# Patient Record
Sex: FEMALE | Race: AMERICAN INDIAN OR ALASKA NATIVE | Employment: OTHER | URBAN - METROPOLITAN AREA
[De-identification: names, ages, dates, MRNs, and addresses within clinical notes are randomized per-mention and may not be internally consistent; named-entity substitution may affect disease eponyms.]

---

## 2021-03-03 ENCOUNTER — HOSPITAL ENCOUNTER (OUTPATIENT)
Dept: LAB | Age: 56
Discharge: HOME | End: 2021-03-03
Payer: COMMERCIAL

## 2021-03-03 DIAGNOSIS — Z13.220 SCREENING FOR CHOLESTEROL LEVEL: ICD-10-CM

## 2021-03-03 DIAGNOSIS — K21.9 GASTROESOPHAGEAL REFLUX DISEASE, UNSPECIFIED WHETHER ESOPHAGITIS PRESENT: ICD-10-CM

## 2021-03-03 DIAGNOSIS — E66.09 OBESITY DUE TO EXCESS CALORIES WITH SERIOUS COMORBIDITY, UNSPECIFIED CLASSIFICATION: ICD-10-CM

## 2021-03-03 DIAGNOSIS — Z11.59 ENCOUNTER FOR HEPATITIS C SCREENING TEST FOR LOW RISK PATIENT: ICD-10-CM

## 2021-03-03 DIAGNOSIS — K92.1 BLOOD IN STOOL: ICD-10-CM

## 2021-03-03 DIAGNOSIS — E03.9 ACQUIRED HYPOTHYROIDISM: ICD-10-CM

## 2021-03-03 DIAGNOSIS — R14.0 ABDOMINAL BLOATING: ICD-10-CM

## 2021-03-03 LAB
ALBUMIN SERPL-MCNC: 3.5 G/DL (ref 3.4–5)
ALBUMIN/GLOB SERPL: 1.1 {RATIO} (ref 0.8–1.7)
ALP SERPL-CCNC: 104 U/L (ref 45–117)
ALT SERPL-CCNC: 26 U/L (ref 13–56)
ANION GAP SERPL CALC-SCNC: 2 MMOL/L (ref 3–18)
AST SERPL-CCNC: 21 U/L (ref 10–38)
BASOPHILS # BLD: 0.1 K/UL (ref 0–0.1)
BASOPHILS NFR BLD: 1 % (ref 0–2)
BILIRUB SERPL-MCNC: 0.4 MG/DL (ref 0.2–1)
BUN SERPL-MCNC: 12 MG/DL (ref 7–18)
BUN/CREAT SERPL: 13 (ref 12–20)
CALCIUM SERPL-MCNC: 9.2 MG/DL (ref 8.5–10.1)
CHLORIDE SERPL-SCNC: 103 MMOL/L (ref 100–111)
CHOLEST SERPL-MCNC: 206 MG/DL
CO2 SERPL-SCNC: 33 MMOL/L (ref 21–32)
CREAT SERPL-MCNC: 0.91 MG/DL (ref 0.6–1.3)
DIFFERENTIAL METHOD BLD: ABNORMAL
EOSINOPHIL # BLD: 0.5 K/UL (ref 0–0.4)
EOSINOPHIL NFR BLD: 5 % (ref 0–5)
ERYTHROCYTE [DISTWIDTH] IN BLOOD BY AUTOMATED COUNT: 15 % (ref 11.6–14.5)
EST. AVERAGE GLUCOSE BLD GHB EST-MCNC: 114 MG/DL
GLOBULIN SER CALC-MCNC: 3.3 G/DL (ref 2–4)
GLUCOSE SERPL-MCNC: 89 MG/DL (ref 74–99)
HBA1C MFR BLD: 5.6 % (ref 4.2–5.6)
HCT VFR BLD AUTO: 47.2 % (ref 35–45)
HDLC SERPL-MCNC: 45 MG/DL (ref 40–60)
HDLC SERPL: 4.6 {RATIO} (ref 0–5)
HGB BLD-MCNC: 14.8 G/DL (ref 12–16)
LDLC SERPL CALC-MCNC: 128.8 MG/DL (ref 0–100)
LIPID PROFILE,FLP: ABNORMAL
LYMPHOCYTES # BLD: 2.4 K/UL (ref 0.9–3.6)
LYMPHOCYTES NFR BLD: 24 % (ref 21–52)
MCH RBC QN AUTO: 30.5 PG (ref 24–34)
MCHC RBC AUTO-ENTMCNC: 31.4 G/DL (ref 31–37)
MCV RBC AUTO: 97.1 FL (ref 74–97)
MONOCYTES # BLD: 0.6 K/UL (ref 0.05–1.2)
MONOCYTES NFR BLD: 6 % (ref 3–10)
NEUTS SEG # BLD: 6.6 K/UL (ref 1.8–8)
NEUTS SEG NFR BLD: 64 % (ref 40–73)
PLATELET # BLD AUTO: 215 K/UL (ref 135–420)
PMV BLD AUTO: 10.6 FL (ref 9.2–11.8)
POTASSIUM SERPL-SCNC: 5 MMOL/L (ref 3.5–5.5)
PROT SERPL-MCNC: 6.8 G/DL (ref 6.4–8.2)
RBC # BLD AUTO: 4.86 M/UL (ref 4.2–5.3)
SODIUM SERPL-SCNC: 138 MMOL/L (ref 136–145)
T4 FREE SERPL-MCNC: 1.2 NG/DL (ref 0.7–1.5)
TRIGL SERPL-MCNC: 161 MG/DL (ref ?–150)
TSH SERPL DL<=0.05 MIU/L-ACNC: 3.58 UIU/ML (ref 0.36–3.74)
VLDLC SERPL CALC-MCNC: 32.2 MG/DL
WBC # BLD AUTO: 10.1 K/UL (ref 4.6–13.2)

## 2021-03-03 PROCEDURE — 84443 ASSAY THYROID STIM HORMONE: CPT

## 2021-03-03 PROCEDURE — 80061 LIPID PANEL: CPT

## 2021-03-03 PROCEDURE — 84439 ASSAY OF FREE THYROXINE: CPT

## 2021-03-03 PROCEDURE — 83036 HEMOGLOBIN GLYCOSYLATED A1C: CPT

## 2021-03-03 PROCEDURE — 86803 HEPATITIS C AB TEST: CPT

## 2021-03-03 PROCEDURE — 85025 COMPLETE CBC W/AUTO DIFF WBC: CPT

## 2021-03-03 PROCEDURE — 80053 COMPREHEN METABOLIC PANEL: CPT

## 2021-03-03 PROCEDURE — 36415 COLL VENOUS BLD VENIPUNCTURE: CPT

## 2021-03-04 LAB
HCV AB SER IA-ACNC: 0.08 INDEX
HCV AB SERPL QL IA: NEGATIVE
HCV COMMENT,HCGAC: NORMAL

## 2021-03-05 ENCOUNTER — TELEPHONE (OUTPATIENT)
Dept: CARDIOLOGY CLINIC | Age: 56
End: 2021-03-05

## 2021-03-05 NOTE — TELEPHONE ENCOUNTER
Per Cinthya Lee NP to be seen for Tachycardia I have called and spoken to patient and she Is aware of her appointment with Dr Nancy Plaza in the [place] office on 3/19/21 @ 12:00.

## 2021-03-15 ENCOUNTER — VIRTUAL VISIT (OUTPATIENT)
Dept: FAMILY MEDICINE CLINIC | Age: 56
End: 2021-03-15
Payer: COMMERCIAL

## 2021-03-15 DIAGNOSIS — Z87.898 HISTORY OF ASCITES: ICD-10-CM

## 2021-03-15 DIAGNOSIS — R51.9 NONINTRACTABLE HEADACHE, UNSPECIFIED CHRONICITY PATTERN, UNSPECIFIED HEADACHE TYPE: ICD-10-CM

## 2021-03-15 DIAGNOSIS — Z71.2 ENCOUNTER TO DISCUSS TEST RESULTS: Primary | ICD-10-CM

## 2021-03-15 DIAGNOSIS — F32.A DEPRESSION, UNSPECIFIED DEPRESSION TYPE: ICD-10-CM

## 2021-03-15 DIAGNOSIS — M25.473 ANKLE SWELLING, UNSPECIFIED LATERALITY: ICD-10-CM

## 2021-03-15 DIAGNOSIS — E03.9 ACQUIRED HYPOTHYROIDISM: ICD-10-CM

## 2021-03-15 DIAGNOSIS — G62.9 NEUROPATHY: ICD-10-CM

## 2021-03-15 DIAGNOSIS — R00.0 TACHYCARDIA: ICD-10-CM

## 2021-03-15 DIAGNOSIS — K92.1 BLOOD IN STOOL: ICD-10-CM

## 2021-03-15 DIAGNOSIS — F43.10 PTSD (POST-TRAUMATIC STRESS DISORDER): ICD-10-CM

## 2021-03-15 DIAGNOSIS — K57.92 DIVERTICULITIS: ICD-10-CM

## 2021-03-15 DIAGNOSIS — R14.0 ABDOMINAL BLOATING: ICD-10-CM

## 2021-03-15 DIAGNOSIS — K21.9 GASTROESOPHAGEAL REFLUX DISEASE, UNSPECIFIED WHETHER ESOPHAGITIS PRESENT: ICD-10-CM

## 2021-03-15 DIAGNOSIS — J45.909 ASTHMA, UNSPECIFIED ASTHMA SEVERITY, UNSPECIFIED WHETHER COMPLICATED, UNSPECIFIED WHETHER PERSISTENT: ICD-10-CM

## 2021-03-15 PROCEDURE — 99214 OFFICE O/P EST MOD 30 MIN: CPT | Performed by: NURSE PRACTITIONER

## 2021-03-15 RX ORDER — ALBUTEROL SULFATE 90 UG/1
2 AEROSOL, METERED RESPIRATORY (INHALATION)
Qty: 1 INHALER | Refills: 0 | Status: SHIPPED | OUTPATIENT
Start: 2021-03-15 | End: 2021-04-12 | Stop reason: SDUPTHER

## 2021-03-15 NOTE — PROGRESS NOTES
Antonio Hays presents today for   Chief Complaint   Patient presents with    Follow-up     3 week    Results     discuss lab results    Other     Lolis Tavaresas George preferred language for health care discussion is english/other. Is someone accompanying this pt? no    Is the patient using any DME equipment during [address]? no    Depression Screening:  3 most recent PHQ Screens 3/15/2021   Little interest or pleasure in doing things More than half the days   Feeling down, depressed, irritable, or hopeless More than half the days   Total Score PHQ 2 4   Trouble falling or staying asleep, or sleeping too much Not at all   Feeling tired or having little energy Not at all   Poor appetite, weight loss, or overeating Not at all   Feeling bad about yourself - or that you are a failure or have let yourself or your family down Nearly every day   Trouble concentrating on things such as school, work, reading, or watching TV More than half the days   Moving or speaking so slowly that other people could have noticed; or the opposite being so fidgety that others notice Not at all   Thoughts of being better off dead, or hurting yourself in some way More than half the days   PHQ 9 Score 11   How difficult have these problems made it for you to do your work, take care of your home and get along with others Not difficult at all       Learning Assessment:  Learning Assessment 2/22/2021   PRIMARY LEARNER Patient   HIGHEST LEVEL OF EDUCATION - PRIMARY LEARNER  2 YEARS [address] CAREGIVER No   PRIMARY LANGUAGE ENGLISH    NEED No   LEARNER PREFERENCE PRIMARY DEMONSTRATION   ANSWERED BY patient   RELATIONSHIP SELF       Abuse Screening:  Abuse Screening Questionnaire 2/22/2021   Do you ever feel afraid of your partner? N   Are you in a relationship with someone who physically or mentally threatens you? N   Is it safe for you to go home?  Y       Generalized Anxiety  No flowsheet data found. Health Maintenance Due   Topic Date Due    Pneumococcal 0-64 years (1 of 1 - PPSV23) Never done    COVID-19 Vaccine (1) Never done    DTaP/Tdap/Td series (1 - Tdap) Never done    PAP AKA CERVICAL CYTOLOGY  Never done    Shingrix Vaccine Age 50> (1 of 2) Never done    Colorectal Cancer Screening Combo  Never done    Breast Cancer Screen Mammogram  Never done    Flu Vaccine (1) 09/01/2020   . Health Maintenance reviewed and discussed and ordered per Provider. Coordination of Care:  1. Have you been to the ER, urgent care clinic since your last visit? Hospitalized since your last visit? no    2. Have you seen or consulted any other health care providers outside of the [address] [place] [address] since your last visit? Include any pap smears or colon screening.  no      Advance Directive:  Discussed 2/22/21
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
## 2021-03-19 ENCOUNTER — OFFICE VISIT (OUTPATIENT)
Dept: CARDIOLOGY CLINIC | Age: 56
End: 2021-03-19
Payer: COMMERCIAL

## 2021-03-19 VITALS
DIASTOLIC BLOOD PRESSURE: 90 MMHG | BODY MASS INDEX: 47.09 KG/M2 | SYSTOLIC BLOOD PRESSURE: 175 MMHG | HEIGHT: 66 IN | TEMPERATURE: 97.4 F | HEART RATE: 72 BPM | WEIGHT: 293 LBS

## 2021-03-19 DIAGNOSIS — I50.33 ACUTE ON CHRONIC DIASTOLIC CONGESTIVE HEART FAILURE (HCC): Primary | ICD-10-CM

## 2021-03-19 DIAGNOSIS — I10 ESSENTIAL HYPERTENSION: ICD-10-CM

## 2021-03-19 DIAGNOSIS — E66.01 SEVERE OBESITY (BMI >= 40) (HCC): ICD-10-CM

## 2021-03-19 PROCEDURE — 93000 ELECTROCARDIOGRAM COMPLETE: CPT | Performed by: INTERNAL MEDICINE

## 2021-03-19 PROCEDURE — 99205 OFFICE O/P NEW HI 60 MIN: CPT | Performed by: INTERNAL MEDICINE

## 2021-03-19 RX ORDER — LANOLIN ALCOHOL/MO/W.PET/CERES
400 CREAM (GRAM) TOPICAL DAILY
Qty: 30 TAB | Refills: 1 | Status: SHIPPED | OUTPATIENT
Start: 2021-03-19 | End: 2021-05-24 | Stop reason: SDUPTHER

## 2021-03-19 RX ORDER — FUROSEMIDE 40 MG/1
40 TABLET ORAL 2 TIMES DAILY
Qty: 60 TAB | Refills: 1 | Status: SHIPPED | OUTPATIENT
Start: 2021-03-19 | End: 2021-04-26

## 2021-03-19 RX ORDER — SPIRONOLACTONE 25 MG/1
25 TABLET ORAL DAILY
Qty: 30 TAB | Refills: 1 | Status: SHIPPED | OUTPATIENT
Start: 2021-03-19 | End: 2021-04-26 | Stop reason: SDUPTHER

## 2021-03-19 RX ORDER — EPINEPHRINE 1 MG/ML
INJECTION, SOLUTION, CONCENTRATE INTRAVENOUS ONCE
COMMUNITY

## 2021-03-19 NOTE — PROGRESS NOTES
HISTORY OF PRESENT ILLNESS  Malachi Carrasco is a 48 y.o. female. 3/21 seen as new patient. H/o TR, MR, idiopathic tachycardia, edema, neuropathy    New Patient  The history is provided by the medical records and patient. Associated symptoms include shortness of breath. Pertinent negatives include no chest pain and no headaches. Palpitations   The history is provided by the patient. The current episode started more than 1 week ago. The problem has been resolved (since thyroid medicine given, last episode >2yrs ago). Episode frequency: racing. Associated symptoms include lower extremity edema and shortness of breath. Pertinent negatives include no fever, no malaise/fatigue, no chest pain, no claudication, no orthopnea, no PND, no nausea, no vomiting, no headaches, no dizziness and no cough. Shortness of Breath  The history is provided by the patient. This is a new problem. The current episode started more than 1 week ago (yrs). The problem has been gradually worsening (few months). Associated symptoms include leg swelling. Pertinent negatives include no fever, no headaches, no cough, no wheezing, no PND, no orthopnea, no chest pain, no vomiting, no rash and no claudication. Sore throat: walking out of the house. The problem's precipitants include exercise. Leg Swelling  The history is provided by the patient. This is a new problem. The current episode started more than 1 week ago (yrs). The problem occurs daily. The problem has been gradually worsening (few months). Associated symptoms include shortness of breath. Pertinent negatives include no chest pain and no headaches. The symptoms are aggravated by standing. The symptoms are relieved by sleep and medications.      Allergies   Allergen Reactions    Ansaid [Flurbiprofen] Anaphylaxis    Keflex [Cephalexin] Hives and Itching       Past Medical History:   Diagnosis Date    Anemia     Angio-edema     Asthma     Depression     Diverticulosis     Enlargement, spleen     Esophagospasm     Essential hypertension     Fatty liver     GERD (gastroesophageal reflux disease)     Histoplasmosis     HPV (human papilloma virus) anogenital infection     Lyme disease     Neuropathy     PCOS (polycystic ovarian syndrome)     PTSD (post-traumatic stress disorder)     Tachycardia     Thyroid disease     Tricuspid insufficiency        Family History   Problem Relation Age of Onset    Cancer Mother         stomach    Seizures Mother     Cancer Sister         double mastectomy    Cancer Sister         lung    Heart Surgery Maternal Grandmother         CABG    Stroke Neg Hx        Social History     Tobacco Use    Smoking status: Current Every Day Smoker     Packs/day: 0.50     Years: 15.00     Pack years: 7.50     Start date: 1985    Smokeless tobacco: Never Used   Substance Use Topics    Alcohol use: Not Currently    Drug use: Not Currently        Current Outpatient Medications   Medication Sig    EPINEPHrine HCl, PF, (ADRENALIN) 1 mg/mL (1 mL) injection once.  albuterol (PROVENTIL HFA, VENTOLIN HFA, PROAIR HFA) 90 mcg/actuation inhaler Take 2 Puffs by inhalation every four (4) hours as needed for Wheezing.  omeprazole (PRILOSEC) 40 mg capsule     magnesium oxide (MAG-OX) 400 mg tablet     ergocalciferol (ERGOCALCIFEROL) 1,250 mcg (50,000 unit) capsule     cyanocobalamin 1,000 mcg tablet     fluticasone propionate (Flovent HFA) 220 mcg/actuation inhaler Take 1 Puff by inhalation two (2) times a day.  traMADoL (ULTRAM) 50 mg tablet Take 50 mg by mouth as needed for Pain.  cyclobenzaprine (FLEXERIL) 10 mg tablet Take  by mouth as needed for Muscle Spasm(s).  levothyroxine (SYNTHROID) 100 mcg tablet Take 100 mcg by mouth Daily (before breakfast).  furosemide (Lasix) 40 mg tablet Take 40 mg by mouth daily. 1/2 tab    cetirizine (ZyrTEC) 10 mg tablet Take 10 mg by mouth daily.     potassium chloride (K-DUR, KLOR-CON) 20 mEq tablet Take 1 Tab by mouth daily. No current facility-administered medications for this visit. Past Surgical History:   Procedure Laterality Date    HX CHOLECYSTECTOMY      HX HEART CATHETERIZATION  2010 approx    normal per patient    HX HEMORRHOIDECTOMY      HX OTHER SURGICAL      Vocal cord polyps       Visit Vitals  BP (!) 175/90   Pulse 72   Temp 97.4 °F (36.3 °C) (Temporal)   Ht 5' 6\" (1.676 m)   Wt 151.8 kg (334 lb 9.6 oz)   BMI 54.01 kg/m²       Diagnostic Studies:  I have reviewed the relevant tests done on the patient and show as follows  EKG tracings reviewed by me today. EKG Results     Procedure 720 Value Units Date/Time    AMB POC EKG ROUTINE W/ 12 LEADS, INTER & REP [[id number]] Resulted: 03/19/21 1242    Order Status: Completed Updated: 03/19/21 1234        XR Results (most recent):  No results found for this or any previous visit. Ms. Luis Lazo has a reminder for a \"due or due soon\" health maintenance. I have asked that she contact her primary care provider for follow-up on this health maintenance. Review of Systems   Constitutional: Negative for chills, fever, malaise/fatigue and weight loss. HENT: Negative for nosebleeds. Sore throat: walking out of the house. Eyes: Negative for discharge. Respiratory: Positive for shortness of breath. Negative for cough and wheezing. Cardiovascular: Positive for palpitations and leg swelling. Negative for chest pain, orthopnea, claudication and PND. Gastrointestinal: Negative for diarrhea, nausea and vomiting. Genitourinary: Negative for dysuria and hematuria. Musculoskeletal: Negative for joint pain. Skin: Negative for rash. Neurological: Negative for dizziness, seizures, loss of consciousness and headaches. Endo/Heme/Allergies: Negative for polydipsia. Does not bruise/bleed easily. Psychiatric/Behavioral: Negative for depression and substance abuse. The patient does not have insomnia.     11/20 echo  ECHOCARDIOGRAM COMPLETE  (DOPPLER ECHO)11/20/2020  [facility]  Result Impression   :   POOR QUALITY 2-DIMENSIONAL IMAGES   DEFINITY CONTRAST UTILIZED TO ENHANCE ENDOCARDIAL BORDER DEFINITION   LEFT VENTRICULAR HYPERTROPHY   GROSSLY NORMAL LEFT VENTRICULAR CAVITY SIZE AND SYSTOLIC FUNCTION   COULD NOT ACCURATELY QUANTIFY EJECTION FRACTION   GRADE 1 DIASTOLIC DYSFUNCTION   RIGHT VENTRICLE NOT VISUALIZED   GROSSLY NORMAL LEFT ATRIAL SIZE   RIGHT ATRIUM NOT VISUALIZED   NO OBVIOUS HEMODYNAMICALLY SIGNIFICANT VALVE PATHOLOGY   RVSP - NA     Physical Exam   Constitutional: She is oriented to person, place, and time. She appears well-developed and well-nourished. No distress. Morbid obesity   HENT:   Head: Normocephalic and atraumatic. Mouth/Throat: Normal dentition. Eyes: Right eye exhibits no discharge. Left eye exhibits no discharge. No scleral icterus. Neck: Neck supple. No JVD present. Carotid bruit is not present. No thyromegaly present. Cardiovascular: Normal rate, regular rhythm, S1 normal, S2 normal, normal heart sounds and intact distal pulses. Exam reveals no gallop and no friction rub. No murmur heard. Pulmonary/Chest: Effort normal and breath sounds normal. She has no wheezes. She has no rales. Abdominal: Soft. She exhibits no mass. There is no abdominal tenderness. Musculoskeletal:         General: No edema. Lymphadenopathy:        Right cervical: No superficial cervical adenopathy present. Left cervical: No superficial cervical adenopathy present. Neurological: She is alert and oriented to person, place, and time. Skin: Skin is warm and dry. No rash noted. Psychiatric: She has a normal mood and affect. Her behavior is normal.       ASSESSMENT and PLAN    LIPIDS : Results for Becca Blake (MRN [id number]) as of 3/19/2021 13:01   Ref.  Range 3/3/2021 12:20   Triglyceride Latest Ref Range: <150 MG/ (H)   Cholesterol, total Latest Ref Range: <200 MG/ (H)   HDL Cholesterol Latest Ref Range: 40 - 60 MG/DL 45   CHOL/HDL Ratio Latest Ref Range: 0 - 5.0   4.6   VLDL, calculated Latest Units: MG/DL 32.2   LDL, calculated Latest Ref Range: 0 - 100 MG/.8 (H)         Diagnoses and all orders for this visit:    1. Acute on chronic diastolic congestive heart failure (HCC)  -     AMB POC EKG ROUTINE W/ 12 LEADS, INTER & REP  -     furosemide (Lasix) 40 mg tablet; Take 1 Tab by mouth two (2) times a day. -     spironolactone (ALDACTONE) 25 mg tablet; Take 1 Tab by mouth daily.  -     magnesium oxide (MAG-OX) 400 mg tablet; Take 1 Tab by mouth daily.  -     METABOLIC PANEL, BASIC; Future  -     METABOLIC PANEL, BASIC; Future    2. Severe obesity (BMI >= 40) ([facility])    3. Essential hypertension        Pertinent laboratory and test data reviewed and discussed with patient. See patient instructions also for other medical advice given    Medications Discontinued During This Encounter   Medication Reason    magnesium oxide (MAG-OX) 400 mg tablet REORDER    furosemide (Lasix) 40 mg tablet REORDER       Follow-up and Dispositions    · Return in about 6 weeks (around 4/30/2021), or if symptoms worsen or fail to improve, for post test.       3/19/2021 seen as new patient for changing the cardiologist.  She cannot locate her old cardiologist and so is changing. She has acute on chronic CHF which is diastolic and right heart failure mostly. Blood pressure is elevated today but is normal most of the times at home. CHF is secondary to morbid obesity most likely. She will need to be worked up for sleep apnea. History of Lyme disease for which she was treated with doxycycline a year. Apparently she is still positive for it and I recommended ID opinion for that. Increase Lasix to 40 twice daily add spironolactone. Told her not to use any over-the-counter potassium but does take the prescription pills so we can follow the labs closely and replace as needed. Continue magnesium.   History of significant electrolyte problems in the past.  Told her to come to ER if she does not feel well. Extensive review of previous records and face-to-face time was more than 60 minutes.

## 2021-03-19 NOTE — PATIENT INSTRUCTIONS
Learning About the [address] [facility] Diet  What is the Mediterranean diet? The Mediterranean diet is a style of eating rather than a diet plan. It features foods eaten in [place], [place], [place] and [place], and other countries along the [facility]. It emphasizes eating foods like fish, fruits, vegetables, beans, high-fiber breads and whole grains, nuts, and olive oil. This style of eating includes limited red meat, cheese, and sweets. Why choose the Mediterranean diet? A Mediterranean-style diet may improve heart health. It contains more fat than other heart-healthy diets. But the fats are mainly from nuts, unsaturated oils (such as fish oils and olive oil), and certain nut or seed oils (such as canola, soybean, or flaxseed oil). These fats may help protect the heart and blood vessels. How can you get started on the Mediterranean diet? Here are some things you can do to switch to a more Mediterranean way of eating. What to eat  · Eat a variety of fruits and vegetables each day, such as grapes, blueberries, tomatoes, broccoli, peppers, figs, olives, spinach, eggplant, beans, lentils, and chickpeas. · Eat a variety of whole-grain foods each day, such as oats, brown rice, and whole wheat bread, pasta, and couscous. · Eat fish at least 2 times a week. Try tuna, salmon, mackerel, lake trout, herring, or sardines. · Eat moderate amounts of low-fat dairy products, such as milk, cheese, or yogurt. · Eat moderate amounts of poultry and eggs. · Choose healthy (unsaturated) fats, such as nuts, olive oil, and certain nut or seed oils like canola, soybean, and flaxseed. · Limit unhealthy (saturated) fats, such as butter, palm oil, and coconut oil. And limit fats found in animal products, such as meat and dairy products made with whole milk. Try to eat red meat only a few times a month in very small amounts. · Limit sweets and desserts to only a few times a week.  This includes sugar-sweetened drinks like soda. The Mediterranean diet may also include red wine with your meal--1 glass each day for women and up to 2 glasses a day for men. Tips for eating at home  · Use herbs, spices, garlic, lemon zest, and citrus juice instead of salt to add flavor to foods. · Add avocado slices to your sandwich instead of salazar. · Have fish for lunch or dinner instead of red meat. Brush the fish with olive oil, and broil or grill it. · Sprinkle your salad with seeds or nuts instead of cheese. · Cook with olive or canola oil instead of butter or oils that are high in saturated fat. · Switch from 2% milk or whole milk to 1% or fat-free milk. · Dip raw vegetables in a vinaigrette dressing or hummus instead of dips made from mayonnaise or sour cream.  · Have a piece of fruit for dessert instead of a piece of cake. Try baked apples, or have some dried fruit. Tips for eating out  · Try broiled, grilled, baked, or poached fish instead of having it fried or breaded. · Ask your  to have your meals prepared with olive oil instead of butter. · Order dishes made with marinara sauce or sauces made from olive oil. Avoid sauces made from cream or mayonnaise. · Choose whole-grain breads, whole wheat pasta and pizza crust, brown rice, beans, and lentils. · Cut back on butter or margarine on bread. Instead, you can dip your bread in a small amount of olive oil. · Ask for a side salad or grilled vegetables instead of french fries or chips. Where can you learn more? Go to http://www.apple.com/  Enter O407 in the search box to learn more about \"Learning About the Mediterranean Diet. \"  Current as of: August 22, 2019               Content Version: 12.6  © 4736-9554 [employer]. Care instructions adapted under license by [employer] (which disclaims liability or warranty for this information).  If you have questions about a medical condition or this instruction, always ask your healthcare professional. [address] any warranty or liability for your use of this information.

## 2021-03-25 ENCOUNTER — HOSPITAL ENCOUNTER (OUTPATIENT)
Dept: LAB | Age: 56
Discharge: HOME | End: 2021-03-25
Payer: COMMERCIAL

## 2021-03-25 DIAGNOSIS — I50.33 ACUTE ON CHRONIC DIASTOLIC CONGESTIVE HEART FAILURE (HCC): ICD-10-CM

## 2021-03-25 LAB
ANION GAP SERPL CALC-SCNC: 4 MMOL/L (ref 3–18)
BUN SERPL-MCNC: 13 MG/DL (ref 7–18)
BUN/CREAT SERPL: 14 (ref 12–20)
CALCIUM SERPL-MCNC: 9.6 MG/DL (ref 8.5–10.1)
CHLORIDE SERPL-SCNC: 101 MMOL/L (ref 100–111)
CO2 SERPL-SCNC: 33 MMOL/L (ref 21–32)
CREAT SERPL-MCNC: 0.95 MG/DL (ref 0.6–1.3)
GLUCOSE SERPL-MCNC: 98 MG/DL (ref 74–99)
POTASSIUM SERPL-SCNC: 4.5 MMOL/L (ref 3.5–5.5)
SODIUM SERPL-SCNC: 138 MMOL/L (ref 136–145)

## 2021-03-25 PROCEDURE — 36415 COLL VENOUS BLD VENIPUNCTURE: CPT

## 2021-03-25 PROCEDURE — 80048 BASIC METABOLIC PNL TOTAL CA: CPT

## 2021-03-30 ENCOUNTER — OFFICE VISIT (OUTPATIENT)
Dept: CARDIOLOGY CLINIC | Age: 56
End: 2021-03-30
Payer: COMMERCIAL

## 2021-03-30 VITALS
HEART RATE: 79 BPM | WEIGHT: 293 LBS | HEIGHT: 66 IN | BODY MASS INDEX: 47.09 KG/M2 | SYSTOLIC BLOOD PRESSURE: 158 MMHG | TEMPERATURE: 97.3 F | DIASTOLIC BLOOD PRESSURE: 100 MMHG

## 2021-03-30 DIAGNOSIS — E66.01 SEVERE OBESITY (BMI >= 40) (HCC): ICD-10-CM

## 2021-03-30 DIAGNOSIS — I10 ESSENTIAL HYPERTENSION: ICD-10-CM

## 2021-03-30 DIAGNOSIS — I50.33 ACUTE ON CHRONIC DIASTOLIC CONGESTIVE HEART FAILURE (HCC): Primary | ICD-10-CM

## 2021-03-30 PROCEDURE — 99214 OFFICE O/P EST MOD 30 MIN: CPT | Performed by: NURSE PRACTITIONER

## 2021-03-30 NOTE — PATIENT INSTRUCTIONS
Heart-Healthy Diet: Care Instructions  Your Care Instructions     A heart-healthy diet has lots of vegetables, fruits, nuts, beans, and whole grains, and is low in salt. It limits foods that are high in saturated fat, such as meats, cheeses, and fried foods. It may be hard to change your diet, but even small changes can lower your risk of heart attack and heart disease. Follow-up care is a key part of your treatment and safety. Be sure to make and go to all appointments, and call your doctor if you are having problems. It's also a good idea to know your test results and keep a list of the medicines you take. How can you care for yourself at home? Watch your portions  · Learn what a serving is. A \"serving\" and a \"portion\" are not always the same thing. Make sure that you are not eating larger portions than are recommended. For example, a serving of pasta is ½ cup. A serving size of meat is 2 to 3 ounces. A 3-ounce serving is about the size of a deck of cards. Measure serving sizes until you are good at [place]" them. Keep in mind that restaurants often serve portions that are 2 or 3 times the size of one serving. · To keep your energy level up and keep you from feeling hungry, eat often but in smaller portions. · Eat only the number of calories you need to stay at a healthy weight. If you need to lose weight, eat fewer calories than your body burns (through exercise and other physical activity). Eat more fruits and vegetables  · Eat a variety of fruit and vegetables every day. Dark green, deep orange, red, or yellow fruits and vegetables are especially good for you. Examples include spinach, carrots, peaches, and berries. · Keep carrots, celery, and other veggies handy for snacks. Buy fruit that is in season and store it where you can see it so that you will be tempted to eat it. · Cook dishes that have a lot of veggies in them, such as stir-fries and soups.   Limit saturated and trans fat  · Read food labels, and try to avoid saturated and trans fats. They increase your risk of heart disease. · Use olive or canola oil when you cook. · Bake, broil, grill, or steam foods instead of frying them. · Choose lean meats instead of high-fat meats such as hot dogs and sausages. Cut off all visible fat when you prepare meat. · Eat fish, skinless poultry, and meat alternatives such as soy products instead of high-fat meats. Soy products, such as tofu, may be especially good for your heart. · Choose low-fat or fat-free milk and dairy products. Eat foods high in fiber  · Eat a variety of grain products every day. Include whole-grain foods that have lots of fiber and nutrients. Examples of whole-grain foods include oats, whole wheat bread, and brown rice. · Buy whole-grain breads and cereals, instead of white bread or pastries. Limit salt and sodium  · Limit how much salt and sodium you eat to help lower your blood pressure. · Taste food before you salt it. Add only a little salt when you think you need it. With time, your taste buds will adjust to less salt. · Eat fewer snack items, fast foods, and other high-salt, processed foods. Check food labels for the amount of sodium in packaged foods. · Choose low-sodium versions of canned goods (such as soups, vegetables, and beans). Limit sugar  · Limit drinks and foods with added sugar. These include candy, desserts, and soda pop. Limit alcohol  · Limit alcohol to no more than 2 drinks a day for men and 1 drink a day for women. Too much alcohol can cause health problems. When should you call for help? Watch closely for changes in your health, and be sure to contact your doctor if:    · You would like help planning heart-healthy meals. Where can you learn more? Go to http://www.[employer].com/  Enter V137 in the search box to learn more about \"Heart-Healthy Diet: Care Instructions. \"  Current as of: August 22, 2019               Content Version: 12.6  © 0298-7711 [employer]. Care instructions adapted under license by [employer] (which disclaims liability or warranty for this information). If you have questions about a medical condition or this instruction, always ask your healthcare professional. [address] any warranty or liability for your use of this information.

## 2021-03-30 NOTE — PROGRESS NOTES
HISTORY OF PRESENT ILLNESS  Travon Sterling is a 48 y.o. female. 3/21 seen as new patient. H/o TR, MR, idiopathic tachycardia, edema, neuropathy  3/30/2021  Seen for follow-up for diastolic heart failure. Patient is very tearful and anxious with 3 pages of questions. She denies chest pain shortness of breath palpitations. She reports bilateral lower extremity edema is improving. She reports that she is following with vascular for chronic venous insufficiency. She complains of chronic neuropathic pain and has requested pain medication or sleep aid. New Patient  The history is provided by the medical records and patient. Pertinent negatives include no chest pain, no headaches and no shortness of breath. Palpitations   The history is provided by the patient. The current episode started more than 1 week ago. The problem has been resolved (since thyroid medicine given, last episode >2yrs ago). Episode frequency: racing. Associated symptoms include lower extremity edema. Pertinent negatives include no fever, no malaise/fatigue, no chest pain, no claudication, no orthopnea, no PND, no nausea, no vomiting, no headaches, no dizziness, no cough and no shortness of breath. Her past medical history is significant for hypertension. Shortness of Breath  The history is provided by the patient. This is a new problem. The current episode started more than 1 week ago (yrs). The problem has been gradually worsening (few months). Associated symptoms include leg swelling. Pertinent negatives include no fever, no headaches, no cough, no wheezing, no PND, no orthopnea, no chest pain, no vomiting, no rash and no claudication. Sore throat: walking out of the house. The problem's precipitants include exercise. Leg Swelling  The history is provided by the patient. This is a new problem. The current episode started more than 1 week ago (yrs). The problem occurs daily. The problem has been gradually worsening (few months). Pertinent negatives include no chest pain, no headaches and no shortness of breath. The symptoms are aggravated by standing. The symptoms are relieved by sleep and medications. CHF  Pertinent negatives include no chest pain, no headaches and no shortness of breath. Hypertension  Pertinent negatives include no chest pain, no headaches and no shortness of breath. Allergies   Allergen Reactions    Ansaid [Flurbiprofen] Anaphylaxis    Keflex [Cephalexin] Hives and Itching       Past Medical History:   Diagnosis Date    Anemia     Angio-edema     Asthma     Depression     Diverticulosis     Enlargement, spleen     Esophagospasm     Essential hypertension     Fatty liver     GERD (gastroesophageal reflux disease)     Histoplasmosis     HPV (human papilloma virus) anogenital infection     Lyme disease     Neuropathy     PCOS (polycystic ovarian syndrome)     PTSD (post-traumatic stress disorder)     Tachycardia     Thyroid disease     Tricuspid insufficiency        Family History   Problem Relation Age of Onset    Cancer Mother         stomach    Seizures Mother     Cancer Sister         double mastectomy    Cancer Sister         lung    Heart Surgery Maternal Grandmother         CABG    Stroke Neg Hx        Social History     Tobacco Use    Smoking status: Current Every Day Smoker     Packs/day: 0.50     Years: 15.00     Pack years: 7.50     Start date: 1985    Smokeless tobacco: Never Used    Tobacco comment: less than 10 depending on the day   Substance Use Topics    Alcohol use: Not Currently    Drug use: Not Currently        Current Outpatient Medications   Medication Sig    EPINEPHrine HCl, PF, (ADRENALIN) 1 mg/mL (1 mL) injection once.  furosemide (Lasix) 40 mg tablet Take 1 Tab by mouth two (2) times a day.  spironolactone (ALDACTONE) 25 mg tablet Take 1 Tab by mouth daily.  magnesium oxide (MAG-OX) 400 mg tablet Take 1 Tab by mouth daily.     albuterol (PROVENTIL HFA, VENTOLIN HFA, PROAIR HFA) 90 mcg/actuation inhaler Take 2 Puffs by inhalation every four (4) hours as needed for Wheezing.  omeprazole (PRILOSEC) 40 mg capsule     ergocalciferol (ERGOCALCIFEROL) 1,250 mcg (50,000 unit) capsule     cyanocobalamin 1,000 mcg tablet     fluticasone propionate (Flovent HFA) 220 mcg/actuation inhaler Take 1 Puff by inhalation two (2) times a day.  traMADoL (ULTRAM) 50 mg tablet Take 50 mg by mouth as needed for Pain.  cyclobenzaprine (FLEXERIL) 10 mg tablet Take  by mouth as needed for Muscle Spasm(s).  levothyroxine (SYNTHROID) 100 mcg tablet Take 100 mcg by mouth Daily (before breakfast).  cetirizine (ZyrTEC) 10 mg tablet Take 10 mg by mouth daily.  potassium chloride (K-DUR, KLOR-CON) 20 mEq tablet Take 1 Tab by mouth daily. No current facility-administered medications for this visit. Past Surgical History:   Procedure Laterality Date    HX CHOLECYSTECTOMY      HX HEART CATHETERIZATION  2010 approx    normal per patient    HX HEMORRHOIDECTOMY      HX OTHER SURGICAL      Vocal cord polyps       Visit Vitals  BP (!) 158/100   Pulse 79   Temp 97.3 °F (36.3 °C)   Ht 5' 6\" (1.676 m)   Wt 145.2 kg (320 lb)   LMP 02/15/2021   BMI 51.65 kg/m²       Diagnostic Studies:  I have reviewed the relevant tests done on the patient and show as follows  EKG tracings reviewed by me today. EKG Results     None        XR Results (most recent):  No results found for this or any previous visit. Ms. Cherry Aguilar has a reminder for a \"due or due soon\" health maintenance. I have asked that she contact her primary care provider for follow-up on this health maintenance. Review of Systems   Constitutional: Negative for chills, fever, malaise/fatigue and weight loss. HENT: Negative for nosebleeds. Sore throat: walking out of the house. Eyes: Negative for discharge. Respiratory: Negative for cough, shortness of breath and wheezing.     Cardiovascular: Positive for leg swelling. Negative for chest pain, palpitations, orthopnea, claudication and PND. Gastrointestinal: Negative for diarrhea, nausea and vomiting. Genitourinary: Negative for dysuria and hematuria. Musculoskeletal: Positive for joint pain and myalgias. Skin: Negative for rash. Neurological: Negative for dizziness, seizures, loss of consciousness and headaches. Endo/Heme/Allergies: Negative for polydipsia. Does not bruise/bleed easily. Psychiatric/Behavioral: Negative for depression and substance abuse. The patient is nervous/anxious and has insomnia. 11/20 echo  ECHOCARDIOGRAM COMPLETE  (DOPPLER ECHO)11/20/2020  [facility]  Result Impression   :   POOR QUALITY 2-DIMENSIONAL IMAGES   DEFINITY CONTRAST UTILIZED TO ENHANCE ENDOCARDIAL BORDER DEFINITION   LEFT VENTRICULAR HYPERTROPHY   GROSSLY NORMAL LEFT VENTRICULAR CAVITY SIZE AND SYSTOLIC FUNCTION   COULD NOT ACCURATELY QUANTIFY EJECTION FRACTION   GRADE 1 DIASTOLIC DYSFUNCTION   RIGHT VENTRICLE NOT VISUALIZED   GROSSLY NORMAL LEFT ATRIAL SIZE   RIGHT ATRIUM NOT VISUALIZED   NO OBVIOUS HEMODYNAMICALLY SIGNIFICANT VALVE PATHOLOGY   RVSP - NA     Physical Exam   Constitutional: She is oriented to person, place, and time. She appears well-developed and well-nourished. No distress. Morbid obesity   HENT:   Head: Normocephalic and atraumatic. Mouth/Throat: Normal dentition. Eyes: Right eye exhibits no discharge. Left eye exhibits no discharge. No scleral icterus. Neck: Neck supple. No JVD (unable to assess JVD due to obesity) present. Carotid bruit is not present. No thyromegaly present. Cardiovascular: Normal rate, regular rhythm, S1 normal, S2 normal, normal heart sounds and intact distal pulses. Exam reveals no gallop and no friction rub. No murmur heard. Pulmonary/Chest: Effort normal and breath sounds normal. No respiratory distress. She has no wheezes. She has no rales. Abdominal: Soft. She exhibits no mass.  There is no abdominal tenderness. Musculoskeletal:         General: Edema (1 BLE edema, with chronic stasis changes to skin) present. Lymphadenopathy:        Right cervical: No superficial cervical adenopathy present. Left cervical: No superficial cervical adenopathy present. Neurological: She is alert and oriented to person, place, and time. Skin: Skin is warm and dry. No rash noted. Psychiatric: She has a normal mood and affect. Her behavior is normal.   Nursing note and vitals reviewed. ASSESSMENT and PLAN    LIPIDS : Results for Maria Luz Tao (MRN [id number]) as of 3/19/2021 13:01   Ref. Range 3/3/2021 12:20   Triglyceride Latest Ref Range: <150 MG/ (H)   Cholesterol, total Latest Ref Range: <200 MG/ (H)   HDL Cholesterol Latest Ref Range: 40 - 60 MG/DL 45   CHOL/HDL Ratio Latest Ref Range: 0 - 5.0   4.6   VLDL, calculated Latest Units: MG/DL 32.2   LDL, calculated Latest Ref Range: 0 - 100 MG/.8 (H)         Diagnoses and all orders for this visit:    1. Acute on chronic diastolic congestive heart failure ([address]  -     ECHO ADULT COMPLETE; Future  -     REFERRAL TO PULMONARY DISEASE    2. Essential hypertension    3. Severe obesity (BMI >= 40) (HCC)        Pertinent laboratory and test data reviewed and discussed with patient. See patient instructions also for other medical advice given    There are no discontinued medications. Follow-up and Dispositions    · Return in 4 years (on 3/30/2025), or if symptoms worsen or fail to improve, for Please obtain records from cardiologist in [place], Follow up with Dr. Corie Lenz. 3/19/2021 seen as new patient for changing the cardiologist.  She cannot locate her old cardiologist and so is changing. She has acute on chronic CHF which is diastolic and right heart failure mostly. Blood pressure is elevated today but is normal most of the times at home. CHF is secondary to morbid obesity most likely.   She will need to be worked up for sleep apnea. History of Lyme disease for which she was treated with doxycycline a year. Apparently she is still positive for it and I recommended ID opinion for that. Increase Lasix to 40 twice daily add spironolactone. Told her not to use any over-the-counter potassium but does take the prescription pills so we can follow the labs closely and replace as needed. Continue magnesium. History of significant electrolyte problems in the past.  Told her to come to ER if she does not feel well. Extensive review of previous records and face-to-face time was more than 60 minutes. 3/30/2021  Seen in follow-up for chronic CHF. Prior echo reviewed poor image quality. Blood pressure is elevated in office today however significantly lower upon recheck. She complains of chronic pain and insomnia and has requested pain medication as well as sleep aid advised to follow-up with PCP. She denies chest pain, shortness of breath, palpitations. She reports improvement in bilateral lower extremity edema. She is very anxious tearful demanding answers and prognosis and life expectancy reassurance provided recommended to continue current plan of care with sleep study, weight loss, fluid status control and weight loss. She has requested follow-up echocardiogram which we have ordered. Recent BMP reviewed and discussed with patient potassium and renal function is stable. Have referred to pulmonary for sleep study. Encouraged diet and exercise to promote weight loss.   Follow-up with Dr. Gasper Seip as previously scheduled

## 2021-03-30 NOTE — PROGRESS NOTES
1. Have you been to the ER, urgent care clinic since your last visit? Hospitalized since your last visit? no    2. Have you seen or consulted any other health care providers outside of the [address] [place] [address] since your last visit? Include any pap smears or colon screening.  no

## 2021-04-12 DIAGNOSIS — J45.909 ASTHMA, UNSPECIFIED ASTHMA SEVERITY, UNSPECIFIED WHETHER COMPLICATED, UNSPECIFIED WHETHER PERSISTENT: ICD-10-CM

## 2021-04-12 RX ORDER — ALBUTEROL SULFATE 90 UG/1
2 AEROSOL, METERED RESPIRATORY (INHALATION)
Qty: 1 INHALER | Refills: 0 | Status: SHIPPED | OUTPATIENT
Start: 2021-04-12 | End: 2021-05-24 | Stop reason: SDUPTHER

## 2021-04-12 NOTE — TELEPHONE ENCOUNTER
Received faxed refill request from [facility]. Last appt 3/15/21, next appt 6/17/21, saw cardiology on 3/30/21. Labs done 3/3/21]    Requested Prescriptions     Pending Prescriptions Disp Refills    albuterol (PROVENTIL HFA, VENTOLIN HFA, PROAIR HFA) 90 mcg/actuation inhaler 1 Inhaler 0     Sig: Take 2 Puffs by inhalation every four (4) hours as needed for Wheezing.

## 2021-04-15 ENCOUNTER — HOSPITAL ENCOUNTER (OUTPATIENT)
Dept: LAB | Age: 56
Discharge: HOME | End: 2021-04-15
Payer: COMMERCIAL

## 2021-04-15 LAB
ANION GAP SERPL CALC-SCNC: 2 MMOL/L (ref 3–18)
BUN SERPL-MCNC: 11 MG/DL (ref 7–18)
BUN/CREAT SERPL: 16 (ref 12–20)
CALCIUM SERPL-MCNC: 9.2 MG/DL (ref 8.5–10.1)
CHLORIDE SERPL-SCNC: 102 MMOL/L (ref 100–111)
CO2 SERPL-SCNC: 34 MMOL/L (ref 21–32)
CREAT SERPL-MCNC: 0.67 MG/DL (ref 0.6–1.3)
GLUCOSE SERPL-MCNC: 71 MG/DL (ref 74–99)
POTASSIUM SERPL-SCNC: 4.6 MMOL/L (ref 3.5–5.5)
SODIUM SERPL-SCNC: 138 MMOL/L (ref 136–145)

## 2021-04-15 PROCEDURE — 36415 COLL VENOUS BLD VENIPUNCTURE: CPT

## 2021-04-15 PROCEDURE — 80048 BASIC METABOLIC PNL TOTAL CA: CPT

## 2021-04-16 ENCOUNTER — TELEPHONE (OUTPATIENT)
Dept: CARDIOLOGY CLINIC | Age: 56
End: 2021-04-16

## 2021-04-16 NOTE — TELEPHONE ENCOUNTER
Called and spoke to patient per Ampi NP reqarding labs, labs reviewed no significant abnormality. She voices understanding and acceptance of this advice and will call back if any further questions or concerns.

## 2021-04-16 NOTE — TELEPHONE ENCOUNTER
----- Message from Indira Gamez NP sent at 4/16/2021  3:34 PM EDT -----  reviewed labs no significant abnormality

## 2021-04-26 ENCOUNTER — OFFICE VISIT (OUTPATIENT)
Dept: CARDIOLOGY CLINIC | Age: 56
End: 2021-04-26

## 2021-04-26 VITALS
BODY MASS INDEX: 47.09 KG/M2 | HEIGHT: 66 IN | SYSTOLIC BLOOD PRESSURE: 158 MMHG | DIASTOLIC BLOOD PRESSURE: 99 MMHG | HEART RATE: 69 BPM | WEIGHT: 293 LBS

## 2021-04-26 DIAGNOSIS — I50.33 ACUTE ON CHRONIC DIASTOLIC CONGESTIVE HEART FAILURE (HCC): Primary | ICD-10-CM

## 2021-04-26 DIAGNOSIS — I10 ESSENTIAL HYPERTENSION: ICD-10-CM

## 2021-04-26 DIAGNOSIS — E66.01 SEVERE OBESITY (BMI >= 40) (HCC): ICD-10-CM

## 2021-04-26 DIAGNOSIS — Z86.39 HISTORY OF HYPOKALEMIA: ICD-10-CM

## 2021-04-26 PROCEDURE — 99214 OFFICE O/P EST MOD 30 MIN: CPT | Performed by: INTERNAL MEDICINE

## 2021-04-26 RX ORDER — SPIRONOLACTONE 25 MG/1
25 TABLET ORAL DAILY
Qty: 30 TAB | Refills: 3 | Status: SHIPPED | OUTPATIENT
Start: 2021-04-26 | End: 2021-05-27

## 2021-04-26 RX ORDER — FUROSEMIDE 40 MG/1
40 TABLET ORAL DAILY
Qty: 90 TAB | Refills: 1 | Status: SHIPPED | OUTPATIENT
Start: 2021-04-26

## 2021-04-26 RX ORDER — LISINOPRIL 5 MG/1
5 TABLET ORAL DAILY
Qty: 30 TAB | Refills: 1 | Status: SHIPPED | OUTPATIENT
Start: 2021-04-26 | End: 2021-06-28

## 2021-04-26 RX ORDER — POTASSIUM CHLORIDE 20 MEQ/1
20 TABLET, EXTENDED RELEASE ORAL DAILY
Qty: 30 TAB | Refills: 5 | Status: SHIPPED | OUTPATIENT
Start: 2021-04-26 | End: 2021-12-03

## 2021-04-26 NOTE — PROGRESS NOTES
HISTORY OF PRESENT ILLNESS  Ed Billing is a 48 y.o. female. 3/21 seen as new patient. H/o TR, MR, idiopathic tachycardia, edema, neuropathy    Shortness of Breath  The history is provided by the patient. This is a new problem. The current episode started more than 1 week ago (yrs). The problem has not changed since onset. Associated symptoms include chest pain and leg swelling. Pertinent negatives include no fever, no headaches, no cough, no wheezing, no PND, no orthopnea, no vomiting, no rash and no claudication. Sore throat: walking out of the house. The problem's precipitants include exercise. Leg Swelling  The history is provided by the patient. This is a new problem. The current episode started more than 1 week ago (yrs). The problem occurs daily. The problem has not changed since onset. Associated symptoms include chest pain and shortness of breath. Pertinent negatives include no headaches. The symptoms are aggravated by standing. The symptoms are relieved by sleep and medications. CHF  The history is provided by the medical records. This is a chronic problem. Associated symptoms include chest pain and shortness of breath. Pertinent negatives include no headaches. Chest Pain (Angina)   The history is provided by the patient. This is a new problem. The current episode started more than 1 week ago (1/21). The problem occurs every several days. Associated with: pressure. Associated symptoms include lower extremity edema and shortness of breath. Pertinent negatives include no claudication, no cough, no dizziness, no fever, no headaches, no malaise/fatigue, no nausea, no orthopnea, no palpitations, no PND and no vomiting.      Allergies   Allergen Reactions    Ansaid [Flurbiprofen] Anaphylaxis    Keflex [Cephalexin] Hives and Itching       Past Medical History:   Diagnosis Date    Anemia     Angio-edema     Asthma     Depression     Diverticulosis     Enlargement, spleen     Esophagospasm  Essential hypertension     Fatty liver     GERD (gastroesophageal reflux disease)     Histoplasmosis     HPV (human papilloma virus) anogenital infection     Lyme disease     Neuropathy     PCOS (polycystic ovarian syndrome)     PTSD (post-traumatic stress disorder)     Tachycardia     Thyroid disease     Tricuspid insufficiency        Family History   Problem Relation Age of Onset    Cancer Mother         stomach    Seizures Mother     Cancer Sister         double mastectomy    Cancer Sister         lung    Heart Surgery Maternal Grandmother         CABG    Stroke Neg Hx        Social History     Tobacco Use    Smoking status: Current Every Day Smoker     Packs/day: 0.50     Years: 15.00     Pack years: 7.50     Start date: 1985    Smokeless tobacco: Never Used    Tobacco comment: less than 10 depending on the day   Substance Use Topics    Alcohol use: Not Currently    Drug use: Not Currently        Current Outpatient Medications   Medication Sig    albuterol (PROVENTIL HFA, VENTOLIN HFA, PROAIR HFA) 90 mcg/actuation inhaler Take 2 Puffs by inhalation every four (4) hours as needed for Wheezing.  EPINEPHrine HCl, PF, (ADRENALIN) 1 mg/mL (1 mL) injection once.  furosemide (Lasix) 40 mg tablet Take 1 Tab by mouth two (2) times a day.  spironolactone (ALDACTONE) 25 mg tablet Take 1 Tab by mouth daily.  magnesium oxide (MAG-OX) 400 mg tablet Take 1 Tab by mouth daily.  omeprazole (PRILOSEC) 40 mg capsule     ergocalciferol (ERGOCALCIFEROL) 1,250 mcg (50,000 unit) capsule     cyanocobalamin 1,000 mcg tablet     fluticasone propionate (Flovent HFA) 220 mcg/actuation inhaler Take 1 Puff by inhalation two (2) times a day.  traMADoL (ULTRAM) 50 mg tablet Take 50 mg by mouth as needed for Pain.  cyclobenzaprine (FLEXERIL) 10 mg tablet Take  by mouth as needed for Muscle Spasm(s).  levothyroxine (SYNTHROID) 100 mcg tablet Take 100 mcg by mouth Daily (before breakfast).     cetirizine (ZyrTEC) 10 mg tablet Take 10 mg by mouth daily.  potassium chloride (K-DUR, KLOR-CON) 20 mEq tablet Take 1 Tab by mouth daily. No current facility-administered medications for this visit. Past Surgical History:   Procedure Laterality Date    HX CHOLECYSTECTOMY      HX HEART CATHETERIZATION  2010 approx    normal per patient    HX HEMORRHOIDECTOMY      HX OTHER SURGICAL      Vocal cord polyps       Visit Vitals  BP (!) 158/99   Pulse 69   Ht 5' 6\" (1.676 m)   Wt 147.4 kg (325 lb)   BMI 52.46 kg/m²       Diagnostic Studies:  I have reviewed the relevant tests done on the patient and show as follows  EKG tracings reviewed by me today. EKG Results     None        XR Results (most recent):  No results found for this or any previous visit. Ms. James Lopez has a reminder for a \"due or due soon\" health maintenance. I have asked that she contact her primary care provider for follow-up on this health maintenance. Review of Systems   Constitutional: Negative for chills, fever, malaise/fatigue and weight loss. HENT: Negative for nosebleeds. Sore throat: walking out of the house. Eyes: Negative for discharge. Respiratory: Positive for shortness of breath. Negative for cough and wheezing. Cardiovascular: Positive for chest pain and leg swelling. Negative for palpitations, orthopnea, claudication and PND. Gastrointestinal: Negative for diarrhea, nausea and vomiting. Genitourinary: Negative for dysuria and hematuria. Musculoskeletal: Negative for joint pain. Skin: Negative for rash. Neurological: Negative for dizziness, seizures, loss of consciousness and headaches. Endo/Heme/Allergies: Negative for polydipsia. Does not bruise/bleed easily. Psychiatric/Behavioral: Negative for depression and substance abuse. The patient does not have insomnia.     11/20 echo  ECHOCARDIOGRAM COMPLETE  (DOPPLER ECHO)11/20/2020  [facility]  Result Impression   :   POOR QUALITY 2-DIMENSIONAL IMAGES   DEFINITY CONTRAST UTILIZED TO ENHANCE ENDOCARDIAL BORDER DEFINITION   LEFT VENTRICULAR HYPERTROPHY   GROSSLY NORMAL LEFT VENTRICULAR CAVITY SIZE AND SYSTOLIC FUNCTION   COULD NOT ACCURATELY QUANTIFY EJECTION FRACTION   GRADE 1 DIASTOLIC DYSFUNCTION   RIGHT VENTRICLE NOT VISUALIZED   GROSSLY NORMAL LEFT ATRIAL SIZE   RIGHT ATRIUM NOT VISUALIZED   NO OBVIOUS HEMODYNAMICALLY SIGNIFICANT VALVE PATHOLOGY   RVSP - NA     04/26/21   ECHO ADULT COMPLETE 04/26/2021 4/26/2021    Narrative · Contrast used: DEFINITY. · Image quality for this study was technically difficult. · LV: Estimated LVEF is 60 - 65%. Normal cavity size and systolic function   (ejection fraction normal). Moderate concentric hypertrophy. Wall motion:   normal. Moderate (grade 2) left ventricular diastolic dysfunction. · RV: Mildly dilated right ventricle. · RA: Mildly dilated right atrium. · TV: Right Ventricular Arterial Pressure (RVSP) is 23 mmHg. Pulmonary   hypertension not suggested by Doppler findings. Signed by: Adrianan Rossi MD     Physical Exam   Constitutional: She is oriented to person, place, and time. She appears well-developed and well-nourished. No distress. Morbid obesity   HENT:   Head: Normocephalic and atraumatic. Mouth/Throat: Normal dentition. Eyes: Right eye exhibits no discharge. Left eye exhibits no discharge. No scleral icterus. Neck: Neck supple. No JVD present. Carotid bruit is not present. No thyromegaly present. Cardiovascular: Normal rate, regular rhythm, S1 normal, S2 normal, normal heart sounds and intact distal pulses. Exam reveals no gallop and no friction rub. No murmur heard. Pulmonary/Chest: Effort normal and breath sounds normal. She has no wheezes. She has no rales. She exhibits tenderness (reproducible). Abdominal: Soft. She exhibits no mass. There is no abdominal tenderness. Musculoskeletal:         General: Edema (trace) present.    Lymphadenopathy: Right cervical: No superficial cervical adenopathy present. Left cervical: No superficial cervical adenopathy present. Neurological: She is alert and oriented to person, place, and time. Skin: Skin is warm and dry. No rash noted. Psychiatric: She has a normal mood and affect. Her behavior is normal.       ASSESSMENT and PLAN    LIPIDS : Results for Rosalba Woods (MRN [id number]) as of 3/19/2021 13:01   Ref. Range 3/3/2021 12:20   Triglyceride Latest Ref Range: <150 MG/ (H)   Cholesterol, total Latest Ref Range: <200 MG/ (H)   HDL Cholesterol Latest Ref Range: 40 - 60 MG/DL 45   CHOL/HDL Ratio Latest Ref Range: 0 - 5.0   4.6   VLDL, calculated Latest Units: MG/DL 32.2   LDL, calculated Latest Ref Range: 0 - 100 MG/.8 (H)       3/19/2021 seen as new patient for changing the cardiologist.  She cannot locate her old cardiologist and so is changing. She has acute on chronic CHF which is diastolic and right heart failure mostly. Blood pressure is elevated today but is normal most of the times at home. CHF is secondary to morbid obesity most likely. She will need to be worked up for sleep apnea. History of Lyme disease for which she was treated with doxycycline a year. Apparently she is still positive for it and I recommended ID opinion for that. Increase Lasix to 40 twice daily add spironolactone. Told her not to use any over-the-counter potassium but does take the prescription pills so we can follow the labs closely and replace as needed. Continue magnesium. History of significant electrolyte problems in the past.  Told her to come to ER if she does not feel well. Extensive review of previous records and face-to-face time was more than 60 minutes. Diagnoses and all orders for this visit:    1. Acute on chronic diastolic congestive heart failure (HCC)  -     furosemide (Lasix) 40 mg tablet; Take 1 Tab by mouth daily. -     spironolactone (ALDACTONE) 25 mg tablet;  Take 1 Tab by mouth daily.  -     METABOLIC PANEL, BASIC; Future    2. History of hypokalemia  -     potassium chloride (K-DUR, KLOR-CON) 20 mEq tablet; Take 1 Tab by mouth daily. 3. Essential hypertension  -     lisinopriL (PRINIVIL, ZESTRIL) 5 mg tablet; Take 1 Tab by mouth daily. 4. Severe obesity (BMI >= 40) ([facility])  -     REFERRAL TO BARIATRICS        Pertinent laboratory and test data reviewed and discussed with patient. See patient instructions also for other medical advice given    Medications Discontinued During This Encounter   Medication Reason    potassium chloride (K-DUR, KLOR-CON) 20 mEq tablet REORDER    furosemide (Lasix) 40 mg tablet     spironolactone (ALDACTONE) 25 mg tablet REORDER       Follow-up and Dispositions    · Return in about 3 months (around 7/26/2021), or if symptoms worsen or fail to improve, for post test.       4/26/2021 CHF persisting. Patient not taking lasix as advised. She will now take lasix 40 mg/day. Follow labs closely. Add acei and f/u BP chart at home. Diet and exercise discussed. Refer to bariatrics.

## 2021-04-26 NOTE — PROGRESS NOTES
1. Have you been to the ER, urgent care clinic since your last visit? Hospitalized since your last visit?     no  2. Have you seen or consulted any other health care providers outside of the [address] [place] [address] since your last visit? Include any pap smears or colon screening. No     3. Since your last visit, have you had any of the following symptoms? chest pains and shortness of breath. 4.  Have you had any blood work, X-rays or cardiac testing? No         5. Where do you normally have your labs drawn? 6. Do you need any refills today?    yes

## 2021-04-26 NOTE — PATIENT INSTRUCTIONS
Medications Discontinued During This Encounter   Medication Reason    potassium chloride (K-DUR, KLOR-CON) 20 mEq tablet REORDER    furosemide (Lasix) 40 mg tablet     spironolactone (ALDACTONE) 25 mg tablet REORDER     After the recommended changes have been made in blood pressure medicines, patient advised to keep BP/HR(pulse rate) chart twice daily and bring us results in next 4 to 5 days. Patient may send the results via \"My Chart\" if desired. Please rest for 5-10 minutes before checking blood pressure. Sit on a comfortable chair without crossing the legs and put your arm on a table. We recommend that you use an upper arm cuff. Check the blood pressure 3 times each time you check the blood pressure and record the lowest reading. If you check the blood pressure in both arms, use the higher reading. Learning About the [address] [facility] Diet  What is the Mediterranean diet? The Mediterranean diet is a style of eating rather than a diet plan. It features foods eaten in [place], [place], [place] and [place], and other countries along the [facility]. It emphasizes eating foods like fish, fruits, vegetables, beans, high-fiber breads and whole grains, nuts, and olive oil. This style of eating includes limited red meat, cheese, and sweets. Why choose the Mediterranean diet? A Mediterranean-style diet may improve heart health. It contains more fat than other heart-healthy diets. But the fats are mainly from nuts, unsaturated oils (such as fish oils and olive oil), and certain nut or seed oils (such as canola, soybean, or flaxseed oil). These fats may help protect the heart and blood vessels. How can you get started on the Mediterranean diet? Here are some things you can do to switch to a more Mediterranean way of eating.   What to eat  · Eat a variety of fruits and vegetables each day, such as grapes, blueberries, tomatoes, broccoli, peppers, figs, olives, spinach, eggplant, beans, lentils, and chickpeas. · Eat a variety of whole-grain foods each day, such as oats, brown rice, and whole wheat bread, pasta, and couscous. · Eat fish at least 2 times a week. Try tuna, salmon, mackerel, lake trout, herring, or sardines. · Eat moderate amounts of low-fat dairy products, such as milk, cheese, or yogurt. · Eat moderate amounts of poultry and eggs. · Choose healthy (unsaturated) fats, such as nuts, olive oil, and certain nut or seed oils like canola, soybean, and flaxseed. · Limit unhealthy (saturated) fats, such as butter, palm oil, and coconut oil. And limit fats found in animal products, such as meat and dairy products made with whole milk. Try to eat red meat only a few times a month in very small amounts. · Limit sweets and desserts to only a few times a week. This includes sugar-sweetened drinks like soda. The Mediterranean diet may also include red wine with your meal--1 glass each day for women and up to 2 glasses a day for men. Tips for eating at home  · Use herbs, spices, garlic, lemon zest, and citrus juice instead of salt to add flavor to foods. · Add avocado slices to your sandwich instead of salazar. · Have fish for lunch or dinner instead of red meat. Brush the fish with olive oil, and broil or grill it. · Sprinkle your salad with seeds or nuts instead of cheese. · Cook with olive or canola oil instead of butter or oils that are high in saturated fat. · Switch from 2% milk or whole milk to 1% or fat-free milk. · Dip raw vegetables in a vinaigrette dressing or hummus instead of dips made from mayonnaise or sour cream.  · Have a piece of fruit for dessert instead of a piece of cake. Try baked apples, or have some dried fruit. Tips for eating out  · Try broiled, grilled, baked, or poached fish instead of having it fried or breaded. · Ask your  to have your meals prepared with olive oil instead of butter. · Order dishes made with marinara sauce or sauces made from olive oil.  Avoid sauces made from cream or mayonnaise. · Choose whole-grain breads, whole wheat pasta and pizza crust, brown rice, beans, and lentils. · Cut back on butter or margarine on bread. Instead, you can dip your bread in a small amount of olive oil. · Ask for a side salad or grilled vegetables instead of french fries or chips. Where can you learn more? Go to http://www.apple.com/  Enter O407 in the search box to learn more about \"Learning About the Mediterranean Diet. \"  Current as of: December 17, 2020               Content Version: 12.8  © 5403-7660 Healthwise, [employer]. Care instructions adapted under license by [employer] (which disclaims liability or warranty for this information). If you have questions about a medical condition or this instruction, always ask your healthcare professional. [address] any warranty or liability for your use of this information.

## 2021-05-18 ENCOUNTER — HOSPITAL ENCOUNTER (OUTPATIENT)
Dept: LAB | Age: 56
Discharge: HOME | End: 2021-05-18
Payer: COMMERCIAL

## 2021-05-18 DIAGNOSIS — I50.33 ACUTE ON CHRONIC DIASTOLIC CONGESTIVE HEART FAILURE (HCC): ICD-10-CM

## 2021-05-18 LAB
ANION GAP SERPL CALC-SCNC: 4 MMOL/L (ref 3–18)
BUN SERPL-MCNC: 10 MG/DL (ref 7–18)
BUN/CREAT SERPL: 13 (ref 12–20)
CALCIUM SERPL-MCNC: 9.4 MG/DL (ref 8.5–10.1)
CHLORIDE SERPL-SCNC: 99 MMOL/L (ref 100–111)
CO2 SERPL-SCNC: 34 MMOL/L (ref 21–32)
CREAT SERPL-MCNC: 0.76 MG/DL (ref 0.6–1.3)
GLUCOSE SERPL-MCNC: 116 MG/DL (ref 74–99)
POTASSIUM SERPL-SCNC: 4.1 MMOL/L (ref 3.5–5.5)
SODIUM SERPL-SCNC: 137 MMOL/L (ref 136–145)

## 2021-05-18 PROCEDURE — 36415 COLL VENOUS BLD VENIPUNCTURE: CPT

## 2021-05-18 PROCEDURE — 80048 BASIC METABOLIC PNL TOTAL CA: CPT

## 2021-05-24 ENCOUNTER — OFFICE VISIT (OUTPATIENT)
Dept: FAMILY MEDICINE CLINIC | Age: 56
End: 2021-05-24
Payer: COMMERCIAL

## 2021-05-24 VITALS
DIASTOLIC BLOOD PRESSURE: 78 MMHG | WEIGHT: 293 LBS | HEART RATE: 75 BPM | SYSTOLIC BLOOD PRESSURE: 130 MMHG | BODY MASS INDEX: 47.09 KG/M2 | RESPIRATION RATE: 18 BRPM | HEIGHT: 66 IN | OXYGEN SATURATION: 97 % | TEMPERATURE: 98.4 F

## 2021-05-24 DIAGNOSIS — E03.9 ACQUIRED HYPOTHYROIDISM: ICD-10-CM

## 2021-05-24 DIAGNOSIS — Z87.42 HISTORY OF PCOS: ICD-10-CM

## 2021-05-24 DIAGNOSIS — Z98.890 HISTORY OF THROAT SURGERY: ICD-10-CM

## 2021-05-24 DIAGNOSIS — Z87.898 HISTORY OF ASCITES: ICD-10-CM

## 2021-05-24 DIAGNOSIS — J45.909 ASTHMA, UNSPECIFIED ASTHMA SEVERITY, UNSPECIFIED WHETHER COMPLICATED, UNSPECIFIED WHETHER PERSISTENT: Primary | ICD-10-CM

## 2021-05-24 DIAGNOSIS — K21.9 GASTROESOPHAGEAL REFLUX DISEASE, UNSPECIFIED WHETHER ESOPHAGITIS PRESENT: ICD-10-CM

## 2021-05-24 DIAGNOSIS — K92.1 BLOOD IN STOOL: ICD-10-CM

## 2021-05-24 DIAGNOSIS — E66.01 MORBID OBESITY WITH BMI OF 50.0-59.9, ADULT (HCC): ICD-10-CM

## 2021-05-24 DIAGNOSIS — I50.33 ACUTE ON CHRONIC DIASTOLIC CONGESTIVE HEART FAILURE (HCC): ICD-10-CM

## 2021-05-24 DIAGNOSIS — F17.200 SMOKING: ICD-10-CM

## 2021-05-24 DIAGNOSIS — R06.00 DYSPNEA, UNSPECIFIED TYPE: ICD-10-CM

## 2021-05-24 DIAGNOSIS — G62.9 NEUROPATHY: ICD-10-CM

## 2021-05-24 PROCEDURE — 99204 OFFICE O/P NEW MOD 45 MIN: CPT | Performed by: STUDENT IN AN ORGANIZED HEALTH CARE EDUCATION/TRAINING PROGRAM

## 2021-05-24 RX ORDER — ALBUTEROL SULFATE 90 UG/1
2 AEROSOL, METERED RESPIRATORY (INHALATION)
Qty: 1 INHALER | Refills: 0 | Status: SHIPPED | OUTPATIENT
Start: 2021-05-24

## 2021-05-24 RX ORDER — LEVOTHYROXINE SODIUM 100 UG/1
100 TABLET ORAL
Qty: 30 TABLET | Refills: 1 | Status: SHIPPED | OUTPATIENT
Start: 2021-05-24 | End: 2021-07-30

## 2021-05-24 RX ORDER — LANOLIN ALCOHOL/MO/W.PET/CERES
400 CREAM (GRAM) TOPICAL DAILY
Qty: 30 TABLET | Refills: 1 | Status: SHIPPED | OUTPATIENT
Start: 2021-05-24 | End: 2022-01-27 | Stop reason: SDUPTHER
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
## 2021-05-24 NOTE — PROGRESS NOTES
Chief Complaint   Patient presents with    Establish Care    PCOS    Asthma    GERD    Hypertension    Other     request for referral to ENT, OB-GYN, and GI     1. Have you been to the ER, urgent care clinic since your last visit? Hospitalized since your last visit? No    2. Have you seen or consulted any other health care providers outside of the [address] [place] [address] since your last visit? Include any pap smears or colon screening.  Neeta Baker is scheduled with neurology Jessica Almanzar MD on July 22,2021 at 10:20 am and general surgery Kushal Macdonald DO on June 25,2021 at 2:00 pm

## 2021-05-24 NOTE — PATIENT INSTRUCTIONS
Asthma Action Plan: After Your Visit  Your Care Instructions  An asthma action plan is based on peak flow and asthma symptoms. Sorting symptoms and peak flow into red, yellow, and green \"zones\" can help you know how bad your asthma is and what actions you should take. Work with your doctor to make your plan. An action plan may include:  · The peak flow readings and symptoms for each zone. · What medicines to take in each zone. · When to call a doctor. · A list of emergency contact numbers. · A list of your asthma triggers. Follow-up care is a key part of your treatment and safety. Be sure to make and go to all appointments, and call your doctor if you are having problems. It's also a good idea to know your test results and keep a list of the medicines you take. How can you care for yourself at home? · Take your daily medicines to help minimize long-term damage and avoid asthma attacks. · Check your peak flow every morning and evening. This is the best way to know how well your lungs are working. · Check your action plan to see what zone you are in.  ¨ If you are in the green zone, keep taking your daily asthma medicines as prescribed. ¨ If you are in the yellow zone, you may be having or will soon have an asthma attack. You may not have any symptoms, but your lungs are not working as well as they should. Take the medicines listed in your action plan. If you stay in the yellow zone, your doctor may need to increase the dose or add a medicine. ¨ If you are in the red zone, follow your action plan. If your symptoms or peak flow don't improve soon, you may need to go to the emergency room or be admitted to the hospital.  · Use an asthma diary. Write down your peak flow readings in the asthma diary. If you have an attack, write down what caused it (if you know), the symptoms, and what medicine you took.   · Make sure you know how and when to call your doctor or go to the hospital.  · Take both the asthma action plan and the asthma diary--along with your peak flow meter and medicines--when you see your doctor. Tell your doctor if you are having trouble following your action plan. When should you call for help? Call 911 anytime you think you may need emergency care. For example, call if:  · You have severe trouble breathing. Call your doctor now or seek immediate medical care if:  · Your symptoms do not get better after you have followed your asthma action plan. · You cough up yellow, dark brown, or bloody mucus (sputum). Watch closely for changes in your health, and be sure to contact your doctor if:  · Your coughing and wheezing get worse. · You need to use quick-relief medicine on more than 2 days a week (unless it is just for exercise). · You need help figuring out what is triggering your asthma attacks. Where can you learn more? Go to APROOFED.be  Enter B511 in the search box to learn more about \"Asthma Action Plan: After Your Visit. \"   © 9523-3691 Healthwise, [employer]. Care instructions adapted under license by [employer] (which disclaims liability or warranty for this information). This care instruction is for use with your licensed healthcare professional. If you have questions about a medical condition or this instruction, always ask your healthcare professional. [address] any warranty or liability for your use of this information. Content Version: 41.8.948782; Last Revised: March 9, 2012                 Asthma in Adults: Care Instructions  Overview     Asthma makes it hard for you to breathe. During an asthma attack, the airways swell and narrow. Severe asthma attacks can be dangerous, but you can usually prevent them. Controlling asthma and treating symptoms before they get bad can help you avoid bad attacks. You may also avoid future trips to the doctor. Follow-up care is a key part of your treatment and safety.  Be sure to make and go to all appointments, and call your doctor if you are having problems. It's also a good idea to know your test results and keep a list of the medicines you take. How can you care for yourself at home? · Follow your asthma action plan so you can manage your symptoms at home. An asthma action plan will help you prevent and control airway reactions and will tell you what to do during an asthma attack. If you do not have an asthma action plan, work with your doctor to build one. · Take your asthma medicine exactly as prescribed. Medicine plays an important role in controlling asthma. Talk to your doctor right away if you have any questions about what to take and how to take it. ? Use your quick-relief medicine when you have symptoms of an attack. Quick-relief medicine often is an albuterol inhaler. Some people need to use quick-relief medicine before they exercise. ? Take your controller medicine every day, not just when you have symptoms. Controller medicine is usually an inhaled corticosteroid. The goal is to prevent problems before they occur. ? If your doctor prescribed corticosteroid pills to use during an attack, take them as directed. They may take hours to work, but they may shorten the attack and help you breathe better. ? Keep your quick-relief medicine with you at all times. · Talk to your doctor before using other medicines. Some medicines, such as aspirin, can cause asthma attacks in some people. · Check yourself for asthma symptoms to know which step to follow in your action plan. Watch for things like being short of breath, having chest tightness, coughing, and wheezing. Also notice if symptoms wake you up at night or if you get tired quickly when you exercise. · If you have a peak flow meter, use it to check how well you are breathing. This can help you predict when an asthma attack is going to occur. Then you can take medicine to prevent the asthma attack or make it less severe.   · See your doctor regularly. These visits will help you learn more about asthma and what you can do to control it. Your doctor will monitor your treatment to make sure the medicine is helping you. · Keep track of your asthma attacks and your treatment. After you have had an attack, write down what triggered it, what helped end it, and any concerns you have about your asthma action plan. Take your diary when you see your doctor. You can then review your asthma action plan and decide if it is working. · Do not smoke or allow others to smoke around you. Avoid smoky places. Smoking makes asthma worse. If you need help quitting, talk to your doctor about stop-smoking programs and medicines. These can increase your chances of quitting for good. · Learn what triggers an asthma attack for you, and avoid the triggers when you can. Common triggers include colds, smoke, air pollution, dust, pollen, mold, pets, cockroaches, stress, and cold air. · Avoid colds and the flu. Talk to your doctor about getting a pneumococcal vaccine shot. If you have had one before, ask your doctor whether you need a second dose. Get a flu vaccine every fall. If you must be around people with colds or the flu, wash your hands often. When should you call for help? Call 911 anytime you think you may need emergency care. For example, call if:    · You have severe trouble breathing. Call your doctor now or seek immediate medical care if:    · Your symptoms do not get better after you have followed your asthma action plan.     · You cough up yellow, dark brown, or bloody mucus (sputum). Watch closely for changes in your health, and be sure to contact your doctor if:    · Your coughing and wheezing get worse.     · You need to use quick-relief medicine on more than 2 days a week within a month (unless it is just for exercise).     · You need help figuring out what is triggering your asthma attacks. Where can you learn more?   Go to http://www.gray.com/  Enter P597 in the search box to learn more about \"Asthma in Adults: Care Instructions. \"  Current as of: October 26, 2020               Content Version: 12.8  © 2006-2021 Healthwise, [facility]. Care instructions adapted under license by [employer] (which disclaims liability or warranty for this information). If you have questions about a medical condition or this instruction, always ask your healthcare professional. [place] [address] any warranty or liability for your use of this information.

## 2021-05-25 PROBLEM — R06.00 DYSPNEA: Status: ACTIVE | Noted: 2021-05-25

## 2021-05-25 PROBLEM — I50.33 ACUTE ON CHRONIC DIASTOLIC CONGESTIVE HEART FAILURE (HCC): Status: ACTIVE | Noted: 2021-05-25

## 2021-05-25 PROBLEM — E66.01 MORBID OBESITY WITH BMI OF 50.0-59.9, ADULT (HCC): Status: ACTIVE | Noted: 2021-05-25

## 2021-05-25 PROBLEM — G62.9 NEUROPATHY: Status: ACTIVE | Noted: 2021-05-25

## 2021-05-27 NOTE — TELEPHONE ENCOUNTER
This pharmacy faxed over request for the following prescriptions to be filled:    Medication requested :   Requested Prescriptions     Pending Prescriptions Disp Refills    cyanocobalamin 1,000 mcg tablet 30 Tablet 0     Sig: Take 1 Tablet by mouth daily.  ergocalciferol (ERGOCALCIFEROL) 1,250 mcg (50,000 unit) capsule 5 Capsule 0     Sig: Take 1 Capsule by mouth every seven (7) days.      PCP: Milo  Pharmacy or Print: [facility]  Mail order or [address] [phone]    Scheduled appointment if not seen by current providers in office: lov 5/24/2021 f/u 8/24/2021

## 2021-06-03 RX ORDER — ERGOCALCIFEROL 1.25 MG/1
50000 CAPSULE ORAL
Qty: 5 CAPSULE | Refills: 0 | Status: SHIPPED | OUTPATIENT
Start: 2021-06-03 | End: 2021-07-21

## 2021-06-03 RX ORDER — LANOLIN ALCOHOL/MO/W.PET/CERES
1000 CREAM (GRAM) TOPICAL DAILY
Qty: 30 TABLET | Refills: 0 | Status: SHIPPED | OUTPATIENT
Start: 2021-06-03 | End: 2021-07-21

## 2021-06-08 ENCOUNTER — DOCUMENTATION ONLY (OUTPATIENT)
Dept: PULMONOLOGY | Age: 56
End: 2021-06-08

## 2021-06-08 NOTE — PROGRESS NOTES
Lf vm for pt to speak w/Carine in regs to new pt sleep apt ref by Np Patricio Souza-when/where if any evals/studies/cpap??;  Symptoms? ?

## 2021-06-28 ENCOUNTER — TELEPHONE (OUTPATIENT)
Dept: CARDIOLOGY CLINIC | Age: 56
End: 2021-06-28

## 2021-06-28 ENCOUNTER — TELEPHONE (OUTPATIENT)
Dept: FAMILY MEDICINE CLINIC | Age: 56
End: 2021-06-28

## 2021-06-28 DIAGNOSIS — I10 ESSENTIAL HYPERTENSION: ICD-10-CM

## 2021-06-28 RX ORDER — LISINOPRIL 5 MG/1
TABLET ORAL
Qty: 30 TABLET | Refills: 2 | Status: SHIPPED | OUTPATIENT
Start: 2021-06-28 | End: 2021-06-29 | Stop reason: SDUPTHER

## 2021-06-28 NOTE — TELEPHONE ENCOUNTER
Placed call to Kacie Inks Meter to inquire / obtain more information on her pain. \"Per Kacie Inks Meter I'm headed to the ER, I've spoken with my insurance company and no need for advise\".

## 2021-06-28 NOTE — TELEPHONE ENCOUNTER
Pt called stating a couple days ago a piece of her bicuspid on the upper right side broke off and she accidentally swallowed it. Pt states she didn't realize it until later and now she is in pain. Pt states the roof of her mouth is swollen along the right side. Pt believes she has an infection and would like to know if she could get an antibiotic. Pt states she is afraid the infection will travel to her brain or her heart. Pt has a dentist appt scheduled for 7/1/2021.

## 2021-06-29 RX ORDER — LISINOPRIL 5 MG/1
TABLET ORAL
Qty: 30 TABLET | Refills: 2 | Status: SHIPPED | OUTPATIENT
Start: 2021-06-29 | End: 2021-08-02

## 2021-07-19 ENCOUNTER — VIRTUAL VISIT (OUTPATIENT)
Dept: NEUROLOGY | Age: 56
End: 2021-07-19
Payer: COMMERCIAL

## 2021-07-19 DIAGNOSIS — G62.9 NEUROPATHY: Primary | ICD-10-CM

## 2021-07-19 PROCEDURE — 99204 OFFICE O/P NEW MOD 45 MIN: CPT | Performed by: NURSE PRACTITIONER

## 2021-07-19 NOTE — PROGRESS NOTES
Belinda Rios presents today for   Chief Complaint   Patient presents with    Headache    New Patient       Is someone accompanying this pt? Virtual visit [phone]    Is the patient using any DME equipment during [address]? no    Depression Screening:  3 most recent PHQ Screens 5/24/2021   Little interest or pleasure in doing things Several days   Feeling down, depressed, irritable, or hopeless Several days   Total Score PHQ 2 2   Trouble falling or staying asleep, or sleeping too much More than half the days   Feeling tired or having little energy Several days   Poor appetite, weight loss, or overeating Several days   Feeling bad about yourself - or that you are a failure or have let yourself or your family down Several days   Trouble concentrating on things such as school, work, reading, or watching TV Not at all   Moving or speaking so slowly that other people could have noticed; or the opposite being so fidgety that others notice Not at all   Thoughts of being better off dead, or hurting yourself in some way Not at all   PHQ 9 Score 7   How difficult have these problems made it for you to do your work, take care of your home and get along with others Not difficult at all       Learning Assessment:  Learning Assessment 5/24/2021   PRIMARY LEARNER Patient   HIGHEST LEVEL OF EDUCATION - PRIMARY LEARNER  2 YEARS [address] CAREGIVER No   PRIMARY LANGUAGE ENGLISH    NEED -   LEARNER PREFERENCE PRIMARY DEMONSTRATION     LISTENING     READING     OTHER (COMMENT)   ANSWERED BY ANA   RELATIONSHIP SELF       Abuse Screening:  Abuse Screening Questionnaire 5/24/2021   Do you ever feel afraid of your partner? N   Are you in a relationship with someone who physically or mentally threatens you? N   Is it safe for you to go home? Y       Fall Risk  No flowsheet data found. Coordination of Care:  1.  Have you been to the ER, urgent care clinic since your last visit? Hospitalized since your last visit? no    2. Have you seen or consulted any other health care providers outside of the [address] [place] [address] since your last visit? Include any pap smears or colon screening.  no

## 2021-07-19 NOTE — PROGRESS NOTES
Charley Velasquez is a 48 y.o. female who was seen by synchronous (real-time) audio-video technology on 7/19/2021 for Headache and New Patient        Assessment & Plan:   Diagnoses and all orders for this visit:    1. Neuropathy       Patient presents for new patient evaluation to discuss her main concern of neuropathic pain. She formally was seen by neurologist while in [place]. Most recently she has been seen by neurologist in the area Dr. Fred Hdez. She will have records sent to our office so I can review previous testing and medications. Unfortunately I cannot make any informed adjustment to her treatment plan without observing these records and patient verbalized understanding. She will follow up with me in office and hopefully at that time I have been able to obtain records. Reviewed documentation including an A1c of 5.6% dated in February. Positive tobacco use and certainly this can be impacting neuropathic symptoms as well as stated vascular concerns. Encourage smoking cessation. She is going out of town and will follow up with me in office in September. All questions addressed and patient is agreeable plan of care. I spent at least 45 minutes on this visit with this new patient. Subjective: This is a 50-year-old female who presents for new patient evaluation with chief complaint of neuropathy. She was previously referred by her former primary care provider for evaluation of headaches and neuropathy in March. Today patient denies headaches. She tells me of a history of neuropathy starting years ago. She was originally seen by a neurologist in [place]. This is going on over 6 years ago. She said originally she had intermittent numbness starting to the thigh. Would have infrequent sharp shooting pains in the feet. Now symptoms have progressed and become more persistent. Having numbness, burning, and tingling in the feet going up to about the shins.   Also having some intermittent pains in the hands. She tells me the neuropathic pain is affecting her ability to sleep. She tells me she only gets approximately 2 to 4 hours of sleep a day. In the past she has been tried on gabapentin. She said that she was on as high as 300 mg 3 times a day and did not tolerate this. Her former neurologist Dr. Martha Galeano dropper her down to 100 mg and she did not tolerate the lower dose of GPN. She said she did not like how it made her feel drowsy. She also reports being on amitriptyline in the past.  She is unsure the dose of this. She said it did help her sleep but did not help much for neuropathic pain during the daytime. She tells me of being prescribed B12 and vitamin D. Tell me it can feel like she can't control her feet at times. She reports seeing orthopedist for back and knee pain. She sees Dr. Vanda Leary and Dr. Luis Carlos Lutz respectively. She also sees Dr. Margy Lindsey for her ankle pain. She mentions orthopedist remarking stim router may aid in neuropathy and she will follow up with them regarding this. She tells me she had lower extremity EMGs in the past completed by Dr. Martha Galeano. I do not have records of this. She tells me of previous imaging to the spine ordered by her orthopedist.  Also endorses PVD and is being seen by vascular specialist for these concerns. Denies diabetes mellitus. Denies history of chemotherapy or radiation. Denies family history of neuropathy. Positive tobacco use. Prior to Admission medications    Medication Sig Start Date End Date Taking? Authorizing Provider   lisinopriL (PRINIVIL, ZESTRIL) 5 mg tablet Take 1 tablet by mouth once daily 6/29/21  Yes Patricio Souza NP   cyanocobalamin 1,000 mcg tablet Take 1 Tablet by mouth daily. 6/3/21  Yes James Pedraza PA-C   ergocalciferol (ERGOCALCIFEROL) 1,250 mcg (50,000 unit) capsule Take 1 Capsule by mouth every seven (7) days.  6/3/21  Yes James Pedraza PA-C   spironolactone (ALDACTONE) 25 mg tablet Take 1 tablet by mouth once daily 5/27/21  Yes Merlyn Lewis MD   albuterol (PROVENTIL HFA, VENTOLIN HFA, PROAIR HFA) 90 mcg/actuation inhaler Take 2 Puffs by inhalation every four (4) hours as needed for Wheezing. 5/24/21  Yes Hailey Pedraza PA-C   magnesium oxide (MAG-OX) 400 mg tablet Take 1 Tablet by mouth daily. 5/24/21  Yes Hailey Pedraza PA-C   potassium chloride (K-DUR, KLOR-CON) 20 mEq tablet Take 1 Tab by mouth daily. 4/26/21  Yes Merlyn Lewis MD   furosemide (Lasix) 40 mg tablet Take 1 Tab by mouth daily. 4/26/21  Yes Merlyn Lewis MD   EPINEPHrine HCl, PF, (ADRENALIN) 1 mg/mL (1 mL) injection once. Yes Provider, Historical   omeprazole (PRILOSEC) 40 mg capsule  1/21/21  Yes Provider, Historical   fluticasone propionate (Flovent HFA) 220 mcg/actuation inhaler Take 1 Puff by inhalation two (2) times a day. Yes Provider, Historical   traMADoL (ULTRAM) 50 mg tablet Take 50 mg by mouth as needed for Pain. Yes Provider, Historical   cyclobenzaprine (FLEXERIL) 10 mg tablet Take  by mouth as needed for Muscle Spasm(s). Yes Provider, Historical   cetirizine (ZyrTEC) 10 mg tablet Take 10 mg by mouth daily. Yes Provider, Historical     Patient Active Problem List   Diagnosis Code    Acute on chronic diastolic congestive heart failure (HCC) I50.33    Neuropathy G62.9    Dyspnea R06.00    Morbid obesity with BMI of 50.0-59.9, adult ([facility] E66.01, Z68.43     Patient Active Problem List    Diagnosis Date Noted    Acute on chronic diastolic congestive heart failure ([facility] 05/25/2021    Neuropathy 05/25/2021    Dyspnea 05/25/2021    Morbid obesity with BMI of 50.0-59.9, adult ([facility] 05/25/2021     Current Outpatient Medications   Medication Sig Dispense Refill    lisinopriL (PRINIVIL, ZESTRIL) 5 mg tablet Take 1 tablet by mouth once daily 30 Tablet 2    cyanocobalamin 1,000 mcg tablet Take 1 Tablet by mouth daily.  30 Tablet 0    ergocalciferol (ERGOCALCIFEROL) 1,250 mcg (50,000 unit) capsule Take 1 Capsule by mouth every seven (7) days. 5 Capsule 0    spironolactone (ALDACTONE) 25 mg tablet Take 1 tablet by mouth once daily 30 Tablet 0    albuterol (PROVENTIL HFA, VENTOLIN HFA, PROAIR HFA) 90 mcg/actuation inhaler Take 2 Puffs by inhalation every four (4) hours as needed for Wheezing. 1 Inhaler 0    magnesium oxide (MAG-OX) 400 mg tablet Take 1 Tablet by mouth daily. 30 Tablet 1    potassium chloride (K-DUR, KLOR-CON) 20 mEq tablet Take 1 Tab by mouth daily. 30 Tab 5    furosemide (Lasix) 40 mg tablet Take 1 Tab by mouth daily. 90 Tab 1    EPINEPHrine HCl, PF, (ADRENALIN) 1 mg/mL (1 mL) injection once.  omeprazole (PRILOSEC) 40 mg capsule       fluticasone propionate (Flovent HFA) 220 mcg/actuation inhaler Take 1 Puff by inhalation two (2) times a day.  traMADoL (ULTRAM) 50 mg tablet Take 50 mg by mouth as needed for Pain.  cyclobenzaprine (FLEXERIL) 10 mg tablet Take  by mouth as needed for Muscle Spasm(s).  cetirizine (ZyrTEC) 10 mg tablet Take 10 mg by mouth daily.        Allergies   Allergen Reactions    Latex, Natural Rubber Itching    Ansaid [Flurbiprofen] Anaphylaxis    Iron Dextran Anaphylaxis    Codeine Itching    Keflex [Cephalexin] Hives and Itching    Levofloxacin Myalgia     Tendon rupture     Past Medical History:   Diagnosis Date    Anemia     Angio-edema     Asthma     Depression     Diverticulosis     Enlargement, spleen     Esophagospasm     Essential hypertension     Fatty liver     GERD (gastroesophageal reflux disease)     Histoplasmosis     HPV (human papilloma virus) anogenital infection     Lyme disease     Neuropathy     PCOS (polycystic ovarian syndrome)     PTSD (post-traumatic stress disorder)     Tachycardia     Thyroid disease     Tricuspid insufficiency      Past Surgical History:   Procedure Laterality Date    HX CHOLECYSTECTOMY      HX HEART CATHETERIZATION  2010 approx    normal per patient    HX HEMORRHOIDECTOMY      HX OTHER SURGICAL Vocal cord polyps     Family History   Problem Relation Age of Onset    Cancer Mother         stomach    Seizures Mother    Larance Donita Cancer Sister         double mastectomy    Cancer Sister         lung    Heart Surgery Maternal Grandmother         CABG    Stroke Neg Hx      Social History     Tobacco Use    Smoking status: Current Every Day Smoker     Packs/day: 0.50     Years: 15.00     Pack years: 7.50     Start date: 1985    Smokeless tobacco: Never Used    Tobacco comment: less than 10 depending on the day   Substance Use Topics    Alcohol use: Not Currently       Review of Systems:   Constitutional: no fever or chills  Skin denies rash or itching  HEENT:  Denies tinnitus, hearing loss, or visual changes  Respiratory: denies shortness of breath  Cardiovascular: denies chest pain, dyspnea on exertion  Gastrointestinal: does not report nausea or vomiting  Genitourinary: does not report dysuria or incontinence  Musculoskeletal: does not report joint pain or swelling  Endocrine: denies weight change  Hematology: denies easy bruising or bleeding   Neurological: as above in HPI      Objective:     Patient-Reported Vitals 7/19/2021   Patient-Reported Weight 318lb        [INSTRUCTIONS:  \"[x]\" Indicates a positive item  \"[]\" Indicates a negative item  -- DELETE ALL ITEMS NOT EXAMINED]    Constitutional: [x] Appears well-developed and well-nourished [x] No apparent distress      [] Abnormal -     Mental status: [x] Alert and awake  [x] Oriented to person/place/time [x] Able to follow commands    [] Abnormal -     Eyes:   EOM    [x]  Normal    [] Abnormal -   Sclera  [x]  Normal    [] Abnormal -          Discharge [x]  None visible   [] Abnormal -     HENT: [x] Normocephalic, atraumatic  [] Abnormal -   [x] Mouth/Throat: Mucous membranes are moist    External Ears [x] Normal  [] Abnormal -    Neck: [x] No visualized mass [] Abnormal -     Pulmonary/Chest: [x] Respiratory effort normal   [x] No visualized signs of difficulty breathing or respiratory distress        [] Abnormal -      Musculoskeletal:   [x] Normal gait with no signs of ataxia         [x] Normal range of motion of neck        [] Abnormal -     Neurological:        [x] No Facial Asymmetry (Cranial nerve 7 motor function) (limited exam due to video visit)          [x] No gaze palsy        [] Abnormal -          Skin:        [x] No significant exanthematous lesions or discoloration noted on facial skin         [] Abnormal -            Psychiatric:       [x] Normal Affect [] Abnormal -        [x] No Hallucinations    Other pertinent observable physical exam findings:-    This is a very pleasant 52-year-old female. She is alert in no apparent distress. Full fund of knowledge. Speech is clear. No facial asymmetry. Extraocular movements intact. Tongue midline. Equal shoulder shrug. Finger-nose-finger intact. Able to take several steps with no obvious signs of ataxia or incoordination. We discussed the expected course, resolution and complications of the diagnosis(es) in detail. Medication risks, benefits, costs, interactions, and alternatives were discussed as indicated. I advised her to contact the office if her condition worsens, changes or fails to improve as anticipated. She expressed understanding with the diagnosis(es) and plan. Sima Rodriguez, was evaluated through a synchronous (real-time) audio-video encounter. The patient (or guardian if applicable) is aware that this is a billable service. Verbal consent to proceed has been obtained within the past 12 months. The visit was conducted pursuant to the emergency declaration under the [address] [place] [address] authority and the [employer] and [employer] General Act. Patient identification was verified, and a caregiver was present when appropriate.  The patient was located in a state where the provider was credentialed to provide care.       Jose Eduardo Rm, NP

## 2021-07-21 RX ORDER — LANOLIN ALCOHOL/MO/W.PET/CERES
CREAM (GRAM) TOPICAL
Qty: 30 TABLET | Refills: 0 | Status: SHIPPED | OUTPATIENT
Start: 2021-07-21 | End: 2021-09-01

## 2021-07-21 RX ORDER — ERGOCALCIFEROL 1.25 MG/1
CAPSULE ORAL
Qty: 5 CAPSULE | Refills: 0 | Status: SHIPPED | OUTPATIENT
Start: 2021-07-21 | End: 2021-09-01

## 2021-07-22 DIAGNOSIS — I50.33 ACUTE ON CHRONIC DIASTOLIC CONGESTIVE HEART FAILURE (HCC): ICD-10-CM

## 2021-07-22 DIAGNOSIS — R06.00 DYSPNEA, UNSPECIFIED TYPE: ICD-10-CM

## 2021-07-22 DIAGNOSIS — E66.01 MORBID OBESITY WITH BMI OF 50.0-59.9, ADULT (HCC): ICD-10-CM

## 2021-07-22 DIAGNOSIS — E03.9 ACQUIRED HYPOTHYROIDISM: ICD-10-CM

## 2021-07-22 DIAGNOSIS — J45.909 ASTHMA, UNSPECIFIED ASTHMA SEVERITY, UNSPECIFIED WHETHER COMPLICATED, UNSPECIFIED WHETHER PERSISTENT: ICD-10-CM

## 2021-07-26 ENCOUNTER — TRANSCRIBE ORDER (OUTPATIENT)
Dept: SCHEDULING | Age: 56
End: 2021-07-26

## 2021-07-26 DIAGNOSIS — E03.9 ACQUIRED HYPOTHYROIDISM: Primary | ICD-10-CM

## 2021-07-29 DIAGNOSIS — E03.9 ACQUIRED HYPOTHYROIDISM: ICD-10-CM

## 2021-07-30 RX ORDER — LEVOTHYROXINE SODIUM 100 UG/1
TABLET ORAL
Qty: 30 TABLET | Refills: 0 | Status: SHIPPED | OUTPATIENT
Start: 2021-07-30 | End: 2021-09-01

## 2021-08-02 ENCOUNTER — OFFICE VISIT (OUTPATIENT)
Dept: CARDIOLOGY CLINIC | Age: 56
End: 2021-08-02
Payer: COMMERCIAL

## 2021-08-02 VITALS
HEART RATE: 76 BPM | HEIGHT: 66 IN | BODY MASS INDEX: 46.93 KG/M2 | WEIGHT: 292 LBS | DIASTOLIC BLOOD PRESSURE: 70 MMHG | SYSTOLIC BLOOD PRESSURE: 109 MMHG

## 2021-08-02 DIAGNOSIS — I10 ESSENTIAL HYPERTENSION: ICD-10-CM

## 2021-08-02 DIAGNOSIS — E66.01 SEVERE OBESITY (BMI >= 40) (HCC): ICD-10-CM

## 2021-08-02 DIAGNOSIS — I50.32 CHRONIC DIASTOLIC CONGESTIVE HEART FAILURE (HCC): Primary | ICD-10-CM

## 2021-08-02 PROCEDURE — 99214 OFFICE O/P EST MOD 30 MIN: CPT | Performed by: INTERNAL MEDICINE

## 2021-08-02 RX ORDER — LISINOPRIL 2.5 MG/1
TABLET ORAL
Qty: 90 TABLET | Refills: 1 | Status: SHIPPED | OUTPATIENT
Start: 2021-08-02 | End: 2022-01-13

## 2021-08-02 NOTE — PROGRESS NOTES
1. Have you been to the ER, urgent care clinic since your last visit? Hospitalized since your last visit? Yes When: 6/2021 Where: [facility] Reason for visit: dental pain    2. Have you seen or consulted any other health care providers outside of the [address] [place] [address] since your last visit? Include any pap smears or colon screening. No     3. Since your last visit, have you had any of the following symptoms? chest pains and shortness of breath. 4.  Have you had any blood work, X-rays or cardiac testing? Yes Where: [facility]         5. Where do you normally have your labs drawn? [facility]  6. Do you need any refills today?    no

## 2021-08-02 NOTE — PROGRESS NOTES
HISTORY OF PRESENT ILLNESS  Zach Peralta is a 48 y.o. female. 3/21 seen as new patient. H/o TR, MR, idiopathic tachycardia, edema, neuropathy    CHF  The history is provided by the medical records. This is a chronic problem. Associated symptoms include chest pain and shortness of breath. Pertinent negatives include no headaches. Shortness of Breath  The history is provided by the patient. This is a new problem. The current episode started more than 1 week ago (yrs). The problem has been gradually improving. Associated symptoms include chest pain. Pertinent negatives include no fever, no headaches, no cough, no wheezing, no PND, no orthopnea, no vomiting, no rash, no leg swelling and no claudication. Sore throat: walking out of the house. The problem's precipitants include exercise (Only intermittently some days with walking in the house;). Leg Swelling  The history is provided by the patient. This is a new problem. The current episode started more than 1 week ago (yrs). The problem occurs daily. The problem has been resolved. Associated symptoms include chest pain and shortness of breath. Pertinent negatives include no headaches. The symptoms are aggravated by standing. The symptoms are relieved by sleep and medications. Chest Pain (Angina)   The history is provided by the patient. This is a new problem. The current episode started more than 1 week ago (1/21). The problem occurs rarely (3 episodes in 3 days in last 3 months; ). The pain is associated with walking (walking dog; ). The pain is present in the lateral region, right side and left side. The quality of the pain is described as pressure-like and tightness. The pain radiates to the left jaw and left neck. Associated symptoms include lower extremity edema and shortness of breath.  Pertinent negatives include no claudication, no cough, no dizziness, no fever, no headaches, no malaise/fatigue, no nausea, no orthopnea, no palpitations, no PND and no vomiting. Treatments tried: drinking coke. The treatment provided mild relief.      Allergies   Allergen Reactions    Latex, Natural Rubber Itching    Ansaid [Flurbiprofen] Anaphylaxis    Iron Dextran Anaphylaxis    Codeine Itching    Keflex [Cephalexin] Hives and Itching    Levofloxacin Myalgia     Tendon rupture       Past Medical History:   Diagnosis Date    Anemia     Angio-edema     Asthma     Depression     Diverticulosis     Enlargement, spleen     Esophagospasm     Essential hypertension     Fatty liver     GERD (gastroesophageal reflux disease)     Histoplasmosis     HPV (human papilloma virus) anogenital infection     Lyme disease     Neuropathy     PCOS (polycystic ovarian syndrome)     PTSD (post-traumatic stress disorder)     Tachycardia     Thyroid disease     Tricuspid insufficiency        Family History   Problem Relation Age of Onset    Cancer Mother         stomach    Seizures Mother     Cancer Sister         double mastectomy    Cancer Sister         lung    Heart Surgery Maternal Grandmother         CABG    Stroke Neg Hx        Social History     Tobacco Use    Smoking status: Current Every Day Smoker     Packs/day: 0.50     Years: 15.00     Pack years: 7.50     Start date: 1985    Smokeless tobacco: Never Used    Tobacco comment: less than 10 depending on the day   Vaping Use    Vaping Use: Former   Substance Use Topics    Alcohol use: Not Currently    Drug use: Not Currently        Current Outpatient Medications   Medication Sig    Euthyrox 100 mcg tablet TAKE 1 TABLET BY MOUTH ONCE DAILY BEFORE BREAKFAST FOR 30 DAYS    ergocalciferol (ERGOCALCIFEROL) 1,250 mcg (50,000 unit) capsule Take 1 capsule by mouth once a week    cyanocobalamin 1,000 mcg tablet Take 1 tablet by mouth once daily    lisinopriL (PRINIVIL, ZESTRIL) 5 mg tablet Take 1 tablet by mouth once daily    spironolactone (ALDACTONE) 25 mg tablet Take 1 tablet by mouth once daily    albuterol (PROVENTIL HFA, VENTOLIN HFA, PROAIR HFA) 90 mcg/actuation inhaler Take 2 Puffs by inhalation every four (4) hours as needed for Wheezing.  magnesium oxide (MAG-OX) 400 mg tablet Take 1 Tablet by mouth daily.  potassium chloride (K-DUR, KLOR-CON) 20 mEq tablet Take 1 Tab by mouth daily.  furosemide (Lasix) 40 mg tablet Take 1 Tab by mouth daily.  EPINEPHrine HCl, PF, (ADRENALIN) 1 mg/mL (1 mL) injection once.  omeprazole (PRILOSEC) 40 mg capsule     traMADoL (ULTRAM) 50 mg tablet Take 50 mg by mouth as needed for Pain.  cyclobenzaprine (FLEXERIL) 10 mg tablet Take  by mouth as needed for Muscle Spasm(s).  cetirizine (ZyrTEC) 10 mg tablet Take 10 mg by mouth daily. No current facility-administered medications for this visit. Past Surgical History:   Procedure Laterality Date    HX CHOLECYSTECTOMY      HX HEART CATHETERIZATION  2010 approx    normal per patient    HX HEMORRHOIDECTOMY      HX OTHER SURGICAL      Vocal cord polyps       Visit Vitals  /70   Pulse 76   Ht 5' 6\" (1.676 m)   Wt 132.5 kg (292 lb)   BMI 47.13 kg/m²       Diagnostic Studies:  I have reviewed the relevant tests done on the patient and show as follows  EKG tracings reviewed by me today. EKG Results     None        XR Results (most recent):  No results found for this or any previous visit. Ms. Sherri Wallis has a reminder for a \"due or due soon\" health maintenance. I have asked that she contact her primary care provider for follow-up on this health maintenance. Review of Systems   Constitutional: Negative for chills, fever, malaise/fatigue and weight loss. HENT: Negative for nosebleeds. Sore throat: walking out of the house. Eyes: Negative for discharge. Respiratory: Positive for shortness of breath. Negative for cough and wheezing. Cardiovascular: Positive for chest pain. Negative for palpitations, orthopnea, claudication, leg swelling and PND.    Gastrointestinal: Negative for diarrhea, nausea and vomiting. Genitourinary: Negative for dysuria and hematuria. Musculoskeletal: Negative for joint pain. Skin: Negative for rash. Neurological: Negative for dizziness, seizures, loss of consciousness and headaches. Endo/Heme/Allergies: Negative for polydipsia. Does not bruise/bleed easily. Psychiatric/Behavioral: Negative for depression and substance abuse. The patient does not have insomnia. 11/20 echo  ECHOCARDIOGRAM COMPLETE  (DOPPLER ECHO)11/20/2020  [facility]  Result Impression   :   POOR QUALITY 2-DIMENSIONAL IMAGES   DEFINITY CONTRAST UTILIZED TO ENHANCE ENDOCARDIAL BORDER DEFINITION   LEFT VENTRICULAR HYPERTROPHY   GROSSLY NORMAL LEFT VENTRICULAR CAVITY SIZE AND SYSTOLIC FUNCTION   COULD NOT ACCURATELY QUANTIFY EJECTION FRACTION   GRADE 1 DIASTOLIC DYSFUNCTION   RIGHT VENTRICLE NOT VISUALIZED   GROSSLY NORMAL LEFT ATRIAL SIZE   RIGHT ATRIUM NOT VISUALIZED   NO OBVIOUS HEMODYNAMICALLY SIGNIFICANT VALVE PATHOLOGY   RVSP - NA     04/26/21   ECHO ADULT COMPLETE 04/26/2021 4/26/2021    Narrative · Contrast used: DEFINITY. · Image quality for this study was technically difficult. · LV: Estimated LVEF is 60 - 65%. Normal cavity size and systolic function   (ejection fraction normal). Moderate concentric hypertrophy. Wall motion:   normal. Moderate (grade 2) left ventricular diastolic dysfunction. · RV: Mildly dilated right ventricle. · RA: Mildly dilated right atrium. · TV: Right Ventricular Arterial Pressure (RVSP) is 23 mmHg. Pulmonary   hypertension not suggested by Doppler findings. Signed by: Mami Pastrana MD     Physical Exam  Constitutional:       General: She is not in acute distress. Appearance: She is well-developed. She is obese. Comments: Morbid obesity   HENT:      Head: Normocephalic and atraumatic. Mouth/Throat:      Dentition: Normal dentition. Eyes:      General: No scleral icterus. Right eye: No discharge. Left eye: No discharge. Neck:      Thyroid: No thyromegaly. Vascular: No carotid bruit or JVD. Cardiovascular:      Rate and Rhythm: Normal rate and regular rhythm. Pulses: Intact distal pulses. Heart sounds: Normal heart sounds, S1 normal and S2 normal. No murmur heard. No friction rub. No gallop. Pulmonary:      Effort: Pulmonary effort is normal.      Breath sounds: Normal breath sounds. No wheezing or rales. Chest:      Chest wall: No tenderness. Abdominal:      Palpations: Abdomen is soft. There is no mass. Tenderness: There is no abdominal tenderness. Musculoskeletal:      Cervical back: Neck supple. Right lower leg: No edema. Left lower leg: No edema. Lymphadenopathy:      Cervical:      Right cervical: No superficial cervical adenopathy. Left cervical: No superficial cervical adenopathy. Skin:     General: Skin is warm and dry. Findings: No rash. Neurological:      Mental Status: She is alert and oriented to person, place, and time. Psychiatric:         Behavior: Behavior normal.         ASSESSMENT and PLAN    LIPIDS : Results for Swathi Baronred (MRN [id number]) as of 3/19/2021 13:01   Ref. Range 3/3/2021 12:20   Triglyceride Latest Ref Range: <150 MG/ (H)   Cholesterol, total Latest Ref Range: <200 MG/ (H)   HDL Cholesterol Latest Ref Range: 40 - 60 MG/DL 45   CHOL/HDL Ratio Latest Ref Range: 0 - 5.0   4.6   VLDL, calculated Latest Units: MG/DL 32.2   LDL, calculated Latest Ref Range: 0 - 100 MG/.8 (H)     ACC CVD risk score as of 3/21 is 5.9%. Continue diet and exercise for now. 3/19/2021 seen as new patient for changing the cardiologist.  She cannot locate her old cardiologist and so is changing. She has acute on chronic CHF which is diastolic and right heart failure mostly. Blood pressure is elevated today but is normal most of the times at home. CHF is secondary to morbid obesity most likely.   She will need to be worked up for sleep apnea. History of Lyme disease for which she was treated with doxycycline a year. Apparently she is still positive for it and I recommended ID opinion for that. Increase Lasix to 40 twice daily add spironolactone. Told her not to use any over-the-counter potassium but does take the prescription pills so we can follow the labs closely and replace as needed. Continue magnesium. History of significant electrolyte problems in the past.  Told her to come to ER if she does not feel well. Extensive review of previous records and face-to-face time was more than 60 minutes. 4/26/2021 CHF persisting. Patient not taking lasix as advised. She will now take lasix 40 mg/day. Follow labs closely. Add acei and f/u BP chart at home. Diet and exercise discussed. Refer to bariatrics. Diagnoses and all orders for this visit:    1. Chronic diastolic congestive heart failure (HCC)  -     METABOLIC PANEL, BASIC; Future  -     CBC W/O DIFF; Future    2. Essential hypertension  -     lisinopriL (PRINIVIL, ZESTRIL) 2.5 mg tablet; Take 1 tablet by mouth once daily    3. Severe obesity (BMI >= 40) (HCC)        Pertinent laboratory and test data reviewed and discussed with patient. See patient instructions also for other medical advice given    Medications Discontinued During This Encounter   Medication Reason    fluticasone propionate (Flovent HFA) 220 mcg/actuation inhaler Alternate Therapy    lisinopriL (PRINIVIL, ZESTRIL) 5 mg tablet        Follow-up and Dispositions    · Return in about 3 months (around 11/2/2021), or if symptoms worsen or fail to improve, for post test.       8/2/2021 CHF is compensated. Blood pressure is controlled and is low normal.  Reduce lisinopril. Check labs to follow-up on potassium due to Aldactone. She is lost significant weight for which she was congratulated. Recommended to continue to lose more weight with which she agrees and is trying.

## 2021-08-02 NOTE — PATIENT INSTRUCTIONS
Medications Discontinued During This Encounter   Medication Reason    fluticasone propionate (Flovent HFA) 220 mcg/actuation inhaler Alternate Therapy    lisinopriL (PRINIVIL, ZESTRIL) 5 mg tablet           A Healthy Heart: Care Instructions  Your Care Instructions     Coronary artery disease, also called heart disease, occurs when a substance called plaque builds up in the vessels that supply oxygen-rich blood to your heart muscle. This can narrow the blood vessels and reduce blood flow. A heart attack happens when blood flow is completely blocked. A high-fat diet, smoking, and other factors increase the risk of heart disease. Your doctor has found that you have a chance of having heart disease. You can do lots of things to keep your heart healthy. It may not be easy, but you can change your diet, exercise more, and quit smoking. These steps really work to lower your chance of heart disease. Follow-up care is a key part of your treatment and safety. Be sure to make and go to all appointments, and call your doctor if you are having problems. It's also a good idea to know your test results and keep a list of the medicines you take. How can you care for yourself at home? Diet    · Use less salt when you cook and eat. This helps lower your blood pressure. Taste food before salting. Add only a little salt when you think you need it. With time, your taste buds will adjust to less salt.     · Eat fewer snack items, fast foods, canned soups, and other high-salt, high-fat, processed foods.     · Read food labels and try to avoid saturated and trans fats. They increase your risk of heart disease by raising cholesterol levels.     · Limit the amount of solid fat-butter, margarine, and shortening-you eat. Use olive, peanut, or canola oil when you cook. Bake, broil, and steam foods instead of frying them.     · Eat a variety of fruit and vegetables every day.  Dark green, deep orange, red, or yellow fruits and vegetables are especially good for you. Examples include spinach, carrots, peaches, and berries.     · Foods high in fiber can reduce your cholesterol and provide important vitamins and minerals. High-fiber foods include whole-grain cereals and breads, oatmeal, beans, brown rice, citrus fruits, and apples.     · Eat lean proteins. Heart-healthy proteins include seafood, lean meats and poultry, eggs, beans, peas, nuts, seeds, and soy products.     · Limit drinks and foods with added sugar. These include candy, desserts, and soda pop. Lifestyle changes    · If your doctor recommends it, get more exercise. Walking is a good choice. Bit by bit, increase the amount you walk every day. Try for at least 30 minutes on most days of the week. You also may want to swim, bike, or do other activities.     · Do not smoke. If you need help quitting, talk to your doctor about stop-smoking programs and medicines. These can increase your chances of quitting for good. Quitting smoking may be the most important step you can take to protect your heart. It is never too late to quit.     · Limit alcohol to 2 drinks a day for men and 1 drink a day for women. Too much alcohol can cause health problems.     · Manage other health problems such as diabetes, high blood pressure, and high cholesterol. If you think you may have a problem with alcohol or drug use, talk to your doctor. Medicines    · Take your medicines exactly as prescribed. Call your doctor if you think you are having a problem with your medicine.     · If your doctor recommends aspirin, take the amount directed each day. Make sure you take aspirin and not another kind of pain reliever, such as acetaminophen (Tylenol). When should you call for help? Call 911 if you have symptoms of a heart attack.  These may include:    · Chest pain or pressure, or a strange feeling in the chest.     · Sweating.     · Shortness of breath.     · Pain, pressure, or a strange feeling in the back, neck, jaw, or upper belly or in one or both shoulders or arms.     · Lightheadedness or sudden weakness.     · A fast or irregular heartbeat. After you call 911, the  may tell you to chew 1 adult-strength or 2 to 4 low-dose aspirin. Wait for an ambulance. Do not try to drive yourself. Watch closely for changes in your health, and be sure to contact your doctor if you have any problems. Where can you learn more? Go to http://www.apple.com/  Enter F075 in the search box to learn more about \"A Healthy Heart: Care Instructions. \"  Current as of: August 31, 2020               Content Version: 12.8  © 2006-2021 [employer], [employer]. Care instructions adapted under license by [employer] (which disclaims liability or warranty for this information). If you have questions about a medical condition or this instruction, always ask your healthcare professional. [address] any warranty or liability for your use of this information.

## 2021-08-03 ENCOUNTER — OFFICE VISIT (OUTPATIENT)
Dept: FAMILY MEDICINE CLINIC | Age: 56
End: 2021-08-03
Payer: COMMERCIAL

## 2021-08-03 VITALS
OXYGEN SATURATION: 98 % | SYSTOLIC BLOOD PRESSURE: 126 MMHG | HEART RATE: 78 BPM | RESPIRATION RATE: 18 BRPM | DIASTOLIC BLOOD PRESSURE: 70 MMHG | TEMPERATURE: 98.9 F | WEIGHT: 292 LBS | HEIGHT: 66 IN | BODY MASS INDEX: 46.93 KG/M2

## 2021-08-03 DIAGNOSIS — G47.00 INSOMNIA, UNSPECIFIED TYPE: Primary | ICD-10-CM

## 2021-08-03 DIAGNOSIS — G62.9 NEUROPATHY: ICD-10-CM

## 2021-08-03 DIAGNOSIS — E66.01 MORBID OBESITY WITH BMI OF 45.0-49.9, ADULT (HCC): ICD-10-CM

## 2021-08-03 DIAGNOSIS — I50.32 CHRONIC DIASTOLIC CONGESTIVE HEART FAILURE (HCC): ICD-10-CM

## 2021-08-03 PROCEDURE — 99214 OFFICE O/P EST MOD 30 MIN: CPT | Performed by: STUDENT IN AN ORGANIZED HEALTH CARE EDUCATION/TRAINING PROGRAM

## 2021-08-03 RX ORDER — RAMELTEON 8 MG/1
8 TABLET ORAL
Qty: 30 TABLET | Refills: 0 | Status: SHIPPED | OUTPATIENT
Start: 2021-08-03 | End: 2021-09-27

## 2021-08-03 NOTE — PATIENT INSTRUCTIONS
Learning About Sleeping Well  What does sleeping well mean? Sleeping well means getting enough sleep. How much sleep is enough varies among people. The number of hours you sleep is not as important as how you feel when you wake up. If you do not feel refreshed, you probably need more sleep. Another sign of not getting enough sleep is feeling tired during the day. The average total nightly sleep time is 7½ to 8 hours. Healthy adults may need a little more or a little less than this. Why is getting enough sleep important? Getting enough quality sleep is a basic part of good health. When your sleep suffers, your mood and your thoughts can suffer too. You may find yourself feeling more grumpy or stressed. Not getting enough sleep also can lead to serious problems, including injury, accidents, anxiety, and depression. What might cause poor sleeping? Many things can cause sleep problems, including:  · Stress. Stress can be caused by fear about a single event, such as giving a speech. Or you may have ongoing stress, such as worry about work or school. · Depression, anxiety, and other mental or emotional conditions. · Changes in your sleep habits or surroundings. This includes changes that happen where you sleep, such as noise, light, or sleeping in a different bed. It also includes changes in your sleep pattern, such as having jet lag or working a late shift. · Health problems, such as pain, breathing problems, and restless legs syndrome. · Lack of regular exercise. How can you help yourself? Here are some tips that may help you sleep more soundly and wake up feeling more refreshed. Your sleeping area   · Use your bedroom only for sleeping and sex. A bit of light reading may help you fall asleep. But if it doesn't, do your reading elsewhere in the house. Don't watch TV in bed. · Be sure your bed is big enough to stretch out comfortably, especially if you have a sleep partner.   · Keep your bedroom quiet, dark, and cool. Use curtains, blinds, or a sleep mask to block out light. To block out noise, use earplugs, soothing music, or a \"white noise\" machine. Your evening and bedtime routine   · Create a relaxing bedtime routine. You might want to take a warm shower or bath, listen to soothing music, or drink a cup of noncaffeinated tea. · Go to bed at the same time every night. And get up at the same time every morning, even if you feel tired. What to avoid   · Limit caffeine (coffee, tea, caffeinated sodas) during the day, and don't have any for at least 4 to 6 hours before bedtime. · Don't drink alcohol before bedtime. Alcohol can cause you to wake up more often during the night. · Don't smoke or use tobacco, especially in the evening. Nicotine can keep you awake. · Don't take naps during the day, especially close to bedtime. · Don't lie in bed awake for too long. If you can't fall asleep, or if you wake up in the middle of the night and can't get back to sleep within 15 minutes or so, get out of bed and go to another room until you feel sleepy. · Don't take medicine right before bed that may keep you awake or make you feel hyper or energized. Your doctor can tell you if your medicine may do this and if you can take it earlier in the day. If you can't sleep   · Imagine yourself in a peaceful, pleasant scene. Focus on the details and feelings of being in a place that is relaxing. · Get up and do a quiet or boring activity until you feel sleepy. · Don't drink any liquids after 6 p.m. if you wake up often because you have to go to the bathroom. Where can you learn more? Go to http://www.gray.com/  Enter G147 in the search box to learn more about \"Learning About Sleeping Well. \"  Current as of: September 23, 2020               Content Version: 12.8  © 5757-7758 [employer].    Care instructions adapted under license by [employer] (which disclaims liability or warranty for this information). If you have questions about a medical condition or this instruction, always ask your healthcare professional. [address] any warranty or liability for your use of this information. Insomnia: Care Instructions  Your Care Instructions     Insomnia is the inability to sleep well. It is a common problem for most people at some time. Insomnia may make it hard for you to get to sleep, stay asleep, or sleep as long as you need to. This can make you tired and grouchy during the day. It can also make you forgetful, less effective at work, and unhappy. Insomnia can be caused by conditions such as depression or anxiety. Pain can also affect your ability to sleep. When these problems are solved, the insomnia usually clears up. But sometimes bad sleep habits can cause insomnia. If insomnia is affecting your work or your enjoyment of life, you can take steps to improve your sleep. Follow-up care is a key part of your treatment and safety. Be sure to make and go to all appointments, and call your doctor if you are having problems. It's also a good idea to know your test results and keep a list of the medicines you take. How can you care for yourself at home? What to avoid   · Do not have drinks with caffeine, such as coffee or black tea, for 8 hours before bed. · Do not smoke or use other types of tobacco near bedtime. Nicotine is a stimulant and can keep you awake. · Avoid drinking alcohol late in the evening, because it can cause you to wake in the middle of the night. · Do not eat a big meal close to bedtime. If you are hungry, eat a light snack. · Do not drink a lot of water close to bedtime, because the need to urinate may wake you up during the night. · Do not read or watch TV in bed. Use the bed only for sleeping and sexual activity. What to try   · Go to bed at the same time every night, and wake up at the same time every morning.  Do not take naps during the day. · Keep your bedroom quiet, dark, and cool. · Sleep on a comfortable pillow and mattress. · If watching the clock makes you anxious, turn it facing away from you so you cannot see the time. · If you worry when you lie down, start a worry book. Well before bedtime, write down your worries, and then set the book and your concerns aside. · Try meditation or other relaxation techniques before you go to bed. · If you cannot fall asleep, get up and go to another room until you feel sleepy. Do something relaxing. Repeat your bedtime routine before you go to bed again. · Make your house quiet and calm about an hour before bedtime. Turn down the lights, turn off the TV, log off the computer, and turn down the volume on music. This can help you relax after a busy day. When should you call for help? Watch closely for changes in your health, and be sure to contact your doctor if:    · Your efforts to improve your sleep do not work.     · Your insomnia gets worse.     · You have been feeling down, depressed, or hopeless or have lost interest in things that you usually enjoy. Where can you learn more? Go to http://www.gray.com/  Enter P513 in the search box to learn more about \"Insomnia: Care Instructions. \"  Current as of: August 31, 2020               Content Version: 12.8  © 4041-7155 [employer]). Care instructions adapted under license by [employer] (which disclaims liability or warranty for this information). If you have questions about a medical condition or this instruction, always ask your healthcare professional. [place] [address] any warranty or liability for your use of this information. Learning About Obesity  What is obesity? Obesity means having an unhealthy amount of body fat. This puts your health in danger. It can lead to other health problems, such as type 2 diabetes and high blood pressure.   How do you know if your weight is in the obesity range? To know if your weight is in the obesity range, your doctor looks at your body mass index (BMI) and waist size. BMI is a number that is calculated from your weight and your height. To figure out your BMI for yourself, you can use an online tool, such as http://www.[employer]ash[employer]/ on the [employer] of [employer]. If your BMI is 30.0 or higher, it falls within the obesity range. Keep in mind that BMI and waist size are only guides. They are not tools to determine your ideal body weight. What causes obesity? When you take in more calories than you burn off, you gain weight. How you eat, how active you are, and other things affect how your body uses calories and whether you gain weight. If you have family members who have too much body fat, you may have inherited a tendency to gain weight. And your family also helps form your eating and lifestyle habits, which can lead to obesity. Also, our busy lives make it harder to plan and cook healthy meals. For many of us, it's easier to reach for prepared foods, go out to eat, or go to the drive-through. But these foods are often high in saturated fat and calories. Portions are often too large. What can you do to reach a healthy weight? Focus on health, not diets. Diets are hard to stay on and don't work in the long run. It is very hard to stay with a diet that includes lots of big changes in your eating habits. Instead of a diet, focus on lifestyle changes that will improve your health and achieve the right balance of energy and calories. To lose weight, you need to burn more calories than you take in. You can do it by eating healthy foods in reasonable amounts and becoming more active, even a little bit every day. Making small changes over time can add up to a lot. Make a plan for change.  Many people have found that naming their reasons for change and staying focused on their plan can make a big difference. Work with your doctor to create a plan that is right for you. · Ask yourself: Phill Vyas are my personal, most powerful reasons for wanting this change? What will my life look like when I've made the change? \"  · Set your long-term goal. Make it specific, such as \"I will lose x pounds. \"  · Break your long-term goal into smaller, short-term goals. Make these small steps specific and within your reach, things you know you can do. These steps are what keep you going from day to day. Talk with your doctor about other weight-loss options. If you have a BMI in a certain range and have not been able to lose weight with diet and exercise, medicine or surgery may be an option for you. Before your doctor will prescribe medicines or surgery, he or she will probably want you to be more active and follow your healthy eating plan for a period of time. These habits are key lifelong changes for managing your weight, with or without other medical treatment. And these changes can help you avoid weight-related health problems. How can you stay on your plan for change? Be ready. Choose to start during a time when there are few events like holidays, social events, and high-stress periods. These events might trigger slip-ups. Decide on your first few steps. Most people have more success when they make small changes, one step at a time. For example, you might switch a daily candy bar to a piece of fruit, walk 10 minutes more, or add more vegetables to a meal.  Line up your support people. Make sure you're not going to be alone as you make this change. Connect with people who understand how important it is to you. Ask family members and friends for help in keeping with your plan. And think about who could make it harder for you, and how to handle them. Try tracking. People who keep track of what they eat, feel, and do are better at losing weight.  Try writing down things like:  · What and how much you eat. · How you feel before and after each meal.  · Details about each meal (like eating out or at home, eating alone, or with friends or family). · What you do to be active. Look and plan. As you track, look for patterns that you may want to change. Take note of:  · When you eat and whether you skip meals. · How often you eat out. · How many fruits and vegetables you eat. · When you eat beyond feeling full. · When and why you eat for reasons other than being hungry. When you stray from your plan, don't get upset. Figure out what made you slip up and how you can fix it. Can you take medicines or have surgery to lose weight? If you have a BMI in a certain range and have not been able to lose weight with diet and exercise, medicine or surgery may be an option for you. If you have a BMI of at least 30.0 (or a BMI of at least 27.0 and another health problem related to your weight), ask your doctor about weight-loss medicines. They work by making you feel less hungry, making you feel full more quickly, or changing how you digest fat. Medicines are used along with diet changes and more physical activity to help you make lasting changes. If you have a BMI of 40.0 or more (or a BMI of 35.0 or more and another health problem related to your weight), your doctor may talk with you about surgery. Weight-loss surgery has risks, and you will need to work with your doctor to compare the risk of having obesity with the risks of surgery. With any option you choose, you will still need to eat a healthy diet and get regular exercise. Follow-up care is a key part of your treatment and safety. Be sure to make and go to all appointments, and call your doctor if you are having problems. It's also a good idea to know your test results and keep a list of the medicines you take. Where can you learn more?   Go to http://www.gray.com/  Enter N111 in the search box to learn more about \"Learning About Obesity. \"  Current as of: September 23, 2020               Content Version: 12.8  © 3785-6363 [employer]. Care instructions adapted under license by [employer] (which disclaims liability or warranty for this information). If you have questions about a medical condition or this instruction, always ask your healthcare professional. [address] any warranty or liability for your use of this information. Starting a Weight Loss Plan: Care Instructions  Overview     If you're thinking about losing weight, it can be hard to know where to start. Your doctor can help you set up a weight loss plan that best meets your needs. You may want to take a class on nutrition or exercise, or you could join a weight loss support group. If you have questions about how to make changes to your eating or exercise habits, ask your doctor about seeing a registered dietitian or an exercise specialist.  It can be a big challenge to lose weight. But you don't have to make huge changes at once. Make small changes, and stick with them. When those changes become habit, add a few more changes. If you don't think you're ready to make changes right now, try to pick a date in the future. Make an appointment to see your doctor to discuss whether the time is right for you to start a plan. Follow-up care is a key part of your treatment and safety. Be sure to make and go to all appointments, and call your doctor if you are having problems. It's also a good idea to know your test results and keep a list of the medicines you take. How can you care for yourself at home? · Set realistic goals. Many people expect to lose much more weight than is likely. A weight loss of 5% to 10% of your body weight may be enough to improve your health. · Get family and friends involved to provide support. Talk to them about why you are trying to lose weight, and ask them to help.  They can help by participating in exercise and having meals with you, even if they may be eating something different. · Find what works best for you. If you do not have time or do not like to cook, a program that offers meal replacement bars or shakes may be better for you. Or if you like to prepare meals, finding a plan that includes daily menus and recipes may be best.  · Ask your doctor about other health professionals who can help you achieve your weight loss goals. ? A dietitian can help you make healthy changes in your diet. ? An exercise specialist or  can help you develop a safe and effective exercise program.  ? A counselor or psychiatrist can help you cope with issues such as depression, anxiety, or family problems that can make it hard to focus on weight loss. · Consider joining a support group for people who are trying to lose weight. Your doctor can suggest groups in your area. Where can you learn more? Go to http://www.gray.com/  Enter U357 in the search box to learn more about \"Starting a Weight Loss Plan: Care Instructions. \"  Current as of: September 23, 2020               Content Version: 12.8  © 2338-8244 [employer]. Care instructions adapted under license by [employer] (which disclaims liability or warranty for this information). If you have questions about a medical condition or this instruction, always ask your healthcare professional. [address] any warranty or liability for your use of this information.

## 2021-08-03 NOTE — PROGRESS NOTES
Edu Rodriguez (: 1965) is a 48 y.o. female, established patient, here for evaluation of the following chief complaint(s): Other (has concerns about sleeping and with neuropathy (has seen both pulmonology and neurology) ) and Other (c/o neuropathy pain (legs, thighs, hands and feet along with stabbing, tingling pain) )       ASSESSMENT/PLAN:  Below is the assessment and plan developed based on review of pertinent history, physical exam, labs, studies, and medications. 1. Insomnia, unspecified type       ramelteon (ROZEREM) 8 mg tablet; Take 1 Tablet by mouth nightly., Normal, Disp-30 Tablet, R-0  Sleep hygiene discussed with the patient. Handout given. Patient advised to start taking ramelteon 8 mg, 1 tablet by mouth nightly. We will reevaluate patient in 1 month. 2. Morbid obesity with BMI of 45.0-49.9, adult [facility])  Patient will continue managing weight with diet and exercise. Examples of moderate physical activity, diet program (Weight Watches, Diet Workshop) discussed with the patient. 3. Neuropathy  Managed by the neurologist. Appointment with Parish Sutherland NP scheduled for 2021.  4. Chronic diastolic congestive heart failure ([facility] [address]  Follows up with cardiology, Dr. Jose Pete. Next visit scheduled for 2021. All chart history elements were reviewed by me at the time of the visit even though marked at time of note closure. Patient understands our medical plan. Patient has provided input and agrees with goals. Alternatives have been explained and offered. All questions answered. The patient is to call if condition worsens or fails to improve. Return in about 1 month (around 9/3/2021) for routine care . SUBJECTIVE/OBJECTIVE:  HPI   1.   Insomnia:   ONSET: problem is longstanding  DESCRIPTION OF SX:  patient reports frequent awakening every 1-1.5 hours; non-restorative sleep  ASSOCIATED ISSUES: chronic neuropathic pain and joint pain involving the knee are interfering with sleep  DENIES: ETOH overuse  TREATMENT TO DATE: melatonin: temporarily effective. Reports attempt to treat insomnia with gabapentin developed side effect. Patient states she tested negative for obstructive sleep apnea. 2. Morbid obesity:  Patient's weight today is 292 pounds. BMI 47.13  Patient lost 28 pounds since last visit 5/24/2021.  3. CHF:  Patient has chronic CHF which is diastolic and right heart failure mostly.  CHF is most likely secondary to morbid obesity. Follows up with Dr. Nahum Tapia. States Dr. Nahum Tapia prescribed diuretics and she lost 28 pounds. 4. Dyspnea. Asthma:   Patient complains of dyspnea on exertion, cough chest tightness, wheezing. Prolonged history of asthma. Uses albuterol inhaler with significant relief. Several incidents of pneumonia. States she tested negative for obstructive sleep apnea. Patient is a current smoker. Smokes 1/2 pack per day. Has smoked for 30 years. Pulmonology consult with Dr. Bambi Hand scheduled for 8/27/2021.  7. Neuropathy:   Patient complaints of constant pain in her spine, knees and legs, calf cramps; weakness in arms and legs. Symptoms are chronic and stable, began 8 years ago. On multiple medications with relief. States magnesium helps her pain. Followed by neurology Dr. Cami Seaman, but after she switched her insurance neurologist did not accept her. Appointment with new neurologist, Kirby Jacobs NP scheduled for 09/21/2021. Review of Systems  Pertinent items are noted in HPI    Physical Exam  General:  alert, cooperative, obese, well appearing, in no apparent distress. CV: The heart sounds are regular in rate and rhythm. There is a normal S1 and S2. There or no murmurs, rubs, or gallops. Lungs: Inspiratory and expiratory efforts are full and unlabored. Lung sounds are clear and equal to auscultation throughout all lung fields without wheezing, rales, or rhonchi. GI:  The abdomen is obese, soft and nontender. Extremities: There is no edema.         An electronic signature was used to authenticate this note.   -- Lindsay Brown PA-C

## 2021-08-03 NOTE — PROGRESS NOTES
Chief Complaint   Patient presents with   Phoebe Conine Other     has concerns about sleeping and with neuropathy (has seen both pulmonology and neurology)      1. Have you been to the ER, urgent care clinic since your last visit? Hospitalized since your last visit? No    2. Have you seen or consulted any other health care providers outside of the [address] [place] [address] since your last visit? Include any pap smears or colon screening.  June 14,2021 pulmnology Dr. Geoff Presley

## 2021-08-04 ENCOUNTER — HOSPITAL ENCOUNTER (OUTPATIENT)
Dept: LAB | Age: 56
Discharge: HOME | End: 2021-08-04
Payer: COMMERCIAL

## 2021-08-04 ENCOUNTER — TELEPHONE (OUTPATIENT)
Dept: FAMILY MEDICINE CLINIC | Age: 56
End: 2021-08-04

## 2021-08-04 ENCOUNTER — HOSPITAL ENCOUNTER (OUTPATIENT)
Dept: ULTRASOUND IMAGING | Age: 56
Discharge: HOME | End: 2021-08-04
Attending: PHYSICIAN ASSISTANT
Payer: COMMERCIAL

## 2021-08-04 DIAGNOSIS — E03.9 ACQUIRED HYPOTHYROIDISM: ICD-10-CM

## 2021-08-04 DIAGNOSIS — I50.32 CHRONIC DIASTOLIC CONGESTIVE HEART FAILURE (HCC): ICD-10-CM

## 2021-08-04 LAB
ANION GAP SERPL CALC-SCNC: 4 MMOL/L (ref 3–18)
BUN SERPL-MCNC: 7 MG/DL (ref 7–18)
BUN/CREAT SERPL: 9 (ref 12–20)
CALCIUM SERPL-MCNC: 9.5 MG/DL (ref 8.5–10.1)
CHLORIDE SERPL-SCNC: 104 MMOL/L (ref 100–111)
CO2 SERPL-SCNC: 30 MMOL/L (ref 21–32)
CREAT SERPL-MCNC: 0.76 MG/DL (ref 0.6–1.3)
ERYTHROCYTE [DISTWIDTH] IN BLOOD BY AUTOMATED COUNT: 12.9 % (ref 11.6–14.5)
GLUCOSE SERPL-MCNC: 88 MG/DL (ref 74–99)
HCT VFR BLD AUTO: 47.2 % (ref 35–45)
HGB BLD-MCNC: 14.8 G/DL (ref 12–16)
MCH RBC QN AUTO: 28.8 PG (ref 24–34)
MCHC RBC AUTO-ENTMCNC: 31.4 G/DL (ref 31–37)
MCV RBC AUTO: 92 FL (ref 74–97)
PLATELET # BLD AUTO: 234 K/UL (ref 135–420)
PMV BLD AUTO: 9.6 FL (ref 9.2–11.8)
POTASSIUM SERPL-SCNC: 4.2 MMOL/L (ref 3.5–5.5)
RBC # BLD AUTO: 5.13 M/UL (ref 4.2–5.3)
SODIUM SERPL-SCNC: 138 MMOL/L (ref 136–145)
WBC # BLD AUTO: 10.5 K/UL (ref 4.6–13.2)

## 2021-08-04 PROCEDURE — 36415 COLL VENOUS BLD VENIPUNCTURE: CPT

## 2021-08-04 PROCEDURE — 85027 COMPLETE CBC AUTOMATED: CPT

## 2021-08-04 PROCEDURE — 76536 US EXAM OF HEAD AND NECK: CPT

## 2021-08-04 PROCEDURE — 80048 BASIC METABOLIC PNL TOTAL CA: CPT

## 2021-08-04 NOTE — TELEPHONE ENCOUNTER
Fax received from [address] [place] [address] meds stating prior auth is required for the Ramelteon 8MG Tablets. Submit a PA request  1. Go to key. Netgen and click \"Enter a Key\"  2. Patient last name: Marlene Barnett      : 1965      Key: [id number]  3.  Click \"start a PA\", complete the form, and \"send to plan\"

## 2021-08-06 ENCOUNTER — TRANSCRIBE ORDER (OUTPATIENT)
Dept: SCHEDULING | Age: 56
End: 2021-08-06

## 2021-08-06 DIAGNOSIS — M54.16 LUMBAR RADICULOPATHY: Primary | ICD-10-CM

## 2021-08-11 NOTE — TELEPHONE ENCOUNTER
Prescribed medication for insomnia (Ramelteon, 8 mg) was not authorized by the patient's insurance. Per [employer], Ms. [place] must first try and fail medication Zolpidem, which is substance controlled medication and can cause serious side effects. It was discussed with supervising physician Dr. Sunita Mckeon and he agreed with not prescribing zolpidem. Please advise patient to discuss insomnia with the neurologist during coming up visit. We also can offer sleep specialist consult for Ms. [place], if desires.

## 2021-08-11 NOTE — TELEPHONE ENCOUNTER
Check with covermymeds, additional information was needing and original medication request of (ramelteon, 8 mg) still pending. Will call Maritza Caba with final approval/denial with Roland Hagan PA-C recommendations. Tried reaching Maritza Caba to inform her on the update of her medication (ramelteon 8 mg)  needing a prior authorization (which was submitted on 08-). Per her insurance she has had to try and fail medication Zolpidem (which is a control substance). Per Roland Hagan PA-C Zolpidem, which is substance controlled medication that can cause serious side effects. It was discussed with her supervising physician Dr. Doug Perez and he agreed with not prescribing zolpidem. Leonor Silver  advise patient to discuss insomnia with the neurologist during coming up visit. We also can offer sleep specialist consult for Ms. [place], if desires. Maritza Caba to call \[facility]\"" with concerns.

## 2021-08-11 NOTE — TELEPHONE ENCOUNTER
Per insurance company [employer] must first try and fail medication Zolpidem. Please send medication request to Lesa Wynn.  Request on PCP desk on 08-

## 2021-08-12 NOTE — TELEPHONE ENCOUNTER
Received notice from [employer] Notice of Denial of medication Ramelteon 8 mg. Place call to Haile Isra to give her an update on denial and to inform her that per Trang Anderson  her insurance states she has had to try and fail medication Zolpidem (which is a control substance) that can cause serious side effects. It was discussed with her supervising physician Dr. Michael Tello and he agreed with not prescribing zolpidem. Becky Garcia  advising her Katharine Blancas) to discuss insomnia with the neurologist during coming up visit. Rosemary Hernandez also can offer sleep specialist consult for Ms. [place], if desires. Per Haile Dhaliwal she does not want to try medication and will follow up with neurologist once she get back in town.

## 2021-08-23 PROBLEM — J45.21 MILD INTERMITTENT ASTHMA WITH ACUTE EXACERBATION: Status: ACTIVE | Noted: 2019-10-08

## 2021-08-23 PROBLEM — H30.93: Status: ACTIVE | Noted: 2021-02-18

## 2021-08-23 PROBLEM — K57.92 DIVERTICULITIS: Status: ACTIVE | Noted: 2021-06-11

## 2021-08-23 PROBLEM — E28.2 POLYCYSTIC OVARY SYNDROME: Status: ACTIVE | Noted: 2021-06-13

## 2021-08-23 PROBLEM — I50.9 CONGESTIVE HEART FAILURE (HCC): Status: ACTIVE | Noted: 2021-06-11

## 2021-08-23 PROBLEM — E03.9 ACQUIRED HYPOTHYROIDISM: Status: ACTIVE | Noted: 2019-10-08

## 2021-08-23 PROBLEM — I10 HYPERTENSIVE DISORDER: Status: ACTIVE | Noted: 2021-06-11

## 2021-08-23 PROBLEM — I87.8 VENOUS STASIS: Status: ACTIVE | Noted: 2019-10-09

## 2021-08-23 PROBLEM — Z72.0 TOBACCO ABUSE: Status: ACTIVE | Noted: 2019-10-09

## 2021-08-23 PROBLEM — F43.10 POST TRAUMATIC STRESS DISORDER: Status: ACTIVE | Noted: 2019-10-10

## 2021-08-23 PROBLEM — E66.01 CLASS 3 SEVERE OBESITY DUE TO EXCESS CALORIES WITH SERIOUS COMORBIDITY AND BODY MASS INDEX (BMI) OF 50.0 TO 59.9 IN ADULT (HCC): Status: ACTIVE | Noted: 2019-10-08

## 2021-08-23 PROBLEM — H31.002 CHORIORETINAL SCAR, LEFT EYE: Status: ACTIVE | Noted: 2021-02-18

## 2021-08-23 PROBLEM — J45.909 ASTHMA: Status: ACTIVE | Noted: 2021-06-11

## 2021-08-23 PROBLEM — J18.9 COMMUNITY ACQUIRED PNEUMONIA OF LEFT LUNG: Status: ACTIVE | Noted: 2019-10-08

## 2021-08-23 PROBLEM — K21.9 GASTROESOPHAGEAL REFLUX DISEASE: Status: ACTIVE | Noted: 2021-06-11

## 2021-08-23 PROBLEM — A69.20 LYME DISEASE: Status: ACTIVE | Noted: 2021-06-13

## 2021-08-23 PROBLEM — E55.9 VITAMIN D DEFICIENCY: Status: ACTIVE | Noted: 2020-10-27

## 2021-09-01 DIAGNOSIS — E03.9 ACQUIRED HYPOTHYROIDISM: ICD-10-CM

## 2021-09-01 RX ORDER — ERGOCALCIFEROL 1.25 MG/1
CAPSULE ORAL
Qty: 5 CAPSULE | Refills: 0 | Status: SHIPPED | OUTPATIENT
Start: 2021-09-01 | End: 2021-10-01

## 2021-09-01 RX ORDER — LEVOTHYROXINE SODIUM 100 UG/1
TABLET ORAL
Qty: 30 TABLET | Refills: 0 | Status: SHIPPED | OUTPATIENT
Start: 2021-09-01 | End: 2021-10-01

## 2021-09-01 RX ORDER — LANOLIN ALCOHOL/MO/W.PET/CERES
CREAM (GRAM) TOPICAL
Qty: 30 TABLET | Refills: 0 | Status: SHIPPED | OUTPATIENT
Start: 2021-09-01 | End: 2021-10-01

## 2021-09-10 ENCOUNTER — TELEPHONE (OUTPATIENT)
Dept: FAMILY MEDICINE CLINIC | Age: 56
End: 2021-09-10

## 2021-09-10 NOTE — TELEPHONE ENCOUNTER
Pt states that she is back In town and will discuss her insomnia with her upcoming appt neurology. She understand the the message that was left about the Zolpidem and agrees with not taking it. She will call back next week if she needs anything else and she is aware that Ardelle Lundborg and [place] [id number] are not in the office till next week.

## 2021-09-21 ENCOUNTER — TELEPHONE (OUTPATIENT)
Dept: NEUROLOGY | Age: 56
End: 2021-09-21

## 2021-09-27 ENCOUNTER — TRANSCRIBE ORDER (OUTPATIENT)
Dept: SCHEDULING | Age: 56
End: 2021-09-27

## 2021-09-27 ENCOUNTER — OFFICE VISIT (OUTPATIENT)
Dept: CARDIOLOGY CLINIC | Age: 56
End: 2021-09-27
Payer: COMMERCIAL

## 2021-09-27 ENCOUNTER — OFFICE VISIT (OUTPATIENT)
Dept: NEUROLOGY | Age: 56
End: 2021-09-27

## 2021-09-27 VITALS
SYSTOLIC BLOOD PRESSURE: 122 MMHG | HEART RATE: 78 BPM | BODY MASS INDEX: 46.06 KG/M2 | HEIGHT: 66 IN | RESPIRATION RATE: 22 BRPM | OXYGEN SATURATION: 98 % | WEIGHT: 286.6 LBS | DIASTOLIC BLOOD PRESSURE: 60 MMHG

## 2021-09-27 VITALS
WEIGHT: 286.6 LBS | HEART RATE: 61 BPM | HEIGHT: 66 IN | SYSTOLIC BLOOD PRESSURE: 134 MMHG | DIASTOLIC BLOOD PRESSURE: 80 MMHG | BODY MASS INDEX: 46.06 KG/M2

## 2021-09-27 DIAGNOSIS — R20.0 NUMBNESS AND TINGLING IN LEFT HAND: ICD-10-CM

## 2021-09-27 DIAGNOSIS — M54.16 LUMBAR RADICULOPATHY: Primary | ICD-10-CM

## 2021-09-27 DIAGNOSIS — G62.9 NEUROPATHY: Primary | ICD-10-CM

## 2021-09-27 DIAGNOSIS — I10 ESSENTIAL HYPERTENSION: ICD-10-CM

## 2021-09-27 DIAGNOSIS — E66.01 SEVERE OBESITY (BMI >= 40) (HCC): ICD-10-CM

## 2021-09-27 DIAGNOSIS — I50.32 CHRONIC DIASTOLIC CONGESTIVE HEART FAILURE (HCC): ICD-10-CM

## 2021-09-27 DIAGNOSIS — R20.2 NUMBNESS AND TINGLING IN LEFT HAND: ICD-10-CM

## 2021-09-27 DIAGNOSIS — R07.9 CHEST PAIN, UNSPECIFIED TYPE: Primary | ICD-10-CM

## 2021-09-27 DIAGNOSIS — Z71.6 TOBACCO ABUSE COUNSELING: ICD-10-CM

## 2021-09-27 PROCEDURE — 99214 OFFICE O/P EST MOD 30 MIN: CPT | Performed by: INTERNAL MEDICINE

## 2021-09-27 PROCEDURE — 99214 OFFICE O/P EST MOD 30 MIN: CPT | Performed by: NURSE PRACTITIONER

## 2021-09-27 RX ORDER — PREGABALIN 25 MG/1
CAPSULE ORAL
Qty: 60 CAPSULE | Refills: 1 | Status: SHIPPED | OUTPATIENT
Start: 2021-09-27 | End: 2021-10-04 | Stop reason: SINTOL

## 2021-09-27 RX ORDER — GUAIFENESIN 100 MG/5ML
LIQUID (ML) ORAL
COMMUNITY

## 2021-09-27 NOTE — PATIENT INSTRUCTIONS
There are no discontinued medications. A Healthy Heart: Care Instructions  Your Care Instructions     Coronary artery disease, also called heart disease, occurs when a substance called plaque builds up in the vessels that supply oxygen-rich blood to your heart muscle. This can narrow the blood vessels and reduce blood flow. A heart attack happens when blood flow is completely blocked. A high-fat diet, smoking, and other factors increase the risk of heart disease. Your doctor has found that you have a chance of having heart disease. You can do lots of things to keep your heart healthy. It may not be easy, but you can change your diet, exercise more, and quit smoking. These steps really work to lower your chance of heart disease. Follow-up care is a key part of your treatment and safety. Be sure to make and go to all appointments, and call your doctor if you are having problems. It's also a good idea to know your test results and keep a list of the medicines you take. How can you care for yourself at home? Diet    · Use less salt when you cook and eat. This helps lower your blood pressure. Taste food before salting. Add only a little salt when you think you need it. With time, your taste buds will adjust to less salt.     · Eat fewer snack items, fast foods, canned soups, and other high-salt, high-fat, processed foods.     · Read food labels and try to avoid saturated and trans fats. They increase your risk of heart disease by raising cholesterol levels.     · Limit the amount of solid fat-butter, margarine, and shortening-you eat. Use olive, peanut, or canola oil when you cook. Bake, broil, and steam foods instead of frying them.     · Eat a variety of fruit and vegetables every day. Dark green, deep orange, red, or yellow fruits and vegetables are especially good for you.  Examples include spinach, carrots, peaches, and berries.     · Foods high in fiber can reduce your cholesterol and provide important vitamins and minerals. High-fiber foods include whole-grain cereals and breads, oatmeal, beans, brown rice, citrus fruits, and apples.     · Eat lean proteins. Heart-healthy proteins include seafood, lean meats and poultry, eggs, beans, peas, nuts, seeds, and soy products.     · Limit drinks and foods with added sugar. These include candy, desserts, and soda pop. Lifestyle changes    · If your doctor recommends it, get more exercise. Walking is a good choice. Bit by bit, increase the amount you walk every day. Try for at least 30 minutes on most days of the week. You also may want to swim, bike, or do other activities.     · Do not smoke. If you need help quitting, talk to your doctor about stop-smoking programs and medicines. These can increase your chances of quitting for good. Quitting smoking may be the most important step you can take to protect your heart. It is never too late to quit.     · Limit alcohol to 2 drinks a day for men and 1 drink a day for women. Too much alcohol can cause health problems.     · Manage other health problems such as diabetes, high blood pressure, and high cholesterol. If you think you may have a problem with alcohol or drug use, talk to your doctor. Medicines    · Take your medicines exactly as prescribed. Call your doctor if you think you are having a problem with your medicine.     · If your doctor recommends aspirin, take the amount directed each day. Make sure you take aspirin and not another kind of pain reliever, such as acetaminophen (Tylenol). When should you call for help? Call 911 if you have symptoms of a heart attack. These may include:    · Chest pain or pressure, or a strange feeling in the chest.     · Sweating.     · Shortness of breath.     · Pain, pressure, or a strange feeling in the back, neck, jaw, or upper belly or in one or both shoulders or arms.     · Lightheadedness or sudden weakness.     · A fast or irregular heartbeat.    After you call 911, the  may tell you to chew 1 adult-strength or 2 to 4 low-dose aspirin. Wait for an ambulance. Do not try to drive yourself. Watch closely for changes in your health, and be sure to contact your doctor if you have any problems. Where can you learn more? Go to http://www.apple.com/  Enter F075 in the search box to learn more about \"A Healthy Heart: Care Instructions. \"  Current as of: April 29, 2021               Content Version: 13.0  © 2006-2021 [employer]. Care instructions adapted under license by [employer] (which disclaims liability or warranty for this information). If you have questions about a medical condition or this instruction, always ask your healthcare professional. [address] any warranty or liability for your use of this information.

## 2021-09-27 NOTE — PROGRESS NOTES
1. Have you been to the ER, urgent care clinic since your last visit? Hospitalized since your last visit?    no    2. Have you seen or consulted any other health care providers outside of the [address] [place] [address] since your last visit? Include any pap smears or colon screening. No     3. Since your last visit, have you had any of the following symptoms? chest pains. SOB        4. Have you had any blood work, X-rays or cardiac testing? No        5. Where do you normally have your labs drawn? [facility]    6. Do you need any refills today? No    Went to do EKG pt felt that she need provider to tell her she needs one.

## 2021-09-27 NOTE — PROGRESS NOTES
[facility]  [address], [address], [facility], [address]  [address] [facility] Hospital[facility] y[facility], [address]  Office:  [phone]  Fax: [phone]  Chief Complaint   Patient presents with    Follow-up     neuropathy     This is a 80-year-old female who presents for follow-up neuropathy. Last seen in July. In the interim endorses continued pain and tingling to the lower extremities. Positive sharp shooting pains in the feet. Endorses having EMG to the lower extremities complete when she was seen by former neurologist Dr. Wu Romeo. She tells me this was in 2019. In the past she did not tolerate gabapentin at doses of 300 mg 3 times daily. The medication was then decreased to 100 mg and she still endorses excessive drowsiness. Currently on tramadol for back pains. She endorses trouble sleeping secondary to pain. Also endorses having some trouble to her left hand. She said she was eating and had numbness and tingling for about 4 to 5 minutes. She is left-handed. She says that this happened several times. Has not had any EMG to the upper extremities in the past.  She endorses upcoming bilateral lower extremity vein ablation. No other concerns at this time.     Past Medical History:   Diagnosis Date    Anemia     Angio-edema     Asthma     Depression     Diverticulosis     Enlargement, spleen     Esophagospasm     Essential hypertension     Fatty liver     GERD (gastroesophageal reflux disease)     Histoplasmosis     HPV (human papilloma virus) anogenital infection     Lyme disease     Neuropathy     PCOS (polycystic ovarian syndrome)     PTSD (post-traumatic stress disorder)     Tachycardia     Thyroid disease     Tricuspid insufficiency        Past Surgical History:   Procedure Laterality Date    HX CHOLECYSTECTOMY      HX HEART CATHETERIZATION  2010 approx    normal per patient    HX HEMORRHOIDECTOMY      HX OTHER SURGICAL      Vocal cord polyps Current Outpatient Medications   Medication Sig Dispense Refill    pregabalin (LYRICA) 25 mg capsule Take 25 mg at bedtime for two weeks then take 50 mg at bedtime 60 Capsule 1    ergocalciferol (ERGOCALCIFEROL) 1,250 mcg (50,000 unit) capsule Take 1 capsule by mouth once a week 5 Capsule 0    Euthyrox 100 mcg tablet TAKE 1 TABLET BY MOUTH ONCE DAILY BEFORE BREAKFAST FOR 30 DAYS 30 Tablet 0    cyanocobalamin 1,000 mcg tablet Take 1 tablet by mouth once daily 30 Tablet 0    lisinopriL (PRINIVIL, ZESTRIL) 2.5 mg tablet Take 1 tablet by mouth once daily 90 Tablet 1    spironolactone (ALDACTONE) 25 mg tablet Take 1 tablet by mouth once daily 30 Tablet 0    albuterol (PROVENTIL HFA, VENTOLIN HFA, PROAIR HFA) 90 mcg/actuation inhaler Take 2 Puffs by inhalation every four (4) hours as needed for Wheezing. 1 Inhaler 0    magnesium oxide (MAG-OX) 400 mg tablet Take 1 Tablet by mouth daily. 30 Tablet 1    potassium chloride (K-DUR, KLOR-CON) 20 mEq tablet Take 1 Tab by mouth daily. 30 Tab 5    furosemide (Lasix) 40 mg tablet Take 1 Tab by mouth daily. 90 Tab 1    EPINEPHrine HCl, PF, (ADRENALIN) 1 mg/mL (1 mL) injection once.  omeprazole (PRILOSEC) 40 mg capsule       traMADoL (ULTRAM) 50 mg tablet Take 50 mg by mouth as needed for Pain.  cyclobenzaprine (FLEXERIL) 10 mg tablet Take  by mouth as needed for Muscle Spasm(s).  cetirizine (ZyrTEC) 10 mg tablet Take 10 mg by mouth daily.       aspirin 81 mg chewable tablet Now, then every 24 hours          Allergies   Allergen Reactions    Latex, Natural Rubber Itching    Latex Itching    Ansaid [Flurbiprofen] Anaphylaxis    Iron Dextran Anaphylaxis and Itching    Levofloxacin Myalgia     Tendon rupture  Tendon rupture    Codeine Itching and Rash    Keflex [Cephalexin] Hives and Itching    Penicillins Other (comments)    Adhesive Rash       Social History     Tobacco Use    Smoking status: Current Every Day Smoker     Packs/day: 0.50 Years: 15.00     Pack years: 7.50     Start date: 1985    Smokeless tobacco: Never Used    Tobacco comment: less than 10 depending on the day   Vaping Use    Vaping Use: Former   Substance Use Topics    Alcohol use: Not Currently    Drug use: Not Currently       Family History   Problem Relation Age of Onset    Cancer Mother         stomach    Seizures Mother     Cancer Sister         double mastectomy    Cancer Sister         lung    Heart Surgery Maternal Grandmother         CABG    Stroke Neg Hx        Review of Systems  GENERAL: Denies fever or fatigue  CARDIAC: No CP or SOB  PULMONARY: No cough of SOB  MUSCULOSKELETAL: No new joint pain  NEURO: SEE HPI    Examination  Visit Vitals  /60 (BP 1 Location: Right lower arm, BP Patient Position: Sitting, BP Cuff Size: Adult)   Pulse 78   Resp 22   Ht 5' 6\" (1.676 m)   Wt 130 kg (286 lb 9.6 oz)   SpO2 98%   BMI 46.26 kg/m²       This is a very pleasant 71-year-old female. She is alert and in no apparent distress. Full fund of knowledge. Speech is clear. No facial asymmetry. Extraocular movements intact. Resists fully in the upper and lower extremities. Reflexes symmetrical in the upper and lower extremities. Negative Tinel's sign bilaterally. Decreased ankle jerks. Decreased vibratory sensation symmetrically. Gait steady. Able to tandem walk. Impression/Plan  Angela Rodriguez is a 55 y.o. female whose history and physical are consistent with neuropathy. Patient presents for follow-up neuropathy. Received partial records from her former neurologist and they have been reviewed and scanned in the media section. Documentation included does not contain previous bilateral lower extremity EMG testing. We will send request to obtain these records. Endorses left hand numbness that has been transient and only lasting for about 4 to 5 minutes.   Due to history of neuropathy and potential for peripheral etiology such as a carpal tunnel syndrome we will order bilateral upper extremity EMG. She previously did not tolerate gabapentin due to excessive drowsiness. We will start with a conservative dose of Lyrica at bedtime to see if we can achieve neuropathic pain control. Follow-up in 2 months or sooner as need be. All questions addressed and patient is agreeable with plan of care  . Diagnoses and all orders for this visit:    1. Neuropathy  -     pregabalin (LYRICA) 25 mg capsule; Take 25 mg at bedtime for two weeks then take 50 mg at bedtime  -     EMG ONE EXTREMITY UPPER RT  -     EMG ONE EXTREMITY UPPER LT    2. Numbness and tingling in left hand  -     EMG ONE EXTREMITY UPPER RT  -     EMG ONE EXTREMITY UPPER LT        Signed By: Frandy Vargas NP        PLEASE NOTE:   Portions of this document may have been produced using voice recognition software. Unrecognized errors in transcription may be present.

## 2021-09-27 NOTE — PROGRESS NOTES
HISTORY OF PRESENT ILLNESS  Gavin Pedroza is a 55 y.o. female. Follow-up of CHF, hypertension, obesity    3/21 seen as new patient. H/o TR, MR, idiopathic tachycardia, edema, neuropathy    Shortness of Breath  The history is provided by the patient. This is a new problem. The current episode started more than 1 week ago (yrs). The problem has not changed since onset. Associated symptoms include chest pain. Pertinent negatives include no fever, no headaches, no cough, no wheezing, no PND, no orthopnea, no vomiting, no rash, no leg swelling and no claudication. Sore throat: walking out of the house. The problem's precipitants include exercise (Only intermittently some days with walking in the house;). Chest Pain (Angina)   The history is provided by the patient. This is a new problem. The current episode started more than 1 week ago (1/21). The problem has been gradually worsening. The problem occurs rarely (3 episodes in 3 days in last 3 months; ). The pain is associated with walking (walking dog; ). The pain is present in the lateral region, right side and left side. The quality of the pain is described as pressure-like and tightness. The pain radiates to the left jaw and left neck. Associated symptoms include lower extremity edema and shortness of breath. Pertinent negatives include no claudication, no cough, no dizziness, no fever, no headaches, no malaise/fatigue, no nausea, no orthopnea, no palpitations, no PND and no vomiting. Treatments tried: drinking coke. The treatment provided mild relief.      Allergies   Allergen Reactions    Latex, Natural Rubber Itching    Latex Itching    Ansaid [Flurbiprofen] Anaphylaxis    Iron Dextran Anaphylaxis and Itching    Levofloxacin Myalgia     Tendon rupture  Tendon rupture    Codeine Itching and Rash    Keflex [Cephalexin] Hives and Itching    Penicillins Other (comments)    Adhesive Rash       Past Medical History:   Diagnosis Date    Anemia     Angio-edema     Asthma     Depression     Diverticulosis     Enlargement, spleen     Esophagospasm     Essential hypertension     Fatty liver     GERD (gastroesophageal reflux disease)     Histoplasmosis     HPV (human papilloma virus) anogenital infection     Lyme disease     Neuropathy     PCOS (polycystic ovarian syndrome)     PTSD (post-traumatic stress disorder)     Tachycardia     Thyroid disease     Tricuspid insufficiency        Family History   Problem Relation Age of Onset    Cancer Mother         stomach    Seizures Mother     Cancer Sister         double mastectomy    Cancer Sister         lung    Heart Surgery Maternal Grandmother         CABG    Stroke Neg Hx        Social History     Tobacco Use    Smoking status: Current Every Day Smoker     Packs/day: 0.50     Years: 15.00     Pack years: 7.50     Start date: 1985    Smokeless tobacco: Never Used    Tobacco comment: less than 10 depending on the day   Vaping Use    Vaping Use: Former   Substance Use Topics    Alcohol use: Not Currently    Drug use: Not Currently        Current Outpatient Medications   Medication Sig    aspirin 81 mg chewable tablet Now, then every 24 hours    pregabalin (LYRICA) 25 mg capsule Take 25 mg at bedtime for two weeks then take 50 mg at bedtime    ergocalciferol (ERGOCALCIFEROL) 1,250 mcg (50,000 unit) capsule Take 1 capsule by mouth once a week    Euthyrox 100 mcg tablet TAKE 1 TABLET BY MOUTH ONCE DAILY BEFORE BREAKFAST FOR 30 DAYS    cyanocobalamin 1,000 mcg tablet Take 1 tablet by mouth once daily    lisinopriL (PRINIVIL, ZESTRIL) 2.5 mg tablet Take 1 tablet by mouth once daily    spironolactone (ALDACTONE) 25 mg tablet Take 1 tablet by mouth once daily    albuterol (PROVENTIL HFA, VENTOLIN HFA, PROAIR HFA) 90 mcg/actuation inhaler Take 2 Puffs by inhalation every four (4) hours as needed for Wheezing.  magnesium oxide (MAG-OX) 400 mg tablet Take 1 Tablet by mouth daily.     potassium chloride (K-DUR, KLOR-CON) 20 mEq tablet Take 1 Tab by mouth daily.  furosemide (Lasix) 40 mg tablet Take 1 Tab by mouth daily.  EPINEPHrine HCl, PF, (ADRENALIN) 1 mg/mL (1 mL) injection once.  omeprazole (PRILOSEC) 40 mg capsule     traMADoL (ULTRAM) 50 mg tablet Take 50 mg by mouth as needed for Pain.  cyclobenzaprine (FLEXERIL) 10 mg tablet Take  by mouth as needed for Muscle Spasm(s).  cetirizine (ZyrTEC) 10 mg tablet Take 10 mg by mouth daily. No current facility-administered medications for this visit. Past Surgical History:   Procedure Laterality Date    HX CHOLECYSTECTOMY      HX HEART CATHETERIZATION  2010 approx    normal per patient    HX HEMORRHOIDECTOMY      HX OTHER SURGICAL      Vocal cord polyps       Visit Vitals  /80   Pulse 61   Ht 5' 6\" (1.676 m)   Wt 130 kg (286 lb 9.6 oz)   BMI 46.26 kg/m²       Diagnostic Studies:  I have reviewed the relevant tests done on the patient and show as follows  EKG tracings reviewed by me today. EKG Results     None        XR Results (most recent):  No results found for this or any previous visit. Ms. Juan Giraldo has a reminder for a \"due or due soon\" health maintenance. I have asked that she contact her primary care provider for follow-up on this health maintenance. Review of Systems   Constitutional: Negative for chills, fever, malaise/fatigue and weight loss. HENT: Negative for nosebleeds. Sore throat: walking out of the house. Eyes: Negative for discharge. Respiratory: Positive for shortness of breath. Negative for cough and wheezing. Cardiovascular: Positive for chest pain. Negative for palpitations, orthopnea, claudication, leg swelling and PND. Gastrointestinal: Negative for diarrhea, nausea and vomiting. Genitourinary: Negative for dysuria and hematuria. Musculoskeletal: Negative for joint pain. Skin: Negative for rash.    Neurological: Negative for dizziness, seizures, loss of consciousness and headaches. Endo/Heme/Allergies: Negative for polydipsia. Does not bruise/bleed easily. Psychiatric/Behavioral: Negative for depression and substance abuse. The patient does not have insomnia. 11/20 echo  ECHOCARDIOGRAM COMPLETE  (DOPPLER ECHO)11/20/2020  [facility]  Result Impression   :   POOR QUALITY 2-DIMENSIONAL IMAGES   DEFINITY CONTRAST UTILIZED TO ENHANCE ENDOCARDIAL BORDER DEFINITION   LEFT VENTRICULAR HYPERTROPHY   GROSSLY NORMAL LEFT VENTRICULAR CAVITY SIZE AND SYSTOLIC FUNCTION   COULD NOT ACCURATELY QUANTIFY EJECTION FRACTION   GRADE 1 DIASTOLIC DYSFUNCTION   RIGHT VENTRICLE NOT VISUALIZED   GROSSLY NORMAL LEFT ATRIAL SIZE   RIGHT ATRIUM NOT VISUALIZED   NO OBVIOUS HEMODYNAMICALLY SIGNIFICANT VALVE PATHOLOGY   RVSP - NA     04/26/21   ECHO ADULT COMPLETE 04/26/2021 4/26/2021    Narrative · Contrast used: DEFINITY. · Image quality for this study was technically difficult. · LV: Estimated LVEF is 60 - 65%. Normal cavity size and systolic function   (ejection fraction normal). Moderate concentric hypertrophy. Wall motion:   normal. Moderate (grade 2) left ventricular diastolic dysfunction. · RV: Mildly dilated right ventricle. · RA: Mildly dilated right atrium. · TV: Right Ventricular Arterial Pressure (RVSP) is 23 mmHg. Pulmonary   hypertension not suggested by Doppler findings. Signed by: Hunter Fuentes MD     Physical Exam  Constitutional:       General: She is not in acute distress. Appearance: She is well-developed. She is obese. Comments: Morbid obesity   HENT:      Head: Normocephalic and atraumatic. Mouth/Throat:      Dentition: Normal dentition. Eyes:      General: No scleral icterus. Right eye: No discharge. Left eye: No discharge. Neck:      Thyroid: No thyromegaly. Vascular: No carotid bruit or JVD. Cardiovascular:      Rate and Rhythm: Normal rate and regular rhythm. Pulses: Intact distal pulses. Heart sounds: Normal heart sounds, S1 normal and S2 normal. No murmur heard. No friction rub. No gallop. Pulmonary:      Effort: Pulmonary effort is normal.      Breath sounds: Normal breath sounds. No wheezing or rales. Chest:      Chest wall: No tenderness. Abdominal:      Palpations: Abdomen is soft. There is no mass. Tenderness: There is no abdominal tenderness. Musculoskeletal:      Cervical back: Neck supple. Right lower leg: No edema. Left lower leg: No edema. Lymphadenopathy:      Cervical:      Right cervical: No superficial cervical adenopathy. Left cervical: No superficial cervical adenopathy. Skin:     General: Skin is warm and dry. Findings: No rash. Neurological:      Mental Status: She is alert and oriented to person, place, and time. Psychiatric:         Behavior: Behavior normal.         ASSESSMENT and PLAN    LIPIDS : Results for Darrel Arvizu (MRN [id number]) as of 3/19/2021 13:01   Ref. Range 3/3/2021 12:20   Triglyceride Latest Ref Range: <150 MG/ (H)   Cholesterol, total Latest Ref Range: <200 MG/ (H)   HDL Cholesterol Latest Ref Range: 40 - 60 MG/DL 45   CHOL/HDL Ratio Latest Ref Range: 0 - 5.0   4.6   VLDL, calculated Latest Units: MG/DL 32.2   LDL, calculated Latest Ref Range: 0 - 100 MG/.8 (H)     ACC CVD risk score as of 3/21 is 5.9%. Continue diet and exercise for now. 3/19/2021 seen as new patient for changing the cardiologist.  She cannot locate her old cardiologist and so is changing. She has acute on chronic CHF which is diastolic and right heart failure mostly. Blood pressure is elevated today but is normal most of the times at home. CHF is secondary to morbid obesity most likely. She will need to be worked up for sleep apnea. History of Lyme disease for which she was treated with doxycycline a year. Apparently she is still positive for it and I recommended ID opinion for that.   Increase Lasix to 40 twice daily add spironolactone. Told her not to use any over-the-counter potassium but does take the prescription pills so we can follow the labs closely and replace as needed. Continue magnesium. History of significant electrolyte problems in the past.  Told her to come to ER if she does not feel well. Extensive review of previous records and face-to-face time was more than 60 minutes. 4/26/2021 CHF persisting. Patient not taking lasix as advised. She will now take lasix 40 mg/day. Follow labs closely. Add acei and f/u BP chart at home. Diet and exercise discussed. Refer to bariatrics. 8/2/2021 CHF is compensated. Blood pressure is controlled and is low normal.  Reduce lisinopril. Check labs to follow-up on potassium due to Aldactone. She is lost significant weight for which she was congratulated. Recommended to continue to lose more weight with which she agrees and is trying. Diagnoses and all orders for this visit:    1. Chest pain, unspecified type  -     NUCLEAR CARDIAC STRESS TEST; Future    2. Chronic diastolic congestive heart failure ([address]    3. Essential hypertension    4. Severe obesity (BMI >= 40) ([facility])    5. Tobacco abuse counseling        Pertinent laboratory and test data reviewed and discussed with patient. See patient instructions also for other medical advice given    There are no discontinued medications. Follow-up and Dispositions    · Return in about 3 months (around 12/27/2021), or if symptoms worsen or fail to improve, for post test.       9/27/2021 CHF is compensated NYHA class II. Blood pressure is controlled. Chest pain continues and seems worse at times. She thinks she has esophageal spasm but will recommend a stress test to evaluate ischemia. She is gradually losing weight for which she was congratulated. Healthy heart habits discussed and smoking cessation reinforced.

## 2021-09-29 ENCOUNTER — TELEPHONE (OUTPATIENT)
Dept: FAMILY MEDICINE CLINIC | Age: 56
End: 2021-09-29

## 2021-09-29 NOTE — TELEPHONE ENCOUNTER
Pt is requesting the referral for the OBGYN to be changed to a female provider. She states that when she contacted the office today of Gay[facility] Aragon they stated that they didn't have any record of a referral. Please advise. When a new referral has been placed.  Thank you

## 2021-09-30 DIAGNOSIS — E03.9 ACQUIRED HYPOTHYROIDISM: ICD-10-CM

## 2021-09-30 NOTE — TELEPHONE ENCOUNTER
Left detail message for [employer] that her insurance [employer] is limited to specialist and that Dr. Alysha Helms is OB-GYN in this area that provides care for her insurance.  Left information for [facility] in [address] in [place], [place] at [phone]    All demographics fax [phone]

## 2021-10-01 RX ORDER — LEVOTHYROXINE SODIUM 100 UG/1
TABLET ORAL
Qty: 30 TABLET | Refills: 0 | Status: SHIPPED | OUTPATIENT
Start: 2021-10-01 | End: 2021-11-01

## 2021-10-01 RX ORDER — ERGOCALCIFEROL 1.25 MG/1
CAPSULE ORAL
Qty: 5 CAPSULE | Refills: 0 | Status: SHIPPED | OUTPATIENT
Start: 2021-10-01 | End: 2021-11-01

## 2021-10-01 RX ORDER — LANOLIN ALCOHOL/MO/W.PET/CERES
CREAM (GRAM) TOPICAL
Qty: 30 TABLET | Refills: 0 | Status: SHIPPED | OUTPATIENT
Start: 2021-10-01 | End: 2021-11-09 | Stop reason: SDUPTHER

## 2021-10-04 ENCOUNTER — TELEPHONE (OUTPATIENT)
Dept: NEUROLOGY | Age: 56
End: 2021-10-04

## 2021-10-04 RX ORDER — DULOXETIN HYDROCHLORIDE 30 MG/1
30 CAPSULE, DELAYED RELEASE ORAL DAILY
Qty: 30 CAPSULE | Refills: 1 | Status: SHIPPED | OUTPATIENT
Start: 2021-10-04 | End: 2021-11-22 | Stop reason: SDUPTHER

## 2021-10-04 NOTE — TELEPHONE ENCOUNTER
Patient called and stated she can not take pregabalin it gave her angioedema of the lips and need something else to replace it. Please advise.

## 2021-10-05 NOTE — TELEPHONE ENCOUNTER
Attempt to contact patient regarding medication side effect, no answer. Left message to return call to office.

## 2021-10-13 ENCOUNTER — OFFICE VISIT (OUTPATIENT)
Dept: FAMILY MEDICINE CLINIC | Age: 56
End: 2021-10-13
Payer: COMMERCIAL

## 2021-10-13 VITALS
RESPIRATION RATE: 16 BRPM | TEMPERATURE: 98.4 F | HEART RATE: 74 BPM | SYSTOLIC BLOOD PRESSURE: 124 MMHG | HEIGHT: 66 IN | WEIGHT: 276 LBS | OXYGEN SATURATION: 98 % | DIASTOLIC BLOOD PRESSURE: 78 MMHG | BODY MASS INDEX: 44.36 KG/M2

## 2021-10-13 DIAGNOSIS — K21.9 GASTROESOPHAGEAL REFLUX DISEASE, UNSPECIFIED WHETHER ESOPHAGITIS PRESENT: ICD-10-CM

## 2021-10-13 DIAGNOSIS — Z01.419 ROUTINE GYNECOLOGICAL EXAMINATION: ICD-10-CM

## 2021-10-13 DIAGNOSIS — N64.52 NIPPLE DISCHARGE IN FEMALE: ICD-10-CM

## 2021-10-13 DIAGNOSIS — R21 SKIN RASH: Primary | ICD-10-CM

## 2021-10-13 DIAGNOSIS — E66.01 MORBID OBESITY WITH BMI OF 40.0-44.9, ADULT (HCC): ICD-10-CM

## 2021-10-13 PROCEDURE — 99214 OFFICE O/P EST MOD 30 MIN: CPT | Performed by: STUDENT IN AN ORGANIZED HEALTH CARE EDUCATION/TRAINING PROGRAM

## 2021-10-13 RX ORDER — AMOXICILLIN 500 MG/1
500 TABLET, FILM COATED ORAL EVERY 8 HOURS
COMMUNITY
End: 2022-01-17

## 2021-10-13 RX ORDER — CHLORPHENIRAMINE MALEATE 4 MG
TABLET ORAL 2 TIMES DAILY
Qty: 24 G | Refills: 0 | Status: SHIPPED | OUTPATIENT
Start: 2021-10-13

## 2021-10-13 RX ORDER — TRIAMCINOLONE ACETONIDE 1 MG/G
CREAM TOPICAL 2 TIMES DAILY
Qty: 15 G | Refills: 0 | Status: SHIPPED | OUTPATIENT
Start: 2021-10-13 | End: 2021-11-09 | Stop reason: SDUPTHER

## 2021-10-13 RX ORDER — LANSOPRAZOLE 30 MG/1
30 CAPSULE, DELAYED RELEASE ORAL
Qty: 30 CAPSULE | Refills: 0 | Status: SHIPPED | OUTPATIENT
Start: 2021-10-13 | End: 2021-11-09 | Stop reason: SDUPTHER

## 2021-10-13 NOTE — PATIENT INSTRUCTIONS
Gastroesophageal Reflux Disease (GERD): Care Instructions  Overview     Gastroesophageal reflux disease (GERD) is the backward flow of stomach acid into the esophagus. The esophagus is the tube that leads from your throat to your stomach. A one-way valve prevents the stomach acid from backing up into this tube. But when you have GERD, this valve does not close tightly enough. This can also cause pain and swelling in your esophagus. (This is called esophagitis.)  If you have mild GERD symptoms including heartburn, you may be able to control the problem with antacids or over-the-counter medicine. You can also make lifestyle changes to help reduce your symptoms. These include changing your diet and eating habits, such as not eating late at night and losing weight. Follow-up care is a key part of your treatment and safety. Be sure to make and go to all appointments, and call your doctor if you are having problems. It's also a good idea to know your test results and keep a list of the medicines you take. How can you care for yourself at home? · Take your medicines exactly as prescribed. Call your doctor if you think you are having a problem with your medicine. · Your doctor may recommend over-the-counter medicine. For mild or occasional indigestion, antacids, such as Tums, Gaviscon, Mylanta, or Maalox, may help. Your doctor also may recommend over-the-counter acid reducers, such as famotidine (Pepcid AC), cimetidine (Tagamet HB), or omeprazole (Prilosec). Read and follow all instructions on the label. If you use these medicines often, talk with your doctor. · Change your eating habits. ? It's best to eat several small meals instead of two or three large meals. ? After you eat, wait 2 to 3 hours before you lie down. ? Chocolate, mint, and alcohol can make GERD worse. ? Spicy foods, foods that have a lot of acid (like tomatoes and oranges), and coffee can make GERD symptoms worse in some people.  If your symptoms are worse after you eat a certain food, you may want to stop eating that food to see if your symptoms get better. · Do not smoke or chew tobacco. Smoking can make GERD worse. If you need help quitting, talk to your doctor about stop-smoking programs and medicines. These can increase your chances of quitting for good. · If you have GERD symptoms at night, raise the head of your bed 6 to 8 inches by putting the frame on blocks or placing a foam wedge under the head of your mattress. (Adding extra pillows does not work.)  · Do not wear tight clothing around your middle. · Lose weight if you need to. Losing just 5 to 10 pounds can help. When should you call for help? Call your doctor now or seek immediate medical care if:    · You have new or different belly pain.     · Your stools are black and tarlike or have streaks of blood. Watch closely for changes in your health, and be sure to contact your doctor if:    · Your symptoms have not improved after 2 days.     · Food seems to catch in your throat or chest.   Where can you learn more? Go to http://www.gray.com/  Enter M799 in the search box to learn more about \"Gastroesophageal Reflux Disease (GERD): Care Instructions. \"  Current as of: February 10, 2021               Content Version: 13.0  © 2006-2021 [employer]wise[employer]. Care instructions adapted under license by [employer] (which disclaims liability or warranty for this information). If you have questions about a medical condition or this instruction, always ask your healthcare professional. [place] [address] any warranty or liability for your use of this information. Nipple Discharge: Care Instructions  Your Care Instructions  Fluid leaking from one or both nipples when you are not breastfeeding is called nipple discharge.  Clear, cloudy, or white discharge that appears only when you press on your nipple is usually normal. The more the nipple is pressed or stimulated, the more fluid appears. Yellow, green, or brown discharge is not normal and may be a symptom of an infection or other problem. Spontaneous discharge appears without pressing or stimulating the nipple. This is not normal unless you are pregnant or breastfeeding. It may be a side effect of a medicine, or it may be caused by other health problems. The treatment of spontaneous nipple discharge depends on what is causing it. You may need additional tests to find out what is causing the nipple discharge. Follow-up care is a key part of your treatment and safety. Be sure to make and go to all appointments, and call your doctor if you are having problems. It's also a good idea to know your test results and keep a list of the medicines you take. How can you care for yourself at home? · If your doctor gave you medicine, take it exactly as prescribed. Call your doctor if you think you are having a problem with your medicine. · Wear a supportive bra, such as a sports bra or jog bra. · Avoid stimulating your breast until you have your follow-up appointment. When should you call for help? Call your doctor now or seek immediate medical care if:    · You have symptoms of a breast infection, such as:  ? Increased pain, swelling, redness, or warmth around a breast.  ? Red streaks extending from the breast.  ? Pus draining from a breast.  ? A fever. Watch closely for changes in your health, and be sure to contact your doctor if:    · You notice any changes in your breast or discharge.     · You do not get better as expected. Where can you learn more? Go to http://www.gray.com/  Enter V431 in the search box to learn more about \"Nipple Discharge: Care Instructions. \"  Current as of: July 1, 2021               Content Version: 13.0  © 4197-9362 [employer].    Care instructions adapted under license by [employer] (which disclaims liability or warranty for this information). If you have questions about a medical condition or this instruction, always ask your healthcare professional. [address] any warranty or liability for your use of this information.     [facility] in [address] in [place], [place] at [phone]

## 2021-10-13 NOTE — PROGRESS NOTES
Jeremy Rodriguez (: 1965) is a 55 y.o. female, established patient, here for evaluation of the following chief complaint(s):  Rash (located underneath stomach area going down to gyne area ) and Breast Discharge (right breast has white thick discharge )       ASSESSMENT/PLAN:  Below is the assessment and plan developed based on review of pertinent history, physical exam, labs, studies, and medications. ICD-10-CM ICD-9-CM    1. Skin rash  R21 782.1 clotrimazole (LOTRIMIN) 1 % topical cream      REFERRAL TO DERMATOLOGY      triamcinolone acetonide (KENALOG) 0.1 % topical cream  Candidal skin infection suspected based on clinical presentation and history of antibiotics use. Treatment with topical antifungals recommended. Patient advised to use clotrimazole 1% topical cream.  Patient was advised to continue applications at least 1 week after resolution of the infection. Kenalog topical cream refilled per patient request.   2. Nipple discharge in female  N64.52 611.79 REFERRAL TO OBSTETRICS AND GYNECOLOGY  No abnormal findings on physical examination, no nipple discharge noted. Referral to OB/GYN for further evaluation. 3. Gastroesophageal reflux disease, unspecified whether esophagitis present  K21.9 530.81 lansoprazole (PREVACID) 30 mg capsule  Lifestyle modification: lose weight; avoid spicy, acidic, fatty foods; limit intake of coffee or alcohol spicy food; eat moderate amounts of food; do not exercise too soon after eating; eat meals at least 3 to 4 hours before lying down. Handout given. Patient advised to start lansoprazole 30 mg tablet once daily as needed. 4. Morbid obesity with BMI of 40.0-44.9, adult ([facility]  E66.01 278.01 Continue managing weight with diet and exercise as tolerable      Z68.41 V85.41    5.  Routine gynecological examination  Z01.419 V72.31 REFERRAL TO OBSTETRICS AND GYNECOLOGY for routine gynecological examination and Pap smear     All chart history elements were reviewed by me at the time of the visit even though marked at time of note closure. Patient understands our medical plan. Patient has provided input and agrees with goals. Alternatives have been explained and offered. All questions answered. The patient is to call if condition worsens or fails to improve. Return in about 3 months (around 1/13/2022) for routine care . SUBJECTIVE/OBJECTIVE:  HPI   1. Skin rash  Chronic problem: Erythema in the skin fold underneath the abdomen. Tried topical triamcinolone 0.025% with relief. Recently took amoxicillin for sinus infection and developed pruritic erythematous rash in the skin fold underneath the abdomen. 2.  Nipple discharge   Patient noticed right-sided nipple discharge for only 2 days in September 2021. Describes discharge as thick and milky. Patient's last menstrual period was 09/22/2021.  3. GERD  Problem longstanding. GERD was well controlled on omeprazole 40 mg/daily for several years. Insurance does not cover anymore. Requests substitution. 4. Morbid obesity:  Estimated body mass index is 44.55 kg/m² . Weight as of this encounter: 276 lb. Patient lost 42 pounds since 5/24/2021.    5. CHF:  Patient has chronic CHF which is diastolic and right heart failure mostly.  CHF is most likely secondary to morbid obesity. Follows up with Dr. Salcido Shown. States Dr. Salcido Shown prescribed diuretics and she lost 28 pounds. 6. Dyspnea. Asthma:   Patient complains of dyspnea on exertion, cough chest tightness, wheezing. Prolonged history of asthma. Uses albuterol inhaler with significant relief. Several incidents of pneumonia. States she tested negative for obstructive sleep apnea. Patient is a current smoker. Smokes 1/2 pack per day.  Has smoked for 30 years. Pulmonology consult with Dr. Jack Hernandez scheduled for 8/27/2021.  7. Neuropathy. Insomnia:   Patient complaints of constant pain in her spine, knees and legs, calf cramps; weakness in arms and legs.  Symptoms are chronic and stable, began 8 years ago. On multiple medications with relief. States magnesium helps her pain. Followed by neurology Dr. Frandy Palma she switched her insurance neurologist did not accept her. Followed with the neurologist, Huan Portillo NP. LOV 09/21/2021. Cymbalta was prescribed for insomnia, works well. 8.  Varicose veins  Varicose vein surgery completed on both legs. Wears compression stockings. Denies peripheral edema. 9. Screening for colon cancer  Colonoscopy scheduled for November,18, 2021    Review of Systems  Pertinent items are noted in HPI    Physical Exam  General:  alert, cooperative, obese, well appearing, in no apparent distress. CV: The heart sounds are regular in rate and rhythm. There is a normal S1 and S2. There or no murmurs, rubs, or gallops. Lungs: Inspiratory and expiratory efforts are full and unlabored. Lung sounds are clear and equal to auscultation throughout all lung fields without wheezing, rales, or rhonchi. GI:  The abdomen is obese, soft and nontender. Right breast: Examined in both the supine and upright positions. There was no supraclavicular, infraclavicular, or axillary lympadenopathy. No nipple discharge. There were no dominant masses, no skin changes, no asymmetry identified. Skin: There is well demarcated, erythematous massive oval-shaped patch with satellite papules in the skinfold underneath the abdomen. Extremities: Compression stockings bilaterally noted.       An electronic signature was used to authenticate this note. -- Richard Burkett PA-C     Please note that this dictation was completed with [employer], the "[facility] [employer]" voice recognition software. Quite often unanticipated grammatical, syntax, homophones, and other interpretive errors are inadvertently transcribed by the computer software. Please disregard these errors. Please excuse any errors that have escaped final proofreading.

## 2021-10-13 NOTE — TELEPHONE ENCOUNTER
Spoke with Pamela at [facility]) referral has been received, office place call to patient and has not received return call from [facility] to schedule.  As of 10-

## 2021-10-13 NOTE — PROGRESS NOTES
Chief Complaint   Patient presents with    Rash     located underneath stomach area going down to gyne area     Other     menstrual issues     Breast Discharge     right breast has white thick discharge      1. \"Have you been to the ER, urgent care clinic since your last visit? Hospitalized since your last visit? \" No    2. \"Have you seen or consulted any other health care providers outside of the [address] [place] [address] since your last visit? \" No     3. For patients aged 39-70: Has the patient had a colonoscopy? Yes, HM satisfied with blue hyperlink     If the patient is female:    4. For patients aged 41-77: Has the patient had a mammogram within the past 2 years? Yes, HM satisfied with blue hyperlink    5. For patients aged 21-65: Has the patient had a pap smear?  No

## 2021-10-14 ENCOUNTER — TELEPHONE (OUTPATIENT)
Dept: FAMILY MEDICINE CLINIC | Age: 56
End: 2021-10-14

## 2021-10-14 NOTE — TELEPHONE ENCOUNTER
Fax received from [address] [place] [address] meds stating prior auth is required for the Lansoprazole 30 MG DR  CAP . Submit a PA request  1. Go to key. [employer] and click \"Enter a Key\"  2. Patient last name: Nancy Mon      : 1965      Key: [id number]  3.  Click \"start a PA\", complete the form, and \"send to plan\"

## 2021-10-14 NOTE — TELEPHONE ENCOUNTER
10- Prior-Authorization pending [facility]

## 2021-10-15 ENCOUNTER — OFFICE VISIT (OUTPATIENT)
Dept: ORTHOPEDIC SURGERY | Age: 56
End: 2021-10-15
Payer: COMMERCIAL

## 2021-10-15 VITALS
TEMPERATURE: 96.9 F | SYSTOLIC BLOOD PRESSURE: 114 MMHG | HEIGHT: 66 IN | OXYGEN SATURATION: 95 % | DIASTOLIC BLOOD PRESSURE: 76 MMHG | WEIGHT: 277 LBS | HEART RATE: 70 BPM | BODY MASS INDEX: 44.52 KG/M2

## 2021-10-15 DIAGNOSIS — R94.131 ABNORMAL EMG: ICD-10-CM

## 2021-10-15 DIAGNOSIS — R20.0 NUMBNESS AND TINGLING OF BOTH UPPER EXTREMITIES: ICD-10-CM

## 2021-10-15 DIAGNOSIS — R20.0 NUMBNESS AND TINGLING OF BOTH UPPER EXTREMITIES: Primary | ICD-10-CM

## 2021-10-15 DIAGNOSIS — R20.2 NUMBNESS AND TINGLING OF BOTH UPPER EXTREMITIES: ICD-10-CM

## 2021-10-15 DIAGNOSIS — R20.2 NUMBNESS AND TINGLING OF BOTH UPPER EXTREMITIES: Primary | ICD-10-CM

## 2021-10-15 PROCEDURE — 95911 NRV CNDJ TEST 9-10 STUDIES: CPT | Performed by: PHYSICAL MEDICINE & REHABILITATION

## 2021-10-15 PROCEDURE — 95886 MUSC TEST DONE W/N TEST COMP: CPT | Performed by: PHYSICAL MEDICINE & REHABILITATION

## 2021-10-15 NOTE — PROGRESS NOTES
[address]  [address], [address]  [address]  Phone: [phone]  Fax: [phone]        Philpipe Robbins  : 1965  PCP: Tiki Lagunas PA-C  10/15/2021    ELECTROMYOGRAPHY AND NERVE CONDUCTION STUDIES    Isaiah Rodriguez was referred by ASIF Penn for electrodiagnostic evaluation of BUE paraesthesia. NCV & EMG Findings:  Evaluation of the left median motor and the right median motor nerves showed prolonged distal onset latency (L6.1, R5.2 ms). The left ulnar motor and the right ulnar motor nerves showed reduced amplitude (L7.3, R7.6 mV) and decreased conduction velocity (A Elbow-B Elbow, L40, R36 m/s). The left median sensory and the right median sensory nerves showed no response (Wrist) and no response (Palm). The left ulnar sensory nerve showed prolonged distal peak latency (4.4 ms) and decreased conduction velocity (Wrist-5th Digit, 32 m/s). All remaining nerves (as indicated in the following tables) were within normal limits. Left vs. Right side comparison data for the median motor nerve indicates abnormal L-R latency difference (0.9 ms). The ulnar sensory nerve indicates abnormal L-R latency difference (0.7 ms). All remaining left vs. right side differences were within normal limits. All examined muscles (as indicated in the following table) showed no evidence of electrical instability. INTERPRETATION    This is an abnormal electrodiagnostic examination. These findings may be consistent with:   1. Moderate-to-severe median mononeuropathy at the right wrist (carpal tunnel syndrome)   2. Mild ulnar mononeuropathy at the right elbow (cubital tunnel syndrome)   3. Moderate-to-severe median mononeuropathy at the left wrist (carpal tunnel syndrome)   4.  Mild-to-moderate ulnar mononeuropathy at the left elbow (cubital tunnel syndrome)    There is no electrodiagnostic evidence of any cervical radiculopathy, brachial plexopathy, peripheral polyneuropathy, or any other mononeuropathy. CLINICAL INTERPRETATION    Her electrodiagnostic findings of bilateral median and ulnar mononeuropathies are consistent with her bilateral hand symptoms. HISTORY OF PRESENT ILLNESS  Malachi Carrasco is a 55 y.o. female. Pt presents today for BUE EMG evaluation of BUE paraesthesia, mostly in the hands. She reports having numbness and tingling in the legs with a diagnosis of neuropathy about 10-15 years ago. She denies h/o DM. She reports h/o lyme disease. PAST MEDICAL HISTORY   Past Medical History:   Diagnosis Date    Anemia     Angio-edema     Asthma     Depression     Diverticulosis     Enlargement, spleen     Esophagospasm     Essential hypertension     Fatty liver     GERD (gastroesophageal reflux disease)     Histoplasmosis     HPV (human papilloma virus) anogenital infection     Lyme disease     Neuropathy     PCOS (polycystic ovarian syndrome)     PTSD (post-traumatic stress disorder)     Tachycardia     Thyroid disease     Tricuspid insufficiency        Past Surgical History:   Procedure Laterality Date    HX CHOLECYSTECTOMY      HX HEART CATHETERIZATION  2010 approx    normal per patient    HX HEMORRHOIDECTOMY      HX OTHER SURGICAL      Vocal cord polyps   . MEDICATIONS    Current Outpatient Medications   Medication Sig Dispense Refill    amoxicillin 500 mg tab Take 500 mg by mouth every eight (8) hours.  clotrimazole (LOTRIMIN) 1 % topical cream Apply  to affected area two (2) times a day. 24 g 0    lansoprazole (PREVACID) 30 mg capsule Take 1 Capsule by mouth Daily (before breakfast). 30 Capsule 0    triamcinolone acetonide (KENALOG) 0.1 % topical cream Apply  to affected area two (2) times a day. use thin layer 15 g 0    DULoxetine (CYMBALTA) 30 mg capsule Take 1 Capsule by mouth daily.  Indications: neuropathic pain 30 Capsule 1    Euthyrox 100 mcg tablet TAKE 1 TABLET BY MOUTH ONCE DAILY BEFORE BREAKFAST 30 Tablet 0    ergocalciferol (ERGOCALCIFEROL) 1,250 mcg (50,000 unit) capsule Take 1 capsule by mouth once a week 5 Capsule 0    cyanocobalamin 1,000 mcg tablet Take 1 tablet by mouth once daily 30 Tablet 0    aspirin 81 mg chewable tablet Now, then every 24 hours      lisinopriL (PRINIVIL, ZESTRIL) 2.5 mg tablet Take 1 tablet by mouth once daily 90 Tablet 1    spironolactone (ALDACTONE) 25 mg tablet Take 1 tablet by mouth once daily 30 Tablet 0    albuterol (PROVENTIL HFA, VENTOLIN HFA, PROAIR HFA) 90 mcg/actuation inhaler Take 2 Puffs by inhalation every four (4) hours as needed for Wheezing. 1 Inhaler 0    magnesium oxide (MAG-OX) 400 mg tablet Take 1 Tablet by mouth daily. (Patient taking differently: Take 400 mg by mouth two (2) times a day.) 30 Tablet 1    potassium chloride (K-DUR, KLOR-CON) 20 mEq tablet Take 1 Tab by mouth daily. 30 Tab 5    furosemide (Lasix) 40 mg tablet Take 1 Tab by mouth daily. 90 Tab 1    EPINEPHrine HCl, PF, (ADRENALIN) 1 mg/mL (1 mL) injection once.  traMADoL (ULTRAM) 50 mg tablet Take 50 mg by mouth as needed for Pain.  cyclobenzaprine (FLEXERIL) 10 mg tablet Take  by mouth as needed for Muscle Spasm(s).  cetirizine (ZyrTEC) 10 mg tablet Take 10 mg by mouth two (2) times a day.           ALLERGIES  Allergies   Allergen Reactions    Latex, Natural Rubber Itching    Latex Itching    Ansaid [Flurbiprofen] Anaphylaxis    Iron Dextran Anaphylaxis and Itching    Levofloxacin Myalgia     Tendon rupture  Tendon rupture    Adhesive Rash    Codeine Itching and Rash    Keflex [Cephalexin] Hives and Itching    Penicillins Other (comments)          SOCIAL HISTORY    Social History     Socioeconomic History    Marital status: SINGLE     Spouse name: Not on file    Number of children: Not on file    Years of education: Not on file    Highest education level: Not on file   Tobacco Use    Smoking status: Current Every Day Smoker Packs/day: 0.50     Years: 15.00     Pack years: 7.50     Start date: 1985    Smokeless tobacco: Never Used    Tobacco comment: less than 10 depending on the day   Vaping Use    Vaping Use: Former   Substance and Sexual Activity    Alcohol use: Not Currently    Drug use: Not Currently    Sexual activity: Not Currently     Partners: Male     Social Determinants of Health     Financial Resource Strain:     Difficulty of Paying Living Expenses:    Food Insecurity:     Worried About Running Out of Food in the Last Year:     [address] in the Last Year:    Transportation Needs:     Lack of Transportation (Medical):  Lack of Transportation (Non-Medical):    Physical Activity:     Days of Exercise per Week:     Minutes of Exercise per Session:    Stress:     Feeling of Stress :    Social Connections:     Frequency of Communication with Friends and Family:     Frequency of Social Gatherings with Friends and Family:     Attends Latter-day Services:     Active Member of Clubs or Organizations:     Attends Club or Organization Meetings:     Marital Status:        FAMILY HISTORY  Family History   Problem Relation Age of Onset    Cancer Mother         stomach    Seizures Mother     Cancer Sister         double mastectomy    Cancer Sister         lung    Heart Surgery Maternal Grandmother         CABG    Stroke Neg Hx          PHYSICAL EXAMINATION  Visit Vitals  /76 (BP 1 Location: Left lower arm, BP Patient Position: Sitting, BP Cuff Size: Adult)   Pulse 70   Temp 96.9 °F (36.1 °C) (Tympanic)   Ht 5' 6\" (1.676 m)   Wt 277 lb (125.6 kg)   LMP 09/22/2021   SpO2 95%   BMI 44.71 kg/m²       Pain Assessment  10/15/2021   Location of Pain Back;Leg;Arm   Severity of Pain 6   Quality of Pain Burning; Yoselyn Duverney; Throbbing;Dull;Aching   Frequency of Pain Constant   Relieving Factors Heat;Other (Comment)   Relieving Factors Comment massage           Constitutional:  Well developed, well nourished, in no acute distress. Psychiatric: Affect and mood are appropriate. Integumentary: No rashes or abrasions noted on exposed areas. SPINE/MUSCULOSKELETAL EXAM    On brief examination: None.       NCV & EMG Findings:  Nerve Conduction Studies  Anti Sensory Summary Table     Stim Site NR Peak (ms) Norm Peak (ms) O-P Amp (µV) Norm O-P Amp Site1 Site2 Delta-P (ms) Dist (cm) Deshaun (m/s) Norm Deshaun (m/s)   Left Median Anti Sensory (2nd Digit)   Wrist NR  <4  >13 Wrist 2nd Digit  14.0  >39   Palm NR  <2.3  >8 Palm 2nd Digit  7.0     Right Median Anti Sensory (2nd Digit)   Wrist NR  <4  >13 Wrist 2nd Digit  14.0  >39   Palm NR  <2.3  >8 Palm 2nd Digit  7.0     Left Radial Anti Sensory (Base 1st Digit)   Wrist    2.1 2.8 33.8 11 Wrist Base 1st Digit 2.1 10.0 48    Right Radial Anti Sensory (Base 1st Digit)   Wrist    2.3 2.8 28.4 11 Wrist Base 1st Digit 2.3 10.0 43    Left Ulnar Anti Sensory (5th Digit)   Wrist    4.4 <4.0 13.3 >9 Wrist 5th Digit 4.4 14.0 32 >38   Right Ulnar Anti Sensory (5th Digit)   Wrist    3.7 <4.0 9.3 >9 Wrist 5th Digit 3.7 14.0 38 >38     Motor Summary Table     Stim Site NR Onset (ms) Norm Onset (ms) O-P Amp (mV) Norm O-P Amp Site1 Site2 Delta-0 (ms) Dist (cm) Deshaun (m/s) Norm Deshaun (m/s)   Left Median Motor (Abd Poll Brev)   Wrist    6.1 <4.5 8.6 >4.1 Elbow Wrist 4.0 20.0 50 >49   Elbow    10.1  7.8          Right Median Motor (Abd Poll Brev)   Wrist    5.2 <4.5 7.7 >4.1 Elbow Wrist 3.8 20.5 54 >49   Elbow    9.0  7.7          Left Ulnar Motor (Abd Dig Min)   Wrist    3.1 <3.7 7.3 >7.9 B Elbow Wrist 3.6 19.0 53 >52   B Elbow    6.7  6.2  A Elbow B Elbow 2.5 10.0 40 >43   A Elbow    9.2  6.1          Right Ulnar Motor (Abd Dig Min)   Wrist    3.0 <3.7 7.6 >7.9 B Elbow Wrist 3.7 19.5 53 >52   B Elbow    6.7  5.7  A Elbow B Elbow 2.8 10.0 36 >43   A Elbow    9.5  6.1            EMG     Side Muscle Nerve Root Ins Act Fibs Psw Amp Dur Poly Recrt Int Saralyn Bays Comment   Right Biceps Musculocut C5-6 Nml Nml Nml Nml Nml 0 Nml Nml    Right Triceps Radial C6-7-8 Nml Nml Nml Nml Nml 0 Nml Nml    Right PronatorTeres Median C6-7 Nml Nml Nml Nml Nml 0 Nml Nml    Right Abd Poll Brev Median C8-T1 Nml Nml Nml Nml Nml 0 Nml Nml    Right 1stDorInt Ulnar C8-T1 Nml Nml Nml Nml Nml 0 Nml Nml    Left Biceps Musculocut C5-6 Nml Nml Nml Nml Nml 0 Nml Nml    Left Triceps Radial C6-7-8 Nml Nml Nml Nml Nml 0 Nml Nml    Left PronatorTeres Median C6-7 Nml Nml Nml Nml Nml 0 Nml Nml    Left Abd Poll Brev Median C8-T1 Nml Nml Nml Nml Nml 0 Nml Nml    Left 1stDorInt Ulnar C8-T1 Nml Nml Nml Nml Nml 0 Nml Nml    Right Cervical Parasp Up Rami C1-3 Nml Nml Nml         Right Cervical Parasp Mid Rami C4-6 Nml Nml Nml         Right Cervical Parasp Low Rami C7-8 Nml Nml Nml         Left Cervical Parasp Up Rami C1-3 Nml Nml Nml         Left Cervical Parasp Mid Rami C4-6 Nml Nml Nml         Left Cervical Parasp Low Rami C7-8 Nml Nml Nml             Nerve Conduction Studies  Anti Sensory Left/Right Comparison     Stim Site L Lat (ms) R Lat (ms) L-R Lat (ms) L Amp (µV) R Amp (µV) L-R Amp (%) Site1 Site2 L Deshaun (m/s) R Deshaun (m/s) L-R Deshaun (m/s)   Median Anti Sensory (2nd Digit)   Wrist       Wrist 2nd Digit      Palm       Palm 2nd Digit      Radial Anti Sensory (Base 1st Digit)   Wrist 2.1 2.3 0.2 33.8 28.4 16.0 Wrist Base 1st Digit 48 43 5   Ulnar Anti Sensory (5th Digit)   Wrist 4.4 3.7 0.7 13.3 9.3 30.1 Wrist 5th Digit 32 38 6     Motor Left/Right Comparison     Stim Site L Lat (ms) R Lat (ms) L-R Lat (ms) L Amp (mV) R Amp (mV) L-R Amp (%) Site1 Site2 L Deshaun (m/s) R Deshaun (m/s) L-R Deshaun (m/s)   Median Motor (Abd Poll Brev)   Wrist 6.1 5.2 0.9 8.6 7.7 10.5 Elbow Wrist 50 54 4   Elbow 10.1 9.0 1.1 7.8 7.7 1.3        Ulnar Motor (Abd Dig Min)   Wrist 3.1 3.0 0.1 7.3 7.6 3.9 B Elbow Wrist 53 53 0   B Elbow 6.7 6.7 0.0 6.2 5.7 8.1 A Elbow B Elbow 40 36 4   A Elbow 9.2 9.5 0.3 6.1 6.1 0.0              Waveforms:                                  [facility]  OFFICE PROCEDURE PROGRESS NOTE        Chart reviewed for the following:   ICyndi, have reviewed the History, Physical and updated the Allergic reactions for [place] performed immediately prior to start of procedure:   Juan Reid, have performed the following reviews on [address] [place] [address] prior to the start of the procedure:            * Patient was identified by name and date of birth   * Agreement on procedure being performed was verified  * Risks and Benefits explained to the patient  * Procedure site verified and marked as necessary  * Patient was positioned for comfort  * Consent was signed and verified     Time: 2:31 PM    Date of procedure: 10/15/2021    Procedure performed by:  Francesca Villa MD    Provider accompanied by: Scribe. Patient accompanied by: Self.     How tolerated by patient: tolerated the procedure well with no complications    Post Procedural Pain Scale: 0 - No Hurt    Comments: none    Written by Delores Long as dictated by Cyndi Aleman MD

## 2021-10-15 NOTE — PROGRESS NOTES
Raymond Roque presents today for   Chief Complaint   Patient presents with    Procedure     EMG on hands, arms       Is someone accompanying this pt? no    Is the patient using any DME equipment during OV? no    Depression Screening:  3 most recent PHQ Screens 5/24/2021   Little interest or pleasure in doing things Several days   Feeling down, depressed, irritable, or hopeless Several days   Total Score PHQ 2 2   Trouble falling or staying asleep, or sleeping too much More than half the days   Feeling tired or having little energy Several days   Poor appetite, weight loss, or overeating Several days   Feeling bad about yourself - or that you are a failure or have let yourself or your family down Several days   Trouble concentrating on things such as school, work, reading, or watching TV Not at all   Moving or speaking so slowly that other people could have noticed; or the opposite being so fidgety that others notice Not at all   Thoughts of being better off dead, or hurting yourself in some way Not at all   PHQ 9 Score 7   How difficult have these problems made it for you to do your work, take care of your home and get along with others Not difficult at all       Learning Assessment:  Learning Assessment 5/24/2021   PRIMARY LEARNER Patient   HIGHEST LEVEL OF EDUCATION - PRIMARY LEARNER  2 YEARS [address] CAREGIVER No   PRIMARY LANGUAGE ENGLISH    NEED -   LEARNER PREFERENCE PRIMARY DEMONSTRATION     LISTENING     READING     OTHER (COMMENT)   ANSWERED BY Regi Jha   RELATIONSHIP SELF       Abuse Screening:  Abuse Screening Questionnaire 5/24/2021   Do you ever feel afraid of your partner? N   Are you in a relationship with someone who physically or mentally threatens you? N   Is it safe for you to go home? Y       Fall Risk  No flowsheet data found. OPIOID RISK TOOL  No flowsheet data found. Coordination of Care:  1.  Have you been to the ER, urgent care clinic since your last visit? no  Hospitalized since your last visit? no    2. Have you seen or consulted any other health care providers outside of the [address] [place] [address] since your last visit? no Include any pap smears or colon screening.  no

## 2021-10-20 ENCOUNTER — TELEPHONE (OUTPATIENT)
Dept: CARDIOLOGY CLINIC | Age: 56
End: 2021-10-20

## 2021-10-20 NOTE — TELEPHONE ENCOUNTER
MD Rena Brenner.  Let me see this patient in the office next week, possibly Monday             Called and spoke with patient and she is aware that we needed to move her appointment up to Monday 10/25/2021 @ 1:30.

## 2021-10-22 NOTE — TELEPHONE ENCOUNTER
There was no answer at [address] when an attempt was made to notify of prior authorization. [facility] pharmacy notified.

## 2021-10-25 ENCOUNTER — OFFICE VISIT (OUTPATIENT)
Dept: CARDIOLOGY CLINIC | Age: 56
End: 2021-10-25
Payer: COMMERCIAL

## 2021-10-25 VITALS
WEIGHT: 281.4 LBS | SYSTOLIC BLOOD PRESSURE: 141 MMHG | HEART RATE: 71 BPM | DIASTOLIC BLOOD PRESSURE: 90 MMHG | HEIGHT: 66 IN | BODY MASS INDEX: 45.22 KG/M2

## 2021-10-25 DIAGNOSIS — I20.8 ATYPICAL ANGINA (HCC): Primary | ICD-10-CM

## 2021-10-25 DIAGNOSIS — Z71.6 TOBACCO ABUSE COUNSELING: ICD-10-CM

## 2021-10-25 DIAGNOSIS — I50.32 CHRONIC DIASTOLIC CONGESTIVE HEART FAILURE (HCC): ICD-10-CM

## 2021-10-25 DIAGNOSIS — I10 ESSENTIAL HYPERTENSION: ICD-10-CM

## 2021-10-25 DIAGNOSIS — E66.01 SEVERE OBESITY (BMI >= 40) (HCC): ICD-10-CM

## 2021-10-25 PROCEDURE — 99215 OFFICE O/P EST HI 40 MIN: CPT | Performed by: INTERNAL MEDICINE

## 2021-10-25 RX ORDER — METOPROLOL TARTRATE 25 MG/1
25 TABLET, FILM COATED ORAL 2 TIMES DAILY
Qty: 60 TABLET | Refills: 3 | Status: SHIPPED | OUTPATIENT
Start: 2021-10-25 | End: 2022-01-17

## 2021-10-25 NOTE — PATIENT INSTRUCTIONS
There are no discontinued medications. Learning About Benefits From Quitting Smoking  How does quitting smoking make you healthier? If you're thinking about quitting smoking, you may have a few reasons to be smoke-free. Your health may be one of them. · When you quit smoking, you lower your risks for cancer, lung disease, heart attack, stroke, blood vessel disease, and blindness from macular degeneration. · When you're smoke-free, you get sick less often, and you heal faster. You are less likely to get colds, flu, bronchitis, and pneumonia. · As a nonsmoker, you may find that your mood is better and you are less stressed. When and how will you feel healthier? Quitting has real health benefits that start from day 1 of being smoke-free. And the longer you stay smoke-free, the healthier you get and the better you feel. The first hours  · After just 20 minutes, your blood pressure and heart rate go down. That means there's less stress on your heart and blood vessels. · Within 12 hours, the level of carbon monoxide in your blood drops back to normal. That makes room for more oxygen. With more oxygen in your body, you may notice that you have more energy than when you smoked. After 2 weeks  · Your lungs start to work better. · Your risk of heart attack starts to drop. After 1 month  · When your lungs are clear, you cough less and breathe deeper, so it's easier to be active. · Your sense of taste and smell return. That means you can enjoy food more than you have since you started smoking. Over the years  · Over the years, your risks of heart disease, heart attack, and stroke are lower. · After 10 years, your risk of dying from lung cancer is cut by about half. And your risk for many other types of cancer is lower too. How would quitting help others in your life? When you quit smoking, you improve the health of everyone who now breathes in your smoke.   · Their heart, lung, and cancer risks drop, much like yours. · They are sick less. For babies and small children, living smoke-free means they're less likely to have ear infections, pneumonia, and bronchitis. · If you're a woman who is or will be pregnant someday, quitting smoking means a healthier . · Children who are close to you are less likely to become adult smokers. Where can you learn more? Go to http://www.gray.com/  Enter O319 in the search box to learn more about \"Learning About Benefits From Quitting Smoking. \"  Current as of: 2021               Content Version: 13.0  © 8774-0502 [employer], [employer]. Care instructions adapted under license by "[employer] (which disclaims liability or warranty for this information). If you have questions about a medical condition or this instruction, always ask your healthcare professional. [address] any warranty or liability for your use of this information.

## 2021-10-25 NOTE — PROGRESS NOTES
HISTORY OF PRESENT ILLNESS  Lorrie Yang is a 55 y.o. female. Follow-up of CHF, hypertension, obesity    3/21 seen as new patient. H/o TR, MR, idiopathic tachycardia, edema, neuropathy    Shortness of Breath  The history is provided by the patient. This is a new problem. The current episode started more than 1 week ago (yrs). The problem has not changed since onset. Associated symptoms include chest pain. Pertinent negatives include no fever, no headaches, no cough, no wheezing, no PND, no orthopnea, no vomiting, no rash, no leg swelling and no claudication. Sore throat: walking out of the house. The problem's precipitants include exercise (Only intermittently some days with walking in the house;). Chest Pain (Angina)   The history is provided by the patient. This is a new problem. The current episode started more than 1 week ago (1/21). The problem has not changed since onset. The problem occurs rarely (3 episodes in 3 days in last 3 months; ). The pain is associated with walking, rest and normal activity (walking dog; ). The pain is present in the lateral region, right side and left side. The quality of the pain is described as pressure-like and tightness. The pain radiates to the left jaw and left neck. Associated symptoms include lower extremity edema and shortness of breath. Pertinent negatives include no claudication, no cough, no dizziness, no fever, no headaches, no malaise/fatigue, no nausea, no orthopnea, no palpitations, no PND and no vomiting. Treatments tried: drinking coke. The treatment provided mild relief. Follow-up  Associated symptoms include chest pain and shortness of breath. Pertinent negatives include no headaches.      Allergies   Allergen Reactions    Latex, Natural Rubber Itching    Latex Itching    Ansaid [Flurbiprofen] Anaphylaxis    Iron Dextran Anaphylaxis and Itching    Levofloxacin Myalgia     Tendon rupture  Tendon rupture    Adhesive Rash    Codeine Itching and Rash  Keflex [Cephalexin] Hives and Itching    Penicillins Other (comments)       Past Medical History:   Diagnosis Date    Anemia     Angio-edema     Asthma     Depression     Diverticulosis     Enlargement, spleen     Esophagospasm     Essential hypertension     Fatty liver     GERD (gastroesophageal reflux disease)     Histoplasmosis     HPV (human papilloma virus) anogenital infection     Lyme disease     Neuropathy     PCOS (polycystic ovarian syndrome)     PTSD (post-traumatic stress disorder)     Tachycardia     Thyroid disease     Tricuspid insufficiency        Family History   Problem Relation Age of Onset    Cancer Mother         stomach    Seizures Mother     Cancer Sister         double mastectomy    Cancer Sister         lung    Heart Surgery Maternal Grandmother         CABG    Stroke Neg Hx        Social History     Tobacco Use    Smoking status: Current Every Day Smoker     Packs/day: 0.50     Years: 15.00     Pack years: 7.50     Start date: 1985    Smokeless tobacco: Never Used    Tobacco comment: less than 10 depending on the day   Vaping Use    Vaping Use: Former   Substance Use Topics    Alcohol use: Not Currently    Drug use: Not Currently        Current Outpatient Medications   Medication Sig    amoxicillin 500 mg tab Take 500 mg by mouth every eight (8) hours.  clotrimazole (LOTRIMIN) 1 % topical cream Apply  to affected area two (2) times a day.  lansoprazole (PREVACID) 30 mg capsule Take 1 Capsule by mouth Daily (before breakfast).  triamcinolone acetonide (KENALOG) 0.1 % topical cream Apply  to affected area two (2) times a day. use thin layer    DULoxetine (CYMBALTA) 30 mg capsule Take 1 Capsule by mouth daily.  Indications: neuropathic pain    Euthyrox 100 mcg tablet TAKE 1 TABLET BY MOUTH ONCE DAILY BEFORE BREAKFAST    ergocalciferol (ERGOCALCIFEROL) 1,250 mcg (50,000 unit) capsule Take 1 capsule by mouth once a week    cyanocobalamin 1,000 mcg tablet Take 1 tablet by mouth once daily    aspirin 81 mg chewable tablet Now, then every 24 hours    lisinopriL (PRINIVIL, ZESTRIL) 2.5 mg tablet Take 1 tablet by mouth once daily    spironolactone (ALDACTONE) 25 mg tablet Take 1 tablet by mouth once daily    albuterol (PROVENTIL HFA, VENTOLIN HFA, PROAIR HFA) 90 mcg/actuation inhaler Take 2 Puffs by inhalation every four (4) hours as needed for Wheezing.  magnesium oxide (MAG-OX) 400 mg tablet Take 1 Tablet by mouth daily. (Patient taking differently: Take 400 mg by mouth two (2) times a day.)    potassium chloride (K-DUR, KLOR-CON) 20 mEq tablet Take 1 Tab by mouth daily.  furosemide (Lasix) 40 mg tablet Take 1 Tab by mouth daily.  EPINEPHrine HCl, PF, (ADRENALIN) 1 mg/mL (1 mL) injection once.  traMADoL (ULTRAM) 50 mg tablet Take 50 mg by mouth as needed for Pain.  cyclobenzaprine (FLEXERIL) 10 mg tablet Take  by mouth as needed for Muscle Spasm(s).  cetirizine (ZyrTEC) 10 mg tablet Take 10 mg by mouth two (2) times a day. No current facility-administered medications for this visit. Past Surgical History:   Procedure Laterality Date    HX CHOLECYSTECTOMY      HX HEART CATHETERIZATION  2010 approx    normal per patient    HX HEMORRHOIDECTOMY      HX OTHER SURGICAL      Vocal cord polyps       Visit Vitals  BP (!) 141/90   Pulse 71   Ht 5' 6\" (1.676 m)   Wt 127.6 kg (281 lb 6.4 oz)   BMI 45.42 kg/m²       Diagnostic Studies:  I have reviewed the relevant tests done on the patient and show as follows  EKG tracings reviewed by me today. EKG Results     None        XR Results (most recent):  No results found for this or any previous visit. Ms. Desai November has a reminder for a \"due or due soon\" health maintenance. I have asked that she contact her primary care provider for follow-up on this health maintenance. Review of Systems   Constitutional: Negative for chills, fever, malaise/fatigue and weight loss.    HENT: Negative for nosebleeds. Sore throat: walking out of the house. Eyes: Negative for discharge. Respiratory: Positive for shortness of breath. Negative for cough and wheezing. Cardiovascular: Positive for chest pain. Negative for palpitations, orthopnea, claudication, leg swelling and PND. Gastrointestinal: Negative for diarrhea, nausea and vomiting. Genitourinary: Negative for dysuria and hematuria. Musculoskeletal: Negative for joint pain. Skin: Negative for rash. Neurological: Negative for dizziness, seizures, loss of consciousness and headaches. Endo/Heme/Allergies: Negative for polydipsia. Does not bruise/bleed easily. Psychiatric/Behavioral: Negative for depression and substance abuse. The patient does not have insomnia. 11/20 echo  ECHOCARDIOGRAM COMPLETE  (DOPPLER ECHO)11/20/2020  [facility]  Result Impression   :   POOR QUALITY 2-DIMENSIONAL IMAGES   DEFINITY CONTRAST UTILIZED TO ENHANCE ENDOCARDIAL BORDER DEFINITION   LEFT VENTRICULAR HYPERTROPHY   GROSSLY NORMAL LEFT VENTRICULAR CAVITY SIZE AND SYSTOLIC FUNCTION   COULD NOT ACCURATELY QUANTIFY EJECTION FRACTION   GRADE 1 DIASTOLIC DYSFUNCTION   RIGHT VENTRICLE NOT VISUALIZED   GROSSLY NORMAL LEFT ATRIAL SIZE   RIGHT ATRIUM NOT VISUALIZED   NO OBVIOUS HEMODYNAMICALLY SIGNIFICANT VALVE PATHOLOGY   RVSP - NA     04/26/21   ECHO ADULT COMPLETE 04/26/2021 4/26/2021    Narrative · Contrast used: DEFINITY. · Image quality for this study was technically difficult. · LV: Estimated LVEF is 60 - 65%. Normal cavity size and systolic function   (ejection fraction normal). Moderate concentric hypertrophy. Wall motion:   normal. Moderate (grade 2) left ventricular diastolic dysfunction. · RV: Mildly dilated right ventricle. · RA: Mildly dilated right atrium. · TV: Right Ventricular Arterial Pressure (RVSP) is 23 mmHg. Pulmonary   hypertension not suggested by Doppler findings.         Signed by: Kenneth Carvajal MD 10/12/21    NUCLEAR CARDIAC STRESS TEST 10/19/2021 10/21/2021    Interpretation Summary  · Baseline ECG: Sinus rhythm, interventricular conduction delay. · Stress test: Stress EKG normal.  · SPECT: Left ventricular function post-stress was normal. Calculated ejection fraction is 84% (normal EF value is equal to or greater than 50%). Left ventricular wall motion was normal at stress, no regional wall motion abnormality noted. The TID ratio is 1.21.  · SPECT: Myocardial perfusion imaging defect 1: There is a defect that is medium in size with a moderate reduction in uptake present in the basal-mid and apical segment(s) of the inferior wall(s) that is partially reversible. There is normal wall motion in the defect area. Viability in the area is good. The defect appears to be ischemia. · SPECT: Left ventricular perfusion is abnormal. Myocardial perfusion imaging supports an intermediate risk stress test.    Signed by: Maribel Rivera MD on 10/19/2021 12:03 PM  Physical Exam  Constitutional:       General: She is not in acute distress. Appearance: She is well-developed. She is obese. Comments: Morbid obesity   HENT:      Head: Normocephalic and atraumatic. Mouth/Throat:      Dentition: Normal dentition. Eyes:      General: No scleral icterus. Right eye: No discharge. Left eye: No discharge. Neck:      Thyroid: No thyromegaly. Vascular: No carotid bruit or JVD. Cardiovascular:      Rate and Rhythm: Normal rate and regular rhythm. Pulses: Intact distal pulses. Heart sounds: Normal heart sounds, S1 normal and S2 normal. No murmur heard. No friction rub. No gallop. Pulmonary:      Effort: Pulmonary effort is normal.      Breath sounds: Normal breath sounds. No wheezing or rales. Chest:      Chest wall: No tenderness. Abdominal:      Palpations: Abdomen is soft. There is no mass. Tenderness: There is no abdominal tenderness.    Musculoskeletal:      Cervical back: Neck supple. Right lower leg: No edema. Left lower leg: No edema. Lymphadenopathy:      Cervical:      Right cervical: No superficial cervical adenopathy. Left cervical: No superficial cervical adenopathy. Skin:     General: Skin is warm and dry. Findings: No rash. Neurological:      Mental Status: She is alert and oriented to person, place, and time. Psychiatric:         Behavior: Behavior normal.         ASSESSMENT and PLAN    LIPIDS : Results for Beverley Charles (MRN [id number]) as of 3/19/2021 13:01   Ref. Range 3/3/2021 12:20   Triglyceride Latest Ref Range: <150 MG/ (H)   Cholesterol, total Latest Ref Range: <200 MG/ (H)   HDL Cholesterol Latest Ref Range: 40 - 60 MG/DL 45   CHOL/HDL Ratio Latest Ref Range: 0 - 5.0   4.6   VLDL, calculated Latest Units: MG/DL 32.2   LDL, calculated Latest Ref Range: 0 - 100 MG/.8 (H)     ACC CVD risk score as of 3/21 is 5.9%. Continue diet and exercise for now. 3/19/2021 seen as new patient for changing the cardiologist.  She cannot locate her old cardiologist and so is changing. She has acute on chronic CHF which is diastolic and right heart failure mostly. Blood pressure is elevated today but is normal most of the times at home. CHF is secondary to morbid obesity most likely. She will need to be worked up for sleep apnea. History of Lyme disease for which she was treated with doxycycline a year. Apparently she is still positive for it and I recommended ID opinion for that. Increase Lasix to 40 twice daily add spironolactone. Told her not to use any over-the-counter potassium but does take the prescription pills so we can follow the labs closely and replace as needed. Continue magnesium. History of significant electrolyte problems in the past.  Told her to come to ER if she does not feel well. Extensive review of previous records and face-to-face time was more than 60 minutes. 4/26/2021 CHF persisting. Patient not taking lasix as advised. She will now take lasix 40 mg/day. Follow labs closely. Add acei and f/u BP chart at home. Diet and exercise discussed. Refer to bariatrics. 8/2/2021 CHF is compensated. Blood pressure is controlled and is low normal.  Reduce lisinopril. Check labs to follow-up on potassium due to Aldactone. She is lost significant weight for which she was congratulated. Recommended to continue to lose more weight with which she agrees and is trying. 9/27/2021 CHF is compensated NYHA class II. Blood pressure is controlled. Chest pain continues and seems worse at times. She thinks she has esophageal spasm but will recommend a stress test to evaluate ischemia. She is gradually losing weight for which she was congratulated. Healthy heart habits discussed and smoking cessation reinforced. Diagnoses and all orders for this visit:    1. Atypical angina (HCC)  -     METABOLIC PANEL, BASIC; Future  -     metoprolol tartrate (LOPRESSOR) 25 mg tablet; Take 1 Tablet by mouth two (2) times a day. -     CTA HEART; Future    2. Chronic diastolic congestive heart failure ([address]    3. Essential hypertension  -     METABOLIC PANEL, BASIC; Future    4. Severe obesity (BMI >= 40) (HCC)    5. Tobacco abuse counseling        Pertinent laboratory and test data reviewed and discussed with patient. See patient instructions also for other medical advice given    There are no discontinued medications. Follow-up and Dispositions    · Return in about 3 months (around 1/25/2022), or if symptoms worsen or fail to improve, for post test.       10/25/2021 seen due to abnormal stress test with a possible ischemia in the inferior wall. Patient continues to have atypical angina. CHF is compensated. She is losing weight but states that sometimes it is involuntary. Continues to smoke and tobacco cessation discussed.   After discussing cardiac catheterization and CTA, she would prefer CT of the heart which was ordered to evaluate coronaries.

## 2021-10-25 NOTE — PROGRESS NOTES
1. Have you been to the ER, urgent care clinic since your last visit? Hospitalized since your last visit?    no    2. Have you seen or consulted any other health care providers outside of the [address] [place] [address] since your last visit? Include any pap smears or colon screening. No     3. Since your last visit, have you had any of the following symptoms? 4. Have you had any blood work, X-rays or cardiac testing? No         5. Where do you normally have your labs drawn? [facility]    6. Do you need any refills today?    no

## 2021-11-01 DIAGNOSIS — I50.33 ACUTE ON CHRONIC DIASTOLIC CONGESTIVE HEART FAILURE (HCC): ICD-10-CM

## 2021-11-01 DIAGNOSIS — E03.9 ACQUIRED HYPOTHYROIDISM: ICD-10-CM

## 2021-11-01 RX ORDER — SPIRONOLACTONE 25 MG/1
TABLET ORAL
Qty: 30 TABLET | Refills: 0 | Status: SHIPPED | OUTPATIENT
Start: 2021-11-01 | End: 2021-11-30

## 2021-11-01 RX ORDER — ERGOCALCIFEROL 1.25 MG/1
CAPSULE ORAL
Qty: 12 CAPSULE | Refills: 0 | Status: SHIPPED | OUTPATIENT
Start: 2021-11-01 | End: 2022-07-26

## 2021-11-01 RX ORDER — LEVOTHYROXINE SODIUM 100 UG/1
TABLET ORAL
Qty: 30 TABLET | Refills: 2 | Status: SHIPPED | OUTPATIENT
Start: 2021-11-01 | End: 2022-01-27 | Stop reason: SDUPTHER

## 2021-11-01 NOTE — TELEPHONE ENCOUNTER
Patient states she will get BMP next week. She voices understanding and acceptance of this advice and will call back if any further questions or concerns.

## 2021-11-01 NOTE — TELEPHONE ENCOUNTER
Left message for Garfield Benito advising medication has been approved asn e-scribed to pharmacy on file.

## 2021-11-09 DIAGNOSIS — K21.9 GASTROESOPHAGEAL REFLUX DISEASE, UNSPECIFIED WHETHER ESOPHAGITIS PRESENT: ICD-10-CM

## 2021-11-09 DIAGNOSIS — R21 SKIN RASH: ICD-10-CM

## 2021-11-09 LAB
ANION GAP SERPL CALC-SCNC: 8 MMOL/L (ref 3–15)
BUN SERPL-MCNC: 7 MG/DL (ref 6–22)
CALCIUM SERPL-MCNC: 9.3 MG/DL (ref 8.4–10.5)
CHLORIDE SERPL-SCNC: 101 MMOL/L (ref 98–110)
CO2 SERPL-SCNC: 27 MMOL/L (ref 20–32)
CREAT SERPL-MCNC: 0.7 MG/DL (ref 0.5–1.2)
GFRAA, 66117: >60
GFRNA, 66118: >60
GLUCOSE SERPL-MCNC: 82 MG/DL (ref 70–99)
POTASSIUM SERPL-SCNC: 4.2 MMOL/L (ref 3.5–5.5)
SODIUM SERPL-SCNC: 136 MMOL/L (ref 133–145)

## 2021-11-09 NOTE — TELEPHONE ENCOUNTER
This patient contacted office for the following prescriptions to be filled:    Last office visit: 10/13/2021  Follow up appointment: 1/27/2022  Medication requested :   Requested Prescriptions     Pending Prescriptions Disp Refills    cyanocobalamin 1,000 mcg tablet 30 Tablet 0     Sig: Take 1 Tablet by mouth daily.  triamcinolone acetonide (KENALOG) 0.1 % topical cream 15 g 0     Sig: Apply  to affected area two (2) times a day. use thin layer    lansoprazole (PREVACID) 30 mg capsule 30 Capsule 0     Sig: Take 1 Capsule by mouth Daily (before breakfast).      PCP: Sw[facility]  Mail order or Local pharmacy name [facility]  [phone]

## 2021-11-11 RX ORDER — LANSOPRAZOLE 30 MG/1
30 CAPSULE, DELAYED RELEASE ORAL
Qty: 30 CAPSULE | Refills: 2 | Status: SHIPPED | OUTPATIENT
Start: 2021-11-11 | End: 2022-01-27 | Stop reason: SDUPTHER

## 2021-11-11 RX ORDER — LANOLIN ALCOHOL/MO/W.PET/CERES
1000 CREAM (GRAM) TOPICAL DAILY
Qty: 30 TABLET | Refills: 2 | Status: SHIPPED | OUTPATIENT
Start: 2021-11-11 | End: 2022-01-27 | Stop reason: SDUPTHER

## 2021-11-11 RX ORDER — TRIAMCINOLONE ACETONIDE 1 MG/G
CREAM TOPICAL 2 TIMES DAILY
Qty: 15 G | Refills: 2 | Status: SHIPPED | OUTPATIENT
Start: 2021-11-11 | End: 2022-01-27 | Stop reason: SDUPTHER

## 2021-11-22 ENCOUNTER — VIRTUAL VISIT (OUTPATIENT)
Dept: NEUROLOGY | Age: 56
End: 2021-11-22
Payer: COMMERCIAL

## 2021-11-22 DIAGNOSIS — G56.01 CARPAL TUNNEL SYNDROME OF RIGHT WRIST: ICD-10-CM

## 2021-11-22 DIAGNOSIS — G56.23 CUBITAL TUNNEL SYNDROME OF BOTH UPPER EXTREMITIES: ICD-10-CM

## 2021-11-22 DIAGNOSIS — Z72.0 TOBACCO USE: ICD-10-CM

## 2021-11-22 DIAGNOSIS — Z51.81 MEDICATION MONITORING ENCOUNTER: ICD-10-CM

## 2021-11-22 DIAGNOSIS — R20.2 NUMBNESS AND TINGLING OF BOTH LEGS: Primary | ICD-10-CM

## 2021-11-22 DIAGNOSIS — R20.0 NUMBNESS AND TINGLING OF BOTH LEGS: Primary | ICD-10-CM

## 2021-11-22 PROCEDURE — 99214 OFFICE O/P EST MOD 30 MIN: CPT | Performed by: NURSE PRACTITIONER

## 2021-11-22 RX ORDER — DULOXETIN HYDROCHLORIDE 30 MG/1
30 CAPSULE, DELAYED RELEASE ORAL DAILY
Qty: 90 CAPSULE | Refills: 1 | Status: SHIPPED | OUTPATIENT
Start: 2021-11-22 | End: 2022-01-18 | Stop reason: SDUPTHER

## 2021-11-22 NOTE — PROGRESS NOTES
Lorrie Yang presents today for   Chief Complaint   Patient presents with    Peripheral Neuropathy       Is someone accompanying this pt? Virtual visit [phone]    Is the patient using any DME equipment during [address]? no    Depression Screening:  3 most recent PHQ Screens 10/25/2021   Little interest or pleasure in doing things Not at all   Feeling down, depressed, irritable, or hopeless Not at all   Total Score PHQ 2 0   Trouble falling or staying asleep, or sleeping too much -   Feeling tired or having little energy -   Poor appetite, weight loss, or overeating -   Feeling bad about yourself - or that you are a failure or have let yourself or your family down -   Trouble concentrating on things such as school, work, reading, or watching TV -   Moving or speaking so slowly that other people could have noticed; or the opposite being so fidgety that others notice -   Thoughts of being better off dead, or hurting yourself in some way -   PHQ 9 Score -   How difficult have these problems made it for you to do your work, take care of your home and get along with others -       Learning Assessment:  Learning Assessment 5/24/2021   PRIMARY LEARNER Patient   HIGHEST LEVEL OF EDUCATION - PRIMARY LEARNER  2 YEARS OF COLLEGE   BARRIERS PRIMARY LEARNER NONE   CO-LEARNER CAREGIVER No   PRIMARY LANGUAGE ENGLISH    NEED -   LEARNER PREFERENCE PRIMARY DEMONSTRATION     LISTENING     READING     OTHER (COMMENT)   ANSWERED BY Giles Schofield   RELATIONSHIP SELF       Abuse Screening:  Abuse Screening Questionnaire 5/24/2021   Do you ever feel afraid of your partner? N   Are you in a relationship with someone who physically or mentally threatens you? N   Is it safe for you to go home? Y       Fall Risk  No flowsheet data found. Coordination of Care:  1. Have you been to the ER, urgent care clinic since your last visit? Hospitalized since your last visit? no    2.  Have you seen or consulted any other health care providers outside of the [address] [place] [address] since your last visit? Include any pap smears or colon screening.  No

## 2021-11-23 ENCOUNTER — TELEPHONE (OUTPATIENT)
Dept: NEUROLOGY | Age: 56
End: 2021-11-23

## 2021-11-23 NOTE — TELEPHONE ENCOUNTER
Pt states she scheduled an EMG appt with Dr. Tiburcio Rodríguez on 12/8 for a sooner appt since Dr. Paris Aquino soonest availability is February 2022.

## 2021-11-30 DIAGNOSIS — I50.33 ACUTE ON CHRONIC DIASTOLIC CONGESTIVE HEART FAILURE (HCC): ICD-10-CM

## 2021-11-30 RX ORDER — SPIRONOLACTONE 25 MG/1
TABLET ORAL
Qty: 30 TABLET | Refills: 0 | Status: SHIPPED | OUTPATIENT
Start: 2021-11-30 | End: 2022-01-13

## 2021-12-03 DIAGNOSIS — Z86.39 HISTORY OF HYPOKALEMIA: ICD-10-CM

## 2021-12-03 RX ORDER — POTASSIUM CHLORIDE 20 MEQ/1
TABLET, EXTENDED RELEASE ORAL
Qty: 30 TABLET | Refills: 0 | Status: SHIPPED | OUTPATIENT
Start: 2021-12-03 | End: 2022-01-13

## 2021-12-13 ENCOUNTER — TELEPHONE (OUTPATIENT)
Dept: CARDIOLOGY CLINIC | Age: 56
End: 2021-12-13

## 2021-12-13 DIAGNOSIS — I50.33 ACUTE ON CHRONIC DIASTOLIC CONGESTIVE HEART FAILURE (HCC): ICD-10-CM

## 2021-12-13 DIAGNOSIS — I20.8 ATYPICAL ANGINA (HCC): ICD-10-CM

## 2021-12-13 DIAGNOSIS — I25.118 CORONARY ARTERY DISEASE INVOLVING NATIVE CORONARY ARTERY OF NATIVE HEART WITH OTHER FORM OF ANGINA PECTORIS (HCC): Primary | ICD-10-CM

## 2021-12-13 RX ORDER — ATORVASTATIN CALCIUM 20 MG/1
20 TABLET, FILM COATED ORAL DAILY
Qty: 30 TABLET | Refills: 5 | Status: SHIPPED | OUTPATIENT
Start: 2021-12-13 | End: 2022-07-18 | Stop reason: SDUPTHER

## 2021-12-13 NOTE — TELEPHONE ENCOUNTER
Get a lipid profile and LFT and then start Lipitor 20 mg a day  Get lipids and LFTs in 6 weeks.   I ordered  Tell her to send heart rate and blood pressure chart twice a day for 4 to 5 days and then we can adjust more medications as needed

## 2021-12-13 NOTE — TELEPHONE ENCOUNTER
Called and spoke with patient and she is aware of the lung nodules that is in the works now for biopsy and all. As far as her small blockages that you want to do with medication and diet is there any medication that she needs to start now. Patient would like to know . Please Advise.

## 2021-12-13 NOTE — TELEPHONE ENCOUNTER
Patient called wanting her result from her CTA that was done at [facility]. I have pulled up the report and scanned into her chart for your review. Please Advise.

## 2021-12-13 NOTE — TELEPHONE ENCOUNTER
Please tell her that she does not have any critical coronary artery disease. There are mild blockages and medications along with weight loss is recommended. Lung nodules are described which are small. Ask her if she has seen any lung doctor and if not, we will need to send her to a lung specialist to follow-up on that.   Please let me know

## 2021-12-14 NOTE — TELEPHONE ENCOUNTER
Spoke to patient per Dr. Hipolito Cid with instructions to have labs done then start Lipitor 20 mg and repeat labs in 6 weeks. Chart BP/HR twice daily and bring in office 4 to 5 days to adjust medications as needed. She voices understanding and acceptance of this advice and will call back if any further questions or concerns.

## 2021-12-30 LAB
A-G RATIO,AGRAT: 1.3 RATIO (ref 1.1–2.6)
ALBUMIN SERPL-MCNC: 3.9 G/DL (ref 3.5–5)
ALP SERPL-CCNC: 120 U/L (ref 25–115)
ALT SERPL-CCNC: 12 U/L (ref 5–40)
AST SERPL W P-5'-P-CCNC: 14 U/L (ref 10–37)
BILIRUB SERPL-MCNC: 0.5 MG/DL (ref 0.2–1.2)
BILIRUBIN, DIRECT,CBIL: <0.2 MG/DL (ref 0–0.3)
CHOLEST SERPL-MCNC: 194 MG/DL (ref 110–200)
GLOBULIN,GLOB: 3.1 G/DL (ref 2–4)
HDLC SERPL-MCNC: 37 MG/DL
HDLC SERPL-MCNC: 5.2 MG/DL (ref 0–5)
LDL/HDL RATIO,LDHD: 3
LDLC SERPL CALC-MCNC: 113 MG/DL (ref 50–99)
NON-HDL CHOLESTEROL, 011976: 157 MG/DL
PROT SERPL-MCNC: 7 G/DL (ref 6.4–8.3)
TRIGL SERPL-MCNC: 219 MG/DL (ref 40–149)
VLDLC SERPL CALC-MCNC: 44 MG/DL (ref 8–30)

## 2022-01-03 NOTE — TELEPHONE ENCOUNTER
Please contact patient and do the following asap    Change Lipitor to 40 mg/day  Get fasting Lipid profile and LFTs in 6 wks. Please reinforce diet and exercise.

## 2022-01-04 ENCOUNTER — TELEPHONE (OUTPATIENT)
Dept: CARDIOLOGY CLINIC | Age: 57
End: 2022-01-04

## 2022-01-04 DIAGNOSIS — I50.33 ACUTE ON CHRONIC DIASTOLIC CONGESTIVE HEART FAILURE (HCC): ICD-10-CM

## 2022-01-04 DIAGNOSIS — I25.118 CORONARY ARTERY DISEASE INVOLVING NATIVE CORONARY ARTERY OF NATIVE HEART WITH OTHER FORM OF ANGINA PECTORIS (HCC): Primary | ICD-10-CM

## 2022-01-04 NOTE — TELEPHONE ENCOUNTER
----- Message from Vanessa Staton MD sent at 1/3/2022  8:44 AM EST -----  Please contact patient and do the following asap    Change Lipitor to 40 mg/day  Get fasting Lipid profile and LFTs in 6 wks. Please reinforce diet and exercise.

## 2022-01-04 NOTE — TELEPHONE ENCOUNTER
Spoke to patient per Dr. Zafar Bull regarding lab/test, lab reviewed change Lipitor to 40 mg/day. Get fasting Lipid /LFt in 6 wks. Please reinforce diet and exercise.  She voices understanding and acceptance of this advice and will call back if any further questions or concerns.'

## 2022-01-07 ENCOUNTER — OFFICE VISIT (OUTPATIENT)
Dept: ORTHOPEDIC SURGERY | Age: 57
End: 2022-01-07
Payer: COMMERCIAL

## 2022-01-07 VITALS
BODY MASS INDEX: 43.47 KG/M2 | TEMPERATURE: 98.6 F | HEIGHT: 67 IN | DIASTOLIC BLOOD PRESSURE: 90 MMHG | SYSTOLIC BLOOD PRESSURE: 149 MMHG | WEIGHT: 277 LBS | HEART RATE: 69 BPM | OXYGEN SATURATION: 99 %

## 2022-01-07 DIAGNOSIS — R20.0 NUMBNESS AND TINGLING OF BOTH LOWER EXTREMITIES: ICD-10-CM

## 2022-01-07 DIAGNOSIS — R20.2 NUMBNESS AND TINGLING OF BOTH LOWER EXTREMITIES: ICD-10-CM

## 2022-01-07 DIAGNOSIS — R20.2 NUMBNESS AND TINGLING OF BOTH LOWER EXTREMITIES: Primary | ICD-10-CM

## 2022-01-07 DIAGNOSIS — R94.131 ABNORMAL EMG: ICD-10-CM

## 2022-01-07 DIAGNOSIS — R20.0 NUMBNESS AND TINGLING OF BOTH LOWER EXTREMITIES: Primary | ICD-10-CM

## 2022-01-07 PROCEDURE — 95886 MUSC TEST DONE W/N TEST COMP: CPT | Performed by: PHYSICAL MEDICINE & REHABILITATION

## 2022-01-07 PROCEDURE — 95910 NRV CNDJ TEST 7-8 STUDIES: CPT | Performed by: PHYSICAL MEDICINE & REHABILITATION

## 2022-01-07 NOTE — LETTER
1/7/2022    Patient: Emily Rodirguez   YOB: 1965   Date of Visit: 1/7/2022     Jordan Powell PA-C  [address]  [address] [place] [address] In [facility] [address] [address]  [address] [place] [address], MD  [address] [facility]  Via In [place]    Dear Izora Riedel, ASIF Ashley MD,      Thank you for referring Ms. Bhavin Rodriguez to [facility] [facility] for evaluation. My notes for this consultation are attached. If you have questions, please do not hesitate to call me. I look forward to following your patient along with you.       Sincerely,    Crystal Tate MD

## 2022-01-07 NOTE — PROGRESS NOTES
Cassandra Tijerina presents today for   Chief Complaint   Patient presents with    Leg Pain     emg ble       Is someone accompanying this pt? no    Is the patient using any DME equipment during OV? no    Depression Screening:  3 most recent PHQ Screens 10/25/2021   Little interest or pleasure in doing things Not at all   Feeling down, depressed, irritable, or hopeless Not at all   Total Score PHQ 2 0   Trouble falling or staying asleep, or sleeping too much -   Feeling tired or having little energy -   Poor appetite, weight loss, or overeating -   Feeling bad about yourself - or that you are a failure or have let yourself or your family down -   Trouble concentrating on things such as school, work, reading, or watching TV -   Moving or speaking so slowly that other people could have noticed; or the opposite being so fidgety that others notice -   Thoughts of being better off dead, or hurting yourself in some way -   PHQ 9 Score -   How difficult have these problems made it for you to do your work, take care of your home and get along with others -       Learning Assessment:  Learning Assessment 5/24/2021   PRIMARY LEARNER Patient   HIGHEST LEVEL OF EDUCATION - PRIMARY LEARNER  2 YEARS OF COLLEGE   BARRIERS PRIMARY LEARNER NONE   CO-LEARNER CAREGIVER No   PRIMARY LANGUAGE ENGLISH    NEED -   LEARNER PREFERENCE PRIMARY DEMONSTRATION     LISTENING     READING     OTHER (COMMENT)   ANSWERED BY Antonio Cespedes   RELATIONSHIP SELF       Abuse Screening:  Abuse Screening Questionnaire 5/24/2021   Do you ever feel afraid of your partner? N   Are you in a relationship with someone who physically or mentally threatens you? N   Is it safe for you to go home? Y       Fall Risk  No flowsheet data found. OPIOID RISK TOOL  No flowsheet data found. Coordination of Care:  1. Have you been to the ER, urgent care clinic since your last visit? no  Hospitalized since your last visit? no    2.  Have you seen or consulted any other health care providers outside of the [address] [place] [address] since your last visit? Yes had surgery in nov 2021  Include any pap smears or colon screening.  no

## 2022-01-07 NOTE — PROGRESS NOTES
[address]  [address]  [address]  Phone: [phone]  Fax: [phone]        Vikki Freitas  : 1965  PCP: Quintin Brandon PA-C  2022    ELECTROMYOGRAPHY AND NERVE CONDUCTION STUDIES    Dorlene Hatchet Meter was referred by Tony Tabares NP for electrodiagnostic evaluation of BLE paraesthesia. NCV & EMG Findings:  Evaluation of the left tibial motor nerve showed reduced amplitude (1.0 mV). The right tibial motor nerve showed reduced amplitude (2.4 mV) and decreased conduction velocity (Knee-Ankle, 37 m/s). The left Sup Fibular sensory and the right Sup Fibular sensory nerves showed no response (14 cm). The left sural sensory and the right sural sensory nerves showed no response (Calf). All remaining nerves (as indicated in the following tables) were within normal limits. Left vs. Right side comparison data for the tibial motor nerve indicates abnormal L-R amplitude difference (58.3 %). All remaining left vs. right side differences were within normal limits. Needle evaluation of the right vastus medialis muscle showed increased polyphasic potentials. The left extensor hallucis longus and the left rectus femoris muscles showed slightly increased polyphasic potentials. The right posterior tibialis muscle showed moderately increased polyphasic potentials. The left upper lumbosacral paraspinal muscle showed increased insertional activity and moderately increased spontaneous activity. All remaining muscles (as indicated in the following table) showed no evidence of electrical instability. INTERPRETATION    This is an abnormal electrodiagnostic examination. These findings may be consistent with:   1. Length dependent sensorimotor peripheral polyneuropathy - this is based on abnormalities throughout her NCS with signs of chronicity in the distal muscles   2.  Mid to upper lumbar radiculopathy(L2,3,4) on the left - this is based on findings of chronicity in the femoral nerve distribution and signs of muscle membrane instability in the left lumbar paraspinal muscles. CLINICAL INTERPRETATION    Her electrodiagnostic findings of peripheral neuropathy and lumbar radiculopathy may be consistent with some of her leg and back symptoms. HISTORY OF PRESENT ILLNESS  Jeni Alvarenga is a 55 y.o. female. Pt presents today for BLE EMG evaluation of BLE paraesthesia, mostly from the knees down, L>R. Her symptoms occasionally radiate up to the thighs. She reports being diagnosed with neuropathy about 10-15 years ago. She denies h/o DM. She has had her vascular issues taken care of. She reports h/o lyme disease. PAST MEDICAL HISTORY   Past Medical History:   Diagnosis Date    Anemia     Angio-edema     Asthma     Depression     Diverticulosis     Enlargement, spleen     Esophagospasm     Essential hypertension     Fatty liver     GERD (gastroesophageal reflux disease)     Histoplasmosis     HPV (human papilloma virus) anogenital infection     Lyme disease     Neuropathy     PCOS (polycystic ovarian syndrome)     PTSD (post-traumatic stress disorder)     Tachycardia     Thyroid disease     Tricuspid insufficiency        Past Surgical History:   Procedure Laterality Date    HX CHOLECYSTECTOMY      HX HEART CATHETERIZATION  2010 approx    normal per patient    HX HEMORRHOIDECTOMY      HX OTHER SURGICAL      Vocal cord polyps   . MEDICATIONS      Current Outpatient Medications   Medication Sig Dispense Refill    atorvastatin (LIPITOR) 20 mg tablet Take 1 Tablet by mouth daily. (Patient taking differently: Take 40 mg by mouth daily.  Increase per Dr. Randell Correia) 30 Tablet 5    potassium chloride (K-DUR, KLOR-CON M20) 20 mEq tablet Take 1 tablet by mouth once daily 30 Tablet 0    spironolactone (ALDACTONE) 25 mg tablet Take 1 tablet by mouth once daily 30 Tablet 0    DULoxetine (CYMBALTA) 30 mg capsule Take 1 Capsule by mouth daily. Indications: neuropathic pain 90 Capsule 1    cyanocobalamin 1,000 mcg tablet Take 1 Tablet by mouth daily. 30 Tablet 2    triamcinolone acetonide (KENALOG) 0.1 % topical cream Apply  to affected area two (2) times a day. use thin layer 15 g 2    lansoprazole (PREVACID) 30 mg capsule Take 1 Capsule by mouth Daily (before breakfast). 30 Capsule 2    Euthyrox 100 mcg tablet TAKE 1 TABLET BY MOUTH ONCE DAILY BEFORE BREAKFAST 30 Tablet 2    ergocalciferol (ERGOCALCIFEROL) 1,250 mcg (50,000 unit) capsule Take 1 capsule by mouth once a week 12 Capsule 0    metoprolol tartrate (LOPRESSOR) 25 mg tablet Take 1 Tablet by mouth two (2) times a day. 60 Tablet 3    amoxicillin 500 mg tab Take 500 mg by mouth every eight (8) hours. (Patient not taking: Reported on 11/22/2021)      clotrimazole (LOTRIMIN) 1 % topical cream Apply  to affected area two (2) times a day. 24 g 0    aspirin 81 mg chewable tablet Now, then every 24 hours      lisinopriL (PRINIVIL, ZESTRIL) 2.5 mg tablet Take 1 tablet by mouth once daily 90 Tablet 1    albuterol (PROVENTIL HFA, VENTOLIN HFA, PROAIR HFA) 90 mcg/actuation inhaler Take 2 Puffs by inhalation every four (4) hours as needed for Wheezing. 1 Inhaler 0    magnesium oxide (MAG-OX) 400 mg tablet Take 1 Tablet by mouth daily. (Patient taking differently: Take 400 mg by mouth two (2) times a day.) 30 Tablet 1    furosemide (Lasix) 40 mg tablet Take 1 Tab by mouth daily. 90 Tab 1    EPINEPHrine HCl, PF, (ADRENALIN) 1 mg/mL (1 mL) injection once.  traMADoL (ULTRAM) 50 mg tablet Take 50 mg by mouth as needed for Pain.  cyclobenzaprine (FLEXERIL) 10 mg tablet Take  by mouth as needed for Muscle Spasm(s).  cetirizine (ZyrTEC) 10 mg tablet Take 10 mg by mouth two (2) times a day.           ALLERGIES    Allergies   Allergen Reactions    Latex, Natural Rubber Itching    Latex Itching    Ansaid [Flurbiprofen] Anaphylaxis    Iron Dextran Anaphylaxis and Itching    Levofloxacin Myalgia     Tendon rupture  Tendon rupture    Adhesive Rash    Codeine Itching and Rash    Keflex [Cephalexin] Hives and Itching    Penicillins Other (comments)          SOCIAL HISTORY    Social History     Socioeconomic History    Marital status: SINGLE   Tobacco Use    Smoking status: Current Every Day Smoker     Packs/day: 0.50     Years: 15.00     Pack years: 7.50     Start date: 1985    Smokeless tobacco: Never Used    Tobacco comment: less than 10 depending on the day   Vaping Use    Vaping Use: Former   Substance and Sexual Activity    Alcohol use: Not Currently    Drug use: Not Currently    Sexual activity: Not Currently     Partners: Male       FAMILY HISTORY    Family History   Problem Relation Age of Onset    Cancer Mother         stomach    Seizures Mother     Cancer Sister         double mastectomy    Cancer Sister         lung    Heart Surgery Maternal Grandmother         CABG    Stroke Neg Hx          PHYSICAL EXAMINATION  Visit Vitals  BP (!) 149/90 (BP 1 Location: Right lower arm, BP Patient Position: Sitting, BP Cuff Size: Adult)   Pulse 69   Temp 98.6 °F (37 °C) (Temporal)   Ht 5' 7\" (1.702 m)   Wt 277 lb (125.6 kg)   LMP  (LMP Unknown)   SpO2 99%   BMI 43.38 kg/m²       Pain Assessment  1/7/2022   Location of Pain Leg   Location Modifiers Left;Right   Severity of Pain 6   Quality of Pain Sharp; Aching   Duration of Pain Persistent   Frequency of Pain Constant   Aggravating Factors Other (Comment)   Aggravating Factors Comment everything   Relieving Factors -   Relieving Factors Comment -   Result of Injury No           Constitutional:  Well developed, well nourished, in no acute distress. Psychiatric: Affect and mood are appropriate. Integumentary: No rashes or abrasions noted on exposed areas. SPINE/MUSCULOSKELETAL EXAM    On brief examination: None.       NCV & EMG Findings:  Nerve Conduction Studies  Anti Sensory Summary Table     Stim Site NR Peak (ms) Norm Peak (ms) O-P Amp (µV) Norm O-P Amp Site1 Site2 Delta-P (ms) Dist (cm) Deshaun (m/s) Norm Deshaun (m/s)   Left Sup Fibular Anti Sensory (Ant Lat Mall)   14 cm NR  <4.4  >5.0 14 cm Ant Lat Mall  14.0  >32   Right Sup Fibular Anti Sensory (Ant Lat Mall)   14 cm NR  <4.4  >5.0 14 cm Ant Lat Mall  14.0  >32   Left Sural Anti Sensory (Lat Mall)   Calf NR  <4.5  >4.0 Calf Lat Mall  14.0  >35   Right Sural Anti Sensory (Lat Mall)   Calf NR  <4.5  >4.0 Calf Lat Mall  14.0  >35     Motor Summary Table     Stim Site NR Onset (ms) Norm Onset (ms) O-P Amp (mV) Norm O-P Amp Site1 Site2 Delta-0 (ms) Dist (cm) Deshaun (m/s) Norm Deshaun (m/s)   Left Fibular Motor (Ext Dig Brev)   Ankle    4.8 <6.5 2.2 >1.3 B Fib Ankle 5.7 29.5 52 >38   B Fib    10.5  2.4  Poplt B Fib 1.4 7.0 50 >40   Poplt    11.9  2.0          Right Fibular Motor (Ext Dig Brev)   Ankle    5.3 <6.5 2.8 >1.3 B Fib Ankle 6.1 28.0 46 >38   B Fib    11.4  2.6  Poplt B Fib 1.3 7.0 54 >40   Poplt    12.7  2.5          Left Tibial Motor (Abd Rosas Brev)   Ankle    4.6 <6.1 1.0 >4.4 Knee Ankle 7.5 30.0 40 >39   Knee    12.1  0.8          Right Tibial Motor (Abd Rosas Brev)   Ankle    4.5 <6.1 2.4 >4.4 Knee Ankle 8.8 32.5 37 >39   Knee    13.3  1.6            EMG     Side Muscle Nerve Root Ins Act Fibs Psw Amp Dur Poly Recrt Int Revonda Gale Comment   Right TensorFascLat SupGluteal L4-5, S1 Nml Nml Nml Nml Nml 0 Nml Nml    Right VastusMed Femoral L2-4 Nml Nml Nml Nml Nml 3+ Nml Nml    Right AntTibialis Dp Br Fibular L4-5 Nml Nml Nml Nml Nml 0 Nml Nml    Right Gastroc Tibial S1-2 Nml Nml Nml Nml Nml 0 Nml Nml    Left TensorFascLat SupGluteal L4-5, S1 Nml Nml Nml Nml Nml 0 Nml Nml    Left VastusMed Femoral L2-4 Nml Nml Nml Nml Nml 0 Nml Nml    Left AntTibialis Dp Br Fibular L4-5 Nml Nml Nml Nml Nml 0 Nml Nml    Left Gastroc Tibial S1-2 Nml Nml Nml Nml Nml 0 Nml Nml    Left ExtHallLong Dp Br Fibular L5, S1 Nml Nml Nml Nml Nml 1+ Nml Nml    Left PostTibialis Tibial L5, S1 Nml Nml Nml Nml Nml 0 Nml Nml    Right PostTibialis Tibial L5, S1 Nml Nml Nml Nml Nml 2+ Nml Nml    Left RectFemoris Femoral L2-4 Nml Nml Nml Nml Nml 1+ Nml Nml    Left AdductorLong Obturator L2-4 Nml Nml Nml Nml Nml 0 Nml Nml    Right Lumbo Parasp Up Rami L1-2 Nml Nml Nml         Right Lumbo Parasp Mid Rami L3-4 Nml Nml Nml         Right Lumbo Parasp Low Rami L5-S1 Nml Nml Nml         Left Lumbo Parasp Up Rami L1-2 Incr 1+ 2+         Left Lumbo Parasp Mid Rami L3-4 Nml Nml Nml         Left Lumbo Parasp Low Rami L5-S1 Nml Nml Nml             Nerve Conduction Studies  Anti Sensory Left/Right Comparison     Stim Site L Lat (ms) R Lat (ms) L-R Lat (ms) L Amp (µV) R Amp (µV) L-R Amp (%) Site1 Site2 L Deshaun (m/s) R Deshaun (m/s) L-R Deshaun (m/s)   Sup Fibular Anti Sensory (Ant Lat Mall)   14 cm       14 cm Ant Lat Mall      Sural Anti Sensory (Lat Mall)   Calf       Calf Lat Mall        Motor Left/Right Comparison     Stim Site L Lat (ms) R Lat (ms) L-R Lat (ms) L Amp (mV) R Amp (mV) L-R Amp (%) Site1 Site2 L Deshaun (m/s) R Deshaun (m/s) L-R Deshaun (m/s)   Fibular Motor (Ext Dig Brev)   Ankle 4.8 5.3 0.5 2.2 2.8 21.4 B Fib Ankle 52 46 6   B Fib 10.5 11.4 0.9 2.4 2.6 7.7 Poplt B Fib 50 54 4   Poplt 11.9 12.7 0.8 2.0 2.5 20.0        Tibial Motor (Abd Rosas Brev)   Ankle 4.6 4.5 0.1 1.0 2.4 58.3 Knee Ankle 40 37 3   Knee 12.1 13.3 1.2 0.8 1.6 50.0              Waveforms:                            [facility]  OFFICE PROCEDURE PROGRESS NOTE        Chart reviewed for the following:   Yumi MARINO, have reviewed the History, Physical and updated the Allergic reactions for Nikhil Dakota Meter     TIME OUT performed immediately prior to start of procedure:   Yumi MARINO, have performed the following reviews on Nikhil Dakota Meter prior to the start of the procedure:            * Patient was identified by name and date of birth   * Agreement on procedure being performed was verified  * Risks and Benefits explained to the patient  * Procedure site verified and marked as necessary  * Patient was positioned for comfort  * Consent was signed and verified     Time: 3:45 PM    Date of procedure: 1/7/2022    Procedure performed by:  Tamanna Saha MD    Provider accompanied by: Lisa. Patient accompanied by: Self.     How tolerated by patient: tolerated the procedure well with no complications    Post Procedural Pain Scale: 0 - No Hurt    Comments: none    Written by Marylu Zepeda, [address] as dictated by Sendy Benitez MD

## 2022-01-13 RX ORDER — ERGOCALCIFEROL 1.25 MG/1
CAPSULE ORAL
Qty: 12 CAPSULE | Refills: 0 | OUTPATIENT
Start: 2022-01-13

## 2022-01-14 ENCOUNTER — HOSPITAL ENCOUNTER (OUTPATIENT)
Dept: LAB | Age: 57
Discharge: HOME | End: 2022-01-14
Payer: COMMERCIAL

## 2022-01-14 DIAGNOSIS — Z51.81 MEDICATION MONITORING ENCOUNTER: ICD-10-CM

## 2022-01-14 LAB
ALBUMIN SERPL-MCNC: 3.7 G/DL (ref 3.4–5)
ALBUMIN/GLOB SERPL: 1 {RATIO} (ref 0.8–1.7)
ALP SERPL-CCNC: 130 U/L (ref 45–117)
ALT SERPL-CCNC: 19 U/L (ref 13–56)
ANION GAP SERPL CALC-SCNC: 4 MMOL/L (ref 3–18)
AST SERPL-CCNC: 14 U/L (ref 10–38)
BILIRUB SERPL-MCNC: 0.5 MG/DL (ref 0.2–1)
BUN SERPL-MCNC: 12 MG/DL (ref 7–18)
BUN/CREAT SERPL: 14 (ref 12–20)
CALCIUM SERPL-MCNC: 10.4 MG/DL (ref 8.5–10.1)
CHLORIDE SERPL-SCNC: 102 MMOL/L (ref 100–111)
CO2 SERPL-SCNC: 29 MMOL/L (ref 21–32)
CREAT SERPL-MCNC: 0.85 MG/DL (ref 0.6–1.3)
GLOBULIN SER CALC-MCNC: 3.7 G/DL (ref 2–4)
GLUCOSE SERPL-MCNC: 113 MG/DL (ref 74–99)
POTASSIUM SERPL-SCNC: 4.2 MMOL/L (ref 3.5–5.5)
PROT SERPL-MCNC: 7.4 G/DL (ref 6.4–8.2)
SODIUM SERPL-SCNC: 135 MMOL/L (ref 136–145)

## 2022-01-14 PROCEDURE — 36415 COLL VENOUS BLD VENIPUNCTURE: CPT

## 2022-01-14 PROCEDURE — 80053 COMPREHEN METABOLIC PANEL: CPT

## 2022-01-14 NOTE — TELEPHONE ENCOUNTER
Spoke with Bell Hargrove aware all medications refill we be addressed at her appointment on January 27,2022 with Leobardo Fofana PA-C

## 2022-01-17 ENCOUNTER — VIRTUAL VISIT (OUTPATIENT)
Dept: NEUROLOGY | Age: 57
End: 2022-01-17
Payer: COMMERCIAL

## 2022-01-17 DIAGNOSIS — R20.0 NUMBNESS AND TINGLING OF BOTH LEGS: ICD-10-CM

## 2022-01-17 DIAGNOSIS — R20.2 NUMBNESS AND TINGLING OF BOTH LEGS: ICD-10-CM

## 2022-01-17 DIAGNOSIS — Z72.0 TOBACCO USE: ICD-10-CM

## 2022-01-17 DIAGNOSIS — G62.9 NEUROPATHY: Primary | ICD-10-CM

## 2022-01-17 PROCEDURE — 99214 OFFICE O/P EST MOD 30 MIN: CPT | Performed by: NURSE PRACTITIONER

## 2022-01-17 RX ORDER — DULOXETIN HYDROCHLORIDE 60 MG/1
60 CAPSULE, DELAYED RELEASE ORAL DAILY
Status: CANCELLED | OUTPATIENT
Start: 2022-01-17

## 2022-01-17 NOTE — PROGRESS NOTES
Helen Verduzco presents today for No chief complaint on file. Is someone accompanying this pt? Virtual visit [phone]    Is the patient using any DME equipment during [address]? no    Depression Screening:  3 most recent PHQ Screens 10/25/2021   Little interest or pleasure in doing things Not at all   Feeling down, depressed, irritable, or hopeless Not at all   Total Score PHQ 2 0   Trouble falling or staying asleep, or sleeping too much -   Feeling tired or having little energy -   Poor appetite, weight loss, or overeating -   Feeling bad about yourself - or that you are a failure or have let yourself or your family down -   Trouble concentrating on things such as school, work, reading, or watching TV -   Moving or speaking so slowly that other people could have noticed; or the opposite being so fidgety that others notice -   Thoughts of being better off dead, or hurting yourself in some way -   PHQ 9 Score -   How difficult have these problems made it for you to do your work, take care of your home and get along with others -       Learning Assessment:  Learning Assessment 5/24/2021   PRIMARY LEARNER Patient   HIGHEST LEVEL OF EDUCATION - PRIMARY LEARNER  2 YEARS OF COLLEGE   BARRIERS PRIMARY LEARNER NONE   CO-LEARNER CAREGIVER No   PRIMARY LANGUAGE ENGLISH    NEED -   LEARNER PREFERENCE PRIMARY DEMONSTRATION     LISTENING     READING     OTHER (COMMENT)   ANSWERED BY Ursula Bob   RELATIONSHIP SELF       Abuse Screening:  Abuse Screening Questionnaire 5/24/2021   Do you ever feel afraid of your partner? N   Are you in a relationship with someone who physically or mentally threatens you? N   Is it safe for you to go home? Y       Fall Risk  No flowsheet data found. Coordination of Care:  1. Have you been to the ER, urgent care clinic since your last visit? Hospitalized since your last visit? no    2.  Have you seen or consulted any other health care providers outside of the [facility] since your last visit? Include any pap smears or colon screening.  no

## 2022-01-17 NOTE — PROGRESS NOTES
Eris Osei is a 55 y.o. female who was seen by synchronous (real-time) audio-video technology on 1/17/2022 for Numbness        Assessment & Plan:   Diagnoses and all orders for this visit:    1. Neuropathy    2. Tobacco use    3. Numbness and tingling of both legs        Patient presents for follow-up results. We discussed diagnostic testing results including EMG consistent with peripheral neuropathy and left-sided radiculopathy. She has follow-up with Ortho upcoming. She is followed by ANGEL Streeter. Patient would like me to send my note to her ortho provider. We discussed serum blood tests including unremarkable AST and ALT. Encourage smoking cessation. We will increase the Cymbalta to 60 mg. She will call the office back with her new pharmacy. In the meantime can start taking two 30 mg tablets. She will follow-up with me in 3 months or sooner as need be. All questions addressed and patient is agreeable with plan of care. CC: Ren Woods PA-C    I spent at least 30 minutes on this visit with this established patient. [id number]  Subjective: This is a 52-year-old female who presents for follow-up results. Last seen in November. In the interim had bilateral lower extremity EMG complete. Maintained on Cymbalta 30 mg daily. Some control in symptoms. She is open to increasing this. She has upcoming follow-up with orthopedic provider ANGEL Streeter. She tells me of having a recent eye exam.  She tells me her ophthalmologist mention multiple sclerosis. She denies history of numbness or weakness to a limb that lasted for over a week and then got better. Denies history of vision loss in an eye that lasted for over a week and that eventually got better. No other concerns at this time. Prior to Admission medications    Medication Sig Start Date End Date Taking?  Authorizing Provider   potassium chloride (K-DUR, KLOR-CON M20) 20 mEq tablet Take 1 tablet by mouth once daily 1/13/22  Yes Jaycee Vines Shan Jackson MD   spironolactone (ALDACTONE) 25 mg tablet Take 1 tablet by mouth once daily 1/13/22  Yes Faheem Beavers MD   lisinopriL (PRINIVIL, ZESTRIL) 2.5 mg tablet Take 1 tablet by mouth once daily 1/13/22  Yes Faheem Beavers MD   atorvastatin (LIPITOR) 20 mg tablet Take 1 Tablet by mouth daily. Patient taking differently: Take 40 mg by mouth daily. Increase per Dr. Javier Lacy 12/13/21  Yes Faheem Beavers MD   DULoxetine (CYMBALTA) 30 mg capsule Take 1 Capsule by mouth daily. Indications: neuropathic pain 11/22/21  Yes Yovanny Penn NP   cyanocobalamin 1,000 mcg tablet Take 1 Tablet by mouth daily. 11/11/21  Yes Hailey Pedraza PA-C   triamcinolone acetonide (KENALOG) 0.1 % topical cream Apply  to affected area two (2) times a day. use thin layer 11/11/21  Yes Hailey Pedraza PA-C   lansoprazole (PREVACID) 30 mg capsule Take 1 Capsule by mouth Daily (before breakfast). 11/11/21  Yes Hailey Pedraza PA-C   Euthyrox 100 mcg tablet TAKE 1 TABLET BY MOUTH ONCE DAILY BEFORE BREAKFAST 11/1/21  Yes Hailey Pedraza PA-C   ergocalciferol (ERGOCALCIFEROL) 1,250 mcg (50,000 unit) capsule Take 1 capsule by mouth once a week 11/1/21  Yes Hailey Pedraza PA-C   clotrimazole (LOTRIMIN) 1 % topical cream Apply  to affected area two (2) times a day. 10/13/21  Yes Iliana Pedraza PA-C   aspirin 81 mg chewable tablet Now, then every 24 hours   Yes Provider, Historical   albuterol (PROVENTIL HFA, VENTOLIN HFA, PROAIR HFA) 90 mcg/actuation inhaler Take 2 Puffs by inhalation every four (4) hours as needed for Wheezing. 5/24/21  Yes Hailey Pedraza PA-C   magnesium oxide (MAG-OX) 400 mg tablet Take 1 Tablet by mouth daily. Patient taking differently: Take 400 mg by mouth two (2) times a day. 5/24/21  Yes Hailey Pedraza PA-C   furosemide (Lasix) 40 mg tablet Take 1 Tab by mouth daily. 4/26/21  Yes Faheem Beavers MD   EPINEPHrine HCl, PF, (ADRENALIN) 1 mg/mL (1 mL) injection once.    Yes Provider, Historical traMADoL (ULTRAM) 50 mg tablet Take 50 mg by mouth as needed for Pain. Yes Provider, Historical   cetirizine (ZyrTEC) 10 mg tablet Take 10 mg by mouth two (2) times a day.    Yes Provider, Historical     Patient Active Problem List   Diagnosis Code    Acute on chronic diastolic congestive heart failure (HCC) I50.33    Neuropathy G62.9    Dyspnea R06.00    Severe obesity (BMI >= 40) ([facility]) E66.01    Community acquired pneumonia of left lung J18.9    Congestive heart failure (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) I20.8     Patient Active Problem List    Diagnosis Date Noted    Tobacco abuse counseling 10/25/2021    Atypical angina ([facility] 10/25/2021    Polycystic ovary syndrome 06/13/2021    Lyme disease 06/13/2021    Congest[facility] heart failure ([facility] 06/11/2021    Diverticulitis 06/11/2021    Gastroesophageal reflux disease 06/11/2021    Essential hypertension 06/11/2021    Asthma 06/11/2021    Acute on chronic diastolic congestive heart failure (HCC) 05/25/2021    Neuropathy 05/25/2021    Dyspnea 05/25/2021    Severe obesity (BMI >= 40) ([facility] 05/25/2021    Chorioretinal scar, left eye 02/18/2021    Chorioretinal inflammation, bilateral 02/18/2021    Vitamin D deficiency 10/27/2020    Post traumatic stress disorder 10/10/2019    Venous stasis 10/09/2019    Tobacco abuse 10/09/2019    Community acquired pneumonia of left lung 10/08/2019    Class 3 severe obesity due to excess calories with serious comorbidity and body mass index (BMI) of 50.0 to 59.9 in adult [facility]) 10/08/2019    Acquired hypothyroidism 10/08/2019    Mild intermittent asthma with acute exacerbation 10/08/2019     Current Outpatient Medications   Medication Sig Dispense Refill    potassium chloride (K-DUR, KLOR-CON M20) 20 mEq tablet Take 1 tablet by mouth once daily 30 Tablet 0    spironolactone (ALDACTONE) 25 mg tablet Take 1 tablet by mouth once daily 30 Tablet 0    lisinopriL (PRINIVIL, ZESTRIL) 2.5 mg tablet Take 1 tablet by mouth once daily 90 Tablet 0    atorvastatin (LIPITOR) 20 mg tablet Take 1 Tablet by mouth daily. (Patient taking differently: Take 40 mg by mouth daily. Increase per Dr. Nupur Araujo) 30 Tablet 5    DULoxetine (CYMBALTA) 30 mg capsule Take 1 Capsule by mouth daily. Indications: neuropathic pain 90 Capsule 1    cyanocobalamin 1,000 mcg tablet Take 1 Tablet by mouth daily. 30 Tablet 2    triamcinolone acetonide (KENALOG) 0.1 % topical cream Apply  to affected area two (2) times a day. use thin layer 15 g 2    lansoprazole (PREVACID) 30 mg capsule Take 1 Capsule by mouth Daily (before breakfast). 30 Capsule 2    Euthyrox 100 mcg tablet TAKE 1 TABLET BY MOUTH ONCE DAILY BEFORE BREAKFAST 30 Tablet 2    ergocalciferol (ERGOCALCIFEROL) 1,250 mcg (50,000 unit) capsule Take 1 capsule by mouth once a week 12 Capsule 0    clotrimazole (LOTRIMIN) 1 % topical cream Apply  to affected area two (2) times a day. 24 g 0    aspirin 81 mg chewable tablet Now, then every 24 hours      albuterol (PROVENTIL HFA, VENTOLIN HFA, PROAIR HFA) 90 mcg/actuation inhaler Take 2 Puffs by inhalation every four (4) hours as needed for Wheezing. 1 Inhaler 0    magnesium oxide (MAG-OX) 400 mg tablet Take 1 Tablet by mouth daily.  (Patient taking differently: Take 400 mg by mouth two (2) times a day.) 30 Tablet 1    furosemide (Lasix) 40 mg tablet Take 1 Tab by mouth daily. 90 Tab 1    EPINEPHrine HCl, PF, (ADRENALIN) 1 mg/mL (1 mL) injection once.  traMADoL (ULTRAM) 50 mg tablet Take 50 mg by mouth as needed for Pain.  cetirizine (ZyrTEC) 10 mg tablet Take 10 mg by mouth two (2) times a day.        Allergies   Allergen Reactions    Latex, Natural Rubber Itching    Latex Itching    Ansaid [Flurbiprofen] Anaphylaxis    Iron Dextran Anaphylaxis and Itching    Levofloxacin Myalgia     Tendon rupture  Tendon rupture    Adhesive Rash    Codeine Itching and Rash    Keflex [Cephalexin] Hives and Itching    Penicillins Other (comments)     Past Medical History:   Diagnosis Date    Anemia     Angio-edema     Asthma     Depression     Diverticulosis     Enlargement, spleen     Esophagospasm     Essential hypertension     Fatty liver     GERD (gastroesophageal reflux disease)     Histoplasmosis     HPV (human papilloma virus) anogenital infection     Lyme disease     Neuropathy     PCOS (polycystic ovarian syndrome)     PTSD (post-traumatic stress disorder)     Tachycardia     Thyroid disease     Tricuspid insufficiency      Past Surgical History:   Procedure Laterality Date    HX CHOLECYSTECTOMY      HX HEART CATHETERIZATION  2010 approx    normal per patient    HX HEMORRHOIDECTOMY      HX OTHER SURGICAL      Vocal cord polyps     Family History   Problem Relation Age of Onset    Cancer Mother         stomach    Seizures Mother     Cancer Sister         double mastectomy    Cancer Sister         lung    Heart Surgery Maternal Grandmother         CABG    Stroke Neg Hx      Social History     Tobacco Use    Smoking status: Current Every Day Smoker     Packs/day: 0.50     Years: 15.00     Pack years: 7.50     Start date: 1985    Smokeless tobacco: Never Used    Tobacco comment: less than 10 depending on the day   Substance Use Topics    Alcohol use: Not Currently       Review of Systems  GENERAL: Denies fever or fatigue  CARDIAC: No CP or SOB  PULMONARY: No cough of SOB  MUSCULOSKELETAL: No new joint pain  NEURO: SEE HPI      Objective:     Patient-Reported Vitals 1/17/2022   Patient-Reported Weight 265lb   Patient-Reported Systolic  666   Patient-Reported Diastolic 80      General: alert, cooperative, no distress   Mental  status: normal mood, behavior, speech, dress, motor activity, and thought processes, able to follow commands   HENT: NCAT   Neck: no visualized mass   Resp: no respiratory distress   Neuro: no gross deficits   Skin: no discoloration or lesions of concern on visible areas   Psychiatric: normal affect, consistent with stated mood, no evidence of hallucinations     Additional exam findings: This is a very pleasant 56-year-old female. She is alert and in no apparent distress. Full fund of knowledge. Speech is clear. No facial asymmetry. No signs of ataxia or incoordination. Gait deferred. Diagnostic test results    Excerpt from EMG report dated January 7, 2022 Complete by Dr. Lubna Last:    INTERPRETATION     This is an abnormal electrodiagnostic examination. These findings may be consistent with:   1. Length dependent sensorimotor peripheral polyneuropathy - this is based on abnormalities throughout her NCS with signs of chronicity in the distal muscles   2. Mid to upper lumbar radiculopathy(L2,3,4) on the left - this is based on findings of chronicity in the femoral nerve distribution and signs of muscle membrane instability in the left lumbar paraspinal muscles.      CLINICAL INTERPRETATION     Her electrodiagnostic findings of peripheral neuropathy and lumbar radiculopathy may be consistent with some of her leg and back symptoms.      We discussed the expected course, resolution and complications of the diagnosis(es) in detail. Medication risks, benefits, costs, interactions, and alternatives were discussed as indicated.   I advised her to contact the office if her condition worsens, changes or fails to improve as anticipated. She expressed understanding with the diagnosis(es) and plan. Arnol Rodriguez, was evaluated through a synchronous (real-time) audio-video encounter. The patient (or guardian if applicable) is aware that this is a billable service. Verbal consent to proceed has been obtained within the past 12 months. The visit was conducted pursuant to the emergency declaration under the [address] [place] [address] authority and the [employer] and [employer] General Act. Patient identification was verified, and a caregiver was present when appropriate. The patient was located in a state where the provider was credentialed to provide care.     Feroz Aguilar NP

## 2022-01-18 RX ORDER — DULOXETIN HYDROCHLORIDE 60 MG/1
60 CAPSULE, DELAYED RELEASE ORAL DAILY
Qty: 90 CAPSULE | Refills: 0 | Status: SHIPPED | OUTPATIENT
Start: 2022-01-18 | End: 2022-05-20 | Stop reason: SDUPTHER

## 2022-01-27 ENCOUNTER — OFFICE VISIT (OUTPATIENT)
Dept: FAMILY MEDICINE CLINIC | Age: 57
End: 2022-01-27
Payer: COMMERCIAL

## 2022-01-27 VITALS
HEIGHT: 67 IN | RESPIRATION RATE: 16 BRPM | DIASTOLIC BLOOD PRESSURE: 74 MMHG | OXYGEN SATURATION: 98 % | WEIGHT: 262 LBS | TEMPERATURE: 98.3 F | BODY MASS INDEX: 41.12 KG/M2 | SYSTOLIC BLOOD PRESSURE: 132 MMHG | HEART RATE: 76 BPM

## 2022-01-27 DIAGNOSIS — G62.9 NEUROPATHY: ICD-10-CM

## 2022-01-27 DIAGNOSIS — R21 SKIN RASH: ICD-10-CM

## 2022-01-27 DIAGNOSIS — E03.9 ACQUIRED HYPOTHYROIDISM: Primary | ICD-10-CM

## 2022-01-27 DIAGNOSIS — E53.8 VITAMIN B12 DEFICIENCY: ICD-10-CM

## 2022-01-27 DIAGNOSIS — E66.01 MORBID OBESITY WITH BMI OF 40.0-44.9, ADULT (HCC): ICD-10-CM

## 2022-01-27 DIAGNOSIS — I50.33 ACUTE ON CHRONIC DIASTOLIC CONGESTIVE HEART FAILURE (HCC): ICD-10-CM

## 2022-01-27 DIAGNOSIS — E55.9 VITAMIN D DEFICIENCY: ICD-10-CM

## 2022-01-27 DIAGNOSIS — M79.18 MUSCLE ACHE OF EXTREMITY: ICD-10-CM

## 2022-01-27 DIAGNOSIS — E83.52 HYPERCALCEMIA: ICD-10-CM

## 2022-01-27 DIAGNOSIS — F17.200 SMOKING: ICD-10-CM

## 2022-01-27 DIAGNOSIS — K21.9 GASTROESOPHAGEAL REFLUX DISEASE, UNSPECIFIED WHETHER ESOPHAGITIS PRESENT: ICD-10-CM

## 2022-01-27 PROCEDURE — 99214 OFFICE O/P EST MOD 30 MIN: CPT | Performed by: STUDENT IN AN ORGANIZED HEALTH CARE EDUCATION/TRAINING PROGRAM

## 2022-01-27 RX ORDER — LANOLIN ALCOHOL/MO/W.PET/CERES
1000 CREAM (GRAM) TOPICAL DAILY
Qty: 90 TABLET | Refills: 1 | Status: SHIPPED | OUTPATIENT
Start: 2022-01-27

## 2022-01-27 RX ORDER — LANSOPRAZOLE 30 MG/1
30 CAPSULE, DELAYED RELEASE ORAL
Qty: 90 CAPSULE | Refills: 1 | Status: SHIPPED | OUTPATIENT
Start: 2022-01-27 | End: 2022-10-25 | Stop reason: SDUPTHER

## 2022-01-27 RX ORDER — TRIAMCINOLONE ACETONIDE 1 MG/G
CREAM TOPICAL 2 TIMES DAILY
Qty: 45 G | Refills: 5 | Status: SHIPPED | OUTPATIENT
Start: 2022-01-27

## 2022-01-27 RX ORDER — ERGOCALCIFEROL 1.25 MG/1
50000 CAPSULE ORAL
Qty: 12 CAPSULE | Refills: 1 | Status: CANCELLED | OUTPATIENT
Start: 2022-01-27

## 2022-01-27 RX ORDER — LANOLIN ALCOHOL/MO/W.PET/CERES
400 CREAM (GRAM) TOPICAL 2 TIMES DAILY
Qty: 180 TABLET | Refills: 1 | Status: SHIPPED | OUTPATIENT
Start: 2022-01-27 | End: 2022-07-18 | Stop reason: SDUPTHER

## 2022-01-27 RX ORDER — LEVOTHYROXINE SODIUM 100 UG/1
100 TABLET ORAL
Qty: 90 TABLET | Refills: 1 | Status: SHIPPED | OUTPATIENT
Start: 2022-01-27 | End: 2022-09-15 | Stop reason: SDUPTHER

## 2022-01-27 NOTE — PROGRESS NOTES
Chief Complaint   Patient presents with    CHF    GERD    Insomnia    Other     neuropathy      1. \"Have you been to the ER, urgent care clinic since your last visit? Hospitalized since your last visit? \" No    2. \"Have you seen or consulted any other health care providers outside of the [address] [place] [address] since your last visit? \" No     3. For patients aged 39-70: Has the patient had a colonoscopy / FIT/ Cologuard? Yes - no Care Gap present      If the patient is female:    4. For patients aged 41-77: Has the patient had a mammogram within the past 2 years? Yes - no Care Gap present  See top three    5. For patients aged 21-65: Has the patient had a pap smear?  No    Health Maintenance Due   Topic Date Due    Cervical cancer screen  Never done    Colorectal Cancer Screening Combo  Never done    Shingrix Vaccine Age 50> (1 of 2) Never done    Flu Vaccine (1) 09/01/2021

## 2022-01-27 NOTE — PATIENT INSTRUCTIONS
Neuropathic Pain: Care Instructions  Overview     Neuropathic pain is caused by pressure on or damage to your nerves. It's often simply called nerve pain. Some people feel this type of pain all the time. For others, it comes and goes. Diabetes, shingles, or an injury can cause nerve pain. Many people say the pain feels sharp, burning, or stabbing. But some people feel it as a dull ache. In some cases, it makes your skin very sensitive. So touch, pressure, and other sensations that did not hurt before may now cause pain. It's important to know that this kind of pain is real and can affect your quality of life. It's also important to know that treatment can help. Treatment includes pain medicines, exercise, and physical therapy. Medicines can help reduce the number of pain signals that travel over the nerves. This can make the painful areas less sensitive. It can also help you sleep better and improve your mood. But medicines are only one part of successful treatment. Most people do best with more than one kind of treatment. Your doctor may recommend that you try cognitive-behavioral therapy and stress management. If you feel that your treatment is not working, talk to your doctor. And be sure to tell your doctor if you think you might be depressed or anxious. These are common problems that can also be treated. Follow-up care is a key part of your treatment and safety. Be sure to make and go to all appointments, and call your doctor if you are having problems. It's also a good idea to know your test results and keep a list of the medicines you take. How can you care for yourself at home? · Be safe with medicines. Read and follow all instructions on the label. ? If the doctor gave you a prescription medicine for pain, take it as prescribed. ? If you are not taking a prescription pain medicine, ask your doctor if you can take an over-the-counter medicine.   · Save hard tasks for days when you have less pain. Follow a hard task with an easy task. And remember to take breaks. · Relax, and reduce stress. You may want to try deep breathing or meditation. These can help. · Keep moving. Gentle, daily exercise can help reduce pain. Your doctor or physical therapist can tell you what type of exercise is best for you. This may include walking, swimming, and stationary biking. It may also include stretches and range-of-motion exercises. · Try heat, cold packs, and massage. · Get enough sleep. Constant pain can make you more tired. If the pain makes it hard to sleep, talk with your doctor. · Think positively. Your thoughts can affect your pain. Do fun things to distract yourself from the pain. See a movie, read a book, listen to music, or spend time with a friend. · Keep a pain diary. Try to write down how strong your pain is and what it feels like. Also try to notice and write down how your moods, thoughts, sleep, activities, and medicine affect your pain. These notes can help you and your doctor find the best ways to treat your pain. Reducing constipation caused by pain medicine  Pain medicines often cause constipation. To reduce constipation:  · Talk to your doctor about a laxative. If a laxative doesn't work, your doctor may suggest a prescription medicine. · Include fruits, vegetables, beans, and whole grains in your diet each day. These foods are high in fiber. · Get some exercise every day. Build up slowly to 30 to 60 minutes a day on 5 or more days of the week. · Take a fiber supplement, such as Citrucel or Metamucil, every day if needed. Read and follow all instructions on the label. · Schedule time each day for a bowel movement. Having a daily routine may help. Take your time and do not strain when having a bowel movement. When should you call for help? Call your doctor now or seek immediate medical care if:    · You feel sad, anxious, or hopeless for more than a few days.  This could mean you are depressed. Depression is common in people who have a lot of pain. But it can be treated.     · You have trouble with bowel movements, such as:  ? No bowel movement in 3 days. ? Blood in the anal area, in your stool, or on the toilet paper. ? Diarrhea for more than 24 hours. Watch closely for changes in your health, and be sure to contact your doctor if:    · Your pain is getting worse.     · You can't sleep because of pain.     · You are very worried or anxious about your pain.     · You have trouble taking your pain medicine.     · You have any concerns about your pain medicine or its side effects.     · You have vomiting or cramps for more than 2 hours. Where can you learn more? Go to http://www.gray.com/  Enter Q917 in the search box to learn more about \"Neuropathic Pain: Care Instructions. \"  Current as of: April 8, 2021               Content Version: 13.0  © 7318-1830 [employer]. Care instructions adapted under license by [employer] (which disclaims liability or warranty for this information). If you have questions about a medical condition or this instruction, always ask your healthcare professional. [place] [address] any warranty or liability for your use of this information. Learning About Vitamin D  Why is it important to get enough vitamin D? Your body needs vitamin D to absorb calcium. Calcium keeps your bones and muscles, including your heart, healthy and strong. If your muscles don't get enough calcium, they can cramp, hurt, or feel weak. You may have long-term (chronic) muscle aches and pains. If you don't get enough vitamin D throughout life, you have an increased chance of having thin and brittle bones (osteoporosis) in your later years. Children who don't get enough vitamin D may not grow as much as others their age. They also have a chance of getting a rare disease called rickets. It causes weak bones.   Vitamin D and calcium are added to many foods. And your body uses sunshine to make its own vitamin D. How much vitamin D do you need? The recommended daily allowance (RDA) for vitamin D is 600 IU (international units) every day for people ages 3 through 79. Adults 71 and older need 800 IU every day. Blood tests for vitamin D can check your vitamin D level. But there is no standard normal range used by all laboratories. You're likely getting enough vitamin D if your levels are in the range of 20 to 50 ng/mL. How can you get more vitamin D? Foods that contain vitamin D include:  · [place], tuna, and mackerel. These are some of the best foods to eat when you need to get more vitamin D.  · Cheese, egg yolks, and beef liver. These foods have vitamin D in small amounts. · Milk, soy drinks, orange juice, yogurt, margarine, and some kinds of cereal have vitamin D added to them. Some people don't make vitamin D as well as others. They may have to take extra care in getting enough vitamin D. Things that reduce how much vitamin D your body makes include:  · Dark skin, such as many  Americans have. · Age, especially if you are older than 72. · Digestive problems, such as Crohn's or celiac disease. · Liver and kidney disease. Some people who do not get enough vitamin D may need supplements. Are there any risks from taking vitamin D?  · Too much vitamin D:  ? Can damage your kidneys. ? Can cause nausea and vomiting, constipation, and weakness. ? Raises the amount of calcium in your blood. If this happens, you can get confused or have an irregular heart rhythm. · Vitamin D may interact with other medicines. Tell your doctor about all of the medicines you take, including over-the-counter drugs, herbs, and pills. Tell your doctor about all of your current medical problems. Where can you learn more?   Go to http://www.gray.com/  Enter O605 in the search box to learn more about \"Learning About Vitamin D.\"  Current as of: December 17, 2020               Content Version: 13.0  © 9473-6526 [employer]. Care instructions adapted under license by [employer] (which disclaims liability or warranty for this information). If you have questions about a medical condition or this instruction, always ask your healthcare professional. [address] any warranty or liability for your use of this information. Hypercalcemia: Care Instructions  Your Care Instructions     Hypercalcemia is too much calcium in the blood. You need calcium to have strong bones. It also helps your muscles, heart, and nerves work as they should. But too much is dangerous. Several problems can cause too much calcium in the blood. It can happen because of medicines or certain health problems. Some diseases can make your intestines take in too much calcium. And some can take calcium from your bones. A noncancerous tumor can grow in the glands that control calcium levels. And some cancers can cause high calcium levels. These high levels may make you lose fluids (become dehydrated). You may get confused and very tired. Some people also have nausea, vomiting, and constipation. Your doctor will treat you based on how serious the problem is and what is causing it. Since too much calcium can be dangerous, it is important to treat it. You may get fluids to stop dehydration. You also may get medicine to help your body get rid of calcium through your urine or put it back into your bones. If a tumor is the cause, you may need surgery. Follow-up care is a key part of your treatment and safety. Be sure to make and go to all appointments, and call your doctor if you are having problems. It's also a good idea to know your test results and keep a list of the medicines you take. How can you care for yourself at home? · Take your medicines exactly as prescribed.  Call your doctor if you think you are having a problem with your medicine. · Make sure your doctor knows about all the medicines (including over-the-counter or herbal products) you are taking. If a medicine is causing your high calcium levels, your doctor will have you stop taking it. · Drink plenty of fluids. If you have kidney, heart, or liver disease and have to limit fluids, talk with your doctor before you increase the amount of fluids you drink. · Get at least 30 minutes of exercise on most days of the week. Walking is a good choice. You also may want to do other activities, such as running, swimming, cycling, or playing tennis or team sports. Exercise helps the calcium go back into your bones. · Do not reduce how much calcium you eat. · Let your doctor know if you take vitamins or other supplements that have calcium or vitamin D. When should you call for help? Call your doctor now or seek immediate medical care if:    · You are confused or have trouble thinking clearly. Watch closely for changes in your health, and be sure to contact your doctor if:    · You are feeling so tired or weak that you cannot do your usual activities.     · You do not get better as expected. Where can you learn more? Go to http://www.gray.com/  Enter I288 in the search box to learn more about \"Hypercalcemia: Care Instructions. \"  Current as of: December 17, 2020               Content Version: 13.0  © 7636-5877 [employer]. Care instructions adapted under license by [employer] (which disclaims liability or warranty for this information). If you have questions about a medical condition or this instruction, always ask your healthcare professional. [address] any warranty or liability for your use of this information.

## 2022-01-27 NOTE — PROGRESS NOTES
Mario Rodriguez (: 1965) is a 55 y.o. female, established patient, here for evaluation of the following chief complaint(s):  CHF, GERD, Insomnia, and Other (neuropathy )       ASSESSMENT/PLAN:  Below is the assessment and plan developed based on review of pertinent history, physical exam, labs, studies, and medications. ICD-10-CM ICD-9-CM    1. Acquired hypothyroidism  E03.9 244.9 levothyroxine (Euthyrox) 100 mcg tablet      TSH 3RD GENERATION  Continue taking levothyroxine as prescribed. We will check TSH. 2. Gastroesophageal reflux disease, unspecified whether esophagitis present  K21.9 530.81 lansoprazole (PREVACID) 30 mg capsule  Continue with lifestyle modifications, take medication as prescribed. 3. Skin rash  R21 782.1 triamcinolone acetonide (KENALOG) 0.1 % topical cream  Appointment with the dermatologist scheduled for tomorrow. Kenalog topical cream refilled per patient request.   4. Acute on chronic diastolic congestive heart failure (HCC)  I50.33 428.33 magnesium oxide (MAG-OX) 400 mg tablet  CHF compensated. Follows up with Dr. Sander Miller. Echo and nuclear cardiac stress test completed. LOV  10/25/2021. Next office visit 2022.     428.0    5. Neuropathy  G62.9 355.9 magnesium oxide (MAG-OX) 400 mg tablet  Follows up with the neurologist, Huan Portillo NP. LOV 2021. Consulted by spine specialist, Dr. Edith Allen on 2022 for electrodiagnostic evaluation of BLE paraesthesia (abnormal test). Medication refilled per patient request   6. Hypercalcemia  E83.52 275.42 VITAMIN D, 25 HYDROXY      CALCIUM, IONIZED      PTH INTACT  Mild hypercalcemia noted in most recent CMP. We will check ionized calcium, PTH and serum vitamin D level. 7. Muscle ache of extremity  M79.18 729.1 CALCIUM, IONIZED      PTH INTACT   8. Vitamin D deficiency  E55.9 268.9 VITAMIN D, 25 HYDROXY   9. Vitamin B12 deficiency  E53.8 266.2 cyanocobalamin 1,000 mcg tablet      VITAMIN B12   10.  Morbid obesity with BMI of 40.0-44.9, adult ([facility]  E66.01 278.01 HEMOGLOBIN A1C WITH EAG  Commended for losing weight. Advised to continue managing weight with diet and increased physical activity. We will check hemoglobin A1c to rule out prediabetes. Z68.41 V85.41    11. Smoking  F17.200 305.1 Personalized risks of tobacco use on current patient's health and benefits from quitting smoking discussed. Advice and assistance to quit smoking offered. Nicotine patches offered but patient declined. Patient's motivation level to quit smoking was low today, states she smokes 2-3 cigarettes per day. All chart history elements were reviewed by me at the time of the visit even though marked at time of note closure. Patient understands our medical plan. Patient has provided input and agrees with goals. Alternatives have been explained and offered. All questions answered. The patient is to call if condition worsens or fails to improve. Return in about 3 months (around 4/27/2022) for routine care or sooner based on lab results. SUBJECTIVE/OBJECTIVE:  HPI   1. Hypothyroidism   Problem longstanding. Well-controlled on levothyroxine 100 mg. Last TSH (8/3/2021): 3.58. Follows with ENT. Most recent ultrasound of thyroid gland completed in September 2021.  2.  Skin rash  Erythema in the skin fold underneath the breast and abdomen, chronic problem, comes and goes. Has used Kenalog 0.1% with some relief. Exhausted medication and seeking refill. Appointment with the dermatologist scheduled for tomorrow. 3. GERD  Problem longstanding. GERD was well controlled on omeprazole 40 mg/daily for several years. Substituted by lansoprazole due to insurance requirement. 4. Morbid obesity:  Estimated body mass index is 41.04 kg/m², weight of this encounter: 276 lb. Patient lost 19 additional pounds since October, 2021.   Total weight loss 56 pounds since May, 2021.  5. CHF:  Patient has chronic CHF which is diastolic and right heart failure mostly.  CHF is most likely secondary to morbid obesity. Follows up with Dr. Debra Pitts. LOV  10/25/2021. Echo and nuclear cardiac stress test completed. Next office visit 1/31/2022.  6. Dyspnea. Asthma. Smoking   Patient complains of dyspnea on exertion, cough, chest tightness, wheezing. Prolonged history of asthma. Several incidents of pneumonia. Tested negative for obstructive sleep apnea. Follows with [facility]. CT scan (11/01/2021): Several small pulmonary nodules, mild upper lobe predominant centrilobular emphysema, mild bronchial wall thickening as well as basilar areas of mosaic pulmonary attenuation. Bronchoscopy done by Dr. Ej Venegas [facility] on 12/15/2021. Patient is a current smoker. Attempts to quit, states she smokes 2-3 cigarettes per day.  Has smoked for 30 years. 7. Neuropathy. Insomnia:   Patient complaints of constant pain in her spine, knees and legs, calf cramps; weakness in arms and legs. Symptoms are chronic and stable, began 8 years ago. On multiple medications with relief. States magnesium helps her pain. Followed with the neurologist, Huan Portillo NP. LOV 11/22/2021. Cymbalta was prescribed for insomnia, works well. Consulted by spine specialist, Dr. Anny Flores on 1/7/2022 for electrodiagnostic evaluation of BLE paraesthesia (abnormal test). 8.  Varicose veins  Varicose vein surgery completed on both legs. Denies peripheral edema. 9.  Carpal tunnel syndrome  Diagnosed with carpal tunnel syndrome of the left hand. Endoscopic release on 12/10/2021 by Ortho, Dr. Angela Spatz. 10.  Muscle ache of extremity. Hypercalcemia. Patient complains of muscle ache of lower legs, mostly at night, disturbing her sleep. Most recent CMP showed mild hypercalcemia. Patient reports recent constipation. PMH of vitamin D deficiency, on ergocalciferol 50,000 international units /weekly.     ROS  Pertinent items are noted in HPI    Physical Exam   Visit Vitals  /74 (BP 1 Location: Left arm, BP Patient Position: Sitting, BP Cuff Size: Large adult)   Pulse 76   Temp 98.3 °F (36.8 °C) (Temporal)   Resp 16   Ht 5' 7\" (1.702 m)   Wt 262 lb (118.8 kg)   SpO2 98%   BMI 41.04 kg/m²     General:  alert, cooperative, obese, well appearing, in no apparent distress. CV: The heart sounds are regular in rate and rhythm. There is a normal S1 and S2. There or no murmurs, rubs, or gallops. Lungs: Inspiratory and expiratory efforts are full and unlabored. Lung sounds are clear and equal to auscultation throughout all lung fields without wheezing, rales, or rhonchi. GI:  The abdomen is obese, soft and nontender. Extremities: No peripheral edema.      An electronic signature was used to authenticate this note. -- Celio Woodard PA-C     Medical dictation software was used for portions of this report. Unintended voice recognition errors may occur.

## 2022-01-28 ENCOUNTER — HOSPITAL ENCOUNTER (OUTPATIENT)
Dept: LAB | Age: 57
Discharge: HOME | End: 2022-01-28
Payer: COMMERCIAL

## 2022-01-28 ENCOUNTER — TELEPHONE (OUTPATIENT)
Dept: CARDIOLOGY CLINIC | Age: 57
End: 2022-01-28

## 2022-01-28 DIAGNOSIS — E53.8 VITAMIN B12 DEFICIENCY: ICD-10-CM

## 2022-01-28 DIAGNOSIS — E66.01 MORBID OBESITY WITH BMI OF 40.0-44.9, ADULT (HCC): ICD-10-CM

## 2022-01-28 DIAGNOSIS — E03.9 ACQUIRED HYPOTHYROIDISM: ICD-10-CM

## 2022-01-28 DIAGNOSIS — M79.18 MUSCLE ACHE OF EXTREMITY: ICD-10-CM

## 2022-01-28 DIAGNOSIS — E83.52 HYPERCALCEMIA: ICD-10-CM

## 2022-01-28 DIAGNOSIS — I10 ESSENTIAL HYPERTENSION: ICD-10-CM

## 2022-01-28 DIAGNOSIS — I50.33 ACUTE ON CHRONIC DIASTOLIC CONGESTIVE HEART FAILURE (HCC): ICD-10-CM

## 2022-01-28 LAB
25(OH)D3 SERPL-MCNC: 57 NG/ML (ref 30–100)
ALBUMIN SERPL-MCNC: 3.8 G/DL (ref 3.4–5)
ALBUMIN/GLOB SERPL: 1.2 {RATIO} (ref 0.8–1.7)
ALP SERPL-CCNC: 126 U/L (ref 45–117)
ALT SERPL-CCNC: 18 U/L (ref 13–56)
ANION GAP SERPL CALC-SCNC: 6 MMOL/L (ref 3–18)
AST SERPL-CCNC: 11 U/L (ref 10–38)
BILIRUB DIRECT SERPL-MCNC: 0.1 MG/DL (ref 0–0.2)
BILIRUB SERPL-MCNC: 0.4 MG/DL (ref 0.2–1)
BUN SERPL-MCNC: 18 MG/DL (ref 7–18)
BUN/CREAT SERPL: 18 (ref 12–20)
CALCIUM SERPL-MCNC: 9.5 MG/DL (ref 8.5–10.1)
CALCIUM SERPL-MCNC: 9.5 MG/DL (ref 8.5–10.1)
CHLORIDE SERPL-SCNC: 99 MMOL/L (ref 100–111)
CHOLEST SERPL-MCNC: 184 MG/DL
CO2 SERPL-SCNC: 30 MMOL/L (ref 21–32)
CREAT SERPL-MCNC: 1 MG/DL (ref 0.6–1.3)
EST. AVERAGE GLUCOSE BLD GHB EST-MCNC: 108 MG/DL
GLOBULIN SER CALC-MCNC: 3.3 G/DL (ref 2–4)
GLUCOSE SERPL-MCNC: 84 MG/DL (ref 74–99)
HBA1C MFR BLD: 5.4 % (ref 4.2–5.6)
HDLC SERPL-MCNC: 45 MG/DL (ref 40–60)
HDLC SERPL: 4.1 {RATIO} (ref 0–5)
LDLC SERPL CALC-MCNC: 115.2 MG/DL (ref 0–100)
LIPID PROFILE,FLP: ABNORMAL
POTASSIUM SERPL-SCNC: 4.7 MMOL/L (ref 3.5–5.5)
PROT SERPL-MCNC: 7.1 G/DL (ref 6.4–8.2)
PTH-INTACT SERPL-MCNC: 88.3 PG/ML (ref 18.4–88)
SODIUM SERPL-SCNC: 135 MMOL/L (ref 136–145)
TRIGL SERPL-MCNC: 119 MG/DL (ref ?–150)
TSH SERPL DL<=0.05 MIU/L-ACNC: 2.17 UIU/ML (ref 0.36–3.74)
VIT B12 SERPL-MCNC: 763 PG/ML (ref 211–911)
VLDLC SERPL CALC-MCNC: 23.8 MG/DL

## 2022-01-28 PROCEDURE — 82607 VITAMIN B-12: CPT

## 2022-01-28 PROCEDURE — 80048 BASIC METABOLIC PNL TOTAL CA: CPT

## 2022-01-28 PROCEDURE — 82306 VITAMIN D 25 HYDROXY: CPT

## 2022-01-28 PROCEDURE — 84443 ASSAY THYROID STIM HORMONE: CPT

## 2022-01-28 PROCEDURE — 83970 ASSAY OF PARATHORMONE: CPT

## 2022-01-28 PROCEDURE — 80076 HEPATIC FUNCTION PANEL: CPT

## 2022-01-28 PROCEDURE — 80061 LIPID PANEL: CPT

## 2022-01-28 PROCEDURE — 36415 COLL VENOUS BLD VENIPUNCTURE: CPT

## 2022-01-28 PROCEDURE — 83036 HEMOGLOBIN GLYCOSYLATED A1C: CPT

## 2022-01-28 NOTE — TELEPHONE ENCOUNTER
----- Message from Sravanthi Schwab MD sent at 1/28/2022  3:15 PM EST -----  Please contact patient and do the following asap    Check if compliant with cholesterol meds  Get the names and doses of meds that patient takes and show me asap  Please reinforce diet and exercise.

## 2022-01-31 ENCOUNTER — OFFICE VISIT (OUTPATIENT)
Dept: CARDIOLOGY CLINIC | Age: 57
End: 2022-01-31
Payer: COMMERCIAL

## 2022-01-31 VITALS
SYSTOLIC BLOOD PRESSURE: 140 MMHG | DIASTOLIC BLOOD PRESSURE: 77 MMHG | BODY MASS INDEX: 42.38 KG/M2 | OXYGEN SATURATION: 97 % | HEART RATE: 69 BPM | HEIGHT: 67 IN | WEIGHT: 270 LBS

## 2022-01-31 DIAGNOSIS — E78.00 HYPERCHOLESTEREMIA: ICD-10-CM

## 2022-01-31 DIAGNOSIS — I50.32 CHRONIC DIASTOLIC CONGESTIVE HEART FAILURE (HCC): Primary | ICD-10-CM

## 2022-01-31 DIAGNOSIS — E66.01 SEVERE OBESITY (BMI >= 40) (HCC): ICD-10-CM

## 2022-01-31 DIAGNOSIS — Z86.39 HISTORY OF HYPOKALEMIA: ICD-10-CM

## 2022-01-31 DIAGNOSIS — I25.118 CORONARY ARTERY DISEASE INVOLVING NATIVE CORONARY ARTERY OF NATIVE HEART WITH OTHER FORM OF ANGINA PECTORIS (HCC): ICD-10-CM

## 2022-01-31 DIAGNOSIS — Z71.6 TOBACCO ABUSE COUNSELING: ICD-10-CM

## 2022-01-31 PROBLEM — I25.10 CORONARY ARTERY DISEASE: Status: ACTIVE | Noted: 2022-01-31

## 2022-01-31 PROCEDURE — 99214 OFFICE O/P EST MOD 30 MIN: CPT | Performed by: INTERNAL MEDICINE

## 2022-01-31 NOTE — PROGRESS NOTES
[phone] (home)   Placed call to Angie Rodriguez on 01/31/2021 at 8:50 am to discuss her lab results. Left message. (If patient return call please notify her that all her labs including TSH, calcium, hemoglobin A1c, vitamin D within normal limits. Mildly elevated parathyroid hormone. Patient does not need high-dose vitamin D supplementation. She can take over-the-counter vitamin D3 (cholecalciferol) 400 units daily).

## 2022-01-31 NOTE — PATIENT INSTRUCTIONS
Medications Discontinued During This Encounter   Medication Reason    potassium chloride (K-DUR, KLOR-CON M20) 20 mEq tablet Side Effects          Body Mass Index: Care Instructions  Your Care Instructions     Body mass index (BMI) can help you see if your weight is raising your risk for health problems. It uses a formula to compare how much you weigh with how tall you are. · A BMI lower than 18.5 is considered underweight. · A BMI between 18.5 and 24.9 is considered healthy. · A BMI between 25 and 29.9 is considered overweight. A BMI of 30 or higher is considered obese. If your BMI is in the normal range, it means that you have a lower risk for weight-related health problems. If your BMI is in the overweight or obese range, you may be at increased risk for weight-related health problems, such as high blood pressure, heart disease, stroke, arthritis or joint pain, and diabetes. If your BMI is in the underweight range, you may be at increased risk for health problems such as fatigue, lower protection (immunity) against illness, muscle loss, bone loss, hair loss, and hormone problems. BMI is just one measure of your risk for weight-related health problems. You may be at higher risk for health problems if you are not active, you eat an unhealthy diet, or you drink too much alcohol or use tobacco products. Follow-up care is a key part of your treatment and safety. Be sure to make and go to all appointments, and call your doctor if you are having problems. It's also a good idea to know your test results and keep a list of the medicines you take. How can you care for yourself at home? · Practice healthy eating habits. This includes eating plenty of fruits, vegetables, whole grains, lean protein, and low-fat dairy. · If your doctor recommends it, get more exercise. Walking is a good choice. Bit by bit, increase the amount you walk every day. Try for at least 30 minutes on most days of the week. · Do not smoke. Smoking can increase your risk for health problems. If you need help quitting, talk to your doctor about stop-smoking programs and medicines. These can increase your chances of quitting for good. · Limit alcohol to 2 drinks a day for men and 1 drink a day for women. Too much alcohol can cause health problems. If you have a BMI higher than 25  · Your doctor may do other tests to check your risk for weight-related health problems. This may include measuring the distance around your waist. A waist measurement of more than 40 inches in men or 35 inches in women can increase the risk of weight-related health problems. · Talk with your doctor about steps you can take to stay healthy or improve your health. You may need to make lifestyle changes to lose weight and stay healthy, such as changing your diet and getting regular exercise. If you have a BMI lower than 18.5  · Your doctor may do other tests to check your risk for health problems. · Talk with your doctor about steps you can take to stay healthy or improve your health. You may need to make lifestyle changes to gain or maintain weight and stay healthy, such as getting more healthy foods in your diet and doing exercises to build muscle. Where can you learn more? Go to http://www.[employer].com/  Enter S176 in the search box to learn more about \"Body Mass Index: Care Instructions. \"  Current as of: March 17, 2021               Content Version: 13.0  © 1836-3819 [employer]. Care instructions adapted under license by [employer] (which disclaims liability or warranty for this information). If you have questions about a medical condition or this instruction, always ask your healthcare professional. [place] [address] any warranty or liability for your use of this information.

## 2022-01-31 NOTE — PROGRESS NOTES
1. Have you been to the ER, urgent care clinic since your last visit? Hospitalized since your last visit? No    2. Have you seen or consulted any other health care providers outside of the [address] [place] [address] since your last visit? Include any pap smears or colon screening. No     3. Since your last visit, have you had any of the following symptoms? chest pains, palpitations and dizziness. 4.  Have you had any blood work, X-rays or cardiac testing? Yes Where: [facility]     Requested: NO     In [facility]: YES    5. Where do you normally have your labs drawn? [facility]    6. Do you need any refills today?    No

## 2022-01-31 NOTE — TELEPHONE ENCOUNTER
Contacted patient regarding names and doses of medications and to confirm if taking cholesterol medications. Has upcoming appointment 1/31 states will discuss medications at visit.

## 2022-01-31 NOTE — PROGRESS NOTES
HISTORY OF PRESENT ILLNESS  Christiano Cunha is a 55 y.o. female. Follow-up of CHF, hypertension, obesity    3/21 seen as new patient. H/o TR, MR, idiopathic tachycardia, edema, neuropathy    Follow-up  Associated symptoms include chest pain and shortness of breath. Pertinent negatives include no headaches. Shortness of Breath  The history is provided by the patient. This is a new problem. The current episode started more than 1 week ago (yrs). The problem has been gradually improving. Associated symptoms include chest pain. Pertinent negatives include no fever, no headaches, no cough, no wheezing, no PND, no orthopnea, no vomiting, no rash, no leg swelling and no claudication. Sore throat: walking out of the house. The problem's precipitants include exercise (Only intermittently some days with walking in the house;). Chest Pain (Angina)   The history is provided by the patient. This is a new problem. The current episode started more than 1 week ago (1/21). The problem has been gradually improving. The problem occurs rarely (3 episodes in 3 days in last 3 months; ). The pain is associated with walking, rest and normal activity (walking dog; ). The pain is present in the lateral region, right side and left side. The quality of the pain is described as pressure-like and tightness. The pain radiates to the left jaw and left neck. Associated symptoms include lower extremity edema and shortness of breath. Pertinent negatives include no claudication, no cough, no dizziness, no fever, no headaches, no malaise/fatigue, no nausea, no orthopnea, no palpitations, no PND and no vomiting. Treatments tried: drinking coke. The treatment provided mild relief.      Allergies   Allergen Reactions    Latex, Natural Rubber Itching    Latex Itching    Ansaid [Flurbiprofen] Anaphylaxis    Iron Dextran Anaphylaxis and Itching    Levofloxacin Myalgia     Tendon rupture  Tendon rupture    Adhesive Rash    Codeine Itching and Rash    Keflex [Cephalexin] Hives and Itching    Penicillins Other (comments)       Past Medical History:   Diagnosis Date    Anemia     Angio-edema     Asthma     Depression     Diverticulosis     Enlargement, spleen     Esophagospasm     Essential hypertension     Fatty liver     GERD (gastroesophageal reflux disease)     Histoplasmosis     HPV (human papilloma virus) anogenital infection     Lyme disease     Neuropathy     PCOS (polycystic ovarian syndrome)     PTSD (post-traumatic stress disorder)     Tachycardia     Thyroid disease     Tricuspid insufficiency        Family History   Problem Relation Age of Onset    Cancer Mother         stomach    Seizures Mother     Cancer Sister         double mastectomy    Cancer Sister         lung    Heart Surgery Maternal Grandmother         CABG    Stroke Neg Hx        Social History     Tobacco Use    Smoking status: Current Every Day Smoker     Packs/day: 0.50     Years: 15.00     Pack years: 7.50     Start date: 1985    Smokeless tobacco: Never Used    Tobacco comment: less than 10 depending on the day   Vaping Use    Vaping Use: Former   Substance Use Topics    Alcohol use: Not Currently    Drug use: Not Currently        Current Outpatient Medications   Medication Sig    cyanocobalamin 1,000 mcg tablet Take 1 Tablet by mouth daily.  lansoprazole (PREVACID) 30 mg capsule Take 1 Capsule by mouth Daily (before breakfast).  triamcinolone acetonide (KENALOG) 0.1 % topical cream Apply  to affected area two (2) times a day. use thin layer    levothyroxine (Euthyrox) 100 mcg tablet Take 1 Tablet by mouth Daily (before breakfast).  magnesium oxide (MAG-OX) 400 mg tablet Take 1 Tablet by mouth two (2) times a day.  DULoxetine (CYMBALTA) 60 mg capsule Take 1 Capsule by mouth daily.  Indications: neuropathic pain    potassium chloride (K-DUR, KLOR-CON M20) 20 mEq tablet Take 1 tablet by mouth once daily    spironolactone (ALDACTONE) 25 mg tablet Take 1 tablet by mouth once daily    lisinopriL (PRINIVIL, ZESTRIL) 2.5 mg tablet Take 1 tablet by mouth once daily    atorvastatin (LIPITOR) 20 mg tablet Take 1 Tablet by mouth daily. (Patient taking differently: Take 40 mg by mouth daily. Increase per Dr. Jaycee Vines)   [facility] ergocalciferol (ERGOCALCIFEROL) 1,250 mcg (50,000 unit) capsule Take 1 capsule by mouth once a week    clotrimazole (LOTRIMIN) 1 % topical cream Apply  to affected area two (2) times a day.  aspirin 81 mg chewable tablet Now, then every 24 hours    albuterol (PROVENTIL HFA, VENTOLIN HFA, PROAIR HFA) 90 mcg/actuation inhaler Take 2 Puffs by inhalation every four (4) hours as needed for Wheezing.  furosemide (Lasix) 40 mg tablet Take 1 Tab by mouth daily.  EPINEPHrine HCl, PF, (ADRENALIN) 1 mg/mL (1 mL) injection once.  traMADoL (ULTRAM) 50 mg tablet Take 50 mg by mouth as needed for Pain.  cetirizine (ZyrTEC) 10 mg tablet Take 10 mg by mouth two (2) times a day. No current facility-administered medications for this visit. Past Surgical History:   Procedure Laterality Date    HX CHOLECYSTECTOMY      HX HEART CATHETERIZATION  2010 approx    normal per patient    HX HEMORRHOIDECTOMY      HX OTHER SURGICAL      Vocal cord polyps       Visit Vitals  BP (!) 140/77 (BP 1 Location: Left upper arm, BP Patient Position: Sitting, BP Cuff Size: Adult)   Pulse 69   Ht 5' 7\" (1.702 m)   Wt 122.5 kg (270 lb)   LMP  (LMP Unknown)   SpO2 97%   BMI 42.29 kg/m²       Diagnostic Studies:  I have reviewed the relevant tests done on the patient and show as follows  EKG tracings reviewed by me today. EKG Results     None        XR Results (most recent):  No results found for this or any previous visit. Ms. Mayito Donis has a reminder for a \"due or due soon\" health maintenance. I have asked that she contact her primary care provider for follow-up on this health maintenance.     Review of Systems   Constitutional: Negative for chills, fever, malaise/fatigue and weight loss. HENT: Negative for nosebleeds. Sore throat: walking out of the house. Eyes: Negative for discharge. Respiratory: Positive for shortness of breath. Negative for cough and wheezing. Cardiovascular: Positive for chest pain. Negative for palpitations, orthopnea, claudication, leg swelling and PND. Gastrointestinal: Negative for diarrhea, nausea and vomiting. Genitourinary: Negative for dysuria and hematuria. Musculoskeletal: Negative for joint pain. Skin: Negative for rash. Neurological: Negative for dizziness, seizures, loss of consciousness and headaches. Endo/Heme/Allergies: Negative for polydipsia. Does not bruise/bleed easily. Psychiatric/Behavioral: Negative for depression and substance abuse. The patient does not have insomnia. 11/20 echo  ECHOCARDIOGRAM COMPLETE  (DOPPLER ECHO)11/20/2020  [facility]  Result Impression   :   POOR QUALITY 2-DIMENSIONAL IMAGES   DEFINITY CONTRAST UTILIZED TO ENHANCE ENDOCARDIAL BORDER DEFINITION   LEFT VENTRICULAR HYPERTROPHY   GROSSLY NORMAL LEFT VENTRICULAR CAVITY SIZE AND SYSTOLIC FUNCTION   COULD NOT ACCURATELY QUANTIFY EJECTION FRACTION   GRADE 1 DIASTOLIC DYSFUNCTION   RIGHT VENTRICLE NOT VISUALIZED   GROSSLY NORMAL LEFT ATRIAL SIZE   RIGHT ATRIUM NOT VISUALIZED   NO OBVIOUS HEMODYNAMICALLY SIGNIFICANT VALVE PATHOLOGY   RVSP - NA     04/26/21   ECHO ADULT COMPLETE 04/26/2021 4/26/2021    Narrative · Contrast used: DEFINITY. · Image quality for this study was technically difficult. · LV: Estimated LVEF is 60 - 65%. Normal cavity size and systolic function   (ejection fraction normal). Moderate concentric hypertrophy. Wall motion:   normal. Moderate (grade 2) left ventricular diastolic dysfunction. · RV: Mildly dilated right ventricle. · RA: Mildly dilated right atrium. · TV: Right Ventricular Arterial Pressure (RVSP) is 23 mmHg.  Pulmonary   hypertension not suggested by Doppler findings. Signed by: Cristofer Pradhan MD   10/12/21    NUCLEAR CARDIAC STRESS TEST 10/19/2021 10/21/2021    Interpretation Summary  · Baseline ECG: Sinus rhythm, interventricular conduction delay. · Stress test: Stress EKG normal.  · SPECT: Left ventricular function post-stress was normal. Calculated ejection fraction is 84% (normal EF value is equal to or greater than 50%). Left ventricular wall motion was normal at stress, no regional wall motion abnormality noted. The TID ratio is 1.21.  · SPECT: Myocardial perfusion imaging defect 1: There is a defect that is medium in size with a moderate reduction in uptake present in the basal-mid and apical segment(s) of the inferior wall(s) that is partially reversible. There is normal wall motion in the defect area. Viability in the area is good. The defect appears to be ischemia. · SPECT: Left ventricular perfusion is abnormal. Myocardial perfusion imaging supports an intermediate risk stress test.    Signed by: Campbell Price MD on 10/19/2021 12:03 PM  12/21 CTA chest with coronary angiography  Impression   --------------------   Nonobstructive coronary artery disease      Physical Exam  Constitutional:       General: She is not in acute distress. Appearance: She is well-developed. She is obese. Comments: Morbid obesity   HENT:      Head: Normocephalic and atraumatic. Mouth/Throat:      Dentition: Normal dentition. Eyes:      General: No scleral icterus. Right eye: No discharge. Left eye: No discharge. Neck:      Thyroid: No thyromegaly. Vascular: No carotid bruit or JVD. Cardiovascular:      Rate and Rhythm: Normal rate and regular rhythm. Pulses: Intact distal pulses. Heart sounds: Normal heart sounds, S1 normal and S2 normal. No murmur heard. No friction rub. No gallop. Pulmonary:      Effort: Pulmonary effort is normal.      Breath sounds: Normal breath sounds. No wheezing or rales.    Chest: Chest wall: No tenderness. Abdominal:      Palpations: Abdomen is soft. There is no mass. Tenderness: There is no abdominal tenderness. Musculoskeletal:      Cervical back: Neck supple. Right lower leg: No edema. Left lower leg: No edema. Lymphadenopathy:      Cervical:      Right cervical: No superficial cervical adenopathy. Left cervical: No superficial cervical adenopathy. Skin:     General: Skin is warm and dry. Findings: No rash. Neurological:      Mental Status: She is alert and oriented to person, place, and time. Psychiatric:         Behavior: Behavior normal.         ASSESSMENT and PLAN    LIPIDS : Results for Dk Estes (MRN [id number]) as of 1/31/2022 13:48   Ref. Range 12/30/2021 11:04 1/14/2022 15:07 1/28/2022 11:57   Triglyceride Latest Ref Range: <150 MG/ (H)  119   Cholesterol, total Latest Ref Range: <200 MG/  184   HDL Cholesterol Latest Ref Range: 40 - 60 MG/DL 37 (L)  45   CHOLESTEROL/HDL Latest Ref Range: 0.0 - 5.0  5.2     Non-HDL Cholesterol Latest Ref Range: <130 mg/dL 157 (H)     CHOL/HDL Ratio Latest Ref Range: 0 - 5.0     4.1   VLDL, calculated Latest Units: MG/DL 44 (H)  23.8   LDL, calculated Latest Ref Range: 0 - 100 MG/ (H)  115.2 (H)       ACC CVD risk score as of 3/21 is 5.9%. Continue diet and exercise for now. 3/19/2021 seen as new patient for changing the cardiologist.  She cannot locate her old cardiologist and so is changing. She has acute on chronic CHF which is diastolic and right heart failure mostly. Blood pressure is elevated today but is normal most of the times at home. CHF is secondary to morbid obesity most likely. She will need to be worked up for sleep apnea. History of Lyme disease for which she was treated with doxycycline a year. Apparently she is still positive for it and I recommended ID opinion for that. Increase Lasix to 40 twice daily add spironolactone.   Told her not to use any over-the-counter potassium but does take the prescription pills so we can follow the labs closely and replace as needed. Continue magnesium. History of significant electrolyte problems in the past.  Told her to come to ER if she does not feel well. Extensive review of previous records and face-to-face time was more than 60 minutes. 4/26/2021 CHF persisting. Patient not taking lasix as advised. She will now take lasix 40 mg/day. Follow labs closely. Add acei and f/u BP chart at home. Diet and exercise discussed. Refer to bariatrics. 8/2/2021 CHF is compensated. Blood pressure is controlled and is low normal.  Reduce lisinopril. Check labs to follow-up on potassium due to Aldactone. She is lost significant weight for which she was congratulated. Recommended to continue to lose more weight with which she agrees and is trying. 9/27/2021 CHF is compensated NYHA class II. Blood pressure is controlled. Chest pain continues and seems worse at times. She thinks she has esophageal spasm but will recommend a stress test to evaluate ischemia. She is gradually losing weight for which she was congratulated. Healthy heart habits discussed and smoking cessation reinforced. 10/25/2021 seen due to abnormal stress test with a possible ischemia in the inferior wall. Patient continues to have atypical angina. CHF is compensated. She is losing weight but states that sometimes it is involuntary. Continues to smoke and tobacco cessation discussed. After discussing cardiac catheterization and CTA, she would prefer CT of the heart which was ordered to evaluate coronaries. Diagnoses and all orders for this visit:    1. Chronic diastolic congestive heart failure (HCC)  -     METABOLIC PANEL, BASIC; Future    2. Coronary artery disease involving native coronary artery of native heart with other form of angina pectoris ([address]    3. Severe obesity (BMI >= 40) ([facility])    4. Tobacco abuse counseling    5. Hypercholesteremia  -     LIPID PANEL; Future  -     HEPATIC FUNCTION PANEL; Future    6. History of hypokalemia        Pertinent laboratory and test data reviewed and discussed with patient. See patient instructions also for other medical advice given    Medications Discontinued During This Encounter   Medication Reason    potassium chloride (K-DUR, KLOR-CON M20) 20 mEq tablet Side Effects       Follow-up and Dispositions    · Return in about 6 months (around 7/31/2022), or if symptoms worsen or fail to improve, for post test.       1/31/2022 CHF is improving well. She is losing weight well for which she was congratulated. CAD stable. Since there is nonobstructive disease by CTA of the chest, goal LDL should be less than 70. Diet weight and exercise discussed. Tobacco cessation reinforced but she does not seem to be motivated to do it. We will hold potassium replacement as K was high normal and follow the labs in 2 weeks and lipids and LFTs in 4 weeks.

## 2022-02-01 NOTE — PROGRESS NOTES
Received a call on 2/1/2022 and 11:16 AM and spoke with patient (two identifiers verified) to advise of lab results.

## 2022-03-07 DIAGNOSIS — I50.33 ACUTE ON CHRONIC DIASTOLIC CONGESTIVE HEART FAILURE (HCC): ICD-10-CM

## 2022-03-07 NOTE — TELEPHONE ENCOUNTER
This pharmacy faxed over request for the following prescriptions to be filled:    Medication requested :   Requested Prescriptions     Pending Prescriptions Disp Refills    spironolactone (ALDACTONE) 25 mg tablet 30 Tablet 0     Sig: Take 1 Tablet by mouth daily.      PCP: [facility]  Pharmacy or Print: [facility]  Mail order or Local pharmacy [phone]    Scheduled appointment if not seen by current providers in office: lov 1/27/2022 f/u 4/27/2022 Prabhu Pablo

## 2022-03-11 RX ORDER — SPIRONOLACTONE 25 MG/1
25 TABLET ORAL DAILY
Qty: 30 TABLET | Refills: 2 | Status: SHIPPED | OUTPATIENT
Start: 2022-03-11 | End: 2022-07-18 | Stop reason: SDUPTHER

## 2022-03-15 ENCOUNTER — TELEPHONE (OUTPATIENT)
Dept: NEUROLOGY | Age: 57
End: 2022-03-15

## 2022-03-15 NOTE — TELEPHONE ENCOUNTER
Spoke with patient verified name and , patient reports having a terrible fall to hard tile floor on  and encountered concussion symptom like feelings, nausea, and swelling to right side of head. She states swelling has gone down but feels mushie and sore. Patient request any orders that may need to be done prior to appointment and if should make sooner appointment to follow. NP to advise.

## 2022-03-16 NOTE — TELEPHONE ENCOUNTER
Spoke with patient verified name and , patient made aware of NP, Bibeault recommendations to go to ER or Urgent Care for Evaluation. Patient to follow up for sooner appt.

## 2022-03-18 PROBLEM — H31.002 CHORIORETINAL SCAR, LEFT EYE: Status: ACTIVE | Noted: 2021-02-18

## 2022-03-18 PROBLEM — K57.92 DIVERTICULITIS: Status: ACTIVE | Noted: 2021-06-11

## 2022-03-18 PROBLEM — I87.8 VENOUS STASIS: Status: ACTIVE | Noted: 2019-10-09

## 2022-03-18 PROBLEM — J45.21 MILD INTERMITTENT ASTHMA WITH ACUTE EXACERBATION: Status: ACTIVE | Noted: 2019-10-08

## 2022-03-18 PROBLEM — K21.9 GASTROESOPHAGEAL REFLUX DISEASE: Status: ACTIVE | Noted: 2021-06-11

## 2022-03-18 PROBLEM — I20.8 ATYPICAL ANGINA (HCC): Status: ACTIVE | Noted: 2021-10-25

## 2022-03-18 PROBLEM — I20.89 ATYPICAL ANGINA: Status: ACTIVE | Noted: 2021-10-25

## 2022-03-18 PROBLEM — J45.909 ASTHMA: Status: ACTIVE | Noted: 2021-06-11

## 2022-03-19 PROBLEM — A69.20 LYME DISEASE: Status: ACTIVE | Noted: 2021-06-13

## 2022-03-19 PROBLEM — F43.10 POST TRAUMATIC STRESS DISORDER: Status: ACTIVE | Noted: 2019-10-10

## 2022-03-19 PROBLEM — G62.9 NEUROPATHY: Status: ACTIVE | Noted: 2021-05-25

## 2022-03-19 PROBLEM — E03.9 ACQUIRED HYPOTHYROIDISM: Status: ACTIVE | Noted: 2019-10-08

## 2022-03-19 PROBLEM — E28.2 POLYCYSTIC OVARY SYNDROME: Status: ACTIVE | Noted: 2021-06-13

## 2022-03-19 PROBLEM — E55.9 VITAMIN D DEFICIENCY: Status: ACTIVE | Noted: 2020-10-27

## 2022-03-19 PROBLEM — I50.33 ACUTE ON CHRONIC DIASTOLIC CONGESTIVE HEART FAILURE (HCC): Status: ACTIVE | Noted: 2021-05-25

## 2022-03-19 PROBLEM — Z72.0 TOBACCO ABUSE: Status: ACTIVE | Noted: 2019-10-09

## 2022-03-19 PROBLEM — J18.9 COMMUNITY ACQUIRED PNEUMONIA OF LEFT LUNG: Status: ACTIVE | Noted: 2019-10-08

## 2022-03-19 PROBLEM — I50.9 CONGESTIVE HEART FAILURE (HCC): Status: ACTIVE | Noted: 2021-06-11

## 2022-03-19 PROBLEM — I25.10 CORONARY ARTERY DISEASE: Status: ACTIVE | Noted: 2022-01-31

## 2022-03-19 PROBLEM — Z71.6 TOBACCO ABUSE COUNSELING: Status: ACTIVE | Noted: 2021-10-25

## 2022-03-19 PROBLEM — R06.00 DYSPNEA: Status: ACTIVE | Noted: 2021-05-25

## 2022-03-19 PROBLEM — I10 ESSENTIAL HYPERTENSION: Status: ACTIVE | Noted: 2021-06-11

## 2022-03-20 PROBLEM — H30.93: Status: ACTIVE | Noted: 2021-02-18

## 2022-03-20 PROBLEM — E66.813 CLASS 3 SEVERE OBESITY DUE TO EXCESS CALORIES WITH SERIOUS COMORBIDITY AND BODY MASS INDEX (BMI) OF 50.0 TO 59.9 IN ADULT: Status: ACTIVE | Noted: 2019-10-08

## 2022-03-20 PROBLEM — E66.01 SEVERE OBESITY (BMI >= 40) (HCC): Status: ACTIVE | Noted: 2021-05-25

## 2022-03-20 PROBLEM — E66.01 CLASS 3 SEVERE OBESITY DUE TO EXCESS CALORIES WITH SERIOUS COMORBIDITY AND BODY MASS INDEX (BMI) OF 50.0 TO 59.9 IN ADULT (HCC): Status: ACTIVE | Noted: 2019-10-08

## 2022-05-03 ENCOUNTER — TELEPHONE (OUTPATIENT)
Dept: NEUROLOGY | Age: 57
End: 2022-05-03

## 2022-05-20 ENCOUNTER — TELEPHONE (OUTPATIENT)
Dept: NEUROLOGY | Age: 57
End: 2022-05-20

## 2022-05-20 ENCOUNTER — OFFICE VISIT (OUTPATIENT)
Dept: NEUROLOGY | Age: 57
End: 2022-05-20
Payer: COMMERCIAL

## 2022-05-20 VITALS
HEART RATE: 83 BPM | OXYGEN SATURATION: 97 % | RESPIRATION RATE: 18 BRPM | HEIGHT: 67 IN | DIASTOLIC BLOOD PRESSURE: 84 MMHG | BODY MASS INDEX: 43.79 KG/M2 | WEIGHT: 279 LBS | SYSTOLIC BLOOD PRESSURE: 136 MMHG

## 2022-05-20 DIAGNOSIS — G62.9 NEUROPATHY: Primary | ICD-10-CM

## 2022-05-20 PROCEDURE — 99214 OFFICE O/P EST MOD 30 MIN: CPT | Performed by: NURSE PRACTITIONER

## 2022-05-20 RX ORDER — DULOXETIN HYDROCHLORIDE 60 MG/1
60 CAPSULE, DELAYED RELEASE ORAL
Qty: 90 CAPSULE | Refills: 3 | Status: SHIPPED | OUTPATIENT
Start: 2022-05-20 | End: 2022-08-18 | Stop reason: SDUPTHER

## 2022-05-20 RX ORDER — DULOXETIN HYDROCHLORIDE 30 MG/1
30 CAPSULE, DELAYED RELEASE ORAL 2 TIMES DAILY
Qty: 60 CAPSULE | Refills: 3 | Status: SHIPPED | OUTPATIENT
Start: 2022-05-20 | End: 2022-07-18 | Stop reason: SDUPTHER

## 2022-05-20 NOTE — PROGRESS NOTES
Zayda Schneider presents today for   Chief Complaint   Patient presents with    Peripheral Neuropathy     follow up       Is someone accompanying this pt? no    Is the patient using any DME equipment during [address]? no    Depression Screening:  3 most recent PHQ Screens 10/25/2021   Little interest or pleasure in doing things Not at all   Feeling down, depressed, irritable, or hopeless Not at all   Total Score PHQ 2 0   Trouble falling or staying asleep, or sleeping too much -   Feeling tired or having little energy -   Poor appetite, weight loss, or overeating -   Feeling bad about yourself - or that you are a failure or have let yourself or your family down -   Trouble concentrating on things such as school, work, reading, or watching TV -   Moving or speaking so slowly that other people could have noticed; or the opposite being so fidgety that others notice -   Thoughts of being better off dead, or hurting yourself in some way -   PHQ 9 Score -   How difficult have these problems made it for you to do your work, take care of your home and get along with others -       Learning Assessment:  Learning Assessment 5/24/2021   PRIMARY LEARNER Patient   HIGHEST LEVEL OF EDUCATION - PRIMARY LEARNER  2 YEARS OF COLLEGE   BARRIERS PRIMARY LEARNER NONE   CO-LEARNER CAREGIVER No   PRIMARY LANGUAGE ENGLISH    NEED -   LEARNER PREFERENCE PRIMARY DEMONSTRATION     LISTENING     READING     OTHER (COMMENT)   ANSWERED BY Doretha Frias   RELATIONSHIP SELF       Abuse Screening:  Abuse Screening Questionnaire 5/24/2021   Do you ever feel afraid of your partner? N   Are you in a relationship with someone who physically or mentally threatens you? N   Is it safe for you to go home? Y       Fall Risk  No flowsheet data found. Coordination of Care:  1. Have you been to the ER, urgent care clinic since your last visit? Hospitalized since your last visit? no    2.  Have you seen or consulted any other health care providers outside of the [address] [place] [address] since your last visit? Include any pap smears or colon screening.  no

## 2022-05-20 NOTE — PROGRESS NOTES
[facility]  [address], [address], [place], [address]  [address] [place] [address], [address]  Office:  [phone]  Fax: [phone]  Chief Complaint   Patient presents with    Peripheral Neuropathy     follow up       This is a 55year old female who presents for follow up neuropathy. Having right leg pains down the lateral aspect of her leg. Same numbness and tingling in the legs as before. Has been following ortho and receiving injections in her lower back. Maintained on Cymbalta 60 mg nightly and tolerating this. No urinary or bowel incontinence or new weaknesses. Her balance has always been poor. No other concerns at this time. Past Medical History:   Diagnosis Date    Anemia     Angio-edema     Asthma     Depression     Diverticulosis     Enlargement, spleen     Esophagospasm     Essential hypertension     Fatty liver     GERD (gastroesophageal reflux disease)     Histoplasmosis     HPV (human papilloma virus) anogenital infection     Lyme disease     Neuropathy     PCOS (polycystic ovarian syndrome)     PTSD (post-traumatic stress disorder)     Tachycardia     Thyroid disease     Tricuspid insufficiency        Past Surgical History:   Procedure Laterality Date    HX CHOLECYSTECTOMY      HX HEART CATHETERIZATION  2010 approx    normal per patient    HX HEMORRHOIDECTOMY      HX OTHER SURGICAL      Vocal cord polyps       Current Outpatient Medications   Medication Sig Dispense Refill    DULoxetine (CYMBALTA) 60 mg capsule Take 1 Capsule by mouth nightly. Indications: neuropathic pain 90 Capsule 3    DULoxetine (CYMBALTA) 30 mg capsule Take 1 Capsule by mouth two (2) times a day. 60 Capsule 3    spironolactone (ALDACTONE) 25 mg tablet Take 1 Tablet by mouth daily. 30 Tablet 2    cyanocobalamin 1,000 mcg tablet Take 1 Tablet by mouth daily.  90 Tablet 1    lansoprazole (PREVACID) 30 mg capsule Take 1 Capsule by mouth Daily (before breakfast). 90 Capsule 1    triamcinolone acetonide (KENALOG) 0.1 % topical cream Apply  to affected area two (2) times a day. use thin layer 45 g 5    levothyroxine (Euthyrox) 100 mcg tablet Take 1 Tablet by mouth Daily (before breakfast). 90 Tablet 1    magnesium oxide (MAG-OX) 400 mg tablet Take 1 Tablet by mouth two (2) times a day. 180 Tablet 1    lisinopriL (PRINIVIL, ZESTRIL) 2.5 mg tablet Take 1 tablet by mouth once daily 90 Tablet 0    atorvastatin (LIPITOR) 20 mg tablet Take 1 Tablet by mouth daily. (Patient taking differently: Take 40 mg by mouth daily. Increase per Dr. Franchesca Crews) 30 Tablet 5    clotrimazole (LOTRIMIN) 1 % topical cream Apply  to affected area two (2) times a day. 24 g 0    aspirin 81 mg chewable tablet Now, then every 24 hours      albuterol (PROVENTIL HFA, VENTOLIN HFA, PROAIR HFA) 90 mcg/actuation inhaler Take 2 Puffs by inhalation every four (4) hours as needed for Wheezing. 1 Inhaler 0    furosemide (Lasix) 40 mg tablet Take 1 Tab by mouth daily. 90 Tab 1    EPINEPHrine HCl, PF, (ADRENALIN) 1 mg/mL (1 mL) injection once.  traMADoL (ULTRAM) 50 mg tablet Take 50 mg by mouth as needed for Pain.  cetirizine (ZyrTEC) 10 mg tablet Take 10 mg by mouth two (2) times a day.       ergocalciferol (ERGOCALCIFEROL) 1,250 mcg (50,000 unit) capsule Take 1 capsule by mouth once a week (Patient not taking: Reported on 5/20/2022) 12 Capsule 0        Allergies   Allergen Reactions    Latex, Natural Rubber Itching    Latex Itching    Ansaid [Flurbiprofen] Anaphylaxis    Iron Dextran Anaphylaxis and Itching    Levofloxacin Myalgia     Tendon rupture  Tendon rupture    Adhesive Rash    Codeine Itching and Rash    Keflex [Cephalexin] Hives and Itching    Penicillins Other (comments)       Social History     Tobacco Use    Smoking status: Current Every Day Smoker     Packs/day: 0.50     Years: 15.00     Pack years: 7.50     Start date: 1985    Smokeless tobacco: Never Used    Tobacco comment: less than 10 depending on the day   Vaping Use    Vaping Use: Former   Substance Use Topics    Alcohol use: Not Currently    Drug use: Not Currently       Family History   Problem Relation Age of Onset    Cancer Mother         stomach    Seizures Mother     Cancer Sister         double mastectomy    Cancer Sister         lung    Heart Surgery Maternal Grandmother         CABG    Stroke Neg Hx        Review of Systems  GENERAL: Denies fever or fatigue  CARDIAC: No CP or SOB  PULMONARY: No cough of SOB  MUSCULOSKELETAL: No new joint pain  NEURO: SEE HPI    Examination  Visit Vitals  /84   Pulse 83   Resp 18   Ht 5' 7\" (1.702 m)   Wt 126.6 kg (279 lb)   SpO2 97%   BMI 43.70 kg/m²       This is a very pleasant 55year old female. She is alert and in NAD. Full fund of knowledge. Speech is clear. No facial asymmetry. Freely moves the upper and lower extremities. Able to tandem walk. Impression/Plan  Gavi Rodriguez is a 55 y.o. female whose history and physical are consistent with neuropathy. Will increase Cymbalta to 30 mg in the am and 90 mg in the pm. New prescriptions provided. Balance therapy referral place. Patient requests [facility] in [place]. Monitor symptoms and follow up in 3 months. All questions addressed and patient is agreeable with plan of care. Diagnoses and all orders for this visit:    1. Neuropathy  -     REFERRAL TO PHYSICAL THERAPY    Other orders  -     DULoxetine (CYMBALTA) 60 mg capsule; Take 1 Capsule by mouth nightly. Indications: neuropathic pain  -     DULoxetine (CYMBALTA) 30 mg capsule; Take 1 Capsule by mouth two (2) times a day. I spent at least 30 minutes with this established patient. Signed By: Srinivas Yun NP        PLEASE NOTE:   Portions of this document may have been produced using voice recognition software. Unrecognized errors in transcription may be present.

## 2022-05-20 NOTE — TELEPHONE ENCOUNTER
Received call from patient's Pharmacy, spoke with [facility], for clarification of Cymbalta. Patient to take 30 mg of Cymbalta in AM and 90 mg at night (60 mg and a 30 mg Capsule) per NP Isamar's verbal clarification.

## 2022-07-14 ENCOUNTER — TELEPHONE (OUTPATIENT)
Dept: CARDIOLOGY CLINIC | Age: 57
End: 2022-07-14

## 2022-07-14 DIAGNOSIS — I10 ESSENTIAL HYPERTENSION: ICD-10-CM

## 2022-07-14 DIAGNOSIS — E78.00 HYPERCHOLESTEREMIA: Primary | ICD-10-CM

## 2022-07-16 LAB
A-G RATIO,AGRAT: 1.3 RATIO (ref 1.1–2.6)
ALBUMIN SERPL-MCNC: 3.8 G/DL (ref 3.5–5)
ALP SERPL-CCNC: 103 U/L (ref 25–115)
ALT SERPL-CCNC: 16 U/L (ref 5–40)
ANION GAP SERPL CALC-SCNC: 12 MMOL/L (ref 3–15)
AST SERPL W P-5'-P-CCNC: 16 U/L (ref 10–37)
BILIRUB SERPL-MCNC: 0.3 MG/DL (ref 0.2–1.2)
BILIRUBIN, DIRECT,CBIL: <0.2 MG/DL (ref 0–0.3)
BUN SERPL-MCNC: 14 MG/DL (ref 6–22)
CALCIUM SERPL-MCNC: 9.8 MG/DL (ref 8.4–10.5)
CHLORIDE SERPL-SCNC: 102 MMOL/L (ref 98–110)
CHOLEST SERPL-MCNC: 199 MG/DL (ref 110–200)
CO2 SERPL-SCNC: 26 MMOL/L (ref 20–32)
CREAT SERPL-MCNC: 0.9 MG/DL (ref 0.5–1.2)
GLOBULIN,GLOB: 2.9 G/DL (ref 2–4)
GLOMERULAR FILTRATION RATE: >60 ML/MIN/1.73 SQ.M.
GLUCOSE SERPL-MCNC: 110 MG/DL (ref 70–99)
HDLC SERPL-MCNC: 34 MG/DL
HDLC SERPL-MCNC: 5.9 MG/DL (ref 0–5)
LDL/HDL RATIO,LDHD: 3.1
LDLC SERPL CALC-MCNC: 104 MG/DL (ref 50–99)
NON-HDL CHOLESTEROL, 011976: 165 MG/DL
POTASSIUM SERPL-SCNC: 3.8 MMOL/L (ref 3.5–5.5)
PROT SERPL-MCNC: 6.7 G/DL (ref 6.4–8.3)
SODIUM SERPL-SCNC: 140 MMOL/L (ref 133–145)
TRIGL SERPL-MCNC: 307 MG/DL (ref 40–149)
VLDLC SERPL CALC-MCNC: 61 MG/DL (ref 8–30)

## 2022-07-18 ENCOUNTER — OFFICE VISIT (OUTPATIENT)
Dept: CARDIOLOGY CLINIC | Age: 57
End: 2022-07-18
Payer: COMMERCIAL

## 2022-07-18 VITALS
DIASTOLIC BLOOD PRESSURE: 75 MMHG | SYSTOLIC BLOOD PRESSURE: 134 MMHG | HEIGHT: 67 IN | BODY MASS INDEX: 43.79 KG/M2 | HEART RATE: 76 BPM | WEIGHT: 279 LBS

## 2022-07-18 DIAGNOSIS — I10 ESSENTIAL HYPERTENSION: ICD-10-CM

## 2022-07-18 DIAGNOSIS — I25.118 CORONARY ARTERY DISEASE INVOLVING NATIVE CORONARY ARTERY OF NATIVE HEART WITH OTHER FORM OF ANGINA PECTORIS (HCC): ICD-10-CM

## 2022-07-18 DIAGNOSIS — E66.01 SEVERE OBESITY (BMI >= 40) (HCC): ICD-10-CM

## 2022-07-18 DIAGNOSIS — G62.9 NEUROPATHY: ICD-10-CM

## 2022-07-18 DIAGNOSIS — I50.32 CHRONIC DIASTOLIC CONGESTIVE HEART FAILURE (HCC): Primary | ICD-10-CM

## 2022-07-18 PROCEDURE — 93000 ELECTROCARDIOGRAM COMPLETE: CPT | Performed by: INTERNAL MEDICINE

## 2022-07-18 PROCEDURE — 99214 OFFICE O/P EST MOD 30 MIN: CPT | Performed by: INTERNAL MEDICINE

## 2022-07-18 RX ORDER — LANOLIN ALCOHOL/MO/W.PET/CERES
400 CREAM (GRAM) TOPICAL 2 TIMES DAILY
Qty: 180 TABLET | Refills: 1 | Status: SHIPPED | OUTPATIENT
Start: 2022-07-18

## 2022-07-18 RX ORDER — DOXYCYCLINE 100 MG/1
CAPSULE ORAL
COMMUNITY
Start: 2022-04-13

## 2022-07-18 RX ORDER — LISINOPRIL 2.5 MG/1
2.5 TABLET ORAL DAILY
Qty: 90 TABLET | Refills: 0 | Status: SHIPPED | OUTPATIENT
Start: 2022-07-18

## 2022-07-18 RX ORDER — ATORVASTATIN CALCIUM 20 MG/1
40 TABLET, FILM COATED ORAL DAILY
Qty: 30 TABLET | Refills: 2 | Status: SHIPPED | OUTPATIENT
Start: 2022-07-18

## 2022-07-18 RX ORDER — SPIRONOLACTONE 25 MG/1
25 TABLET ORAL DAILY
Qty: 30 TABLET | Refills: 2 | Status: SHIPPED | OUTPATIENT
Start: 2022-07-18

## 2022-07-18 NOTE — PROGRESS NOTES
1. Have you been to the ER, urgent care clinic since your last visit? Hospitalized since your last visit? Yes, [facility] for fluid    2. Have you seen or consulted any other health care providers outside of the [address] [place] [address] since your last visit? Include any pap smears or colon screening. No     3. Since your last visit, have you had any of the following symptoms? chest pains, palpitations, shortness of breath, dizziness and swelling in legs/arms. 4.  Have you had any blood work, X-rays or cardiac testing? Yes Where: [facility]     Requested: NO     In [facility]: NO    5. Where do you normally have your labs drawn? [facility]    6. Do you need any refills today?    Yes

## 2022-07-18 NOTE — TELEPHONE ENCOUNTER
Please contact patient and do the following asap    Change atorvastatin to 40 mg/day  Get fasting Lipid profile and LFTs in 6 wks. Please reinforce diet and exercise.

## 2022-07-18 NOTE — PATIENT INSTRUCTIONS
Medications Discontinued During This Encounter   Medication Reason    DULoxetine (CYMBALTA) 30 mg capsule DUPLICATE ORDER    atorvastatin (LIPITOR) 20 mg tablet REORDER    lisinopriL (PRINIVIL, ZESTRIL) 2.5 mg tablet REORDER    magnesium oxide (MAG-OX) 400 mg tablet REORDER    spironolactone (ALDACTONE) 25 mg tablet REORDER          Avoiding Triggers With Heart Failure: Care Instructions  Your Care Instructions     Triggers are anything that make your heart failure flare up. A flare-up is also called \"sudden heart failure\" or \"acute heart failure. \" When you have a flare-up, fluid builds up in your lungs, and you have problems breathing. You might need to go to the hospital. By watching for changes in your condition and avoiding triggers, you can prevent heart failure flare-ups. Follow-up care is a key part of your treatment and safety. Be sure to make and go to all appointments, and call your doctor if you are having problems. It's also a good idea to know your test results and keep a list of the medicines you take. How can you care for yourself at home? Watch for changes in your weight and condition  · Weigh yourself without clothing at the same time each day. Record your weight. Call your doctor if you have sudden weight gain, such as more than 2 to 3 pounds in a day or 5 pounds in a week. (Your doctor may suggest a different range of weight gain.) A sudden weight gain may mean that your heart failure is getting worse. · Keep a daily record of your symptoms. Write down any changes in how you feel, such as new shortness of breath, cough, or problems eating. Also record if your ankles are more swollen than usual and if you feel more tired than usual. Note anything that you ate or did that could have triggered these changes. Limit sodium  Sodium causes your body to hold on to extra water. This may cause your heart failure symptoms to get worse. People get most of their sodium from processed foods.  Fast food and restaurant meals also tend to be very high in sodium. · Your doctor may suggest that you limit sodium. Your doctor can tell you how much sodium is right for you. This includes limiting sodium in cooked and packaged foods. · Read food labels on cans and food packages. They tell you how much sodium you get in one serving. Check the serving size. If you eat more than one serving, you are getting more sodium. · Be aware that sodium can come in forms other than salt, including monosodium glutamate (MSG), sodium citrate, and sodium bicarbonate (baking soda). MSG is often added to Asian food. You can sometimes ask for food without MSG or salt. · Slowly reducing salt will help you adjust to the taste. Take the salt shaker off the table. · Flavor your food with garlic, lemon juice, onion, vinegar, herbs, and spices instead of salt. Do not use soy sauce, steak sauce, onion salt, garlic salt, mustard, or ketchup on your food, unless it is labeled \"low-sodium\" or \"low-salt. \"  · Make your own salad dressings, sauces, and ketchup without adding salt. · Use fresh or frozen ingredients, instead of canned ones, whenever you can. Choose low-sodium canned goods. · Eat less processed food and food from restaurants, including fast food. Exercise as directed  Moderate, regular exercise is very good for your heart. It improves your blood flow and helps control your weight. But too much exercise can stress your heart and cause a heart failure flare-up. · Check with your doctor before you start an exercise program.  · Walking is an easy way to get exercise. Start out slowly. Gradually increase the length and pace of your walk. Swimming, riding a bike, and using a treadmill are also good forms of exercise. · When you exercise, watch for signs that your heart is working too hard. You are pushing yourself too hard if you cannot talk while you are exercising.  If you become short of breath or dizzy or have chest pain, stop, sit down, and rest.  · Do not exercise when you do not feel well. Take medicines correctly  · Take your medicines exactly as prescribed. Call your doctor if you think you are having a problem with your medicine. · Make a list of all the medicines you take. Include those prescribed to you by other doctors and any over-the-counter medicines, vitamins, or supplements you take. Take this list with you when you go to any doctor. · Take your medicines at the same time every day. It may help you to post a list of all the medicines you take every day and what time of day you take them. · Make taking your medicine as simple as you can. Plan times to take your medicines when you are doing other things, such as eating a meal or getting ready for bed. This will make it easier to remember to take your medicines. · Get organized. Use helpful tools, such as daily or weekly pill containers. When should you call for help? Call 911  if you have symptoms of sudden heart failure such as:    · You have severe trouble breathing.     · You cough up pink, foamy mucus.     · You have a new irregular or rapid heartbeat. Call your doctor now or seek immediate medical care if:    · You have new or increased shortness of breath.     · You are dizzy or lightheaded, or you feel like you may faint.     · You have sudden weight gain, such as more than 2 to 3 pounds in a day or 5 pounds in a week. (Your doctor may suggest a different range of weight gain.)     · You have increased swelling in your legs, ankles, or feet.     · You are suddenly so tired or weak that you cannot do your usual activities. Watch closely for changes in your health, and be sure to contact your doctor if you develop new symptoms. Where can you learn more? Go to http://www.gray.com/  Enter V089 in the search box to learn more about \"Avoiding Triggers With Heart Failure: Care Instructions. \"  Current as of: January 10, 4800               BMALBQO Version: 13.2  © 2231-3170 [employer], [employer]. Care instructions adapted under license by [employer] (which disclaims liability or warranty for this information). If you have questions about a medical condition or this instruction, always ask your healthcare professional. [address] any warranty or liability for your use of this information.

## 2022-07-18 NOTE — PROGRESS NOTES
HISTORY OF PRESENT ILLNESS  Brad Pantoja is a 55 y.o. female. Follow-up of CHF, hypertension, obesity    3/21 seen as new patient. H/o TR, MR, idiopathic tachycardia, edema, neuropathy    Follow-up  Associated symptoms include chest pain and shortness of breath. Pertinent negatives include no headaches. Shortness of Breath  The history is provided by the patient. This is a new problem. The current episode started more than 1 week ago (yrs). The problem has been gradually improving. Associated symptoms include chest pain and leg swelling. Pertinent negatives include no fever, no headaches, no cough, no wheezing, no PND, no orthopnea, no vomiting, no rash and no claudication. Sore throat: walking out of the house. The problem's precipitants include exercise (Only intermittently some days with walking in the house;;). Chest Pain (Angina)   The history is provided by the patient. This is a new problem. The current episode started more than 1 week ago (1/21). The problem has been rapidly improving. The problem occurs rarely (3 episodes in 3 days in last 3 months; ). The pain is associated with walking, rest and normal activity (walking dog; ). The pain is present in the lateral region, right side and left side. The quality of the pain is described as pressure-like and tightness. The pain radiates to the left jaw and left neck. Associated symptoms include lower extremity edema and shortness of breath. Pertinent negatives include no claudication, no cough, no dizziness, no fever, no headaches, no malaise/fatigue, no nausea, no orthopnea, no palpitations, no PND and no vomiting. Treatments tried: drinking coke. The treatment provided mild relief. Leg Swelling  The history is provided by the patient. This is a new problem. The current episode started more than 1 week ago. The problem occurs daily. The problem has been gradually improving (Since ER visit 7/6/2022).  Associated symptoms include chest pain and shortness of breath. Pertinent negatives include no headaches. Allergies   Allergen Reactions    Latex, Natural Rubber Itching    Latex Itching    Ansaid [Flurbiprofen] Anaphylaxis    Iron Dextran Anaphylaxis and Itching    Levofloxacin Myalgia     Tendon rupture  Tendon rupture    Adhesive Rash    Codeine Itching and Rash    Keflex [Cephalexin] Hives and Itching    Penicillins Other (comments)       Past Medical History:   Diagnosis Date    Anemia     Angio-edema     Asthma     Depression     Diverticulosis     Enlargement, spleen     Esophagospasm     Essential hypertension     Fatty liver     GERD (gastroesophageal reflux disease)     Histoplasmosis     HPV (human papilloma virus) anogenital infection     Lyme disease     Neuropathy     PCOS (polycystic ovarian syndrome)     PTSD (post-traumatic stress disorder)     Tachycardia     Thyroid disease     Tricuspid insufficiency        Family History   Problem Relation Age of Onset    Cancer Mother         stomach    Seizures Mother     Cancer Sister         double mastectomy    Cancer Sister         lung    Heart Surgery Maternal Grandmother         CABG    Stroke Neg Hx        Social History     Tobacco Use    Smoking status: Current Every Day Smoker     Packs/day: 0.50     Years: 15.00     Pack years: 7.50     Start date: 1985    Smokeless tobacco: Never Used    Tobacco comment: less than 10 depending on the day   Vaping Use    Vaping Use: Former   Substance Use Topics    Alcohol use: Not Currently    Drug use: Not Currently        Current Outpatient Medications   Medication Sig    DULoxetine (CYMBALTA) 60 mg capsule Take 1 Capsule by mouth nightly. Indications: neuropathic pain    spironolactone (ALDACTONE) 25 mg tablet Take 1 Tablet by mouth daily.  cyanocobalamin 1,000 mcg tablet Take 1 Tablet by mouth daily.  lansoprazole (PREVACID) 30 mg capsule Take 1 Capsule by mouth Daily (before breakfast).     triamcinolone acetonide (KENALOG) 0.1 % topical cream Apply  to affected area two (2) times a day. use thin layer    levothyroxine (Euthyrox) 100 mcg tablet Take 1 Tablet by mouth Daily (before breakfast).  magnesium oxide (MAG-OX) 400 mg tablet Take 1 Tablet by mouth two (2) times a day.  lisinopriL (PRINIVIL, ZESTRIL) 2.5 mg tablet Take 1 tablet by mouth once daily    atorvastatin (LIPITOR) 20 mg tablet Take 1 Tablet by mouth daily. (Patient taking differently: Take 40 mg by mouth daily. Increase per Dr. Kimmie Zavala)   Hearn clotrimazole (LOTRIMIN) 1 % topical cream Apply  to affected area two (2) times a day.  aspirin 81 mg chewable tablet Now, then every 24 hours    albuterol (PROVENTIL HFA, VENTOLIN HFA, PROAIR HFA) 90 mcg/actuation inhaler Take 2 Puffs by inhalation every four (4) hours as needed for Wheezing.  furosemide (Lasix) 40 mg tablet Take 1 Tab by mouth daily.  EPINEPHrine HCl, PF, (ADRENALIN) 1 mg/mL (1 mL) injection once.  cetirizine (ZyrTEC) 10 mg tablet Take 10 mg by mouth two (2) times a day.  ergocalciferol (ERGOCALCIFEROL) 1,250 mcg (50,000 unit) capsule Take 1 capsule by mouth once a week (Patient not taking: Reported on 5/20/2022)    traMADoL (ULTRAM) 50 mg tablet Take 50 mg by mouth as needed for Pain. (Patient not taking: Reported on 7/18/2022)     No current facility-administered medications for this visit. Past Surgical History:   Procedure Laterality Date    HX CHOLECYSTECTOMY      HX HEART CATHETERIZATION  2010 approx    normal per patient    HX HEMORRHOIDECTOMY      HX OTHER SURGICAL      Vocal cord polyps       Visit Vitals  Ht 5' 7\" (1.702 m)   Wt 126.6 kg (279 lb)   BMI 43.70 kg/m²       Diagnostic Studies:  I have reviewed the relevant tests done on the patient and show as follows  EKG tracings reviewed by me today. EKG Results     None        XR Results (most recent):  No results found for this or any previous visit.        Ms. Prerna Yee has a reminder for a \"due or due soon\" health maintenance. I have asked that she contact her primary care provider for follow-up on this health maintenance. Review of Systems   Constitutional: Negative for chills, fever, malaise/fatigue and weight loss. HENT: Negative for nosebleeds. Sore throat: walking out of the house. Eyes: Negative for discharge. Respiratory: Positive for shortness of breath. Negative for cough and wheezing. Cardiovascular: Positive for chest pain and leg swelling. Negative for palpitations, orthopnea, claudication and PND. Gastrointestinal: Negative for diarrhea, nausea and vomiting. Genitourinary: Negative for dysuria and hematuria. Musculoskeletal: Negative for joint pain. Skin: Negative for rash. Neurological: Negative for dizziness, seizures, loss of consciousness and headaches. Endo/Heme/Allergies: Negative for polydipsia. Does not bruise/bleed easily. Psychiatric/Behavioral: Negative for depression and substance abuse. The patient does not have insomnia. 11/20 echo  ECHOCARDIOGRAM COMPLETE  (DOPPLER ECHO)11/20/2020  [facility]  Result Impression   :   POOR QUALITY 2-DIMENSIONAL IMAGES   DEFINITY CONTRAST UTILIZED TO ENHANCE ENDOCARDIAL BORDER DEFINITION   LEFT VENTRICULAR HYPERTROPHY   GROSSLY NORMAL LEFT VENTRICULAR CAVITY SIZE AND SYSTOLIC FUNCTION   COULD NOT ACCURATELY QUANTIFY EJECTION FRACTION   GRADE 1 DIASTOLIC DYSFUNCTION   RIGHT VENTRICLE NOT VISUALIZED   GROSSLY NORMAL LEFT ATRIAL SIZE   RIGHT ATRIUM NOT VISUALIZED   NO OBVIOUS HEMODYNAMICALLY SIGNIFICANT VALVE PATHOLOGY   RVSP - NA     04/26/21   ECHO ADULT COMPLETE 04/26/2021 4/26/2021    Narrative · Contrast used: DEFINITY. · Image quality for this study was technically difficult. · LV: Estimated LVEF is 60 - 65%. Normal cavity size and systolic function   (ejection fraction normal). Moderate concentric hypertrophy.  Wall motion:   normal. Moderate (grade 2) left ventricular diastolic dysfunction. · RV: Mildly dilated right ventricle. · RA: Mildly dilated right atrium. · TV: Right Ventricular Arterial Pressure (RVSP) is 23 mmHg. Pulmonary   hypertension not suggested by Doppler findings. Signed by: Pro Leung MD   10/12/21    NUCLEAR CARDIAC STRESS TEST 10/19/2021 10/21/2021    Interpretation Summary  · Baseline ECG: Sinus rhythm, interventricular conduction delay. · Stress test: Stress EKG normal.  · SPECT: Left ventricular function post-stress was normal. Calculated ejection fraction is 84% (normal EF value is equal to or greater than 50%). Left ventricular wall motion was normal at stress, no regional wall motion abnormality noted. The TID ratio is 1.21.  · SPECT: Myocardial perfusion imaging defect 1: There is a defect that is medium in size with a moderate reduction in uptake present in the basal-mid and apical segment(s) of the inferior wall(s) that is partially reversible. There is normal wall motion in the defect area. Viability in the area is good. The defect appears to be ischemia. · SPECT: Left ventricular perfusion is abnormal. Myocardial perfusion imaging supports an intermediate risk stress test.    Signed by: Malcom Stout MD on 10/19/2021 12:03 PM  12/21 CTA chest with coronary angiography  Impression   --------------------   Nonobstructive coronary artery disease      Physical Exam  Constitutional:       General: She is not in acute distress. Appearance: She is well-developed. She is obese. Comments: Morbid obesity   HENT:      Head: Normocephalic and atraumatic. Mouth/Throat:      Dentition: Normal dentition. Eyes:      General: No scleral icterus. Right eye: No discharge. Left eye: No discharge. Neck:      Thyroid: No thyromegaly. Vascular: No carotid bruit or JVD. Cardiovascular:      Rate and Rhythm: Normal rate and regular rhythm. Pulses: Intact distal pulses.       Heart sounds: Normal heart sounds, S1 normal and S2 normal. No murmur heard. No friction rub. No gallop. Pulmonary:      Effort: Pulmonary effort is normal.      Breath sounds: Normal breath sounds. No wheezing or rales. Chest:      Chest wall: No tenderness. Abdominal:      Palpations: Abdomen is soft. There is no mass. Tenderness: There is no abdominal tenderness. Musculoskeletal:      Cervical back: Neck supple. Right lower leg: No edema. Left lower leg: No edema. Lymphadenopathy:      Cervical:      Right cervical: No superficial cervical adenopathy. Left cervical: No superficial cervical adenopathy. Skin:     General: Skin is warm and dry. Findings: No rash. Neurological:      Mental Status: She is alert and oriented to person, place, and time. Psychiatric:         Behavior: Behavior normal.         ASSESSMENT and PLAN    LIPIDS :   Results for Fredrick Beck (MRN [id number]) as of 7/18/2022 13:42   Ref. Range 12/30/2021 11:04 1/14/2022 15:07 1/28/2022 11:57 1/28/2022 12:00 7/16/2022 11:56   Triglyceride Latest Ref Range: 40 - 149 mg/dL 219 (H)  119  307 (H)   Cholesterol, total Latest Ref Range: 110 - 200 mg/dL 194  184  199   HDL Cholesterol Latest Ref Range: >=40 mg/dL 37 (L)  45  34 (L)   CHOLESTEROL/HDL Latest Ref Range: 0.0 - 5.0  5.2    5.9   Non-HDL Cholesterol Latest Ref Range: <130 mg/dL 157 (H)    165 (H)   CHOL/HDL Ratio Latest Ref Range: 0 - 5.0     4.1     VLDL, calculated Latest Ref Range: 8 - 30 mg/dL 44 (H)  23.8  61 (H)   LDL, calculated Latest Ref Range: 50 - 99 mg/dL 113 (H)  115.2 (H)  104 (H)         ACC CVD risk score as of 3/21 is 5.9%. Continue diet and exercise for now. 3/19/2021 seen as new patient for changing the cardiologist.  She cannot locate her old cardiologist and so is changing. She has acute on chronic CHF which is diastolic and right heart failure mostly. Blood pressure is elevated today but is normal most of the times at home.   CHF is secondary to morbid obesity most likely. She will need to be worked up for sleep apnea. History of Lyme disease for which she was treated with doxycycline a year. Apparently she is still positive for it and I recommended ID opinion for that. Increase Lasix to 40 twice daily add spironolactone. Told her not to use any over-the-counter potassium but does take the prescription pills so we can follow the labs closely and replace as needed. Continue magnesium. History of significant electrolyte problems in the past.  Told her to come to ER if she does not feel well. Extensive review of previous records and face-to-face time was more than 60 minutes. 4/26/2021 CHF persisting. Patient not taking lasix as advised. She will now take lasix 40 mg/day. Follow labs closely. Add acei and f/u BP chart at home. Diet and exercise discussed. Refer to bariatrics. 8/2/2021 CHF is compensated. Blood pressure is controlled and is low normal.  Reduce lisinopril. Check labs to follow-up on potassium due to Aldactone. She is lost significant weight for which she was congratulated. Recommended to continue to lose more weight with which she agrees and is trying. 9/27/2021 CHF is compensated NYHA class II. Blood pressure is controlled. Chest pain continues and seems worse at times. She thinks she has esophageal spasm but will recommend a stress test to evaluate ischemia. She is gradually losing weight for which she was congratulated. Healthy heart habits discussed and smoking cessation reinforced. 10/25/2021 seen due to abnormal stress test with a possible ischemia in the inferior wall. Patient continues to have atypical angina. CHF is compensated. She is losing weight but states that sometimes it is involuntary. Continues to smoke and tobacco cessation discussed. After discussing cardiac catheterization and CTA, she would prefer CT of the heart which was ordered to evaluate coronaries. 1/31/2022 CHF is improving well.   She is losing weight well for which she was congratulated. CAD stable. Since there is nonobstructive disease by CTA of the chest, goal LDL should be less than 70. Diet weight and exercise discussed. Tobacco cessation reinforced but she does not seem to be motivated to do it. We will hold potassium replacement as K was high normal and follow the labs in 2 weeks and lipids and LFTs in 4 weeks. Diagnoses and all orders for this visit:    1. Chronic diastolic congestive heart failure (HCC)  -     spironolactone (ALDACTONE) 25 mg tablet; Take 1 Tablet by mouth daily. -     atorvastatin (LIPITOR) 20 mg tablet; Take 2 Tablets by mouth daily. Increase per Dr. Jelena Skinner  -     magnesium oxide (MAG-OX) 400 mg tablet; Take 1 Tablet by mouth two (2) times a day. -     lisinopriL (PRINIVIL, ZESTRIL) 2.5 mg tablet; Take 1 Tablet by mouth daily. -     MAGNESIUM; Future    2. Coronary artery disease involving native coronary artery of native heart with other form of angina pectoris ([facility] [address]    3. Neuropathy    4. Essential hypertension  -     AMB POC EKG ROUTINE W/ 12 LEADS, INTER & REP    5. Severe obesity (BMI >= 40) ([facility])        Pertinent laboratory and test data reviewed and discussed with patient. See patient instructions also for other medical advice given    Medications Discontinued During This Encounter   Medication Reason    DULoxetine (CYMBALTA) 30 mg capsule DUPLICATE ORDER    atorvastatin (LIPITOR) 20 mg tablet REORDER    lisinopriL (PRINIVIL, ZESTRIL) 2.5 mg tablet REORDER    magnesium oxide (MAG-OX) 400 mg tablet REORDER    spironolactone (ALDACTONE) 25 mg tablet REORDER       Follow-up and Dispositions    · Return in about 6 months (around 1/18/2023), or if symptoms worsen or fail to improve, for post test.       7/18/2022  CAD stable. CHF is compensated. No significant fluid overload today NYHA class II-III. Activity limited because of joint pains also.   Blood pressure is controlled  Diet weight discussed and she understands the importance of weight loss and exercise. Will check magnesium level and decide whether she needs to continue to take magnesium.

## 2022-07-26 ENCOUNTER — OFFICE VISIT (OUTPATIENT)
Dept: FAMILY MEDICINE CLINIC | Age: 57
End: 2022-07-26
Payer: COMMERCIAL

## 2022-07-26 VITALS
OXYGEN SATURATION: 96 % | HEART RATE: 84 BPM | RESPIRATION RATE: 16 BRPM | BODY MASS INDEX: 43.16 KG/M2 | DIASTOLIC BLOOD PRESSURE: 82 MMHG | SYSTOLIC BLOOD PRESSURE: 130 MMHG | WEIGHT: 275 LBS | HEIGHT: 67 IN | TEMPERATURE: 98.5 F

## 2022-07-26 DIAGNOSIS — E55.9 VITAMIN D DEFICIENCY: ICD-10-CM

## 2022-07-26 DIAGNOSIS — E78.5 DYSLIPIDEMIA: ICD-10-CM

## 2022-07-26 DIAGNOSIS — T14.8XXA OPEN WOUND: ICD-10-CM

## 2022-07-26 DIAGNOSIS — I50.9 CONGESTIVE HEART FAILURE, UNSPECIFIED HF CHRONICITY, UNSPECIFIED HEART FAILURE TYPE (HCC): ICD-10-CM

## 2022-07-26 DIAGNOSIS — Z12.31 ENCOUNTER FOR SCREENING MAMMOGRAM FOR MALIGNANT NEOPLASM OF BREAST: ICD-10-CM

## 2022-07-26 DIAGNOSIS — F17.200 SMOKING: ICD-10-CM

## 2022-07-26 DIAGNOSIS — R91.1 LUNG NODULE: ICD-10-CM

## 2022-07-26 DIAGNOSIS — I20.8 ATYPICAL ANGINA (HCC): ICD-10-CM

## 2022-07-26 DIAGNOSIS — E03.9 ACQUIRED HYPOTHYROIDISM: ICD-10-CM

## 2022-07-26 DIAGNOSIS — J45.20 MILD INTERMITTENT ASTHMA WITHOUT COMPLICATION: ICD-10-CM

## 2022-07-26 DIAGNOSIS — E04.1 THYROID NODULE: ICD-10-CM

## 2022-07-26 DIAGNOSIS — Z13.1 SCREENING FOR DIABETES MELLITUS: ICD-10-CM

## 2022-07-26 DIAGNOSIS — R19.4 CHANGE IN BOWEL HABIT: ICD-10-CM

## 2022-07-26 DIAGNOSIS — F43.10 POST TRAUMATIC STRESS DISORDER: ICD-10-CM

## 2022-07-26 DIAGNOSIS — M79.89 LEG SWELLING: ICD-10-CM

## 2022-07-26 DIAGNOSIS — I10 ESSENTIAL HYPERTENSION: Primary | ICD-10-CM

## 2022-07-26 DIAGNOSIS — G62.9 NEUROPATHY: ICD-10-CM

## 2022-07-26 PROCEDURE — 99214 OFFICE O/P EST MOD 30 MIN: CPT | Performed by: FAMILY MEDICINE

## 2022-07-26 NOTE — PROGRESS NOTES
Chief Complaint   Patient presents with    Back Pain     C/o lower chronic back pain     GERD    CHF    Vitamin B12 Deficiency    Thyroid Problem     Hx of hypothyroidism     Other     Hx of hypercalcemia, muscle ache of lower extremity, and neuropathy        1. \"Have you been to the ER, urgent care clinic since your last visit? Hospitalized since your last visit? \" Yes When: 03- Where: [facility]  Reason for visit: closed head injury and on 07- [facility] ED for chronic venous insufficent. 2. \"Have you seen or consulted any other health care providers outside of the [address] [place] [address] since your last visit? \" No     3. For patients aged 39-70: Has the patient had a colonoscopy / FIT/ Cologuard? No      If the patient is female:    4. For patients aged 41-77: Has the patient had a mammogram within the past 2 years? Yes - Care Gap present. Most recent result on file      5. For patients aged 21-65: Has the patient had a pap smear?  No

## 2022-07-27 NOTE — PROGRESS NOTES
HISTORY OF PRESENT ILLNESS  Carole Arora is a 55 y.o. female. HPI: Here as a new patient to me. Transfer from [facility]. Currently she is dealing with drainage from umbilical area since March. She has been following dermatology. Currently she has a open wound over umbilical area. No foul smell or drainage but she has packed cotton gauze to prevent drainage. Packing was clean. She denies any fever. She was put on doxycycline by dermatology. At this time done sooner appointment with surgeon for further recommendation. No signs of cellulitis or infection over surrounding skin. She had a recent hospitalization. Reviewed records from July from [facility]. Had a CT abdomen/pelvis done showed result as below. History of pulmonary nodules. Following pulmonary. History of asthma and CHF. Currently no other signs of volume overload. She does have a chronic shortness of breath on exertion which is unchanged. Currently walking without any support. Mild pitting edema which is chronic for her palpation. She was able to talk in full sentence. Not using any extra respiratory muscle. No auditory wheezing. Also history of thyroid nodule. Seen by Endo. Had an ultrasound done. We will try to obtain all these records. She was sitting without any acute distress. No headache or dizziness. No chest pain or shortness of breath. No palpitation or diaphoresis. No nausea or vomiting. No urinary complaint. She does have a change in bowel movement. On and off diarrhea and constipation. Questionable irritable bowel syndrome. Has seen GI in the past.  No recent appointment. She is in process to change the new insurance so [facility] as she does not want to see any one else.    Visit Vitals  /82 (BP 1 Location: Left arm, BP Patient Position: Sitting, BP Cuff Size: Large adult)   Pulse 84   Temp 98.5 °F (36.9 °C) (Temporal)   Resp 16   Ht 5' 7\" (1.702 m)   Wt 275 lb (124.7 kg) SpO2 96%   BMI 43.07 kg/m²     Review medication list, vitals, problem list,allergies. Lab Results   Component Value Date/Time    WBC 10.5 08/04/2021 02:14 PM    HGB 14.8 08/04/2021 02:14 PM    HCT 47.2 (H) 08/04/2021 02:14 PM    PLATELET 214 51/45/8834 02:14 PM    MCV 92.0 08/04/2021 02:14 PM     Lab Results   Component Value Date/Time    Sodium 140 07/16/2022 11:56 AM    Potassium 3.8 07/16/2022 11:56 AM    Chloride 102 07/16/2022 11:56 AM    CO2 26 07/16/2022 11:56 AM    Anion gap 12.0 07/16/2022 11:56 AM    Glucose 110 (H) 07/16/2022 11:56 AM    BUN 14 07/16/2022 11:56 AM    Creatinine 0.9 07/16/2022 11:56 AM    BUN/Creatinine ratio 18 01/28/2022 11:57 AM    GFR est AA >60 01/28/2022 11:57 AM    GFR est non-AA 58 (L) 01/28/2022 11:57 AM    Calcium 9.8 07/16/2022 11:56 AM    Bilirubin, total 0.3 07/16/2022 11:56 AM    Alk. phosphatase 103 07/16/2022 11:56 AM    Protein, total 6.7 07/16/2022 11:56 AM    Albumin 3.8 07/16/2022 11:56 AM    Globulin 2.9 07/16/2022 11:56 AM    A-G Ratio 1.3 07/16/2022 11:56 AM    ALT (SGPT) 16 07/16/2022 11:56 AM    AST (SGOT) 16 07/16/2022 11:56 AM     Lab Results   Component Value Date/Time    Cholesterol, total 199 07/16/2022 11:56 AM    HDL Cholesterol 34 (L) 07/16/2022 11:56 AM    LDL, calculated 104 (H) 07/16/2022 11:56 AM    VLDL, calculated 61 (H) 07/16/2022 11:56 AM    Triglyceride 307 (H) 07/16/2022 11:56 AM    CHOL/HDL Ratio 4.1 01/28/2022 11:57 AM     Lab Results   Component Value Date/Time    TSH 2.17 01/28/2022 12:00 PM     Lab Results   Component Value Date/Time    TSH 2.17 01/28/2022 12:00 PM        CT Results (most recent):  Results from Orders Only encounter on 12/13/21    CTA HEART  . CT ABD/PELVIS-NO ORAL/IV    Anatomical Region Laterality Modality   Abdomen -- Computed Tomography   Pelvis -- --       Impression      No acute intra-abdominal abnormality.      There appears to be some sort of material within the patient's naval with mildsurrounding subcutaneous fat stranding or edema   History of hypertension. Vitals been stable. Taking medication with compliance    ROS: See HPI    Physical Exam  Cardiovascular:      Rate and Rhythm: Normal rate. Pulmonary:      Effort: Pulmonary effort is normal. No respiratory distress. Abdominal:      Comments: Open wound over umbilical area. No drainage. Packing was clean. No with mild redness over surrounding skin or tenderness. Musculoskeletal:         General: Swelling (mild pitting edema bilateral lower ext.) present. Neurological:      Mental Status: She is alert and oriented to person, place, and time. Psychiatric:         Behavior: Behavior normal.       ASSESSMENT and PLAN    ICD-10-CM ICD-9-CM    1. Essential hypertension : well controlled. Continue current dose of medication and low salt diet. Exercise as tolerated. A18 595.5 METABOLIC PANEL, COMPREHENSIVE      2. Mild intermittent asthma without complication : stable. No increase use of albuterol J45.20 493.90       3. Acquired hypothyroidism : checking labs. Stated has h/o thyroid nodule and seen ENT . She will make a follow up appt  E03.9 244.9 TSH 3RD GENERATION      4. Vitamin D deficiency : recheck labs. E55.9 268.9 VITAMIN D, 25 HYDROXY      5. Post traumatic stress disorder  F43.10 309.81       6. Neuropathy : on symptomatic treatmet. G62.9 355.9       7. Congestive heart failure, unspecified HF chronicity, unspecified heart failure type ([address] : no signs of volume over load. Stable chronic leg swelling, able to talk in full sentence. No using any extra respiratory muscle. I50.9 428.0       8. Atypical angina (HCC) : asymptomatic. I20.8 413.9       9. Open wound : over umbilical area. Sending to surgeon. Following dermatology. On doxycycline since one month. T14. 8XXA 879.8 REFERRAL TO GENERAL SURGERY      CBC WITH AUTOMATED DIFF    at umbilical area / since march. dermatology at [facility] treating it since march       10.  Change in bowel habit : for now symptomatic treatment with probiotic  R19.4 787.99     used to follow Dr. Matthew Hancock. now when she will get a new insurance she will go back       11. Leg swelling : table. Following cardiology and their recommendations. M79.89 729.81       12. Smoking : currently smoking. No plan to stop. Will follow up next visit. F17.200 305.1       13. Lung nodule : following pulmonary. History of histoplasmosis, will follow pulmonary and their recommendations. R91.1 793.11     history of histoplasmosis . following Dr. Nelta Gaucher       14. Encounter for screening mammogram for malignant neoplasm of breast  Z12.31 V76.12 [facility] MAMMO BI SCREENING INCL CAD      13. Screening for diabetes mellitus  Z13.1 V77.1 HEMOGLOBIN A1C WITH EAG      16. Dyslipidemia : checking labs. E78.5 272.4 LIPID PANEL      17. Thyroid nodule : according to her seen ENT specialist. Will make a follow up appt. E04.1 241.0       Pt understood and agree with the plan     Follow-up and Dispositions    Return in about 2 weeks (around 8/9/2022) for need 30 minutes for an appt. Sajan Werner

## 2022-07-29 ENCOUNTER — OFFICE VISIT (OUTPATIENT)
Dept: SURGERY | Age: 57
End: 2022-07-29
Payer: COMMERCIAL

## 2022-07-29 VITALS
HEIGHT: 67 IN | HEART RATE: 84 BPM | BODY MASS INDEX: 45.52 KG/M2 | TEMPERATURE: 97 F | RESPIRATION RATE: 18 BRPM | DIASTOLIC BLOOD PRESSURE: 80 MMHG | OXYGEN SATURATION: 97 % | WEIGHT: 290 LBS | SYSTOLIC BLOOD PRESSURE: 154 MMHG

## 2022-07-29 DIAGNOSIS — K21.9 CHRONIC GERD: ICD-10-CM

## 2022-07-29 DIAGNOSIS — R13.10 DYSPHAGIA, UNSPECIFIED TYPE: ICD-10-CM

## 2022-07-29 DIAGNOSIS — Z86.010 HISTORY OF COLON POLYPS: ICD-10-CM

## 2022-07-29 DIAGNOSIS — S31.105A OPEN WOUND OF UMBILICAL REGION, INITIAL ENCOUNTER: ICD-10-CM

## 2022-07-29 DIAGNOSIS — E66.01 MORBID OBESITY WITH BMI OF 45.0-49.9, ADULT (HCC): ICD-10-CM

## 2022-07-29 DIAGNOSIS — M79.3 PANNICULITIS: Primary | ICD-10-CM

## 2022-07-29 PROCEDURE — 99204 OFFICE O/P NEW MOD 45 MIN: CPT | Performed by: SURGERY

## 2022-07-29 NOTE — PROGRESS NOTES
CC:   Chief Complaint   Patient presents with    New Patient     Open wound umbilical area. Assessment:    ICD-10-CM ICD-9-CM    1. Panniculitis  M79.3 729.30 REFERRAL TO HOME HEALTH      2. Open wound of umbilical region, initial encounter  S31.105A 879.2       3. History of colon polyps  Z86.010 V12.72       4. Dysphagia, unspecified type  R13.10 787.20       5. Chronic GERD  K21.9 530.81       6. Morbid obesity with BMI of 45.0-49.9, adult ([facility] [address]  E66.01 278.01     Z68.42 V85.42           Plan: Recommend she stop ointment to the umbilicus. Aquacel AG packing to the umbilicus every other day. We discussed the importance of taking lasix as recommended as her recent weight gain from not taking it is contributing to her ongoing panniculitis and open wound. She agrees with this plan and we will have home health come for wound assessment and order supplies. She is also requested today we set her up for her EGD and colonoscopy which are overdue. We can assist with this. The risks and benefits of the procedure were reviewed with the patient including infection, bleeding, need for repeat procedure, injury to surrounding structures and perforation. Questions were answered and consent was obtained. HPI:  Raymond Roque is a 55 y.o. female who is referred by Rakesh Self MD for umbilical wound. She states she has issues with drainage since March 2022. She has applied ointment and taken doxycyline as directed by dermatology with no improvement of her symptoms. She changes the dressing frequently as she knows her pannus makes this a challenge to heal. She has lost approximately 100 pounds on her own and deals with frequent skin infections for excess weight in this area. She also states she is overdue for an EGD and colonoscopy. She does have a history of colon polyps. No family history of colon cancer. No changes to bowel or bladder habits.  She does have bad acid reflux and feels a sticking sensation in her throat. She often awakes in the morning with a cough. She does take medication for acid reflux but feels this is not helping her symptoms. Allergies: Allergies   Allergen Reactions    Latex, Natural Rubber Itching    Latex Itching    Ansaid [Flurbiprofen] Anaphylaxis    Iron Dextran Anaphylaxis and Itching    Levofloxacin Myalgia     Tendon rupture  Tendon rupture    Adhesive Rash    Codeine Itching and Rash    Keflex [Cephalexin] Hives and Itching    Penicillins Other (comments)       Medication Review:  Current Outpatient Medications on File Prior to Visit   Medication Sig Dispense Refill    spironolactone (ALDACTONE) 25 mg tablet Take 1 Tablet by mouth daily. 30 Tablet 2    atorvastatin (LIPITOR) 20 mg tablet Take 2 Tablets by mouth daily. Increase per Dr. Brodie Cheema 30 Tablet 2    magnesium oxide (MAG-OX) 400 mg tablet Take 1 Tablet by mouth two (2) times a day. 180 Tablet 1    lisinopriL (PRINIVIL, ZESTRIL) 2.5 mg tablet Take 1 Tablet by mouth daily. 90 Tablet 0    doxycycline (VIBRAMYCIN) 100 mg capsule TAKE 1 CAPSULE BY MOUTH TWICE DAILY WITH FOOD      DULoxetine (CYMBALTA) 60 mg capsule Take 1 Capsule by mouth nightly. Indications: neuropathic pain 90 Capsule 3    cyanocobalamin 1,000 mcg tablet Take 1 Tablet by mouth daily. 90 Tablet 1    lansoprazole (PREVACID) 30 mg capsule Take 1 Capsule by mouth Daily (before breakfast). 90 Capsule 1    triamcinolone acetonide (KENALOG) 0.1 % topical cream Apply  to affected area two (2) times a day. use thin layer 45 g 5    levothyroxine (Euthyrox) 100 mcg tablet Take 1 Tablet by mouth Daily (before breakfast). 90 Tablet 1    clotrimazole (LOTRIMIN) 1 % topical cream Apply  to affected area two (2) times a day. 24 g 0    aspirin 81 mg chewable tablet Now, then every 24 hours      albuterol (PROVENTIL HFA, VENTOLIN HFA, PROAIR HFA) 90 mcg/actuation inhaler Take 2 Puffs by inhalation every four (4) hours as needed for Wheezing.  1 Inhaler 0    furosemide (Lasix) 40 mg tablet Take 1 Tab by mouth daily. 90 Tab 1    traMADoL (ULTRAM) 50 mg tablet Take 50 mg by mouth as needed for Pain. cetirizine (ZYRTEC) 10 mg tablet Take 10 mg by mouth two (2) times a day. EPINEPHrine HCl, PF, (ADRENALIN) 1 mg/mL (1 mL) injection once. (Patient not taking: Reported on 7/29/2022)       No current facility-administered medications on file prior to visit. Systems Review:  Review of Systems   Constitutional:  Negative for appetite change, fatigue, fever and unexpected weight change. HENT:  Positive for trouble swallowing. Respiratory:  Negative for chest tightness and shortness of breath. Cardiovascular:  Negative for chest pain. Gastrointestinal:  Negative for abdominal pain and blood in stool.      PMH:  Past Medical History:   Diagnosis Date    Anemia     Angio-edema     Asthma     Depression     Diverticulosis     Enlargement, spleen     Esophagospasm     Essential hypertension     Fatty liver     GERD (gastroesophageal reflux disease)     Histoplasmosis     HPV (human papilloma virus) anogenital infection     Lyme disease     Neuropathy     PCOS (polycystic ovarian syndrome)     PTSD (post-traumatic stress disorder)     Tachycardia     Thyroid disease     Tricuspid insufficiency        Surgical History:  Past Surgical History:   Procedure Laterality Date    HX CHOLECYSTECTOMY      HX HEART CATHETERIZATION  2010 approx    normal per patient    HX HEMORRHOIDECTOMY      HX OTHER SURGICAL      Vocal cord polyps       Social History:  Social History     Socioeconomic History    Marital status: SINGLE   Tobacco Use    Smoking status: Every Day     Packs/day: 0.50     Years: 15.00     Pack years: 7.50     Types: Cigarettes     Start date: 1985    Smokeless tobacco: Never    Tobacco comments:     less than 10 depending on the day   Vaping Use    Vaping Use: Former   Substance and Sexual Activity    Alcohol use: Not Currently    Drug use: Not Currently    Sexual activity: Not Currently     Partners: Male       Family History:  Family History   Problem Relation Age of Onset    Cancer Mother         stomach    Seizures Mother     Cancer Sister         double mastectomy    Cancer Sister         lung    Heart Surgery Maternal Grandmother         CABG    Stroke Neg Hx        Telephone on 07/14/2022   Component Date Value Ref Range Status    Triglyceride 07/16/2022 307 (A) 40 - 149 mg/dL Final    HDL Cholesterol 07/16/2022 34 (A) >=40 mg/dL Final    Cholesterol, total 07/16/2022 199  110 - 200 mg/dL Final    CHOLESTEROL/HDL 07/16/2022 5.9  0.0 - 5.0 Final    Non-HDL Cholesterol 07/16/2022 165 (A) <130 mg/dL Final    LDL, calculated 07/16/2022 104 (A) 50 - 99 mg/dL Final    VLDL, calculated 07/16/2022 61 (A) 8 - 30 mg/dL Final    LDL/HDL Ratio 07/16/2022 3.1   Final    Comment: Test includes cholesterol, HDL cholesterol, triglycerides and LDL. Cholesterol Recommended NCEP guidelines in mg/dL:    Less than 200            Desirable  200 - 239                Borderline High  Greater than or  = 240   High      Please Note:  Total Chol/HDL Ratio                     Men     Women  1/2 Avg. Risk    3.4     3.3      Avg. Risk    5.0     4.4  2X  Avg. Risk    9.6     7.1  3X  Avg. Risk   23.4    11.0            Glucose 07/16/2022 110 (A) 70 - 99 mg/dL Final    BUN 07/16/2022 14  6 - 22 mg/dL Final    Creatinine 07/16/2022 0.9  0.5 - 1.2 mg/dL Final    Sodium 07/16/2022 140  133 - 145 mmol/L Final    Potassium 07/16/2022 3.8  3.5 - 5.5 mmol/L Final    Chloride 07/16/2022 102  98 - 110 mmol/L Final    CO2 07/16/2022 26  20 - 32 mmol/L Final    Calcium 07/16/2022 9.8  8.4 - 10.5 mg/dL Final    GLOMERULAR FILTRATION RATE 07/16/2022 >60.0  >60.0 mL/min/1.73 sq.m. Final    Comment: eGFR calculation based on the Chronic Kidney Disease Epidemiology   Collaboration  (CKD-EPI) equation refit without adjustment for race. This eGFR is validated for stable chronic renal failure patients.  This equation  is unreliable in acute illness or patients with normal renal function. Anion gap 07/16/2022 12.0  3.0 - 15.0 mmol/L Final    Comment: Anion Gap calculation based on electrolyte reference ranges. Test includes BUN, CO2, Chloride, Creatinine, Glucose, Potassium, Calcium and  Sodium. AST (SGOT) 07/16/2022 16  10 - 37 U/L Final    ALT (SGPT) 07/16/2022 16  5 - 40 U/L Final    Comment: Test includes ALT, Alkaline Phosphatase, AST, Total Protein, Direct Bilirubin,  Total Bilirubin and Albumin. Alk. phosphatase 07/16/2022 103  25 - 115 U/L Final    Bilirubin, total 07/16/2022 0.3  0.2 - 1.2 mg/dL Final    Bilirubin, direct 07/16/2022 <0.2  0.0 - 0.3 mg/dL Final    Protein, total 07/16/2022 6.7  6.4 - 8.3 g/dL Final    Albumin 07/16/2022 3.8  3.5 - 5.0 g/dL Final    A-G Ratio 07/16/2022 1.3  1.1 - 2.6 ratio Final    Globulin 07/16/2022 2.9  2.0 - 4.0 g/dL Final       NUCLEAR CARDIAC STRESS TEST  · Baseline ECG: Sinus rhythm, interventricular conduction delay. · Stress test: Stress EKG normal.  · SPECT: Left ventricular function post-stress was normal. Calculated   ejection fraction is 84% (normal EF value is equal to or greater than   50%). Left ventricular wall motion was normal at stress, no regional wall   motion abnormality noted. The TID ratio is 1.21.  · SPECT: Myocardial perfusion imaging defect 1: There is a defect that is   medium in size with a moderate reduction in uptake present in the   basal-mid and apical segment(s) of the inferior wall(s) that is partially   reversible. There is normal wall motion in the defect area. Viability in   the area is good. The defect appears to be ischemia.   · SPECT: Left ventricular perfusion is abnormal. Myocardial perfusion   imaging supports an intermediate risk stress test.            Physical Exam:  Visit Vitals  BP (!) 154/80 (BP 1 Location: Right arm, BP Patient Position: Sitting, BP Cuff Size: Other (Comment)) Comment (BP Cuff Size): wrist cuff   Pulse 84   Temp 97 °F (36.1 °C) (Temporal)   Resp 18   Ht 5' 7\" (1.702 m)   Wt 131.5 kg (290 lb)   SpO2 97%   BMI 45.42 kg/m²    BMI: Body mass index is 45.42 kg/m². Physical Exam  Constitutional:       Appearance: Normal appearance. She is obese. Eyes:      Extraocular Movements: Extraocular movements intact. Pupils: Pupils are equal, round, and reactive to light. Cardiovascular:      Rate and Rhythm: Normal rate. Pulmonary:      Effort: Pulmonary effort is normal.   Abdominal:      Palpations: Abdomen is soft. Tenderness: There is no abdominal tenderness. Comments: Large pannus with panniculitis noted, superficial ulceration of skin umbilicus with mild-moderate serous drainage, no foul order, no periwound erythema   Neurological:      General: No focal deficit present. Mental Status: She is alert and oriented to person, place, and time. Mental status is at baseline. Psychiatric:         Mood and Affect: Mood normal.         Behavior: Behavior normal.         Thought Content: Thought content normal.       I have reviewed the information entered by the clinical staff and/or patient and verified it as accurate or edited where necessary.      Electronically signed by:    Vickey Russo DO, MPH

## 2022-07-29 NOTE — Clinical Note
7/29/2022    Patient: Davion Rodriguez   YOB: 1965   Date of Visit: 7/29/2022     Wilian Garcia PA-C  Via      Savanna Marroquin MD  [address] [place] [id number]  Via In [facility] [address]    Dear Twanda Goldmann, MD,      Thank you for referring Ms. Nithin Rodriguez to [address] [facility] for evaluation. My notes for this consultation are attached. If you have questions, please do not hesitate to call me. I look forward to following your patient along with you.       Sincerely,    Cleopatra Medellin, DO

## 2022-07-29 NOTE — PROGRESS NOTES
Darrion Hansen is a 55 y.o. female  Chief Complaint   Patient presents with    New Patient     Open wound umbilical area. Ms. Keyla Woodson has been given the following recommendations today due to her elevated BP reading: referred to Alternative/PCP. Pt also see's cardiologist for her blood pressure.

## 2022-07-29 NOTE — H&P (VIEW-ONLY)
CC:   Chief Complaint   Patient presents with    New Patient     Open wound umbilical area. Assessment:    ICD-10-CM ICD-9-CM    1. Panniculitis  M79.3 729.30 REFERRAL TO HOME HEALTH      2. Open wound of umbilical region, initial encounter  S31.105A 879.2       3. History of colon polyps  Z86.010 V12.72       4. Dysphagia, unspecified type  R13.10 787.20       5. Chronic GERD  K21.9 530.81       6. Morbid obesity with BMI of 45.0-49.9, adult ([facility] [address]  E66.01 278.01     Z68.42 V85.42           Plan: Recommend she stop ointment to the umbilicus. Aquacel AG packing to the umbilicus every other day. We discussed the importance of taking lasix as recommended as her recent weight gain from not taking it is contributing to her ongoing panniculitis and open wound. She agrees with this plan and we will have home health come for wound assessment and order supplies. She is also requested today we set her up for her EGD and colonoscopy which are overdue. We can assist with this. The risks and benefits of the procedure were reviewed with the patient including infection, bleeding, need for repeat procedure, injury to surrounding structures and perforation. Questions were answered and consent was obtained. HPI:  Malachi Carrasco is a 55 y.o. female who is referred by Raisa St MD for umbilical wound. She states she has issues with drainage since March 2022. She has applied ointment and taken doxycyline as directed by dermatology with no improvement of her symptoms. She changes the dressing frequently as she knows her pannus makes this a challenge to heal. She has lost approximately 100 pounds on her own and deals with frequent skin infections for excess weight in this area. She also states she is overdue for an EGD and colonoscopy. She does have a history of colon polyps. No family history of colon cancer. No changes to bowel or bladder habits.  She does have bad acid reflux and feels a sticking sensation in her throat. She often awakes in the morning with a cough. She does take medication for acid reflux but feels this is not helping her symptoms. Allergies: Allergies   Allergen Reactions    Latex, Natural Rubber Itching    Latex Itching    Ansaid [Flurbiprofen] Anaphylaxis    Iron Dextran Anaphylaxis and Itching    Levofloxacin Myalgia     Tendon rupture  Tendon rupture    Adhesive Rash    Codeine Itching and Rash    Keflex [Cephalexin] Hives and Itching    Penicillins Other (comments)       Medication Review:  Current Outpatient Medications on File Prior to Visit   Medication Sig Dispense Refill    spironolactone (ALDACTONE) 25 mg tablet Take 1 Tablet by mouth daily. 30 Tablet 2    atorvastatin (LIPITOR) 20 mg tablet Take 2 Tablets by mouth daily. Increase per Dr. Kimmie Zavala 30 Tablet 2    magnesium oxide (MAG-OX) 400 mg tablet Take 1 Tablet by mouth two (2) times a day. 180 Tablet 1    lisinopriL (PRINIVIL, ZESTRIL) 2.5 mg tablet Take 1 Tablet by mouth daily. 90 Tablet 0    doxycycline (VIBRAMYCIN) 100 mg capsule TAKE 1 CAPSULE BY MOUTH TWICE DAILY WITH FOOD      DULoxetine (CYMBALTA) 60 mg capsule Take 1 Capsule by mouth nightly. Indications: neuropathic pain 90 Capsule 3    cyanocobalamin 1,000 mcg tablet Take 1 Tablet by mouth daily. 90 Tablet 1    lansoprazole (PREVACID) 30 mg capsule Take 1 Capsule by mouth Daily (before breakfast). 90 Capsule 1    triamcinolone acetonide (KENALOG) 0.1 % topical cream Apply  to affected area two (2) times a day. use thin layer 45 g 5    levothyroxine (Euthyrox) 100 mcg tablet Take 1 Tablet by mouth Daily (before breakfast). 90 Tablet 1    clotrimazole (LOTRIMIN) 1 % topical cream Apply  to affected area two (2) times a day. 24 g 0    aspirin 81 mg chewable tablet Now, then every 24 hours      albuterol (PROVENTIL HFA, VENTOLIN HFA, PROAIR HFA) 90 mcg/actuation inhaler Take 2 Puffs by inhalation every four (4) hours as needed for Wheezing.  1 Inhaler 0    furosemide (Lasix) 40 mg tablet Take 1 Tab by mouth daily. 90 Tab 1    traMADoL (ULTRAM) 50 mg tablet Take 50 mg by mouth as needed for Pain. cetirizine (ZYRTEC) 10 mg tablet Take 10 mg by mouth two (2) times a day. EPINEPHrine HCl, PF, (ADRENALIN) 1 mg/mL (1 mL) injection once. (Patient not taking: Reported on 7/29/2022)       No current facility-administered medications on file prior to visit. Systems Review:  Review of Systems   Constitutional:  Negative for appetite change, fatigue, fever and unexpected weight change. HENT:  Positive for trouble swallowing. Respiratory:  Negative for chest tightness and shortness of breath. Cardiovascular:  Negative for chest pain. Gastrointestinal:  Negative for abdominal pain and blood in stool.      PMH:  Past Medical History:   Diagnosis Date    Anemia     Angio-edema     Asthma     Depression     Diverticulosis     Enlargement, spleen     Esophagospasm     Essential hypertension     Fatty liver     GERD (gastroesophageal reflux disease)     Histoplasmosis     HPV (human papilloma virus) anogenital infection     Lyme disease     Neuropathy     PCOS (polycystic ovarian syndrome)     PTSD (post-traumatic stress disorder)     Tachycardia     Thyroid disease     Tricuspid insufficiency        Surgical History:  Past Surgical History:   Procedure Laterality Date    HX CHOLECYSTECTOMY      HX HEART CATHETERIZATION  2010 approx    normal per patient    HX HEMORRHOIDECTOMY      HX OTHER SURGICAL      Vocal cord polyps       Social History:  Social History     Socioeconomic History    Marital status: SINGLE   Tobacco Use    Smoking status: Every Day     Packs/day: 0.50     Years: 15.00     Pack years: 7.50     Types: Cigarettes     Start date: 1985    Smokeless tobacco: Never    Tobacco comments:     less than 10 depending on the day   Vaping Use    Vaping Use: Former   Substance and Sexual Activity    Alcohol use: Not Currently    Drug use: Not Currently    Sexual activity: Not Currently     Partners: Male       Family History:  Family History   Problem Relation Age of Onset    Cancer Mother         stomach    Seizures Mother     Cancer Sister         double mastectomy    Cancer Sister         lung    Heart Surgery Maternal Grandmother         CABG    Stroke Neg Hx        Telephone on 07/14/2022   Component Date Value Ref Range Status    Triglyceride 07/16/2022 307 (A) 40 - 149 mg/dL Final    HDL Cholesterol 07/16/2022 34 (A) >=40 mg/dL Final    Cholesterol, total 07/16/2022 199  110 - 200 mg/dL Final    CHOLESTEROL/HDL 07/16/2022 5.9  0.0 - 5.0 Final    Non-HDL Cholesterol 07/16/2022 165 (A) <130 mg/dL Final    LDL, calculated 07/16/2022 104 (A) 50 - 99 mg/dL Final    VLDL, calculated 07/16/2022 61 (A) 8 - 30 mg/dL Final    LDL/HDL Ratio 07/16/2022 3.1   Final    Comment: Test includes cholesterol, HDL cholesterol, triglycerides and LDL. Cholesterol Recommended NCEP guidelines in mg/dL:    Less than 200            Desirable  200 - 239                Borderline High  Greater than or  = 240   High      Please Note:  Total Chol/HDL Ratio                     Men     Women  1/2 Avg. Risk    3.4     3.3      Avg. Risk    5.0     4.4  2X  Avg. Risk    9.6     7.1  3X  Avg. Risk   23.4    11.0            Glucose 07/16/2022 110 (A) 70 - 99 mg/dL Final    BUN 07/16/2022 14  6 - 22 mg/dL Final    Creatinine 07/16/2022 0.9  0.5 - 1.2 mg/dL Final    Sodium 07/16/2022 140  133 - 145 mmol/L Final    Potassium 07/16/2022 3.8  3.5 - 5.5 mmol/L Final    Chloride 07/16/2022 102  98 - 110 mmol/L Final    CO2 07/16/2022 26  20 - 32 mmol/L Final    Calcium 07/16/2022 9.8  8.4 - 10.5 mg/dL Final    GLOMERULAR FILTRATION RATE 07/16/2022 >60.0  >60.0 mL/min/1.73 sq.m. Final    Comment: eGFR calculation based on the Chronic Kidney Disease Epidemiology   Collaboration  (CKD-EPI) equation refit without adjustment for race. This eGFR is validated for stable chronic renal failure patients.  This equation  is unreliable in acute illness or patients with normal renal function. Anion gap 07/16/2022 12.0  3.0 - 15.0 mmol/L Final    Comment: Anion Gap calculation based on electrolyte reference ranges. Test includes BUN, CO2, Chloride, Creatinine, Glucose, Potassium, Calcium and  Sodium. AST (SGOT) 07/16/2022 16  10 - 37 U/L Final    ALT (SGPT) 07/16/2022 16  5 - 40 U/L Final    Comment: Test includes ALT, Alkaline Phosphatase, AST, Total Protein, Direct Bilirubin,  Total Bilirubin and Albumin. Alk. phosphatase 07/16/2022 103  25 - 115 U/L Final    Bilirubin, total 07/16/2022 0.3  0.2 - 1.2 mg/dL Final    Bilirubin, direct 07/16/2022 <0.2  0.0 - 0.3 mg/dL Final    Protein, total 07/16/2022 6.7  6.4 - 8.3 g/dL Final    Albumin 07/16/2022 3.8  3.5 - 5.0 g/dL Final    A-G Ratio 07/16/2022 1.3  1.1 - 2.6 ratio Final    Globulin 07/16/2022 2.9  2.0 - 4.0 g/dL Final       NUCLEAR CARDIAC STRESS TEST  · Baseline ECG: Sinus rhythm, interventricular conduction delay. · Stress test: Stress EKG normal.  · SPECT: Left ventricular function post-stress was normal. Calculated   ejection fraction is 84% (normal EF value is equal to or greater than   50%). Left ventricular wall motion was normal at stress, no regional wall   motion abnormality noted. The TID ratio is 1.21.  · SPECT: Myocardial perfusion imaging defect 1: There is a defect that is   medium in size with a moderate reduction in uptake present in the   basal-mid and apical segment(s) of the inferior wall(s) that is partially   reversible. There is normal wall motion in the defect area. Viability in   the area is good. The defect appears to be ischemia.   · SPECT: Left ventricular perfusion is abnormal. Myocardial perfusion   imaging supports an intermediate risk stress test.            Physical Exam:  Visit Vitals  BP (!) 154/80 (BP 1 Location: Right arm, BP Patient Position: Sitting, BP Cuff Size: Other (Comment)) Comment (BP Cuff Size): wrist cuff   Pulse 84   Temp 97 °F (36.1 °C) (Temporal)   Resp 18   Ht 5' 7\" (1.702 m)   Wt 131.5 kg (290 lb)   SpO2 97%   BMI 45.42 kg/m²    BMI: Body mass index is 45.42 kg/m². Physical Exam  Constitutional:       Appearance: Normal appearance. She is obese. Eyes:      Extraocular Movements: Extraocular movements intact. Pupils: Pupils are equal, round, and reactive to light. Cardiovascular:      Rate and Rhythm: Normal rate. Pulmonary:      Effort: Pulmonary effort is normal.   Abdominal:      Palpations: Abdomen is soft. Tenderness: There is no abdominal tenderness. Comments: Large pannus with panniculitis noted, superficial ulceration of skin umbilicus with mild-moderate serous drainage, no foul order, no periwound erythema   Neurological:      General: No focal deficit present. Mental Status: She is alert and oriented to person, place, and time. Mental status is at baseline. Psychiatric:         Mood and Affect: Mood normal.         Behavior: Behavior normal.         Thought Content: Thought content normal.       I have reviewed the information entered by the clinical staff and/or patient and verified it as accurate or edited where necessary.      Electronically signed by:    Michael Stringer DO, MPH

## 2022-08-01 ENCOUNTER — TELEPHONE (OUTPATIENT)
Dept: SURGERY | Age: 57
End: 2022-08-01

## 2022-08-01 ENCOUNTER — TELEPHONE (OUTPATIENT)
Dept: CARDIOLOGY CLINIC | Age: 57
End: 2022-08-01

## 2022-08-01 NOTE — TELEPHONE ENCOUNTER
Left voicemail message to contact our office regarding colonoscopy/prep instructions for Wednesday, August 10, 2022 at [facility].

## 2022-08-01 NOTE — LETTER
2022 1:18 PM    Ms. Daniela Bowen [id number]      Dr. Benja Hernandez  [address] [facility], [address] [place] [address]      Dear Dr. Bello Coral:    Re: Tan Ang   : 1965       Ms. Nasreen Rodriguez is cleared from a cardiac standpoint with mild risk for lumbar surgery scheduled on 2022. If you have any questions or any further assistance is needed please contact our office. Sincerely,         Signed By: Felix Dial.  Saúl Gutierrez MD    2022

## 2022-08-01 NOTE — TELEPHONE ENCOUNTER
Spoke to Ms. Spain to inform of EGD, Colonoscopy scheduled on Wednesday, August 10, 2022 at 10:30am/9:00am arrival  at [facility] with Dr. Samir Ro.

## 2022-08-03 ENCOUNTER — TELEPHONE (OUTPATIENT)
Dept: FAMILY MEDICINE CLINIC | Age: 57
End: 2022-08-03

## 2022-08-03 NOTE — TELEPHONE ENCOUNTER
----- Message from Lubna Melchor sent at 8/1/2022  2:20 PM EDT -----  Subject: Referral Request    Reason for referral request? Patient needs to get an order for a   diagnostic mammogram. Please reach out to patient. Provider patient wants to be referred to(if known):     Provider Phone Number(if known):     Additional Information for Provider?   ---------------------------------------------------------------------------  --------------  [id number] [employer]    [phone]; OK to leave message on voicemail  ---------------------------------------------------------------------------  --------------

## 2022-08-03 NOTE — TELEPHONE ENCOUNTER
----- Message from Kaylan Callejas sent at 8/1/2022  2:20 PM EDT -----  Subject: Referral Request    Reason for referral request? Patient needs to get an order for a   diagnostic mammogram. Please reach out to patient. Provider patient wants to be referred to(if known):     Provider Phone Number(if known):     Additional Information for Provider?   ---------------------------------------------------------------------------  --------------  [facility]    [phone]; OK to leave message on voicemail  ---------------------------------------------------------------------------  --------------

## 2022-08-05 NOTE — TELEPHONE ENCOUNTER
Pt called upset because she was told Dr. Gerald Patel could not clear her for surgery which is 9/14/2022 with Dr. ROSARIO [facility]. Pt advised that Dr. Gerald Patel can do her clearance but she needs to be cleared by cardiology and pulmonology and that she can be scheduled on 8/25/2022. Pt states she needs an appt for the early part of September. Pt states she had an appt with her cardiologist, Dr. Bruce Araujo, on 7/18/2022 and they are using that appt for her clearance. Pt states she will reach out to pulmonology on Monday. Pt also advised that the surgery coordinator needs to reach out to FP to talk with the nurse. Pt verbalized understanding and will call back Monday once she gets more information. Pt also needs an order for a diagnostic mammogram. Please advise.

## 2022-08-08 RX ORDER — BUDESONIDE 90 UG/1
AEROSOL, POWDER RESPIRATORY (INHALATION)
COMMUNITY

## 2022-08-08 NOTE — PERIOP NOTES
PRE-SURGICAL INSTRUCTIONS        Patient's Name:  Zoila Rodriguez      Today's Date:  8/8/2022            Covid Testing Date and Time:    Surgery Date:  8/10/2022                Do NOT eat or drink anything, including candy, gum, or ice chips after midnight on 8/9/2022, unless you have specific instructions from your surgeon or anesthesia provider to do so. You may brush your teeth before coming to the hospital.  No smoking 24 hours prior to the day of surgery. No alcohol 24 hours prior to the day of surgery. No recreational drugs for one week prior to the day of surgery. Leave all valuables, including money/purse, at home. Remove all jewelry, nail polish, acrylic nails, and makeup (including mascara); no lotions powders, deodorant, or perfume/cologne/after shave on the skin. Follow instruction for Hibiclens washes and CHG wipes from surgeon's office. Glasses/contact lenses and dentures may be worn to the hospital.  They will be removed prior to surgery. Call your doctor if symptoms of a cold or illness develop within 24-48 hours prior to your surgery. 11.  If you are having an outpatient procedure, please make arrangements for a responsible ADULT TO [address] [place] [address] and stay with you for 24 hours after your surgery. 12. ONE VISITOR in the hospital at this time for outpatient procedures. Exceptions may be made for surgical admissions, per nursing unit guidelines      Special Instructions:      Bring list of CURRENT medications. Bring inhaler. Bring CPAP machine. Bring any pertinent legal medical records. Take these medications the morning of surgery with a sip of water:  per MD  Follow physician instructions about insulin. Follow physician instructions about stopping anticoagulants. Complete bowel prep per MD instructions. On the day of surgery, come in the main entrance of [facility]. Let the  at the desk know you are there for surgery.   A staff member will come escort you to the surgical area on the second floor. If you have any questions or concerns, please do not hesitate to call:     (Prior to the day of surgery) PAT department:  [phone]   (Day of surgery) Pre-Op department:  [phone]    These surgical instructions were reviewed with patient during the PAT phone call.

## 2022-08-09 ENCOUNTER — ANESTHESIA EVENT (OUTPATIENT)
Dept: ENDOSCOPY | Age: 57
End: 2022-08-09
Payer: MEDICAID

## 2022-08-09 NOTE — TELEPHONE ENCOUNTER
Let pt know that ordered ct chest. I have received message from Dr. Miller/ pulmonlogist and will order ct scan. If no change no need to repeat further ct.

## 2022-08-09 NOTE — TELEPHONE ENCOUNTER
Spoke with patient (two identifiers verified) to follow-up on diagnostic mammogram and pre-op appointment request. Patient is requesting a diagnostic mammogram of her right breast due to nipple discharge; previous markers in right breast in past mammogram as well. She is aware I will route message to Dr. Nicole Hannon to request order. Patient is aware routine mammogram order was placed by Dr. Nicole Hannon on 7/26/22. She will complete mammogram [facility]. She will contact [facility] scheduling to request appointment. Upon review of patient's chart, she has an appointment at another family practice on 9/01/22 as a new patient for pre-op consult regarding Dr. Karine Hammer procedure on 9/14/22. However, patient stated she prefers to keep Dr. Nicole Hannon as her PCP and would like to have pre-op consult done at [facility]. She is unable to accept the pre-op appointment offered by Dr. Nicole Hannon for 8/25/22. Patient scheduled a pre-op appointment with Dr. Nicole Hannon on 9/06/22 at 11:45 AM. I advised I will need to contact Dr. Dubose Corporal surgery scheduler to coordinate pre-op appointment details with their office directly. The surgery scheduler will then reach out to her with instructions regarding pre-op preparation. Patient verbalized understanding. She has colonoscopy procedure scheduled for tomorrow; has asked me to inform surgery scheduler to contact her at another time regarding appointment instructions.

## 2022-08-09 NOTE — TELEPHONE ENCOUNTER
Spoke with patient (two identifiers verified) and she is requesting diagnostic mammogram of right breast due to nipple discharge. Per patient, previous markers in right breast from past mammogram, as well. Note: per Dr. Hayley Rawls, message regarding CT Chest order is an error. Please disregard.

## 2022-08-10 ENCOUNTER — HOSPITAL ENCOUNTER (OUTPATIENT)
Age: 57
Setting detail: OUTPATIENT SURGERY
Discharge: HOME | End: 2022-08-10
Attending: SURGERY | Admitting: SURGERY
Payer: MEDICAID

## 2022-08-10 ENCOUNTER — ANESTHESIA (OUTPATIENT)
Dept: ENDOSCOPY | Age: 57
End: 2022-08-10
Payer: MEDICAID

## 2022-08-10 VITALS
HEART RATE: 80 BPM | WEIGHT: 273 LBS | OXYGEN SATURATION: 95 % | SYSTOLIC BLOOD PRESSURE: 169 MMHG | HEIGHT: 67 IN | RESPIRATION RATE: 16 BRPM | DIASTOLIC BLOOD PRESSURE: 83 MMHG | BODY MASS INDEX: 42.85 KG/M2 | TEMPERATURE: 98.7 F

## 2022-08-10 DIAGNOSIS — N64.52 NIPPLE DISCHARGE: Primary | ICD-10-CM

## 2022-08-10 LAB — HCG UR QL: NEGATIVE

## 2022-08-10 PROCEDURE — 2709999900 HC NON-CHARGEABLE SUPPLY: Performed by: SURGERY

## 2022-08-10 PROCEDURE — 74011250636 HC RX REV CODE- 250/636: Performed by: NURSE ANESTHETIST, CERTIFIED REGISTERED

## 2022-08-10 PROCEDURE — 77030021593 HC FCPS BIOP ENDOSC BSC -A: Performed by: SURGERY

## 2022-08-10 PROCEDURE — 74011000250 HC RX REV CODE- 250: Performed by: NURSE ANESTHETIST, CERTIFIED REGISTERED

## 2022-08-10 PROCEDURE — 77030019988 HC FCPS ENDOSC DISP BSC -B: Performed by: SURGERY

## 2022-08-10 PROCEDURE — 88305 TISSUE EXAM BY PATHOLOGIST: CPT

## 2022-08-10 PROCEDURE — 43239 EGD BIOPSY SINGLE/MULTIPLE: CPT | Performed by: SURGERY

## 2022-08-10 PROCEDURE — 74011000250 HC RX REV CODE- 250: Performed by: SURGERY

## 2022-08-10 PROCEDURE — 00813 ANES UPR LWR GI NDSC PX: CPT | Performed by: NURSE ANESTHETIST, CERTIFIED REGISTERED

## 2022-08-10 PROCEDURE — 76040000007: Performed by: SURGERY

## 2022-08-10 PROCEDURE — 76060000032 HC ANESTHESIA 0.5 TO 1 HR: Performed by: SURGERY

## 2022-08-10 PROCEDURE — 45378 DIAGNOSTIC COLONOSCOPY: CPT | Performed by: SURGERY

## 2022-08-10 PROCEDURE — C1729 CATH, DRAINAGE: HCPCS | Performed by: SURGERY

## 2022-08-10 PROCEDURE — 81025 URINE PREGNANCY TEST: CPT

## 2022-08-10 PROCEDURE — 77030008565 HC TBNG SUC IRR ERBE -B: Performed by: SURGERY

## 2022-08-10 PROCEDURE — 00813 ANES UPR LWR GI NDSC PX: CPT | Performed by: ANESTHESIOLOGY

## 2022-08-10 RX ORDER — METOPROLOL TARTRATE 5 MG/5ML
INJECTION INTRAVENOUS AS NEEDED
Status: DISCONTINUED | OUTPATIENT
Start: 2022-08-10 | End: 2022-08-10 | Stop reason: HOSPADM

## 2022-08-10 RX ORDER — SODIUM CHLORIDE, SODIUM LACTATE, POTASSIUM CHLORIDE, CALCIUM CHLORIDE 600; 310; 30; 20 MG/100ML; MG/100ML; MG/100ML; MG/100ML
75 INJECTION, SOLUTION INTRAVENOUS CONTINUOUS
Status: DISCONTINUED | OUTPATIENT
Start: 2022-08-10 | End: 2022-08-10 | Stop reason: HOSPADM

## 2022-08-10 RX ORDER — PROPOFOL 10 MG/ML
INJECTION, EMULSION INTRAVENOUS AS NEEDED
Status: DISCONTINUED | OUTPATIENT
Start: 2022-08-10 | End: 2022-08-10 | Stop reason: HOSPADM

## 2022-08-10 RX ORDER — SODIUM CHLORIDE 0.9 % (FLUSH) 0.9 %
5-40 SYRINGE (ML) INJECTION EVERY 8 HOURS
Status: DISCONTINUED | OUTPATIENT
Start: 2022-08-10 | End: 2022-08-10 | Stop reason: HOSPADM

## 2022-08-10 RX ORDER — FENTANYL CITRATE 50 UG/ML
INJECTION, SOLUTION INTRAMUSCULAR; INTRAVENOUS AS NEEDED
Status: DISCONTINUED | OUTPATIENT
Start: 2022-08-10 | End: 2022-08-10 | Stop reason: HOSPADM

## 2022-08-10 RX ORDER — SODIUM CHLORIDE 0.9 % (FLUSH) 0.9 %
5-40 SYRINGE (ML) INJECTION AS NEEDED
Status: DISCONTINUED | OUTPATIENT
Start: 2022-08-10 | End: 2022-08-10 | Stop reason: HOSPADM

## 2022-08-10 RX ORDER — MAGNESIUM SULFATE 100 %
4 CRYSTALS MISCELLANEOUS AS NEEDED
Status: DISCONTINUED | OUTPATIENT
Start: 2022-08-10 | End: 2022-08-10 | Stop reason: HOSPADM

## 2022-08-10 RX ADMIN — PROPOFOL 150 MG: 10 INJECTION, EMULSION INTRAVENOUS at 10:24

## 2022-08-10 RX ADMIN — SODIUM CHLORIDE, POTASSIUM CHLORIDE, SODIUM LACTATE AND CALCIUM CHLORIDE 75 ML/HR: 600; 310; 30; 20 INJECTION, SOLUTION INTRAVENOUS at 09:40

## 2022-08-10 RX ADMIN — PROPOFOL 50 MG: 10 INJECTION, EMULSION INTRAVENOUS at 10:42

## 2022-08-10 RX ADMIN — FENTANYL CITRATE 50 MCG: 50 INJECTION, SOLUTION INTRAMUSCULAR; INTRAVENOUS at 10:21

## 2022-08-10 RX ADMIN — METOPROLOL TARTRATE 2 MG: 5 INJECTION, SOLUTION INTRAVENOUS at 10:39

## 2022-08-10 RX ADMIN — FENTANYL CITRATE 25 MCG: 50 INJECTION, SOLUTION INTRAMUSCULAR; INTRAVENOUS at 10:40

## 2022-08-10 RX ADMIN — PROPOFOL 50 MG: 10 INJECTION, EMULSION INTRAVENOUS at 10:52

## 2022-08-10 RX ADMIN — PROPOFOL 100 MG: 10 INJECTION, EMULSION INTRAVENOUS at 10:50

## 2022-08-10 RX ADMIN — PROPOFOL 50 MG: 10 INJECTION, EMULSION INTRAVENOUS at 11:04

## 2022-08-10 RX ADMIN — FENTANYL CITRATE 25 MCG: 50 INJECTION, SOLUTION INTRAMUSCULAR; INTRAVENOUS at 10:53

## 2022-08-10 RX ADMIN — PROPOFOL 50 MG: 10 INJECTION, EMULSION INTRAVENOUS at 10:37

## 2022-08-10 RX ADMIN — METOPROLOL TARTRATE 2 MG: 5 INJECTION, SOLUTION INTRAVENOUS at 10:34

## 2022-08-10 RX ADMIN — PROPOFOL 50 MG: 10 INJECTION, EMULSION INTRAVENOUS at 10:27

## 2022-08-10 RX ADMIN — SODIUM CHLORIDE, PRESERVATIVE FREE 10 ML: 5 INJECTION INTRAVENOUS at 09:42

## 2022-08-10 RX ADMIN — FAMOTIDINE 20 MG: 10 INJECTION INTRAVENOUS at 09:39

## 2022-08-10 RX ADMIN — METOPROLOL TARTRATE 1 MG: 5 INJECTION, SOLUTION INTRAVENOUS at 10:49

## 2022-08-10 NOTE — OP NOTES
Esophagogastroduodenoscopy with biopsy and Colonoscopy    Patient Name: Sinai Rodriguez     SURGERY DATE: 08/10/22     : 1965     AGE: 55 y.o. Anesthesiologist: Anesthesiologist: Cleda Gowers, DO  CRNA: Lacie Brian CRNA     Anesthesia: MAC     PreOp DX: Z86.010 HISTORY OF COLON POLYPS  R13.10 DYSPHAGIA     PostOp DX: antral gastritis, duodenitis, diverticulosis    Procedure: EGD with biopsy, colonoscopy    Procedure Details: After informed consent was obtained the patient was taken to the endoscopy suite and placed in the left lateral position. MAC was administered by anesthesia and titrated to effect. A bite block was then placed. The endoscope was placed through the bite block, down the oropharynx and into the esophagus. The esophagus was normal. The scope was then advanced into the stomach and air was used for insufflation. Antral gastritis was noted with no evidence of ulceration. Antral biopsy was obtained to test for H pylori. The scope was advanced through the pylorus down to the second portion of the duodenum and mild duodenitis was noted. Cold forceps biopsy was obtained. The scope was then withdrawn back through the pylorus and retroflexed upon itself. No abnormalities seen. The scope was then discontinued from the patient. A digital rectal exam was performed and was normal.  The colonoscope was then inserted in the rectum and CO2 was used for insufflation. The scope was then slowly advanced to the sigmoid, descending, transverse, ascending colon and into the cecum. The ileocecal valve was visualized. Bowel preparation was excellent we had good visualization of the entire lumen throughout the procedure. The colonoscope was then slowly withdrawn back through the colon and retroflexed in the rectum. There is no evidence of polyps seen throughout the procedure. There was scattered diverticuli seen without any evidence of diverticulitis in the sigmoid colon.  The scope was discontinued from the patient. The patient tolerated the procedure well and was sent to recovery in stable condition.     Estimated Blood Loss:  None    Specimens: antrum, duodenum           Complications: None           Disposition: tolerated procedure well, Dr. Blaire Martinez will call with pathology and further recommendations,Repeat colonoscopy in 10 years, follow up routine appointment as scheduled           Condition: Stable    Markie Cassidy DO

## 2022-08-10 NOTE — ANESTHESIA POSTPROCEDURE EVALUATION
Procedure(s):  COLONOSCOPY  ESOPHAGOGASTRODUODENOSCOPY (EGD)/ Biopsies. MAC    Anesthesia Post Evaluation      Multimodal analgesia: multimodal analgesia used between 6 hours prior to anesthesia start to PACU discharge  Patient location during evaluation: PACU  Patient participation: complete - patient participated  Level of consciousness: awake and alert  Pain management: adequate  Airway patency: patent  Anesthetic complications: no  Cardiovascular status: acceptable  Respiratory status: acceptable  Hydration status: acceptable  Post anesthesia nausea and vomiting:  controlled  Final Post Anesthesia Temperature Assessment:  Normothermia (36.0-37.5 degrees C)      INITIAL Post-op Vital signs:   Vitals Value Taken Time   /90 08/10/22 1137   Temp 36.7 °C (98 °F) 08/10/22 1120   Pulse 71 08/10/22 1138   Resp 17 08/10/22 1138   SpO2 100 % 08/10/22 1138   Vitals shown include unvalidated device data.

## 2022-08-10 NOTE — ANESTHESIA PREPROCEDURE EVALUATION
Relevant Problems   RESPIRATORY SYSTEM   (+) Asthma   (+) Community acquired pneumonia of left lung   (+) Dyspnea   (+) Mild intermittent asthma with acute exacerbation      NEUROLOGY   (+) Post traumatic stress disorder      CARDIOVASCULAR   (+) Acute on chronic diastolic congestive heart failure (HCC)   (+) Atypical angina (HCC)   (+) Congestive heart failure (HCC)   (+) Coronary artery disease   (+) Essential hypertension      GASTROINTESTINAL   (+) Gastroesophageal reflux disease      ENDOCRINE   (+) Acquired hypothyroidism   (+) Class 3 severe obesity due to excess calories with serious comorbidity and body mass index (BMI) of 50.0 to 59.9 in adult [facility])       Anesthetic History   No history of anesthetic complications            Review of Systems / Medical History  Patient summary reviewed and pertinent labs reviewed    Pulmonary            Asthma : well controlled       Neuro/Psych         Psychiatric history    Comments: depression Cardiovascular    Hypertension          CAD         GI/Hepatic/Renal     GERD      Liver disease     Endo/Other      Hypothyroidism  Morbid obesity     Other Findings   Comments: Chronic Back Pain         Physical Exam    Airway  Mallampati: III  TM Distance: 4 - 6 cm  Neck ROM: normal range of motion, short neck   Mouth opening: Normal     Cardiovascular  Regular rate and rhythm,  S1 and S2 normal,  no murmur, click, rub, or gallop             Dental      Comments: Edentulous upper   Pulmonary  Breath sounds clear to auscultation               Abdominal  GI exam deferred       Other Findings            Anesthetic Plan    ASA: 3  Anesthesia type: MAC          Induction: Intravenous  Anesthetic plan and risks discussed with: Patient

## 2022-08-10 NOTE — DISCHARGE INSTRUCTIONS
Discharge Instructions     Colonoscopy: What to Expect     After a colonoscopy you will need to be driven home by a friend or family member since you will still be recovering from sedation. It is also recommended that you have someone with you for the first 24 hours after you leave the hospital.    If you are experiencing nausea, your doctor may prescribe appropriate medication to help alleviate your symptoms. During the first 24 hours after your procedure you should:    -resume normal diet, unless otherwise directed  -refrain from driving or operating heavy machinery  -take any pain medications or stool softeners that have been prescribed by your doctor  -drink plenty of liquids  -avoid consuming alcohol  -rest  -avoid strenuous or heavy lifting    After 24 hours you can resume normal activities and eating habits. However, if polyps were removed during your procedure, you will need to change your activities for the next several days. Avoid running, heavy lifting, and unnecessary travel. Complications rarely arise after colonoscopy, but call your doctor immediately or go to your nearest emergency room if you experience any of the following symptoms:    -difficulty urinating or moving your bowels  -stools that are black or bloody  -difficulty breathing  -blood or bile in vomit  -abdominal tenderness          Prasanna Donaldson DO, MPH  Cell phone: [phone] [facility]  [address] [place] [address]  Office phone: [phone]  Fax: [phone]           Diverticulosis: Care Instructions  Your Care Instructions  In diverticulosis, pouches called diverticula form in the wall of the large intestine (colon). The pouches do not cause any pain or other symptoms. Most people who have diverticulosis do not know they have it. But the pouches sometimes bleed, and if they become infected, they can cause pain and other symptoms.  When this happens, it is called diverticulitis. Diverticula form when pressure pushes the wall of the colon outward at certain weak points. A diet that is too low in fiber can cause diverticula. Follow-up care is a key part of your treatment and safety. Be sure to make and go to all appointments, and call your doctor if you are having problems. It's also a good idea to know your test results and keep a list of the medicines you take. How can you care for yourself at home? Include fruits, leafy green vegetables, beans, and whole grains in your diet each day. These foods are high in fiber. Take a fiber supplement, such as Citrucel or Metamucil, every day if needed. Read and follow all instructions on the label. Drink plenty of fluids. If you have kidney, heart, or liver disease and have to limit fluids, talk with your doctor before you increase the amount of fluids you drink. Get at least 30 minutes of exercise on most days of the week. Walking is a good choice. You also may want to do other activities, such as running, swimming, cycling, or playing tennis or team sports. Cut out foods that cause gas, pain, or other symptoms. When should you call for help? Call your doctor now or seek immediate medical care if:    You have belly pain. You pass maroon or very bloody stools. You have a fever. You have nausea and vomiting. You have unusual changes in your bowel movements or abdominal swelling. You have burning pain when you urinate. You have abnormal vaginal discharge. You have shoulder pain. You have cramping pain that does not get better when you have a bowel movement or pass gas. You pass gas or stool from your urethra while urinating. Watch closely for changes in your health, and be sure to contact your doctor if you have any problems. Where can you learn more?   Go to http://www.gray.com/  Enter A789 in the search box to learn more about \"Diverticulosis: Care Instructions. \"  Current as of: September 8, 2021               Content Version: 13.2  © 3334-4872 [employer]. Care instructions adapted under license by [employer] (which disclaims liability or warranty for this information). If you have questions about a medical condition or this instruction, always ask your healthcare professional. [address] any warranty or liability for your use of this information. Hemorrhoids: Care Instructions  Overview     Hemorrhoids are swollen veins that develop in the anal canal. Bleeding during bowel movements, itching, and rectal pain are the most common symptoms. Hemorrhoids can be uncomfortable at times, but rarely are they a serious problem. Most of the time, you can treat them with simple changes to your diet and bowel habits. These changes include eating more fiber and not straining to pass stools. Most hemorrhoids don't need surgery or other treatment unless they are very large and painful or bleed a lot. Follow-up care is a key part of your treatment and safety. Be sure to make and go to all appointments, and call your doctor if you are having problems. It's also a good idea to know your test results and keep a list of the medicines you take. How can you care for yourself at home? Sit in a few inches of warm water (sitz bath) 3 times a day and after bowel movements. The warm water helps with pain and itching. Put ice on your anal area several times a day for 10 minutes at a time. Put a thin cloth between the ice and your skin. Follow this by placing a warm, wet towel on the area for another 10 to 20 minutes. Take pain medicines exactly as directed. If the doctor gave you a prescription medicine for pain, take it as prescribed. If you are not taking a prescription pain medicine, ask your doctor if you can take an over-the-counter medicine. Keep the anal area clean, but be gentle.  Use water and a fragrance-free soap, or use baby wipes or medicated pads such as Tucks. Wear cotton underwear and loose clothing to decrease moisture in the anal area. Eat more fiber. Include foods such as whole-grain breads and cereals, raw vegetables, raw and dried fruits, and beans. Drink plenty of fluids. If you have kidney, heart, or liver disease and have to limit fluids, talk with your doctor before you increase the amount of fluids you drink. Use a stool softener that contains bran or psyllium. You can save money by buying bran or psyllium (available in bulk at most health food stores) and sprinkling it on foods or stirring it into fruit juice. Or you can use a product such as Metamucil or Hydrocil. Practice healthy bowel habits. Go to the bathroom as soon as you have the urge. Avoid straining to pass stools. Relax and give yourself time to let things happen naturally. Do not hold your breath while passing stools. Do not read while sitting on the toilet. Get off the toilet as soon as you have finished. Take your medicines exactly as prescribed. Call your doctor if you think you are having a problem with your medicine. When should you call for help? Call 911 anytime you think you may need emergency care. For example, call if:    You pass maroon or very bloody stools. Call your doctor now or seek immediate medical care if:    You have increased pain. You have increased bleeding. Watch closely for changes in your health, and be sure to contact your doctor if:    Your symptoms have not improved after 3 or 4 days. Where can you learn more? Go to http://www.apple.com/  Enter F228 in the search box to learn more about \"Hemorrhoids: Care Instructions. \"  Current as of: September 8, 2021               Content Version: 13.2  © 2006-2022 [employer]. Care instructions adapted under license by [employer] (which disclaims liability or warranty for this information).  If you have questions about a medical condition or this instruction, always ask your healthcare professional. [address] any warranty or liability for your use of this information. Gastritis: Care Instructions  Your Care Instructions     Gastritis is a sore and upset stomach. It happens when something irritates the stomach lining. Many things can cause it. These include an infection such as the flu or something you ate or drank. Medicines or a sore on the lining of the stomach (ulcer) also can cause it. Your belly may bloat and ache. You may belch, vomit, and feel sick to your stomach. You should be able to relieve the problem by taking medicine. And it may help to change your diet. If gastritis lasts, your doctor may prescribe medicine. Follow-up care is a key part of your treatment and safety. Be sure to make and go to all appointments, and call your doctor if you are having problems. It's also a good idea to know your test results and keep a list of the medicines you take. How can you care for yourself at home? If your doctor prescribed antibiotics, take them as directed. Do not stop taking them just because you feel better. You need to take the full course of antibiotics. Be safe with medicines. If your doctor prescribed medicine to decrease stomach acid, take it as directed. Call your doctor if you think you are having a problem with your medicine. Do not take any other medicine, including over-the-counter pain relievers, without talking to your doctor first.  If your doctor recommends over-the-counter medicine to reduce stomach acid, such as Pepcid AC (famotidine), Prilosec (omeprazole), or Tagamet HB (cimetidine) follow the directions on the label. Drink plenty of fluids to prevent dehydration. Choose water and other clear liquids. If you have kidney, heart, or liver disease and have to limit fluids, talk with your doctor before you increase the amount of fluids you drink.   Avoid foods that make your symptoms worse. These may include chocolate, mint, alcohol, pepper, spicy foods, high-fat foods, or drinks with caffeine in them, such as tea, coffee, juan m, or energy drinks. If your symptoms are worse after you eat a certain food, you may want to stop eating it to see if your symptoms get better. When should you call for help? Call 911 anytime you think you may need emergency care. For example, call if:    You vomit blood or what looks like coffee grounds. You pass maroon or very bloody stools. Call your doctor now or seek immediate medical care if:    You start breathing fast and have not produced urine in the last 8 hours. You cannot keep fluids down. Watch closely for changes in your health, and be sure to contact your doctor if:    You do not get better as expected. Where can you learn more? Go to http://www.apple.com/  Enter Z536 in the search box to learn more about \"Gastritis: Care Instructions. \"  Current as of: September 8, 2021               Content Version: 13.2  © 2006-2022 [employer]. Care instructions adapted under license by [employer] (which disclaims liability or warranty for this information). If you have questions about a medical condition or this instruction, always ask your healthcare professional. [address] any warranty or liability for your use of this information.

## 2022-08-10 NOTE — INTERVAL H&P NOTE
Update History & Physical    The Patient's History and Physical of August 10, 2022 was reviewed with the patient and I examined the patient. There was no change. The surgical site was confirmed by the patient and me. Plan:  The risk, benefits, expected outcome, and alternative to the recommended procedure have been discussed with the patient. Patient understands and wants to proceed with the procedure.     Electronically signed by Tennille Tamayo DO on 8/10/2022 at 9:20 AM

## 2022-08-11 ENCOUNTER — OFFICE VISIT (OUTPATIENT)
Dept: SURGERY | Age: 57
End: 2022-08-11
Payer: MEDICAID

## 2022-08-11 VITALS
RESPIRATION RATE: 18 BRPM | HEIGHT: 67 IN | HEART RATE: 80 BPM | BODY MASS INDEX: 42.85 KG/M2 | TEMPERATURE: 97.6 F | WEIGHT: 273 LBS | SYSTOLIC BLOOD PRESSURE: 168 MMHG | OXYGEN SATURATION: 97 % | DIASTOLIC BLOOD PRESSURE: 78 MMHG

## 2022-08-11 DIAGNOSIS — S31.105D OPEN WOUND OF UMBILICAL REGION, SUBSEQUENT ENCOUNTER: Primary | ICD-10-CM

## 2022-08-11 DIAGNOSIS — K21.9 CHRONIC GERD: ICD-10-CM

## 2022-08-11 DIAGNOSIS — M79.3 PANNICULITIS: ICD-10-CM

## 2022-08-11 PROCEDURE — 99213 OFFICE O/P EST LOW 20 MIN: CPT | Performed by: SURGERY

## 2022-08-11 RX ORDER — PANTOPRAZOLE SODIUM 40 MG/1
40 TABLET, DELAYED RELEASE ORAL 2 TIMES DAILY
Qty: 60 TABLET | Refills: 1 | Status: SHIPPED | OUTPATIENT
Start: 2022-08-11 | End: 2022-09-10

## 2022-08-11 NOTE — PROGRESS NOTES
CC:   Chief Complaint   Patient presents with    Follow-up     Superficial ulceration of umbilicus skin with drainage        Assessment:    ICD-10-CM ICD-9-CM    1. Open wound of umbilical region, subsequent encounter  S31.105D V58.89 REFERRAL TO PLASTIC SURGERY     879.2       2. Panniculitis  M79.3 729.30 REFERRAL TO PLASTIC SURGERY      3. Chronic GERD  K21.9 530.81           Plan: We will place another consult for home health there was an issue with her insurance and previous home health agency. I recommended [employer] rope packing every other day to this area. Regarding her colonoscopy she is good for another 10 years unless she has changes to her bowel habits. Pathology from EGD was reviewed with her and this was benign. Her symptoms are related to GERD which is poorly controlled. We will try her on protonix 40mg twice a day and if this works her PCM can prescribe this to her long term. She is requesting referral for panniculectomy. I will place referral to plastics Dr. Cecilia Godfrey to see if she is appropriate candidate. She agrees with this plan and will call in the future if she has questions or concerns. HPI: She is here today for follow up for her umbilicus. She states home health never contacted her to set up wound care and the area looks worse since last visit. She continues to pack the area with cotton balls. Moderate amount of drainage from her skin. 7/29/2022  Joslyn Rodriguez is a 55 y.o. female who is referred by [facility] for umbilical wound. She states she has issues with drainage since March 2022. She has applied ointment and taken doxycyline as directed by dermatology with no improvement of her symptoms. She changes the dressing frequently as she knows her pannus makes this a challenge to heal. She has lost approximately 100 pounds on her own and deals with frequent skin infections for excess weight in this area. She also states she is overdue for an EGD and colonoscopy.  She does have a history of colon polyps. No family history of colon cancer. No changes to bowel or bladder habits. She does have bad acid reflux and feels a sticking sensation in her throat. She often awakes in the morning with a cough. She does take medication for acid reflux but feels this is not helping her symptoms. Allergies: Allergies   Allergen Reactions    Latex, Natural Rubber Itching    Latex Itching    Ansaid [Flurbiprofen] Anaphylaxis    Iron Dextran Anaphylaxis and Itching    Levofloxacin Myalgia     Tendon rupture  Tendon rupture    Adhesive Rash    Codeine Itching and Rash    Keflex [Cephalexin] Hives and Itching    Penicillins Other (comments)       Medication Review:  Current Outpatient Medications on File Prior to Visit   Medication Sig Dispense Refill    budesonide (Pulmicort Flexhaler) 90 mcg/actuation aepb inhaler Pulmicort Flexhaler 90 mcg/actuation breath activated   INHALE 2 PUFFS BY MOUTH TWICE DAILY      spironolactone (ALDACTONE) 25 mg tablet Take 1 Tablet by mouth daily. 30 Tablet 2    atorvastatin (LIPITOR) 20 mg tablet Take 2 Tablets by mouth daily. Increase per Dr. Alieca 30 Tablet 2    magnesium oxide (MAG-OX) 400 mg tablet Take 1 Tablet by mouth two (2) times a day. 180 Tablet 1    lisinopriL (PRINIVIL, ZESTRIL) 2.5 mg tablet Take 1 Tablet by mouth daily. 90 Tablet 0    doxycycline (VIBRAMYCIN) 100 mg capsule       DULoxetine (CYMBALTA) 60 mg capsule Take 1 Capsule by mouth nightly. Indications: neuropathic pain 90 Capsule 3    cyanocobalamin 1,000 mcg tablet Take 1 Tablet by mouth daily. 90 Tablet 1    lansoprazole (PREVACID) 30 mg capsule Take 1 Capsule by mouth Daily (before breakfast). 90 Capsule 1    triamcinolone acetonide (KENALOG) 0.1 % topical cream Apply  to affected area two (2) times a day. use thin layer 45 g 5    levothyroxine (Euthyrox) 100 mcg tablet Take 1 Tablet by mouth Daily (before breakfast).  90 Tablet 1    clotrimazole (LOTRIMIN) 1 % topical cream Apply  to affected area two (2) times a day. 24 g 0    aspirin 81 mg chewable tablet       albuterol (PROVENTIL HFA, VENTOLIN HFA, PROAIR HFA) 90 mcg/actuation inhaler Take 2 Puffs by inhalation every four (4) hours as needed for Wheezing. 1 Inhaler 0    furosemide (Lasix) 40 mg tablet Take 1 Tab by mouth daily. 90 Tab 1    EPINEPHrine HCl, PF, (ADRENALIN) 1 mg/mL (1 mL) injection once.  traMADoL (ULTRAM) 50 mg tablet Take 50 mg by mouth as needed for Pain.  cetirizine (ZYRTEC) 10 mg tablet Take 10 mg by mouth two (2) times a day. No current facility-administered medications on file prior to visit. Systems Review:  Review of Systems   Constitutional:  Negative for appetite change, fatigue, fever and unexpected weight change. HENT:  Positive for trouble swallowing. Respiratory:  Negative for chest tightness and shortness of breath. Cardiovascular:  Negative for chest pain. Gastrointestinal:  Negative for abdominal pain and blood in stool.      PMH:  Past Medical History:   Diagnosis Date    Anemia     Angio-edema     Asthma     Depression     Diverticulosis     Enlargement, spleen     Esophagospasm     Essential hypertension     Fatty liver     GERD (gastroesophageal reflux disease)     Histoplasmosis     HPV (human papilloma virus) anogenital infection     Lyme disease     Neuropathy     PCOS (polycystic ovarian syndrome)     PTSD (post-traumatic stress disorder)     Tachycardia     Thyroid disease     Tricuspid insufficiency        Surgical History:  Past Surgical History:   Procedure Laterality Date    COLONOSCOPY N/A 8/10/2022    COLONOSCOPY performed by Adelene Litten, DO at [address] HX CHOLECYSTECTOMY      [address] [facility] [address]  2010 approx    normal per patient    HX HEMORRHOIDECTOMY      HX OTHER SURGICAL      Vocal cord polyps       Social History:  Social History     Socioeconomic History    Marital status: SINGLE Tobacco Use    Smoking status: Every Day     Packs/day: 0.50     Years: 15.00     Pack years: 7.50     Types: Cigarettes     Start date: 5    Smokeless tobacco: Never    Tobacco comments:     less than 10 depending on the day   Vaping Use    Vaping Use: Former   Substance and Sexual Activity    Alcohol use: Not Currently    Drug use: Not Currently     Types: Marijuana    Sexual activity: Not Currently     Partners: Male       Family History:  Family History   Problem Relation Age of Onset    Cancer Mother         stomach    Seizures Mother     Cancer Sister         double mastectomy    Cancer Sister         lung    Heart Surgery Maternal Grandmother         CABG    Stroke Neg Hx        Admission on 08/10/2022, Discharged on 08/10/2022   Component Date Value Ref Range Status    Pregnancy test,urine (POC) 08/10/2022 Negative  NEG   Final    Test results should be confirmed using serum quantitative hCG when detection of pregnancy is critical and before performing any critical medical procedure. Telephone on 07/14/2022   Component Date Value Ref Range Status    Triglyceride 07/16/2022 307 (A) 40 - 149 mg/dL Final    HDL Cholesterol 07/16/2022 34 (A) >=40 mg/dL Final    Cholesterol, total 07/16/2022 199  110 - 200 mg/dL Final    CHOLESTEROL/HDL 07/16/2022 5.9  0.0 - 5.0 Final    Non-HDL Cholesterol 07/16/2022 165 (A) <130 mg/dL Final    LDL, calculated 07/16/2022 104 (A) 50 - 99 mg/dL Final    VLDL, calculated 07/16/2022 61 (A) 8 - 30 mg/dL Final    LDL/HDL Ratio 07/16/2022 3.1   Final    Comment: Test includes cholesterol, HDL cholesterol, triglycerides and LDL. Cholesterol Recommended NCEP guidelines in mg/dL:    Less than 200            Desirable  200 - 239                Borderline High  Greater than or  = 240   High      Please Note:  Total Chol/HDL Ratio                     Men     Women  1/2 Avg. Risk    3.4     3.3      Avg. Risk    5.0     4.4  2X  Avg.  Risk    9.6     7.1  3X Avg. Risk   23.4    11.0            Glucose 07/16/2022 110 (A) 70 - 99 mg/dL Final    BUN 07/16/2022 14  6 - 22 mg/dL Final    Creatinine 07/16/2022 0.9  0.5 - 1.2 mg/dL Final    Sodium 07/16/2022 140  133 - 145 mmol/L Final    Potassium 07/16/2022 3.8  3.5 - 5.5 mmol/L Final    Chloride 07/16/2022 102  98 - 110 mmol/L Final    CO2 07/16/2022 26  20 - 32 mmol/L Final    Calcium 07/16/2022 9.8  8.4 - 10.5 mg/dL Final    GLOMERULAR FILTRATION RATE 07/16/2022 >60.0  >60.0 mL/min/1.73 sq.m. Final    Comment: eGFR calculation based on the Chronic Kidney Disease Epidemiology   Collaboration  (CKD-EPI) equation refit without adjustment for race. This eGFR is validated for stable chronic renal failure patients. This   equation  is unreliable in acute illness or patients with normal renal function.  Anion gap 07/16/2022 12.0  3.0 - 15.0 mmol/L Final    Comment: Anion Gap calculation based on electrolyte reference ranges. Test includes BUN, CO2, Chloride, Creatinine, Glucose, Potassium, Calcium and  Sodium.  AST (SGOT) 07/16/2022 16  10 - 37 U/L Final    ALT (SGPT) 07/16/2022 16  5 - 40 U/L Final    Comment: Test includes ALT, Alkaline Phosphatase, AST, Total Protein, Direct Bilirubin,  Total Bilirubin and Albumin.  Alk. phosphatase 07/16/2022 103  25 - 115 U/L Final    Bilirubin, total 07/16/2022 0.3  0.2 - 1.2 mg/dL Final    Bilirubin, direct 07/16/2022 <0.2  0.0 - 0.3 mg/dL Final    Protein, total 07/16/2022 6.7  6.4 - 8.3 g/dL Final    Albumin 07/16/2022 3.8  3.5 - 5.0 g/dL Final    A-G Ratio 07/16/2022 1.3  1.1 - 2.6 ratio Final    Globulin 07/16/2022 2.9  2.0 - 4.0 g/dL Final       NUCLEAR CARDIAC STRESS TEST  · Baseline ECG: Sinus rhythm, interventricular conduction delay. · Stress test: Stress EKG normal.  · SPECT: Left ventricular function post-stress was normal. Calculated   ejection fraction is 84% (normal EF value is equal to or greater than   50%).  Left ventricular wall motion was normal at stress, no regional wall   motion abnormality noted. The TID ratio is 1.21.  · SPECT: Myocardial perfusion imaging defect 1: There is a defect that is   medium in size with a moderate reduction in uptake present in the   basal-mid and apical segment(s) of the inferior wall(s) that is partially   reversible. There is normal wall motion in the defect area. Viability in   the area is good. The defect appears to be ischemia. · SPECT: Left ventricular perfusion is abnormal. Myocardial perfusion   imaging supports an intermediate risk stress test.            Physical Exam:  Visit Vitals  BP (!) 168/78   Pulse 80   Temp 97.6 °F (36.4 °C)   Resp 18   Ht 5' 7\" (1.702 m)   Wt 123.8 kg (273 lb)   SpO2 97%   BMI 42.76 kg/m²    BMI: Body mass index is 42.76 kg/m². Physical Exam  Constitutional:       Appearance: Normal appearance. She is obese. Eyes:      Extraocular Movements: Extraocular movements intact. Pupils: Pupils are equal, round, and reactive to light. Cardiovascular:      Rate and Rhythm: Normal rate. Pulmonary:      Effort: Pulmonary effort is normal.   Abdominal:      Palpations: Abdomen is soft. Tenderness: There is no abdominal tenderness. Comments: Large pannus with panniculitis noted, ulceration of skin inside umbilicus with mild-moderate serous drainage and small amount of slough in bed of wound no foul order, no periwound erythema   Neurological:      General: No focal deficit present. Mental Status: She is alert and oriented to person, place, and time. Mental status is at baseline. Psychiatric:         Mood and Affect: Mood normal.         Behavior: Behavior normal.         Thought Content: Thought content normal.       I have reviewed the information entered by the clinical staff and/or patient and verified it as accurate or edited where necessary.      Electronically signed by:    Ben Yun DO, MPH

## 2022-08-12 NOTE — TELEPHONE ENCOUNTER
I called [facility] and asked  Herbert James if I could speak with Dr. Yessi Washington surgery scheduler. She transferred call to  Jovita Camacho, but there was no answer. I left a voice message asking Jovita Camacho to return call to [facility] regarding patient's pre-op appointment with Dr. Gerald Patel for Dr. Cora Rivera procedure on 9/14/22, which is scheduled on 9/06/22. Jovita Camacho was asked to return call to [facility], [phone] press option # 3 for . Ask to speak with Randi.

## 2022-08-18 ENCOUNTER — OFFICE VISIT (OUTPATIENT)
Dept: NEUROLOGY | Age: 57
End: 2022-08-18
Payer: MEDICAID

## 2022-08-18 VITALS
SYSTOLIC BLOOD PRESSURE: 160 MMHG | RESPIRATION RATE: 18 BRPM | OXYGEN SATURATION: 97 % | BODY MASS INDEX: 42.69 KG/M2 | HEIGHT: 67 IN | HEART RATE: 94 BPM | WEIGHT: 272 LBS | DIASTOLIC BLOOD PRESSURE: 100 MMHG

## 2022-08-18 DIAGNOSIS — R26.89 IMBALANCE: ICD-10-CM

## 2022-08-18 DIAGNOSIS — G62.9 NEUROPATHY: ICD-10-CM
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
## 2022-08-18 DIAGNOSIS — F32.A DEPRESSION, UNSPECIFIED DEPRESSION TYPE: ICD-10-CM

## 2022-08-18 DIAGNOSIS — R55 SYNCOPE, UNSPECIFIED SYNCOPE TYPE: ICD-10-CM

## 2022-08-18 DIAGNOSIS — G25.81 RESTLESS LEGS: Primary | ICD-10-CM

## 2022-08-18 PROCEDURE — 99215 OFFICE O/P EST HI 40 MIN: CPT | Performed by: NURSE PRACTITIONER

## 2022-08-18 RX ORDER — DULOXETIN HYDROCHLORIDE 60 MG/1
60 CAPSULE, DELAYED RELEASE ORAL 2 TIMES DAILY
Qty: 180 CAPSULE | Refills: 1 | Status: SHIPPED | OUTPATIENT
Start: 2022-08-18 | End: 2022-10-12 | Stop reason: SDUPTHER

## 2022-08-18 NOTE — PATIENT INSTRUCTIONS
Patient instructions:  -duloxetine/ Cymbalta to 60 mg twice daily  -please have labs drawn   -EEG  -MRI brain  -Please call cardiology for follow-up appointment for syncope  -starting PT next week  -psychiatry referral to [facility]  -Follow up in 6 weeks

## 2022-08-18 NOTE — PROGRESS NOTES
[facility]  [address], [address], [place], [address]  [address] [facility], [address]  Office:  [phone]  Fax: [phone]  Chief Complaint   Patient presents with    Follow-up       HPI: Fernando Shafer presents in follow-up for neuropathy. She was seen here in initial evaluation in July 2021 by [facility] in virtual video visit. History per note is as follows. She reported she has a history of neuropathy starting years ago. She was originally seeing a neurologist in [place] around 6 years ago. Originally she had intermittent numbness starting to the thigh. Would have infrequent sharp shooting pains in the feet. Then symptoms have progressed and become more persistent. She was having numbness, burning, and tingling in the feet going up to the shin. Having some intermittent pains in the hands. The neuropathic pain is affecting her ability to sleep. She was only getting 2 to 4 hours of sleep a day. Reported she was tried on gabapentin in the past which was as high as 300 mg 3 times a day and did not tolerate this. Her former neurologist Dr. Mario Alberto Pierre lowered it to 100 mg and she did not tolerate the lower dose of gabapentin. She did not like how it made her feel drowsy. Reported also being on amitriptyline in the past but unsure of the dose. It did help her sleep but did not provide much help for neuropathic pain in the daytime. She had been prescribed vitamin B-12 and vitamin D she felt like she cannot control her feet at times. She was seeing an orthopedist for back pain and knee pain, Dr. Christiano Churchill and Dr. Jo Willingham. She also sees Dr. Johanna Uribe for her ankle pain. She mentioned that orthopedist mention to stim router may aid in neuropathy and she was going to follow-up with them regarding this. She has had lower extremity EMGs in the past by Dr. Mario Alberto Pierre. She had previous imaging of the spine.   She endorsed PVD and was being seen by vascular specialist for this. Denied diabetes. Denied history of chemotherapy or radiation. Denied family history of neuropathy. Endorsed tobacco use. The records were to be obtained from her prior neurologist.  At the visit in September 2021 it was noted that partial records were reviewed from her former neurologist.  It did not include bilateral lower extremity EMGs so these were to be obtained. She endorsed left hand numbness that has been transient and only lasting for 4 to 5 minutes. Bilateral upper extremity EMGs were ordered. Lyrica was started at bedtime. EMG of the bilateral upper extremities reported moderate to severe median mononeuropathy at the right wrist, mild ulnar mononeuropathy at the right elbow, moderate to severe median mononeuropathy at the right wrist, and mild to moderate ulnar mononeuropathy at the left elbow. It was noted that she was now on Cymbalta 30 mg daily but had concerns due to fatty liver. She was going to have surgery with Dr. Matteo Caal of [facility] and she was going to have lower extremities EMG. She was seen here in January 2022. It was noted that she had an unremarkable AST and ALT on last CMP. Cymbalta was increased to 60 mg daily. She was last seen here on 5/20/2022 by [facility]. Cymbalta was increased to 30 mg in a.m. and 90 mg in p.m. She was referred to physical therapy for balance. She presents in follow-up. Reports not doing too well; balance is off, falling, falls and hits her head, back issues, insurance won't cover surgery for her back unless she does PT first.  Reports she's had injections, they don't last long. Going to [facility]. Dr. Cora Rivera for spine and Dr. Sunni Carvajal for injections. Using the RW as of about 2 months ago. Just wants the pain to stop; she can't sleep, she falls. Uses the RW on bad days. Uses the cane when feeling better. Using the walker for the last few days. She reports the increase in Cymbalta helped initially but doesn't seem to do anything.   She is actually taking only 60 mg nightly, not the increased dose currently, something happened to the prescription for the 30 mg pills. Taking gabapentin 300 mg at night. On vitamin B12 oral supplement. Had infusions in the past of iron, B12, others in there, this was in [place]. But allergic to iv iron so needed premed. Reports she has a spider bite on her abdomen that she's been dealing with, has seen derm and a surgeon. Has to  tramadol, it doesn't work much. Recently taking 100 mg every 8 hours, it's not working. The pain is mainly low back, waist down. Also neuropathy and she restless legs. Reports restless legs, they jump at night. Family history in her mother- had cerebral AVM and epilepsy. Past Medical History:   Diagnosis Date    Anemia     Angio-edema     Asthma     Depression     Diverticulosis     Enlargement, spleen     Esophagospasm     Essential hypertension     Fatty liver     GERD (gastroesophageal reflux disease)     Histoplasmosis     HPV (human papilloma virus) anogenital infection     Lyme disease     Neuropathy     PCOS (polycystic ovarian syndrome)     PTSD (post-traumatic stress disorder)     Tachycardia     Thyroid disease     Tricuspid insufficiency        Past Surgical History:   Procedure Laterality Date    COLONOSCOPY N/A 8/10/2022    COLONOSCOPY performed by Dana Araiza,  at [address]  2010 approx    normal per patient    HX HEMORRHOIDECTOMY      HX OTHER SURGICAL      Vocal cord polyps       Current Outpatient Medications   Medication Sig Dispense Refill    DULoxetine (CYMBALTA) 60 mg capsule Take 1 Capsule by mouth two (2) times a day. Indications: neuropathic pain 180 Capsule 1    pantoprazole (PROTONIX) 40 mg tablet Take 1 Tablet by mouth two (2) times a day for 30 days.  60 Tablet 1    budesonide (Pulmicort Flexhaler) 90 mcg/actuation aepb inhaler Pulmicort Flexhaler 90 mcg/actuation breath activated   INHALE 2 PUFFS BY MOUTH TWICE DAILY      spironolactone (ALDACTONE) 25 mg tablet Take 1 Tablet by mouth daily. 30 Tablet 2    atorvastatin (LIPITOR) 20 mg tablet Take 2 Tablets by mouth daily. Increase per Dr. Salcido Shown 30 Tablet 2    magnesium oxide (MAG-OX) 400 mg tablet Take 1 Tablet by mouth two (2) times a day. 180 Tablet 1    lisinopriL (PRINIVIL, ZESTRIL) 2.5 mg tablet Take 1 Tablet by mouth daily. 90 Tablet 0    doxycycline (VIBRAMYCIN) 100 mg capsule       cyanocobalamin 1,000 mcg tablet Take 1 Tablet by mouth daily. 90 Tablet 1    lansoprazole (PREVACID) 30 mg capsule Take 1 Capsule by mouth Daily (before breakfast). 90 Capsule 1    triamcinolone acetonide (KENALOG) 0.1 % topical cream Apply  to affected area two (2) times a day. use thin layer 45 g 5    levothyroxine (Euthyrox) 100 mcg tablet Take 1 Tablet by mouth Daily (before breakfast). 90 Tablet 1    clotrimazole (LOTRIMIN) 1 % topical cream Apply  to affected area two (2) times a day. 24 g 0    aspirin 81 mg chewable tablet       albuterol (PROVENTIL HFA, VENTOLIN HFA, PROAIR HFA) 90 mcg/actuation inhaler Take 2 Puffs by inhalation every four (4) hours as needed for Wheezing. 1 Inhaler 0    furosemide (Lasix) 40 mg tablet Take 1 Tab by mouth daily. 90 Tab 1    EPINEPHrine HCl, PF, (ADRENALIN) 1 mg/mL (1 mL) injection once. cetirizine (ZYRTEC) 10 mg tablet Take 10 mg by mouth two (2) times a day. traMADoL (ULTRAM) 50 mg tablet Take 50 mg by mouth as needed for Pain.  (Patient not taking: Reported on 8/18/2022)          Allergies   Allergen Reactions    Latex, Natural Rubber Itching    Latex Itching    Ansaid [Flurbiprofen] Anaphylaxis    Iron Dextran Anaphylaxis and Itching    Levofloxacin Myalgia     Tendon rupture  Tendon rupture    Adhesive Rash    Codeine Itching and Rash    Keflex [Cephalexin] Hives and Itching    Penicillins Other (comments)       Social History     Tobacco Use    Smoking status: Every Day     Packs/day: 0.50     Years: 15.00 Pack years: 7.50     Types: Cigarettes     Start date: 1985    Smokeless tobacco: Never    Tobacco comments:     less than 10 depending on the day   Vaping Use    Vaping Use: Former   Substance Use Topics    Alcohol use: Not Currently    Drug use: Not Currently     Types: Marijuana       Family History   Problem Relation Age of Onset    Cancer Mother         stomach    Seizures Mother     Cancer Sister         double mastectomy    Cancer Sister         lung    Heart Surgery Maternal Grandmother         CABG    Stroke Neg Hx        Review of Systems:  GENERAL: Denies fever or fatigue  CARDIAC: No CP or SOB  PULMONARY: No cough or SOB  MUSCULOSKELETAL: No new joint pain. +chronic pain as above/ low back pain. NEURO: SEE HPI    Physical Examination:  Visit Vitals  BP (!) 160/100   Pulse 94   Resp 18   Ht 5' 7\" (1.702 m)   Wt 123.4 kg (272 lb)   SpO2 97%   BMI 42.60 kg/m²       Alert, in NAD. Heart is regular. Oriented x3. Speech is fluent. Speech clear. EOMs are full, PERRL, VFFTC, no nystagmus. No facial asymmetry. Tongue is midline. Strength and tone are normal. No drift of the bilateral upper extremities. Fine finger movements symmetrical. FNF intact bilaterally with RUE action tremor on FNF. Decreased PP in a stocking-glove distribution. Lower legs discoloration. Antalgic gait. Impression/Plan: This is a 73-year-old left-handed female who presents in follow-up for neuropathy. She was seen here previously by [facility]. At the last visit Cymbalta was to be increased to 30 mg in a.m. and 90 mg in p.m. She reports the dose increased helped initially but she feels like it is not helping now but she is actually only taking 60 mg nightly as she did not have the 30 mg dosage strength pill any longer. Will plan to use 60 mg twice daily and hopefully this will help for the pain. She is also on gabapentin 300 mg nightly per ortho. She is following up with her orthopedics specialists.   We will obtain an iron panel and ferritin as she reports restless leg symptoms. She had a vitamin B12 level and hemoglobin A1c normal in January. [facility] Endorses she inspects her feet daily. Additional history of falls is that they occur when she just 'passes out'. Reports one of them was from walking when it was caused by the dog running into her, but for other falls she reports she can just fall out of the chair or in the bathroom and she just passes out without warning. Reports she was on pain medications but not any longer because she ran out and the passing out occurs. Reports it started in the past month. We will obtain an EEG and MRI brain. Advised to call for a sooner visit to follow up with her cardiologist due to syncope and also soon for her elevated BP with cardiology or PCP. Encouraged smoking cessation. She feels depressed because of pain and reports she feels like she just has a poor quality of life due to her significant pain. Denies SI. She is interested in proceeding with psychiatry referral.  Follow up in 6 weeks. Diagnoses and all orders for this visit:    1. Restless legs  -     IRON PROFILE; Future  -     FERRITIN; Future  -     CBC WITH AUTOMATED DIFF; Future    2. Neuropathy    3. Fall, subsequent encounter    4. Imbalance    5. Syncope, unspecified syncope type  -     EEG AWAKE AND ASLEEP; Future  -     MRI BRAIN WO CONT; Future    6. Depression, unspecified depression type  -     REFERRAL TO PSYCHIATRY    Other orders  -     DULoxetine (CYMBALTA) 60 mg capsule; Take 1 Capsule by mouth two (2) times a day. Indications: neuropathic pain    Total time 45 minutes with 25 minutes spent in counseling. Signed By: Bijan Ramirez NP        PLEASE NOTE:   Portions of this document may have been produced using voice recognition software. Unrecognized errors in transcription may be present.

## 2022-08-19 DIAGNOSIS — R55 SYNCOPE, UNSPECIFIED SYNCOPE TYPE: ICD-10-CM

## 2022-09-02 NOTE — TELEPHONE ENCOUNTER
Left a voice message for Calista Mckinney, Dr. Fabiola Rodriguez surgery scheduler, advising patient canceled her pre-op appointment that was scheduled with Dr. Tarun Toussaint on 9/06/22 for upcoming surgery with Dr. ROSARIO [facility] on 9/14/22. Patient did not reschedule pre-op appointment. Calista Mckinney was asked to contact [facility] to reschedule pre-op appointment for patient, if it is needed. Please contact our office at [phone]. Ask to speak with Randi.

## 2022-09-08 ENCOUNTER — HOSPITAL ENCOUNTER (OUTPATIENT)
Dept: ULTRASOUND IMAGING | Age: 57
Discharge: HOME | End: 2022-09-08
Attending: FAMILY MEDICINE
Payer: MEDICAID

## 2022-09-08 ENCOUNTER — HOSPITAL ENCOUNTER (OUTPATIENT)
Dept: MAMMOGRAPHY | Age: 57
Discharge: HOME | End: 2022-09-08
Attending: FAMILY MEDICINE
Payer: MEDICAID

## 2022-09-08 DIAGNOSIS — N64.52 NIPPLE DISCHARGE: ICD-10-CM

## 2022-09-08 PROCEDURE — 76642 ULTRASOUND BREAST LIMITED: CPT

## 2022-09-08 PROCEDURE — 77066 DX MAMMO INCL CAD BI: CPT

## 2022-09-09 ENCOUNTER — PATIENT MESSAGE (OUTPATIENT)
Dept: SURGERY | Age: 57
End: 2022-09-09

## 2022-09-09 DIAGNOSIS — N64.52 NIPPLE DISCHARGE: Primary | ICD-10-CM

## 2022-09-09 NOTE — PROGRESS NOTES
Diagnostic mammogram showed Negative result for malignancy . Sending to breast specialist as she has clear discharge from nipple for second opinion.

## 2022-09-12 ENCOUNTER — HOSPITAL ENCOUNTER (OUTPATIENT)
Dept: NON INVASIVE DIAGNOSTICS | Age: 57
Discharge: HOME | End: 2022-09-12
Attending: NURSE PRACTITIONER
Payer: MEDICAID

## 2022-09-12 PROCEDURE — 95819 EEG AWAKE AND ASLEEP: CPT

## 2022-09-14 ENCOUNTER — HOSPITAL ENCOUNTER (OUTPATIENT)
Age: 57
Discharge: HOME | End: 2022-09-14
Attending: NURSE PRACTITIONER
Payer: MEDICAID

## 2022-09-14 PROCEDURE — 70551 MRI BRAIN STEM W/O DYE: CPT

## 2022-09-15 DIAGNOSIS — E03.9 ACQUIRED HYPOTHYROIDISM: ICD-10-CM

## 2022-09-15 RX ORDER — LEVOTHYROXINE SODIUM 100 UG/1
100 TABLET ORAL
Qty: 90 TABLET | Refills: 1 | Status: SHIPPED | OUTPATIENT
Start: 2022-09-15

## 2022-09-15 NOTE — TELEPHONE ENCOUNTER
This pharmacy faxed over request for the following prescriptions to be filled:    Medication requested :   Requested Prescriptions     Pending Prescriptions Disp Refills    levothyroxine (Euthyrox) 100 mcg tablet 90 Tablet 1     Sig: Take 1 Tablet by mouth Daily (before breakfast). PCP: [address] or Print: Kalani  Mail order or Local pharmacy n [address]     Scheduled appointment if not seen by current providers in office: LOV 7/26/2022 No f/u up Scheduled at this time. LMOV TO SCHEDULE F/U due 8/9/2022.

## 2022-09-20 NOTE — PROCEDURES
[address] [place] [address]   EEG    Name:  Stacey Dee  MR#:   [id number]  :  1965  ACCOUNT #:  [de-identified]  DATE OF SERVICE:  2022    This is a portable EEG report. HISTORY/REASON FOR REQUEST:  EEG is requested to rule out seizure. CONDITION OF THE RECORDING:  This is a digital EEG performed using disc electrode placed according to the international system of electrode placement. DESCRIPTION OF THE RECORDING:  When the patient is maximally alert, the background activity consists of low-amplitude 9-10 Hz posterior-dominant synchronous alpha rhythm, reactive to eye opening and eye closure. During this recording, the patient progresses through wakefulness and drowsiness, but does attain stage II sleep with symmetric sleep spindles and vertex waves. Photic did not show any driving response or epileptiform activity. No clear electrographic or clinical seizures are reported during this recording. IMPRESSION:  This is a normal electroencephalogram.  A normal electroencephalogram does not rule out the diagnosis of seizure disorder. Further clinical correlation is suggested.       Radha Berg MD      [id number]  D:  2022 9:09  T:  2022 14:07  JOB #:  [id number]

## 2022-09-21 ENCOUNTER — TELEPHONE (OUTPATIENT)
Dept: NEUROLOGY | Age: 57
End: 2022-09-21

## 2022-09-22 ENCOUNTER — OFFICE VISIT (OUTPATIENT)
Dept: SURGERY | Age: 57
End: 2022-09-22
Payer: MEDICAID

## 2022-09-22 VITALS
WEIGHT: 269 LBS | BODY MASS INDEX: 42.22 KG/M2 | DIASTOLIC BLOOD PRESSURE: 66 MMHG | OXYGEN SATURATION: 100 % | RESPIRATION RATE: 20 BRPM | SYSTOLIC BLOOD PRESSURE: 108 MMHG | TEMPERATURE: 97 F | HEIGHT: 67 IN | HEART RATE: 82 BPM

## 2022-09-22 DIAGNOSIS — Z80.3 FAMILY HISTORY OF BREAST CANCER IN SISTER: ICD-10-CM

## 2022-09-22 DIAGNOSIS — Z91.89 AT HIGH RISK FOR BREAST CANCER: ICD-10-CM

## 2022-09-22 DIAGNOSIS — N64.52 NIPPLE DISCHARGE: Primary | ICD-10-CM

## 2022-09-22 PROCEDURE — 99214 OFFICE O/P EST MOD 30 MIN: CPT | Performed by: SURGERY

## 2022-09-22 NOTE — LETTER
9/24/2022    Patient: Arash Rodriguez   YOB: 1965   Date of Visit: 9/22/2022     Salbador Verduzco, [address] [facility]  [address]  [address] [place] [address]  [address] [facility] [address]    Dear Salbador Verduzco MD,      Thank you for referring Ms. Gab Rodriguez to [address] for evaluation. My notes for this consultation are attached. If you have questions, please do not hesitate to call me. I look forward to following your patient along with you.       Sincerely,    Renata Georges, [address]

## 2022-09-22 NOTE — PROGRESS NOTES
Chief Complaint   Patient presents with    Advice Only     Abnormal mammogram and ultrasound    1. Have you been to the ER, urgent care clinic since your last visit? Hospitalized since your last visit? No    2. Have you seen or consulted any other health care providers outside of the [address] [place] [address] since your last visit? Include any pap smears or colon screening.  Yes dr Jus Thompson

## 2022-09-24 NOTE — PROGRESS NOTES
CC:   Chief Complaint   Patient presents with    Advice Only     Abnormal mammogram and ultrasound        Assessment:    ICD-10-CM ICD-9-CM    1. Nipple discharge  N64.52 611.79       2. Family history of breast cancer in sister  Z80.2 V16.3       3. At high risk for breast cancer  Z91.89 V49.89           Plan: She reports spontaneous nipple discharge on the right which I cannot elicit on today's exam and her imaging was reviewed which was normal. I would recommend we proceed with obtaining MRI for further evaluation of these complaints and for high risk screening as her sister was diagnosed with breast cancer. She would like to continue with high risk screening protocols for breast cancer with mammogram and MRI alternating every 6 months. I will call her once MRI is completed to help guide further care. She should continue with monthly self breast exams and notify us of any changes. She would like to undergo genetic testing today and I will call her once results are completed. She agrees with this plan. HPI:  Say Mckenzie is a 65 y.o. female who is referred for spontaneous clear nipple discharge on the right for the last 6 months. Her most recent bilateral mammogram and right breast ultrasound were normal. She denies any changes to her medication or trauma to her breasts. She has not noticed any changes to her breast except she awoke one morning with clear drainage on her shirt. She reports no pain. She has a sister that was diagnosed with breast cancer age 48. She does not believe her sister underwent genetic testing. Another sister with lung cancer age 39. Her mother had stomach cancer. There is no family history of pancreatic or ovarian cancer. Allergies:   Allergies   Allergen Reactions    Latex, Natural Rubber Itching    Latex Itching    Ansaid [Flurbiprofen] Anaphylaxis    Iron Dextran Anaphylaxis and Itching    Levofloxacin Myalgia     Tendon rupture  Tendon rupture    Adhesive Rash Codeine Itching and Rash    Keflex [Cephalexin] Hives and Itching    Penicillins Other (comments)       Medication Review:  Current Outpatient Medications on File Prior to Visit   Medication Sig Dispense Refill    levothyroxine (Euthyrox) 100 mcg tablet Take 1 Tablet by mouth Daily (before breakfast). 90 Tablet 1    DULoxetine (CYMBALTA) 60 mg capsule Take 1 Capsule by mouth two (2) times a day. Indications: neuropathic pain 180 Capsule 1    budesonide (Pulmicort Flexhaler) 90 mcg/actuation aepb inhaler Pulmicort Flexhaler 90 mcg/actuation breath activated   INHALE 2 PUFFS BY MOUTH TWICE DAILY      spironolactone (ALDACTONE) 25 mg tablet Take 1 Tablet by mouth daily. 30 Tablet 2    atorvastatin (LIPITOR) 20 mg tablet Take 2 Tablets by mouth daily. Increase per Dr. Yovany Perez 30 Tablet 2    magnesium oxide (MAG-OX) 400 mg tablet Take 1 Tablet by mouth two (2) times a day. 180 Tablet 1    lisinopriL (PRINIVIL, ZESTRIL) 2.5 mg tablet Take 1 Tablet by mouth daily. 90 Tablet 0    cyanocobalamin 1,000 mcg tablet Take 1 Tablet by mouth daily. 90 Tablet 1    lansoprazole (PREVACID) 30 mg capsule Take 1 Capsule by mouth Daily (before breakfast). 90 Capsule 1    triamcinolone acetonide (KENALOG) 0.1 % topical cream Apply  to affected area two (2) times a day. use thin layer 45 g 5    aspirin 81 mg chewable tablet       albuterol (PROVENTIL HFA, VENTOLIN HFA, PROAIR HFA) 90 mcg/actuation inhaler Take 2 Puffs by inhalation every four (4) hours as needed for Wheezing. 1 Inhaler 0    furosemide (Lasix) 40 mg tablet Take 1 Tab by mouth daily. 90 Tab 1    traMADoL (ULTRAM) 50 mg tablet Take 50 mg by mouth as needed for Pain. cetirizine (ZYRTEC) 10 mg tablet Take 10 mg by mouth two (2) times a day. doxycycline (VIBRAMYCIN) 100 mg capsule  (Patient not taking: Reported on 9/22/2022)      clotrimazole (LOTRIMIN) 1 % topical cream Apply  to affected area two (2) times a day.  24 g 0    EPINEPHrine HCl, PF, (ADRENALIN) 1 mg/mL (1 mL) injection once. No current facility-administered medications on file prior to visit. Systems Review:  Review of Systems   Constitutional:  Negative for fatigue, fever and unexpected weight change. Respiratory:  Negative for chest tightness and shortness of breath. Cardiovascular:  Negative for palpitations. Skin:  Negative for pallor, rash and wound.      PMH:  Past Medical History:   Diagnosis Date    Anemia     Angio-edema     Asthma     Depression     Diverticulosis     Enlargement, spleen     Esophagospasm     Essential hypertension     Fatty liver     GERD (gastroesophageal reflux disease)     Histoplasmosis     HPV (human papilloma virus) anogenital infection     Lyme disease     Neuropathy     PCOS (polycystic ovarian syndrome)     PTSD (post-traumatic stress disorder)     Tachycardia     Thyroid disease     Tricuspid insufficiency        Surgical History:  Past Surgical History:   Procedure Laterality Date    COLONOSCOPY N/A 8/10/2022    COLONOSCOPY performed by Madhu Singh DO at [facility]    [address] approx    normal per patient    HX HEMORRHOIDECTOMY      HX OTHER SURGICAL      Vocal cord polyps       Social History:  Social History     Socioeconomic History    Marital status: SINGLE   Tobacco Use    Smoking status: Every Day     Packs/day: 0.50     Years: 15.00     Pack years: 7.50     Types: Cigarettes     Start date: 1985    Smokeless tobacco: Never    Tobacco comments:     less than 10 depending on the day   Vaping Use    Vaping Use: Former   Substance and Sexual Activity    Alcohol use: Not Currently    Drug use: Not Currently     Types: Marijuana    Sexual activity: Not Currently     Partners: Male       Family History:  Family History   Problem Relation Age of Onset    Cancer Mother         stomach    Seizures Mother     Cancer Sister         double mastectomy    Cancer Sister         lung    Heart Surgery Maternal Grandmother         CABG    Stroke Neg Hx        Admission on 08/10/2022, Discharged on 08/10/2022   Component Date Value Ref Range Status    Pregnancy test,urine (POC) 08/10/2022 Negative  NEG   Final    Test results should be confirmed using serum quantitative hCG when detection of pregnancy is critical and before performing any critical medical procedure. Telephone on 07/14/2022   Component Date Value Ref Range Status    Triglyceride 07/16/2022 307 (A)  40 - 149 mg/dL Final    HDL Cholesterol 07/16/2022 34 (A)  >=40 mg/dL Final    Cholesterol, total 07/16/2022 199  110 - 200 mg/dL Final    CHOLESTEROL/HDL 07/16/2022 5.9  0.0 - 5.0 Final    Non-HDL Cholesterol 07/16/2022 165 (A)  <130 mg/dL Final    LDL, calculated 07/16/2022 104 (A)  50 - 99 mg/dL Final    VLDL, calculated 07/16/2022 61 (A)  8 - 30 mg/dL Final    LDL/HDL Ratio 07/16/2022 3.1   Final    Comment: Test includes cholesterol, HDL cholesterol, triglycerides and LDL. Cholesterol Recommended NCEP guidelines in mg/dL:    Less than 200            Desirable  200 - 239                Borderline High  Greater than or  = 240   High      Please Note:  Total Chol/HDL Ratio                     Men     Women  1/2 Avg. Risk    3.4     3.3      Avg. Risk    5.0     4.4  2X  Avg. Risk    9.6     7.1  3X  Avg. Risk   23.4    11.0            Glucose 07/16/2022 110 (A)  70 - 99 mg/dL Final    BUN 07/16/2022 14  6 - 22 mg/dL Final    Creatinine 07/16/2022 0.9  0.5 - 1.2 mg/dL Final    Sodium 07/16/2022 140  133 - 145 mmol/L Final    Potassium 07/16/2022 3.8  3.5 - 5.5 mmol/L Final    Chloride 07/16/2022 102  98 - 110 mmol/L Final    CO2 07/16/2022 26  20 - 32 mmol/L Final    Calcium 07/16/2022 9.8  8.4 - 10.5 mg/dL Final    GLOMERULAR FILTRATION RATE 07/16/2022 >60.0  >60.0 mL/min/1.73 sq.m. Final    Comment: eGFR calculation based on the Chronic Kidney Disease Epidemiology   Collaboration  (CKD-EPI) equation refit without adjustment for race.     This eGFR is validated for stable chronic renal failure patients. This   equation  is unreliable in acute illness or patients with normal renal function. Anion gap 07/16/2022 12.0  3.0 - 15.0 mmol/L Final    Comment: Anion Gap calculation based on electrolyte reference ranges. Test includes BUN, CO2, Chloride, Creatinine, Glucose, Potassium, Calcium and  Sodium. AST (SGOT) 07/16/2022 16  10 - 37 U/L Final    ALT (SGPT) 07/16/2022 16  5 - 40 U/L Final    Comment: Test includes ALT, Alkaline Phosphatase, AST, Total Protein, Direct Bilirubin,  Total Bilirubin and Albumin. Alk. phosphatase 07/16/2022 103  25 - 115 U/L Final    Bilirubin, total 07/16/2022 0.3  0.2 - 1.2 mg/dL Final    Bilirubin, direct 07/16/2022 <0.2  0.0 - 0.3 mg/dL Final    Protein, total 07/16/2022 6.7  6.4 - 8.3 g/dL Final    Albumin 07/16/2022 3.8  3.5 - 5.0 g/dL Final    A-G Ratio 07/16/2022 1.3  1.1 - 2.6 ratio Final    Globulin 07/16/2022 2.9  2.0 - 4.0 g/dL Final       MRI BRAIN WO CONT  Narrative: EXAM: MRI BRAIN WO CONT    CLINICAL INDICATION/HISTORY: Balance problems falls struck head neuropathy  restless legs family history includes cerebral AVM and epilepsy    TECHNIQUE: Multisequence multiplanar MR imaging acquired through the brain. Contrast used: None    COMPARISON: None    FINDINGS:    Parenchyma: No evidence of acute restricted diffusion to suggest infarction    There are scattered areas of gliosis punctate right left centrum semiovale  lesional burden mild consistent with chronic small vessel disease as relates to  patient's stated hypertension    No acute hemorrhage. No mass lesion. CSF spaces: Ventricles and cisterns remain midline in position    IAC regions: Unremarkable    Parasellar region: Partially empty sella    there is a slight downward concavity to the optic nerves and chiasm    no entrapment sella is noted    Vasculature: Appropriate flow voids within the major skull base vasculature.     Cervicomedullary junction: Patent    Orbits: Prior right cataract surgery    Paranasal sinuses: Sinuses clear small effusion in the hypotympanic portion of  the mastoid air cells on the right nonspecific   Impression: 1. Mild burden chronic small vessel disease     Narrative & Impression   EXAM: DIGITAL DIAGNOSTIC MAMMOGRAM with CAD  BILATERAL  WITH DIGITAL BREAST  TOMOSYNTHESIS 2D/3D EXAM   AND BREAST  ULTRASOUND LIMITED RIGHT      CLINICAL HISTORY/INDICATION: Spontaneous and expressed right breast clear nipple  discharge x6 months. COMPARISON: Outside exam 2020. TECHNIQUE: Digital 2 D and 3 D  CC and MLO views of both breasts with computer  assisted detection, iCAD Second Look 7.2 . Additional right breast 2-D 3-D ML  and spot compression views obtained of the right breast.     Targeted ultrasound obtained at the right breast .      Breast Composition: The breasts are almost entirely fatty. FINDINGS:      Mammographic:  Asymmetry right breast 12:00 position 5 cm from the nipple  unchanged from 2020. Surgical clip right breast upper outer quadrant. There is  no distortion. There are no suspicious calcifications. No abnormality of the left breast.     Ultrasound:  Targeted ultrasound of the right breast. No mass nor ductal  dilatation demonstrated. Band of mildly heterogeneous echogenic tissue at the  12:00 position 5 cm from the nipple. Morphologically benign lymph node at the right axilla. IMPRESSION     No suspicious finding for malignancy. BIRADS CATEGORY: BIRADS 2: Benign finding(s)  Management Recommendation: However are clear nipple discharge is a concerning  clinical finding.  Recommend follow-up with a surgeon as well as recommend  follow-up breast MRI with and without contrast. The findings and recommendations  were discussed with the patient at the time of the exam. The patient was  referred to [facility], [address], for  notification of the ordering provider and coordination of care. Physical Exam:  Visit Vitals  /66   Pulse 82   Temp 97 °F (36.1 °C)   Resp 20   Ht 5' 7\" (1.702 m)   Wt 122 kg (269 lb)   SpO2 100%   BMI 42.13 kg/m²    BMI: Body mass index is 42.13 kg/m². Physical Exam  Constitutional:       Appearance: Normal appearance. She is obese. Cardiovascular:      Rate and Rhythm: Normal rate. Chest:   Breasts:     Right: Normal. No swelling, bleeding, inverted nipple, mass, nipple discharge, skin change or tenderness. Left: Normal. No swelling, bleeding, inverted nipple, mass, nipple discharge, skin change or tenderness. Lymphadenopathy:      Upper Body:      Right upper body: No supraclavicular, axillary or pectoral adenopathy. Left upper body: No supraclavicular, axillary or pectoral adenopathy. Skin:     General: Skin is warm and dry. Neurological:      General: No focal deficit present. Mental Status: She is alert and oriented to person, place, and time. Mental status is at baseline. Psychiatric:         Mood and Affect: Mood normal.         Behavior: Behavior normal.         Thought Content: Thought content normal.         Judgment: Judgment normal.       I have reviewed the information entered by the clinical staff and/or patient and verified it as accurate or edited where necessary.      Electronically signed by:    Aileen Medina DO, MPH

## 2022-10-12 ENCOUNTER — OFFICE VISIT (OUTPATIENT)
Dept: NEUROLOGY | Age: 57
End: 2022-10-12
Payer: MEDICAID

## 2022-10-12 VITALS
BODY MASS INDEX: 42.12 KG/M2 | DIASTOLIC BLOOD PRESSURE: 72 MMHG | OXYGEN SATURATION: 95 % | HEART RATE: 71 BPM | WEIGHT: 268.4 LBS | HEIGHT: 67 IN | RESPIRATION RATE: 22 BRPM | SYSTOLIC BLOOD PRESSURE: 118 MMHG

## 2022-10-12 DIAGNOSIS — G62.9 NEUROPATHY: ICD-10-CM

## 2022-10-12 DIAGNOSIS — G25.81 RESTLESS LEGS: ICD-10-CM

## 2022-10-12 DIAGNOSIS — R41.9 COGNITIVE COMPLAINTS: ICD-10-CM

## 2022-10-12 PROCEDURE — 99213 OFFICE O/P EST LOW 20 MIN: CPT | Performed by: NURSE PRACTITIONER

## 2022-10-12 RX ORDER — GABAPENTIN 300 MG/1
300 CAPSULE ORAL 3 TIMES DAILY
COMMUNITY

## 2022-10-12 RX ORDER — DULOXETIN HYDROCHLORIDE 60 MG/1
60 CAPSULE, DELAYED RELEASE ORAL 2 TIMES DAILY
Qty: 180 CAPSULE | Refills: 1 | Status: SHIPPED | OUTPATIENT
Start: 2022-10-12

## 2022-10-12 NOTE — PROGRESS NOTES
[facility] [place] [address]. [facility] [place], [address]  Office:  [phone]  Fax: [phone]  Chief Complaint   Patient presents with    Restless Leg Syndrome    Follow-up       HPI: Eris Osei presents in follow-up for neuropathy. She was seen here in initial evaluation in July 2021 by [facility] in virtual video visit. History per note is as follows. She reported she has a history of neuropathy starting years ago. She was originally seeing a neurologist in [place] around 6 years ago. Originally she had intermittent numbness starting to the thigh. Would have infrequent sharp shooting pains in the feet. Then symptoms have progressed and become more persistent. She was having numbness, burning, and tingling in the feet going up to the shin. Having some intermittent pains in the hands. The neuropathic pain is affecting her ability to sleep. She was only getting 2 to 4 hours of sleep a day. Reported she was tried on gabapentin in the past which was as high as 300 mg 3 times a day and did not tolerate this. Her former neurologist Dr. Cecil Mosqueda lowered it to 100 mg and she did not tolerate the lower dose of gabapentin. She did not like how it made her feel drowsy. Reported also being on amitriptyline in the past but unsure of the dose. It did help her sleep but did not provide much help for neuropathic pain in the daytime. She had been prescribed vitamin B-12 and vitamin D she felt like she cannot control her feet at times. She was seeing an orthopedist for back pain and knee pain, Dr. Carl Nevarez and Dr. Leticia Noriega. She also sees Dr. Christophe Ramos for her ankle pain. She mentioned that orthopedist mention to stim router may aid in neuropathy and she was going to follow-up with them regarding this. She has had lower extremity EMGs in the past by Dr. Cecil Mosqueda. She had previous imaging of the spine. She endorsed PVD and was being seen by vascular specialist for this.   Denied diabetes. Denied history of chemotherapy or radiation. Denied family history of neuropathy. Endorsed tobacco use. The records were to be obtained from her prior neurologist.  At the visit in September 2021 it was noted that partial records were reviewed from her former neurologist.  It did not include bilateral lower extremity EMGs so these were to be obtained. She endorsed left hand numbness that has been transient and only lasting for 4 to 5 minutes. Bilateral upper extremity EMGs were ordered. Lyrica was started at bedtime. EMG of the bilateral upper extremities reported moderate to severe median mononeuropathy at the right wrist, mild ulnar mononeuropathy at the right elbow, moderate to severe median mononeuropathy at the right wrist, and mild to moderate ulnar mononeuropathy at the left elbow. It was noted that she was now on Cymbalta 30 mg daily but had concerns due to fatty liver. She was going to have surgery with Dr. Alecia Sherman of [facility] and she was going to have lower extremities EMG. She was seen here in January 2022. It was noted that she had an unremarkable AST and ALT on last CMP. Cymbalta was increased to 60 mg daily. She was seen here on 5/20/2022 by [facility]. Cymbalta was increased to 30 mg in a.m. and 90 mg in p.m. She was referred to physical therapy for balance. She was last seen on 8/8/2022. She was taking a lower dose of Cymbalta so this was increased to 60 mg twice daily to help for pain. She is on gabapentin 300 mg nightly per ortho. She follows up with her orthopedic specialist.  Iron panel and ferritin were obtained due to RLS symptoms. She was going to be starting PT. Additional history of falls where she just passes out. EEG and MRI of the brain were to be obtained. Advised to see her cardiologist sooner due to syncope and also for elevated BP. Encourage smoking cessation. She was referred to psychiatry due to feelings of depression.     EEG on 9/12/2022 was normal.  Labs ordered last visit were not done yet. MRI brain without contrast on 9/14/2022 reported mild burden chronic small vessel disease. She presents today in follow-up. MRI and EEG results discussed. She reports she has many vision issues. Reports vision is very bad on left eye and had cataract surgery on right. Reports no more falls since being on gabapentin. No more passing out spells. She reports she keeps forgetting a lot of stuff that is not normal for her. She has turned down the wrong road when driving, not often. Forgets normal everyday tasks, it goes right out of her mind, problems with words sometimes. The onset is unclear, reports her memory is not good. Since PTSD. She is scheduled to see psychiatry but may not go, feeling better now. Not in that frame of mind anymore which was due to the extreme pain she was dealing with every day, bad quality of life. She denies suicidal thoughts 'more than normal' but then she did endorse that she denies suicidal ideations or intent. She is now taking Cymbalta 30 mg in a.m. and 60 mg in p.m. The nerve pain is ongoing. She feels like when she steps on something with her left foot it feels like it is falling off the step but it is not. Reports initially with the neuropathy just her thigh was going numb, occasional restless legs, occasionally hand going numb. Reports the restless legs she is learning to live with. Electrical charges, 'Yazidism' pains. Reports no sleep apnea, tested in [place], reports what sounds like nocturnal hypoxia, but it's been at least 10-15 years. Her memory is bothering her- memory problems are more frequent, words not coming out. Reports she is leery of taking new medications. She sees pulmonologist Dr. Matilde Vazquez and Kirit Ferris, [facility].     Past Medical History:   Diagnosis Date    Anemia     Angio-edema     Asthma     Depression     Diverticulosis     Enlargement, spleen     Esophagospasm     Essential hypertension     Fatty liver     GERD (gastroesophageal reflux disease)     Histoplasmosis     HPV (human papilloma virus) anogenital infection     Lyme disease     Neuropathy     PCOS (polycystic ovarian syndrome)     PTSD (post-traumatic stress disorder)     Tachycardia     Thyroid disease     Tricuspid insufficiency        Past Surgical History:   Procedure Laterality Date    COLONOSCOPY N/A 8/10/2022    COLONOSCOPY performed by Andrew Zhu DO at [address]  2010 approx    normal per patient    HX HEMORRHOIDECTOMY      HX OTHER SURGICAL      Vocal cord polyps       Current Outpatient Medications   Medication Sig Dispense Refill    DULoxetine (CYMBALTA) 60 mg capsule Take 1 Capsule by mouth two (2) times a day. Indications: neuropathic pain 180 Capsule 1    gabapentin (NEURONTIN) 300 mg capsule Take 300 mg by mouth three (3) times daily. levothyroxine (Euthyrox) 100 mcg tablet Take 1 Tablet by mouth Daily (before breakfast). 90 Tablet 1    budesonide (Pulmicort Flexhaler) 90 mcg/actuation aepb inhaler Pulmicort Flexhaler 90 mcg/actuation breath activated   INHALE 2 PUFFS BY MOUTH TWICE DAILY      spironolactone (ALDACTONE) 25 mg tablet Take 1 Tablet by mouth daily. 30 Tablet 2    atorvastatin (LIPITOR) 20 mg tablet Take 2 Tablets by mouth daily. Increase per Dr. Miki Ren 30 Tablet 2    magnesium oxide (MAG-OX) 400 mg tablet Take 1 Tablet by mouth two (2) times a day. 180 Tablet 1    lisinopriL (PRINIVIL, ZESTRIL) 2.5 mg tablet Take 1 Tablet by mouth daily. 90 Tablet 0    doxycycline (VIBRAMYCIN) 100 mg capsule       cyanocobalamin 1,000 mcg tablet Take 1 Tablet by mouth daily. 90 Tablet 1    lansoprazole (PREVACID) 30 mg capsule Take 1 Capsule by mouth Daily (before breakfast). 90 Capsule 1    triamcinolone acetonide (KENALOG) 0.1 % topical cream Apply  to affected area two (2) times a day.  use thin layer 45 g 5    clotrimazole (LOTRIMIN) 1 % topical cream Apply  to affected area two (2) times a day. 24 g 0    aspirin 81 mg chewable tablet       albuterol (PROVENTIL HFA, VENTOLIN HFA, PROAIR HFA) 90 mcg/actuation inhaler Take 2 Puffs by inhalation every four (4) hours as needed for Wheezing. 1 Inhaler 0    furosemide (Lasix) 40 mg tablet Take 1 Tab by mouth daily. 90 Tab 1    EPINEPHrine HCl, PF, (ADRENALIN) 1 mg/mL (1 mL) injection once. traMADoL (ULTRAM) 50 mg tablet Take 50 mg by mouth as needed for Pain. cetirizine (ZYRTEC) 10 mg tablet Take 10 mg by mouth two (2) times a day. Allergies   Allergen Reactions    Latex, Natural Rubber Itching    Latex Itching    Ansaid [Flurbiprofen] Anaphylaxis    Iron Dextran Anaphylaxis and Itching    Levofloxacin Myalgia     Tendon rupture  Tendon rupture    Adhesive Rash    Codeine Itching and Rash    Keflex [Cephalexin] Hives and Itching    Penicillins Other (comments)       Social History     Tobacco Use    Smoking status: Every Day     Packs/day: 0.50     Years: 15.00     Pack years: 7.50     Types: Cigarettes     Start date: 5    Smokeless tobacco: Never    Tobacco comments:     less than 10 depending on the day   Vaping Use    Vaping Use: Former   Substance Use Topics    Alcohol use: Not Currently    Drug use: Not Currently     Types: Marijuana       Family History   Problem Relation Age of Onset    Cancer Mother         stomach    Seizures Mother     Cancer Sister         double mastectomy    Cancer Sister         lung    Heart Surgery Maternal Grandmother         CABG    Stroke Neg Hx        Review of Systems:  GENERAL: Denies fever or fatigue  CARDIAC: No CP or SOB  PULMONARY: No cough or SOB  MUSCULOSKELETAL: No new joint pain. +chronic joint pains.    NEURO: SEE HPI    Physical Examination:  Visit Vitals  /72 (BP 1 Location: Left upper arm, BP Patient Position: Sitting, BP Cuff Size: Large adult)   Pulse 71   Resp 22   Ht 5' 7\" (1.702 m)   Wt 121.7 kg (268 lb 6.4 oz)   SpO2 95%   BMI 42.04 kg/m²       Alert, in NAD. Heart is regular. Oriented x3. Speech is fluent. Speech clear. No problems providing medical history. EOMs are full, PERRL, VFFTC, no nystagmus. No facial asymmetry. Strength and tone are normal. Antalgic gait. MRI BRAIN WO CONT 9/14/2022;  Study Result    Narrative & Impression   EXAM: MRI BRAIN WO CONT     CLINICAL INDICATION/HISTORY: Balance problems falls struck head neuropathy  restless legs family history includes cerebral AVM and epilepsy     TECHNIQUE: Multisequence multiplanar MR imaging acquired through the brain. Contrast used: None     COMPARISON: None     FINDINGS:     Parenchyma: No evidence of acute restricted diffusion to suggest infarction     There are scattered areas of gliosis punctate right left centrum semiovale  lesional burden mild consistent with chronic small vessel disease as relates to  patient's stated hypertension     No acute hemorrhage. No mass lesion. CSF spaces: Ventricles and cisterns remain midline in position     IAC regions: Unremarkable     Parasellar region: Partially empty sella     there is a slight downward concavity to the optic nerves and chiasm     no entrapment sella is noted     Vasculature: Appropriate flow voids within the major skull base vasculature. Cervicomedullary junction: Patent     Orbits: Prior right cataract surgery     Paranasal sinuses: Sinuses clear small effusion in the hypotympanic portion of  the mastoid air cells on the right nonspecific            IMPRESSION     1. Mild burden chronic small vessel disease       Impression/Plan: This is a 67-year-old left-handed female who presents in follow-up for neuropathy. She has previously been on a regimen of Cymbalta at increasing doses. We will continue Cymbalta for neuropathic pain. Can adjust to 60 mg twice daily. She is also on gabapentin 300 mg nightly per ortho. She is following up with her orthopedics specialists.   Labs were done including an iron panel and ferritin as she reports restless leg symptoms. She had a vitamin B12 level and hemoglobin A1c normal in January. Recommended to obtain labs. We will obtain neuropsychological evaluation due to her cognitive concerns. We discussed the results of testing including EEG and MRI of the brain as above. Encouraged management of vascular risk factors including smoking cessation. Follow up in 4 months. Diagnoses and all orders for this visit:    1. Neuropathy  -     DULoxetine (CYMBALTA) 60 mg capsule; Take 1 Capsule by mouth two (2) times a day. Indications: neuropathic pain    2. Restless legs    3. Cognitive complaints  -     REFERRAL TO NEUROPSYCHOLOGY    Total time 29 minutes with 15 minutes spent in counseling. Signed By: Oliverio Graves NP        PLEASE NOTE:   Portions of this document may have been produced using voice recognition software. Unrecognized errors in transcription may be present.

## 2022-10-25 DIAGNOSIS — K21.9 GASTROESOPHAGEAL REFLUX DISEASE, UNSPECIFIED WHETHER ESOPHAGITIS PRESENT: ICD-10-CM

## 2022-10-25 NOTE — TELEPHONE ENCOUNTER
This pharmacy faxed over request for the following prescriptions to be filled:    Medication requested :   Requested Prescriptions     Pending Prescriptions Disp Refills    lansoprazole (PREVACID) 30 mg capsule 90 Capsule 1     Sig: Take 1 Capsule by mouth Daily (before breakfast).      PCP: Milena Gomez or Print: Kalani  Mail order or Local pharmacy [address]     Scheduled appointment if not seen by current providers in office: LOV 7/26/2022 F/U 11/22/2022

## 2022-10-26 RX ORDER — LANSOPRAZOLE 30 MG/1
30 CAPSULE, DELAYED RELEASE ORAL
Qty: 90 CAPSULE | Refills: 1 | Status: SHIPPED | OUTPATIENT
Start: 2022-10-26

## 2022-11-10 ENCOUNTER — HOSPITAL ENCOUNTER (OUTPATIENT)
Dept: LAB | Age: 57
Discharge: HOME | End: 2022-11-10
Payer: MEDICAID

## 2022-11-10 ENCOUNTER — HOSPITAL ENCOUNTER (OUTPATIENT)
Age: 57
Discharge: HOME | End: 2022-11-10
Attending: SURGERY
Payer: MEDICAID

## 2022-11-10 DIAGNOSIS — G25.81 RESTLESS LEGS: ICD-10-CM

## 2022-11-10 DIAGNOSIS — N64.52 NIPPLE DISCHARGE: ICD-10-CM

## 2022-11-10 LAB
BASOPHILS # BLD: 0.1 K/UL (ref 0–0.1)
BASOPHILS NFR BLD: 1 % (ref 0–2)
CREAT UR-MCNC: 0.7 MG/DL (ref 0.6–1.3)
DIFFERENTIAL METHOD BLD: ABNORMAL
EOSINOPHIL # BLD: 0.5 K/UL (ref 0–0.4)
EOSINOPHIL NFR BLD: 7 % (ref 0–5)
ERYTHROCYTE [DISTWIDTH] IN BLOOD BY AUTOMATED COUNT: 13.4 % (ref 11.6–14.5)
FERRITIN SERPL-MCNC: 73 NG/ML (ref 8–388)
HCT VFR BLD AUTO: 43 % (ref 35–45)
HGB BLD-MCNC: 13.8 G/DL (ref 12–16)
IMM GRANULOCYTES # BLD AUTO: 0 K/UL (ref 0–0.04)
IMM GRANULOCYTES NFR BLD AUTO: 1 % (ref 0–0.5)
IRON SATN MFR SERPL: 14 % (ref 20–50)
IRON SERPL-MCNC: 49 UG/DL (ref 50–175)
LYMPHOCYTES # BLD: 2 K/UL (ref 0.9–3.6)
LYMPHOCYTES NFR BLD: 25 % (ref 21–52)
MCH RBC QN AUTO: 28.5 PG (ref 24–34)
MCHC RBC AUTO-ENTMCNC: 32.1 G/DL (ref 31–37)
MCV RBC AUTO: 88.8 FL (ref 78–100)
MONOCYTES # BLD: 0.6 K/UL (ref 0.05–1.2)
MONOCYTES NFR BLD: 7 % (ref 3–10)
NEUTS SEG # BLD: 4.9 K/UL (ref 1.8–8)
NEUTS SEG NFR BLD: 60 % (ref 40–73)
NRBC # BLD: 0 K/UL (ref 0–0.01)
NRBC BLD-RTO: 0 PER 100 WBC
PLATELET # BLD AUTO: 219 K/UL (ref 135–420)
PMV BLD AUTO: 10.8 FL (ref 9.2–11.8)
RBC # BLD AUTO: 4.84 M/UL (ref 4.2–5.3)
TIBC SERPL-MCNC: 342 UG/DL (ref 250–450)
WBC # BLD AUTO: 8.1 K/UL (ref 4.6–13.2)

## 2022-11-10 PROCEDURE — 82728 ASSAY OF FERRITIN: CPT

## 2022-11-10 PROCEDURE — 85025 COMPLETE CBC W/AUTO DIFF WBC: CPT

## 2022-11-10 PROCEDURE — 77049 MRI BREAST C-+ W/CAD BI: CPT

## 2022-11-10 PROCEDURE — 83540 ASSAY OF IRON: CPT

## 2022-11-10 PROCEDURE — 82565 ASSAY OF CREATININE: CPT

## 2022-11-10 PROCEDURE — 36415 COLL VENOUS BLD VENIPUNCTURE: CPT

## 2022-11-10 PROCEDURE — A9577 INJ MULTIHANCE: HCPCS | Performed by: SURGERY

## 2022-11-10 PROCEDURE — 74011250636 HC RX REV CODE- 250/636: Performed by: SURGERY

## 2022-11-10 RX ADMIN — GADOBENATE DIMEGLUMINE 20 ML: 529 INJECTION, SOLUTION INTRAVENOUS at 11:10

## 2022-11-11 ENCOUNTER — TELEPHONE (OUTPATIENT)
Dept: SURGERY | Age: 57
End: 2022-11-11

## 2022-11-11 NOTE — TELEPHONE ENCOUNTER
Called patient left voicemail for her to call and follow up in the office for MRI results and to discuss surgery.     Marisol santiago

## 2022-11-18 ENCOUNTER — OFFICE VISIT (OUTPATIENT)
Dept: SURGERY | Age: 57
End: 2022-11-18
Payer: MEDICAID

## 2022-11-18 VITALS
WEIGHT: 264 LBS | BODY MASS INDEX: 41.44 KG/M2 | TEMPERATURE: 97.5 F | RESPIRATION RATE: 18 BRPM | DIASTOLIC BLOOD PRESSURE: 63 MMHG | OXYGEN SATURATION: 99 % | HEART RATE: 83 BPM | HEIGHT: 67 IN | SYSTOLIC BLOOD PRESSURE: 130 MMHG

## 2022-11-18 DIAGNOSIS — N64.52 NIPPLE DISCHARGE: Primary | ICD-10-CM

## 2022-11-18 DIAGNOSIS — R92.8 ABNORMAL MRI, BREAST: ICD-10-CM

## 2022-11-18 DIAGNOSIS — Z91.89 AT HIGH RISK FOR BREAST CANCER: ICD-10-CM

## 2022-11-18 PROCEDURE — 3078F DIAST BP <80 MM HG: CPT | Performed by: SURGERY

## 2022-11-18 PROCEDURE — 3074F SYST BP LT 130 MM HG: CPT | Performed by: SURGERY

## 2022-11-18 PROCEDURE — 99214 OFFICE O/P EST MOD 30 MIN: CPT | Performed by: SURGERY

## 2022-11-18 NOTE — H&P (VIEW-ONLY)
CC:   Chief Complaint   Patient presents with    Follow-up     MRI BIRADS 4 in Right breast.         Assessment:    ICD-10-CM ICD-9-CM    1. Nipple discharge  N64.52 611.79 SCHEDULE SURGERY      2. Abnormal MRI, breast  R92.8 793.89       3. At high risk for breast cancer  Z91.89 V49.89           Plan: I reviewed with her the mammogram and MRI results demonstrating BI-RADS 4 lesion of the right nipple. Due to location recommend surgical excisional biopsy as this is not possible with radiology department. We discussed that this could be benign lesion however with ongoing nipple discharge excising the duct would be recommended. She understands this may result in deformity of the right breast and nipple. She feels comfortable with this plan and would like to proceed with surgery soon as possible. The risk and benefits of the procedure were reviewed with the patient including infection, bleeding, need for repeat procedure, injury to surrounding structures and poor cosmetic outcome. If this is cancer additional surgery may be recommended. She would like to continue with high risk screening protocols for breast cancer with mammogram and MRI alternating every 6 months. I reviewed with her today her genetics which were negative. HPI: She is here today to follow up her breast MRI. She continues to have spontaneous clear nipple discharge from the right breast. She reports no pain. No nipple discharge today. 9/22/2022  Maye Rubinstein Meter is a 65 y.o. female who is referred for spontaneous clear nipple discharge on the right for the last 6 months. Her most recent bilateral mammogram and right breast ultrasound were normal. She denies any changes to her medication or trauma to her breasts. She has not noticed any changes to her breast except she awoke one morning with clear drainage on her shirt. She reports no pain. She has a sister that was diagnosed with breast cancer age 48.  She does not believe her sister underwent genetic testing. Another sister with lung cancer age 39. Her mother had stomach cancer. There is no family history of pancreatic or ovarian cancer. Allergies: Allergies   Allergen Reactions    Latex, Natural Rubber Itching    Latex Itching    Ansaid [Flurbiprofen] Anaphylaxis    Iron Dextran Anaphylaxis and Itching    Levofloxacin Myalgia     Tendon rupture  Tendon rupture    Adhesive Rash    Codeine Itching and Rash    Keflex [Cephalexin] Hives and Itching    Penicillins Other (comments)       Medication Review:  Current Outpatient Medications on File Prior to Visit   Medication Sig Dispense Refill    lansoprazole (PREVACID) 30 mg capsule Take 1 Capsule by mouth Daily (before breakfast). 90 Capsule 1    DULoxetine (CYMBALTA) 60 mg capsule Take 1 Capsule by mouth two (2) times a day. Indications: neuropathic pain 180 Capsule 1    gabapentin (NEURONTIN) 300 mg capsule Take 300 mg by mouth three (3) times daily. levothyroxine (Euthyrox) 100 mcg tablet Take 1 Tablet by mouth Daily (before breakfast). 90 Tablet 1    budesonide (Pulmicort Flexhaler) 90 mcg/actuation aepb inhaler Pulmicort Flexhaler 90 mcg/actuation breath activated   INHALE 2 PUFFS BY MOUTH TWICE DAILY      spironolactone (ALDACTONE) 25 mg tablet Take 1 Tablet by mouth daily. 30 Tablet 2    atorvastatin (LIPITOR) 20 mg tablet Take 2 Tablets by mouth daily. Increase per Dr. Josy Su 30 Tablet 2    magnesium oxide (MAG-OX) 400 mg tablet Take 1 Tablet by mouth two (2) times a day. 180 Tablet 1    lisinopriL (PRINIVIL, ZESTRIL) 2.5 mg tablet Take 1 Tablet by mouth daily. 90 Tablet 0    doxycycline (VIBRAMYCIN) 100 mg capsule       cyanocobalamin 1,000 mcg tablet Take 1 Tablet by mouth daily. 90 Tablet 1    triamcinolone acetonide (KENALOG) 0.1 % topical cream Apply  to affected area two (2) times a day. use thin layer 45 g 5    clotrimazole (LOTRIMIN) 1 % topical cream Apply  to affected area two (2) times a day.  24 g 0    aspirin 81 mg chewable tablet       albuterol (PROVENTIL HFA, VENTOLIN HFA, PROAIR HFA) 90 mcg/actuation inhaler Take 2 Puffs by inhalation every four (4) hours as needed for Wheezing. 1 Inhaler 0    furosemide (Lasix) 40 mg tablet Take 1 Tab by mouth daily. 90 Tab 1    EPINEPHrine HCl, PF, (ADRENALIN) 1 mg/mL (1 mL) injection once. traMADoL (ULTRAM) 50 mg tablet Take 50 mg by mouth as needed for Pain. cetirizine (ZYRTEC) 10 mg tablet Take 10 mg by mouth two (2) times a day. No current facility-administered medications on file prior to visit. Systems Review:  Review of Systems   Constitutional:  Negative for fatigue, fever and unexpected weight change. Respiratory:  Negative for chest tightness and shortness of breath. Cardiovascular:  Negative for palpitations. Skin:  Negative for pallor, rash and wound.      PMH:  Past Medical History:   Diagnosis Date    Anemia     Angio-edema     Asthma     Depression     Diverticulosis     Enlargement, spleen     Esophagospasm     Essential hypertension     Fatty liver     GERD (gastroesophageal reflux disease)     Histoplasmosis     HPV (human papilloma virus) anogenital infection     Lyme disease     Neuropathy     PCOS (polycystic ovarian syndrome)     PTSD (post-traumatic stress disorder)     Tachycardia     Thyroid disease     Tricuspid insufficiency        Surgical History:  Past Surgical History:   Procedure Laterality Date    COLONOSCOPY N/A 8/10/2022    COLONOSCOPY performed by Lonnie Gupta DO at [facility]    [address] approx    normal per patient    HX HEMORRHOIDECTOMY      HX OTHER SURGICAL      Vocal cord polyps       Social History:  Social History     Socioeconomic History    Marital status: SINGLE   Tobacco Use    Smoking status: Every Day     Packs/day: 0.50     Years: 15.00     Pack years: 7.50     Types: Cigarettes     Start date: 1985    Smokeless tobacco: Never    Tobacco comments:     less than 10 depending on the day   Vaping Use    Vaping Use: Former   Substance and Sexual Activity    Alcohol use: Not Currently    Drug use: Not Currently     Types: Marijuana    Sexual activity: Not Currently     Partners: Male       Family History:  Family History   Problem Relation Age of Onset    Cancer Mother         stomach    Seizures Mother     Cancer Sister         double mastectomy    Cancer Sister         lung    Heart Surgery Maternal Grandmother         CABG    Stroke [facility] Outpatient Visit on 11/10/2022   Component Date Value Ref Range Status    Iron 11/10/2022 49 (A)  50 - 175 ug/dL Final    Patients receiving metal-binding drugs (e.g. deferoxamine) may show spuriously depressed iron values, as chelated iron may not properly react in the iron assay. TIBC 11/10/2022 342  250 - 450 ug/dL Final    Iron % saturation 11/10/2022 14 (A)  20 - 50 % Final    Ferritin 11/10/2022 73  8 - 388 NG/ML Final    WBC 11/10/2022 8.1  4.6 - 13.2 K/uL Final    RBC 11/10/2022 4.84  4.20 - 5.30 M/uL Final    HGB 11/10/2022 13.8  12.0 - 16.0 g/dL Final    HCT 11/10/2022 43.0  35.0 - 45.0 % Final    MCV 11/10/2022 88.8  78.0 - 100.0 FL Final    MCH 11/10/2022 28.5  24.0 - 34.0 PG Final    MCHC 11/10/2022 32.1  31.0 - 37.0 g/dL Final    RDW 11/10/2022 13.4  11.6 - 14.5 % Final    PLATELET 74/08/0357 399  135 - 420 K/uL Final    MPV 11/10/2022 10.8  9.2 - 11.8 FL Final    NRBC 11/10/2022 0.0  0  WBC Final    ABSOLUTE NRBC 11/10/2022 0.00  0.00 - 0.01 K/uL Final    NEUTROPHILS 11/10/2022 60  40 - 73 % Final    LYMPHOCYTES 11/10/2022 25  21 - 52 % Final    MONOCYTES 11/10/2022 7  3 - 10 % Final    EOSINOPHILS 11/10/2022 7 (A)  0 - 5 % Final    BASOPHILS 11/10/2022 1  0 - 2 % Final    IMMATURE GRANULOCYTES 11/10/2022 1 (A)  0.0 - 0.5 % Final    ABS. NEUTROPHILS 11/10/2022 4.9  1.8 - 8.0 K/UL Final    ABS. LYMPHOCYTES 11/10/2022 2.0  0.9 - 3.6 K/UL Final    ABS.  MONOCYTES 11/10/2022 0.6  0.05 - 1.2 K/UL Final    ABS. EOSINOPHILS 11/10/2022 0.5 (A)  0.0 - 0.4 K/UL Final    ABS. BASOPHILS 11/10/2022 0.1  0.0 - 0.1 K/UL Final    ABS. IMM. GRANS. 11/10/2022 0.0  0.00 - 0.04 K/UL Final    DF 11/10/2022 AUTOMATED    Final   Hospital Outpatient Visit on 11/10/2022   Component Date Value Ref Range Status    Creatinine, POC 11/10/2022 0.7  0.6 - 1.3 MG/DL Final    eGFR (POC) 11/10/2022 >60  >60 ml/min/1.73m2 Final    Comment:      Pediatric calculator link: [email or web address].[email or web address] org/professionals/kdoqi/gfr_calculatorped       Effective Oct 3, 2022       These results are not intended for use in patients <25years of age. eGFR results are calculated without a race factor using  the 2021 CKD-EPI equation. Careful clinical correlation is recommended, particularly when comparing to results calculated using previous equations. The CKD-EPI equation is less accurate in patients with extremes of muscle mass, extra-renal metabolism of creatinine, excessive creatine ingestion, or following therapy that affects renal tubular secretion. MRI BREAST BI W WO CONT  Narrative: MRI OF THE RIGHT AND LEFT BREAST WITH AND WITHOUT CONTRAST:    HISTORY: Clear discharge from the right nipple. Family history of breast   cancer  in sister. COMPARISON:  9/8/2022, 7/31/2020    TECHNIQUE: With the patient in the prone position, axial localizer, axial   T1,  T2, STIR and thin slice dynamic 3-D pre and post gadolinium T1 weighted  sequences with fat suppression were obtained utilizing a 3T GE magnet and   a 16  channel [place] breast coil. 20 cc of Multihance were administered IV for   the  contrast enhanced portion. Rapid imaging with multiple passes through the  breasts was performed. Study interpreted on a YelloYello. Subtracted, reconstructed and MIP images were generated and reviewed.     FINDINGS:  Breast Composition: Fatty  Background enhancement: Minimal.  Contrast Bolus: Adequate  Motion Artifact: Mild    Right axilla: No adenopathy. Left axilla: No adenopathy. No internal mammary adenopathy. Right Breast:There is an 8 x 5 x 1 mm disc shaped enhancement at the right  nipple with extremely thin linear enhancement extending approximately 4 mm   into  the retroareolar region along the lateral aspect of the enhancement. Left Breast: No suspicious enhancing mass or suspicious enhancement   identified. Impression: 1. Small enhancement at the right nipple with 4 mm thin linear enhancement  extending into the retroareolar region, considered suspicious in the   setting of  clear nipple discharge.  -This is not amenable to needle core biopsy due to its position at the   nipple. -Recommend further management per surgeon. 2. No evidence for malignancy in the left breast.    Right Breast Bi-Rads: BIRADS 4:  Suspicious   Left Breast Bi-Rads:BIRADS 1:  Negative     Narrative & Impression   EXAM: DIGITAL DIAGNOSTIC MAMMOGRAM with CAD  BILATERAL  WITH DIGITAL BREAST  TOMOSYNTHESIS 2D/3D EXAM   AND BREAST  ULTRASOUND LIMITED RIGHT      CLINICAL HISTORY/INDICATION: Spontaneous and expressed right breast clear nipple  discharge x6 months. COMPARISON: Outside exam 2020. TECHNIQUE: Digital 2 D and 3 D  CC and MLO views of both breasts with computer  assisted detection, iCAD Second Look 7.2 . Additional right breast 2-D 3-D ML  and spot compression views obtained of the right breast.     Targeted ultrasound obtained at the right breast .      Breast Composition: The breasts are almost entirely fatty. FINDINGS:      Mammographic:  Asymmetry right breast 12:00 position 5 cm from the nipple  unchanged from 2020. Surgical clip right breast upper outer quadrant. There is  no distortion. There are no suspicious calcifications. No abnormality of the left breast.     Ultrasound:  Targeted ultrasound of the right breast. No mass nor ductal  dilatation demonstrated.  Band of mildly heterogeneous echogenic tissue at the  12:00 position 5 cm from the nipple. Morphologically benign lymph node at the right axilla. IMPRESSION     No suspicious finding for malignancy. BIRADS CATEGORY: BIRADS 2: Benign finding(s)  Management Recommendation: However are clear nipple discharge is a concerning  clinical finding. Recommend follow-up with a surgeon as well as recommend  follow-up breast MRI with and without contrast. The findings and recommendations  were discussed with the patient at the time of the exam. The patient was  referred to [facility], [address], for  notification of the ordering provider and coordination of care. Physical Exam:  Visit Vitals  /63   Pulse 83   Temp 97.5 °F (36.4 °C) (Temporal)   Resp 18   Ht 5' 7\" (1.702 m)   Wt 119.7 kg (264 lb)   SpO2 99%   BMI 41.35 kg/m²    BMI: Body mass index is 41.35 kg/m². Physical Exam  Constitutional:       Appearance: Normal appearance. She is obese. Cardiovascular:      Rate and Rhythm: Normal rate. Chest:   Breasts:     Right: Normal. No swelling, bleeding, inverted nipple, mass, nipple discharge, skin change or tenderness. Left: Normal. No swelling, bleeding, inverted nipple, mass, nipple discharge, skin change or tenderness. Lymphadenopathy:      Upper Body:      Right upper body: No supraclavicular, axillary or pectoral adenopathy. Left upper body: No supraclavicular, axillary or pectoral adenopathy. Skin:     General: Skin is warm and dry. Neurological:      General: No focal deficit present. Mental Status: She is alert and oriented to person, place, and time. Mental status is at baseline. Psychiatric:         Mood and Affect: Mood normal.         Behavior: Behavior normal.         Thought Content: Thought content normal.         Judgment: Judgment normal.       I have reviewed the information entered by the clinical staff and/or patient and verified it as accurate or edited where necessary. Electronically signed by:    Ayesha Terry DO, MPH

## 2022-11-18 NOTE — PROGRESS NOTES
CC:   Chief Complaint   Patient presents with    Follow-up     MRI BIRADS 4 in Right breast.         Assessment:    ICD-10-CM ICD-9-CM    1. Nipple discharge  N64.52 611.79 SCHEDULE SURGERY      2. Abnormal MRI, breast  R92.8 793.89       3. At high risk for breast cancer  Z91.89 V49.89           Plan: I reviewed with her the mammogram and MRI results demonstrating BI-RADS 4 lesion of the right nipple. Due to location recommend surgical excisional biopsy as this is not possible with radiology department. We discussed that this could be benign lesion however with ongoing nipple discharge excising the duct would be recommended. She understands this may result in deformity of the right breast and nipple. She feels comfortable with this plan and would like to proceed with surgery soon as possible. The risk and benefits of the procedure were reviewed with the patient including infection, bleeding, need for repeat procedure, injury to surrounding structures and poor cosmetic outcome. If this is cancer additional surgery may be recommended. She would like to continue with high risk screening protocols for breast cancer with mammogram and MRI alternating every 6 months. I reviewed with her today her genetics which were negative. HPI: She is here today to follow up her breast MRI. She continues to have spontaneous clear nipple discharge from the right breast. She reports no pain. No nipple discharge today. 9/22/2022  Bryant Rodriguez is a 65 y.o. female who is referred for spontaneous clear nipple discharge on the right for the last 6 months. Her most recent bilateral mammogram and right breast ultrasound were normal. She denies any changes to her medication or trauma to her breasts. She has not noticed any changes to her breast except she awoke one morning with clear drainage on her shirt. She reports no pain. She has a sister that was diagnosed with breast cancer age 48.  She does not believe her sister underwent genetic testing. Another sister with lung cancer age 39. Her mother had stomach cancer. There is no family history of pancreatic or ovarian cancer. Allergies: Allergies   Allergen Reactions    Latex, Natural Rubber Itching    Latex Itching    Ansaid [Flurbiprofen] Anaphylaxis    Iron Dextran Anaphylaxis and Itching    Levofloxacin Myalgia     Tendon rupture  Tendon rupture    Adhesive Rash    Codeine Itching and Rash    Keflex [Cephalexin] Hives and Itching    Penicillins Other (comments)       Medication Review:  Current Outpatient Medications on File Prior to Visit   Medication Sig Dispense Refill    lansoprazole (PREVACID) 30 mg capsule Take 1 Capsule by mouth Daily (before breakfast). 90 Capsule 1    DULoxetine (CYMBALTA) 60 mg capsule Take 1 Capsule by mouth two (2) times a day. Indications: neuropathic pain 180 Capsule 1    gabapentin (NEURONTIN) 300 mg capsule Take 300 mg by mouth three (3) times daily. levothyroxine (Euthyrox) 100 mcg tablet Take 1 Tablet by mouth Daily (before breakfast). 90 Tablet 1    budesonide (Pulmicort Flexhaler) 90 mcg/actuation aepb inhaler Pulmicort Flexhaler 90 mcg/actuation breath activated   INHALE 2 PUFFS BY MOUTH TWICE DAILY      spironolactone (ALDACTONE) 25 mg tablet Take 1 Tablet by mouth daily. 30 Tablet 2    atorvastatin (LIPITOR) 20 mg tablet Take 2 Tablets by mouth daily. Increase per Dr. Ashleigh Campbell 30 Tablet 2    magnesium oxide (MAG-OX) 400 mg tablet Take 1 Tablet by mouth two (2) times a day. 180 Tablet 1    lisinopriL (PRINIVIL, ZESTRIL) 2.5 mg tablet Take 1 Tablet by mouth daily. 90 Tablet 0    doxycycline (VIBRAMYCIN) 100 mg capsule       cyanocobalamin 1,000 mcg tablet Take 1 Tablet by mouth daily. 90 Tablet 1    triamcinolone acetonide (KENALOG) 0.1 % topical cream Apply  to affected area two (2) times a day. use thin layer 45 g 5    clotrimazole (LOTRIMIN) 1 % topical cream Apply  to affected area two (2) times a day.  24 g 0    aspirin 81 mg chewable tablet       albuterol (PROVENTIL HFA, VENTOLIN HFA, PROAIR HFA) 90 mcg/actuation inhaler Take 2 Puffs by inhalation every four (4) hours as needed for Wheezing. 1 Inhaler 0    furosemide (Lasix) 40 mg tablet Take 1 Tab by mouth daily. 90 Tab 1    EPINEPHrine HCl, PF, (ADRENALIN) 1 mg/mL (1 mL) injection once. traMADoL (ULTRAM) 50 mg tablet Take 50 mg by mouth as needed for Pain. cetirizine (ZYRTEC) 10 mg tablet Take 10 mg by mouth two (2) times a day. No current facility-administered medications on file prior to visit. Systems Review:  Review of Systems   Constitutional:  Negative for fatigue, fever and unexpected weight change. Respiratory:  Negative for chest tightness and shortness of breath. Cardiovascular:  Negative for palpitations. Skin:  Negative for pallor, rash and wound.      PMH:  Past Medical History:   Diagnosis Date    Anemia     Angio-edema     Asthma     Depression     Diverticulosis     Enlargement, spleen     Esophagospasm     Essential hypertension     Fatty liver     GERD (gastroesophageal reflux disease)     Histoplasmosis     HPV (human papilloma virus) anogenital infection     Lyme disease     Neuropathy     PCOS (polycystic ovarian syndrome)     PTSD (post-traumatic stress disorder)     Tachycardia     Thyroid disease     Tricuspid insufficiency        Surgical History:  Past Surgical History:   Procedure Laterality Date    COLONOSCOPY N/A 8/10/2022    COLONOSCOPY performed by Jakub Perez DO at [facility]    [address] approx    normal per patient    HX HEMORRHOIDECTOMY      HX OTHER SURGICAL      Vocal cord polyps       Social History:  Social History     Socioeconomic History    Marital status: SINGLE   Tobacco Use    Smoking status: Every Day     Packs/day: 0.50     Years: 15.00     Pack years: 7.50     Types: Cigarettes     Start date: 1985    Smokeless tobacco: Never    Tobacco comments:     less than 10 depending on the day   Vaping Use    Vaping Use: Former   Substance and Sexual Activity    Alcohol use: Not Currently    Drug use: Not Currently     Types: Marijuana    Sexual activity: Not Currently     Partners: Male       Family History:  Family History   Problem Relation Age of Onset    Cancer Mother         stomach    Seizures Mother     Cancer Sister         double mastectomy    Cancer Sister         lung    Heart Surgery Maternal Grandmother         CABG    Stroke [facility] Outpatient Visit on 11/10/2022   Component Date Value Ref Range Status    Iron 11/10/2022 49 (A)  50 - 175 ug/dL Final    Patients receiving metal-binding drugs (e.g. deferoxamine) may show spuriously depressed iron values, as chelated iron may not properly react in the iron assay. TIBC 11/10/2022 342  250 - 450 ug/dL Final    Iron % saturation 11/10/2022 14 (A)  20 - 50 % Final    Ferritin 11/10/2022 73  8 - 388 NG/ML Final    WBC 11/10/2022 8.1  4.6 - 13.2 K/uL Final    RBC 11/10/2022 4.84  4.20 - 5.30 M/uL Final    HGB 11/10/2022 13.8  12.0 - 16.0 g/dL Final    HCT 11/10/2022 43.0  35.0 - 45.0 % Final    MCV 11/10/2022 88.8  78.0 - 100.0 FL Final    MCH 11/10/2022 28.5  24.0 - 34.0 PG Final    MCHC 11/10/2022 32.1  31.0 - 37.0 g/dL Final    RDW 11/10/2022 13.4  11.6 - 14.5 % Final    PLATELET 81/02/0752 857  135 - 420 K/uL Final    MPV 11/10/2022 10.8  9.2 - 11.8 FL Final    NRBC 11/10/2022 0.0  0  WBC Final    ABSOLUTE NRBC 11/10/2022 0.00  0.00 - 0.01 K/uL Final    NEUTROPHILS 11/10/2022 60  40 - 73 % Final    LYMPHOCYTES 11/10/2022 25  21 - 52 % Final    MONOCYTES 11/10/2022 7  3 - 10 % Final    EOSINOPHILS 11/10/2022 7 (A)  0 - 5 % Final    BASOPHILS 11/10/2022 1  0 - 2 % Final    IMMATURE GRANULOCYTES 11/10/2022 1 (A)  0.0 - 0.5 % Final    ABS. NEUTROPHILS 11/10/2022 4.9  1.8 - 8.0 K/UL Final    ABS. LYMPHOCYTES 11/10/2022 2.0  0.9 - 3.6 K/UL Final    ABS.  MONOCYTES 11/10/2022 0.6  0.05 - 1.2 K/UL Final    ABS. EOSINOPHILS 11/10/2022 0.5 (A)  0.0 - 0.4 K/UL Final    ABS. BASOPHILS 11/10/2022 0.1  0.0 - 0.1 K/UL Final    ABS. IMM. GRANS. 11/10/2022 0.0  0.00 - 0.04 K/UL Final    DF 11/10/2022 AUTOMATED    Final   Hospital Outpatient Visit on 11/10/2022   Component Date Value Ref Range Status    Creatinine, POC 11/10/2022 0.7  0.6 - 1.3 MG/DL Final    eGFR (POC) 11/10/2022 >60  >60 ml/min/1.73m2 Final    Comment:      Pediatric calculator link: [employer]. org/professionals/kdoqi/gfr_calculatorped       Effective Oct 3, 2022       These results are not intended for use in patients <25years of age. eGFR results are calculated without a race factor using  the 2021 CKD-EPI equation. Careful clinical correlation is recommended, particularly when comparing to results calculated using previous equations. The CKD-EPI equation is less accurate in patients with extremes of muscle mass, extra-renal metabolism of creatinine, excessive creatine ingestion, or following therapy that affects renal tubular secretion. MRI BREAST BI W WO CONT  Narrative: MRI OF THE RIGHT AND LEFT BREAST WITH AND WITHOUT CONTRAST:    HISTORY: Clear discharge from the right nipple. Family history of breast   cancer  in sister. COMPARISON:  9/8/2022, 7/31/2020    TECHNIQUE: With the patient in the prone position, axial localizer, axial   T1,  T2, STIR and thin slice dynamic 3-D pre and post gadolinium T1 weighted  sequences with fat suppression were obtained utilizing a 3T GE magnet and   a 16  channel [place] breast coil. 20 cc of Multihance were administered IV for   the  contrast enhanced portion. Rapid imaging with multiple passes through the  breasts was performed. Study interpreted on a [employer]. Subtracted, reconstructed and MIP images were generated and reviewed.     FINDINGS:  Breast Composition: Fatty  Background enhancement: Minimal.  Contrast Bolus: Adequate  Motion Artifact: Mild    Right axilla: No adenopathy. Left axilla: No adenopathy. No internal mammary adenopathy. Right Breast:There is an 8 x 5 x 1 mm disc shaped enhancement at the right  nipple with extremely thin linear enhancement extending approximately 4 mm   into  the retroareolar region along the lateral aspect of the enhancement. Left Breast: No suspicious enhancing mass or suspicious enhancement   identified. Impression: 1. Small enhancement at the right nipple with 4 mm thin linear enhancement  extending into the retroareolar region, considered suspicious in the   setting of  clear nipple discharge.  -This is not amenable to needle core biopsy due to its position at the   nipple. -Recommend further management per surgeon. 2. No evidence for malignancy in the left breast.    Right Breast Bi-Rads: BIRADS 4:  Suspicious   Left Breast Bi-Rads:BIRADS 1:  Negative     Narrative & Impression   EXAM: DIGITAL DIAGNOSTIC MAMMOGRAM with CAD  BILATERAL  WITH DIGITAL BREAST  TOMOSYNTHESIS 2D/3D EXAM   AND BREAST  ULTRASOUND LIMITED RIGHT      CLINICAL HISTORY/INDICATION: Spontaneous and expressed right breast clear nipple  discharge x6 months. COMPARISON: Outside exam 2020. TECHNIQUE: Digital 2 D and 3 D  CC and MLO views of both breasts with computer  assisted detection, iCAD Second Look 7.2 . Additional right breast 2-D 3-D ML  and spot compression views obtained of the right breast.     Targeted ultrasound obtained at the right breast .      Breast Composition: The breasts are almost entirely fatty. FINDINGS:      Mammographic:  Asymmetry right breast 12:00 position 5 cm from the nipple  unchanged from 2020. Surgical clip right breast upper outer quadrant. There is  no distortion. There are no suspicious calcifications. No abnormality of the left breast.     Ultrasound:  Targeted ultrasound of the right breast. No mass nor ductal  dilatation demonstrated.  Band of mildly heterogeneous echogenic tissue at the  12:00 position 5 cm from the nipple. Morphologically benign lymph node at the right axilla. IMPRESSION     No suspicious finding for malignancy. BIRADS CATEGORY: BIRADS 2: Benign finding(s)  Management Recommendation: However are clear nipple discharge is a concerning  clinical finding. Recommend follow-up with a surgeon as well as recommend  follow-up breast MRI with and without contrast. The findings and recommendations  were discussed with the patient at the time of the exam. The patient was  referred to [facility], [address], for  notification of the ordering provider and coordination of care. Physical Exam:  Visit Vitals  /63   Pulse 83   Temp 97.5 °F (36.4 °C) (Temporal)   Resp 18   Ht 5' 7\" (1.702 m)   Wt 119.7 kg (264 lb)   SpO2 99%   BMI 41.35 kg/m²    BMI: Body mass index is 41.35 kg/m². Physical Exam  Constitutional:       Appearance: Normal appearance. She is obese. Cardiovascular:      Rate and Rhythm: Normal rate. Chest:   Breasts:     Right: Normal. No swelling, bleeding, inverted nipple, mass, nipple discharge, skin change or tenderness. Left: Normal. No swelling, bleeding, inverted nipple, mass, nipple discharge, skin change or tenderness. Lymphadenopathy:      Upper Body:      Right upper body: No supraclavicular, axillary or pectoral adenopathy. Left upper body: No supraclavicular, axillary or pectoral adenopathy. Skin:     General: Skin is warm and dry. Neurological:      General: No focal deficit present. Mental Status: She is alert and oriented to person, place, and time. Mental status is at baseline. Psychiatric:         Mood and Affect: Mood normal.         Behavior: Behavior normal.         Thought Content: Thought content normal.         Judgment: Judgment normal.       I have reviewed the information entered by the clinical staff and/or patient and verified it as accurate or edited where necessary. Electronically signed by:    Yuan Braga DO, MPH

## 2022-11-30 ENCOUNTER — TELEPHONE (OUTPATIENT)
Dept: NEUROLOGY | Age: 57
End: 2022-11-30

## 2022-12-01 RX ORDER — DULOXETIN HYDROCHLORIDE 30 MG/1
30 CAPSULE, DELAYED RELEASE ORAL AS NEEDED
COMMUNITY
Start: 2022-10-25

## 2022-12-01 NOTE — PERIOP NOTES
PRE-SURGICAL INSTRUCTIONS        Patient's Name:  Bryant Rodriguez      Today's Date:  12/1/2022            Covid Testing Date and Time:    Surgery Date:  12/6/2022                Do NOT eat or drink anything, including candy, gum, or ice chips after midnight on 12/05, unless you have specific instructions from your surgeon or anesthesia provider to do so. You may brush your teeth before coming to the hospital.  No smoking 24 hours prior to the day of surgery. No alcohol 24 hours prior to the day of surgery. No recreational drugs for one week prior to the day of surgery. Leave all valuables, including money/purse, at home. Remove all jewelry, nail polish, acrylic nails, and makeup (including mascara); no lotions powders, deodorant, or perfume/cologne/after shave on the skin. Follow instruction for Hibiclens washes and CHG wipes from surgeon's office. Glasses/contact lenses and dentures may be worn to the hospital.  They will be removed prior to surgery. Call your doctor if symptoms of a cold or illness develop within 24-48 hours prior to your surgery. 11.  If you are having an outpatient procedure, please make arrangements for a responsible ADULT TO [address] [place] [address] and stay with you for 24 hours after your surgery. 12. ONE VISITOR in the hospital at this time for outpatient procedures. Exceptions may be made for surgical admissions, per nursing unit guidelines      Special Instructions:      Bring list of CURRENT medications. Bring inhaler. Bring any pertinent legal medical records. Take these medications the morning of surgery with a sip of water:  Thyroid , Use Pulmicort prior to surgery. Follow physician instructions about stopping anticoagulants. Complete bowel prep per MD instructions    On the day of surgery, come in the main entrance of [facility]. Let the  at the desk know you are there for surgery.   A staff member will come escort you to the surgical area on the second floor. If you have any questions or concerns, please do not hesitate to call:     (Prior to the day of surgery) PAT department:  [phone]   (Day of surgery) Pre-Op department:  [phone]    These surgical instructions were reviewed with Silver Valdivia during the [facility] phone call.

## 2022-12-05 ENCOUNTER — ANESTHESIA EVENT (OUTPATIENT)
Dept: SURGERY | Age: 57
End: 2022-12-05
Payer: MEDICAID

## 2022-12-06 ENCOUNTER — ANESTHESIA (OUTPATIENT)
Dept: SURGERY | Age: 57
End: 2022-12-06
Payer: MEDICAID

## 2022-12-06 ENCOUNTER — HOSPITAL ENCOUNTER (OUTPATIENT)
Age: 57
Setting detail: OUTPATIENT SURGERY
Discharge: HOME | End: 2022-12-06
Attending: SURGERY | Admitting: SURGERY
Payer: MEDICAID

## 2022-12-06 VITALS
TEMPERATURE: 97.2 F | HEIGHT: 67 IN | WEIGHT: 260.2 LBS | BODY MASS INDEX: 40.84 KG/M2 | SYSTOLIC BLOOD PRESSURE: 107 MMHG | RESPIRATION RATE: 20 BRPM | HEART RATE: 64 BPM | DIASTOLIC BLOOD PRESSURE: 71 MMHG | OXYGEN SATURATION: 96 %

## 2022-12-06 DIAGNOSIS — N64.52 NIPPLE DISCHARGE: Primary | ICD-10-CM

## 2022-12-06 LAB
ANION GAP SERPL CALC-SCNC: 1 MMOL/L (ref 3–18)
BUN SERPL-MCNC: 17 MG/DL (ref 7–18)
BUN/CREAT SERPL: 18 (ref 12–20)
CALCIUM SERPL-MCNC: 9.6 MG/DL (ref 8.5–10.1)
CHLORIDE SERPL-SCNC: 105 MMOL/L (ref 100–111)
CO2 SERPL-SCNC: 31 MMOL/L (ref 21–32)
CREAT SERPL-MCNC: 0.97 MG/DL (ref 0.6–1.3)
GLUCOSE SERPL-MCNC: 100 MG/DL (ref 74–99)
POTASSIUM SERPL-SCNC: 4.2 MMOL/L (ref 3.5–5.5)
SODIUM SERPL-SCNC: 137 MMOL/L (ref 136–145)

## 2022-12-06 PROCEDURE — 74011250636 HC RX REV CODE- 250/636: Performed by: ANESTHESIOLOGY

## 2022-12-06 PROCEDURE — 77030031753 HC SHR ENDO COAG HARM J&J -E: Performed by: SURGERY

## 2022-12-06 PROCEDURE — 77030002996 HC SUT SLK J&J -A: Performed by: SURGERY

## 2022-12-06 PROCEDURE — 77030002933 HC SUT MCRYL J&J -A: Performed by: SURGERY

## 2022-12-06 PROCEDURE — 74011000250 HC RX REV CODE- 250: Performed by: ANESTHESIOLOGY

## 2022-12-06 PROCEDURE — 76010000138 HC OR TIME 0.5 TO 1 HR: Performed by: SURGERY

## 2022-12-06 PROCEDURE — 76210000021 HC REC RM PH II 0.5 TO 1 HR: Performed by: SURGERY

## 2022-12-06 PROCEDURE — 74011000272 HC RX REV CODE- 272: Performed by: SURGERY

## 2022-12-06 PROCEDURE — 74011250636 HC RX REV CODE- 250/636: Performed by: SURGERY

## 2022-12-06 PROCEDURE — 80048 BASIC METABOLIC PNL TOTAL CA: CPT

## 2022-12-06 PROCEDURE — 00400 ANES INTEGUMENTARY SYS NOS: CPT | Performed by: ANESTHESIOLOGY

## 2022-12-06 PROCEDURE — 77030031139 HC SUT VCRL2 J&J -A: Performed by: SURGERY

## 2022-12-06 PROCEDURE — 77030011267 HC ELECTRD BLD COVD -A: Performed by: SURGERY

## 2022-12-06 PROCEDURE — 74011250636 HC RX REV CODE- 250/636: Performed by: NURSE ANESTHETIST, CERTIFIED REGISTERED

## 2022-12-06 PROCEDURE — 74011250637 HC RX REV CODE- 250/637: Performed by: NURSE ANESTHETIST, CERTIFIED REGISTERED

## 2022-12-06 PROCEDURE — 76060000032 HC ANESTHESIA 0.5 TO 1 HR: Performed by: SURGERY

## 2022-12-06 PROCEDURE — 76210000016 HC OR PH I REC 1 TO 1.5 HR: Performed by: SURGERY

## 2022-12-06 PROCEDURE — 77030010509 HC AIRWY LMA MSK TELE -A: Performed by: NURSE ANESTHETIST, CERTIFIED REGISTERED

## 2022-12-06 PROCEDURE — 00400 ANES INTEGUMENTARY SYS NOS: CPT | Performed by: NURSE ANESTHETIST, CERTIFIED REGISTERED

## 2022-12-06 PROCEDURE — 2709999900 HC NON-CHARGEABLE SUPPLY: Performed by: SURGERY

## 2022-12-06 PROCEDURE — 77030040201 HC PRB SET LOCALIZER DISP BIPT -D: Performed by: SURGERY

## 2022-12-06 PROCEDURE — 74011000250 HC RX REV CODE- 250: Performed by: SURGERY

## 2022-12-06 PROCEDURE — 74011250637 HC RX REV CODE- 250/637: Performed by: SURGERY

## 2022-12-06 PROCEDURE — 77030010507 HC ADH SKN DERMBND J&J -B: Performed by: SURGERY

## 2022-12-06 PROCEDURE — 77030016441 HC APPL CLP LIG1 J&J -B: Performed by: SURGERY

## 2022-12-06 PROCEDURE — 77030013079 HC BLNKT BAIR HGGR 3M -A: Performed by: NURSE ANESTHETIST, CERTIFIED REGISTERED

## 2022-12-06 PROCEDURE — 88307 TISSUE EXAM BY PATHOLOGIST: CPT

## 2022-12-06 RX ORDER — MAGNESIUM SULFATE 100 %
4 CRYSTALS MISCELLANEOUS AS NEEDED
Status: DISCONTINUED | OUTPATIENT
Start: 2022-12-06 | End: 2022-12-06 | Stop reason: HOSPADM

## 2022-12-06 RX ORDER — VANCOMYCIN 1.75 GRAM/500 ML IN 0.9 % SODIUM CHLORIDE INTRAVENOUS
1750 ONCE
Status: COMPLETED | OUTPATIENT
Start: 2022-12-06 | End: 2022-12-06

## 2022-12-06 RX ORDER — SODIUM CHLORIDE, SODIUM LACTATE, POTASSIUM CHLORIDE, CALCIUM CHLORIDE 600; 310; 30; 20 MG/100ML; MG/100ML; MG/100ML; MG/100ML
INJECTION, SOLUTION INTRAVENOUS
Status: DISCONTINUED | OUTPATIENT
Start: 2022-12-06 | End: 2022-12-06 | Stop reason: HOSPADM

## 2022-12-06 RX ORDER — FENTANYL CITRATE 50 UG/ML
50 INJECTION, SOLUTION INTRAMUSCULAR; INTRAVENOUS
Status: DISCONTINUED | OUTPATIENT
Start: 2022-12-06 | End: 2022-12-06 | Stop reason: HOSPADM

## 2022-12-06 RX ORDER — HYDROCODONE BITARTRATE AND ACETAMINOPHEN 5; 325 MG/1; MG/1
1 TABLET ORAL
Qty: 15 TABLET | Refills: 0 | Status: SHIPPED | OUTPATIENT
Start: 2022-12-06 | End: 2022-12-11

## 2022-12-06 RX ORDER — FAMOTIDINE 20 MG/1
20 TABLET, FILM COATED ORAL ONCE
Status: COMPLETED | OUTPATIENT
Start: 2022-12-06 | End: 2022-12-06

## 2022-12-06 RX ORDER — SODIUM CHLORIDE, SODIUM LACTATE, POTASSIUM CHLORIDE, CALCIUM CHLORIDE 600; 310; 30; 20 MG/100ML; MG/100ML; MG/100ML; MG/100ML
25 INJECTION, SOLUTION INTRAVENOUS CONTINUOUS
Status: DISCONTINUED | OUTPATIENT
Start: 2022-12-06 | End: 2022-12-06 | Stop reason: HOSPADM

## 2022-12-06 RX ORDER — LIDOCAINE HYDROCHLORIDE 10 MG/ML
0.1 INJECTION, SOLUTION EPIDURAL; INFILTRATION; INTRACAUDAL; PERINEURAL AS NEEDED
Status: DISCONTINUED | OUTPATIENT
Start: 2022-12-06 | End: 2022-12-06 | Stop reason: HOSPADM

## 2022-12-06 RX ORDER — FENTANYL CITRATE 50 UG/ML
INJECTION, SOLUTION INTRAMUSCULAR; INTRAVENOUS AS NEEDED
Status: DISCONTINUED | OUTPATIENT
Start: 2022-12-06 | End: 2022-12-06 | Stop reason: HOSPADM

## 2022-12-06 RX ORDER — PROPOFOL 10 MG/ML
INJECTION, EMULSION INTRAVENOUS AS NEEDED
Status: DISCONTINUED | OUTPATIENT
Start: 2022-12-06 | End: 2022-12-06 | Stop reason: HOSPADM

## 2022-12-06 RX ORDER — SODIUM CHLORIDE 0.9 % (FLUSH) 0.9 %
5-40 SYRINGE (ML) INJECTION AS NEEDED
Status: DISCONTINUED | OUTPATIENT
Start: 2022-12-06 | End: 2022-12-06 | Stop reason: HOSPADM

## 2022-12-06 RX ORDER — MIDAZOLAM HYDROCHLORIDE 1 MG/ML
INJECTION, SOLUTION INTRAMUSCULAR; INTRAVENOUS AS NEEDED
Status: DISCONTINUED | OUTPATIENT
Start: 2022-12-06 | End: 2022-12-06 | Stop reason: HOSPADM

## 2022-12-06 RX ORDER — HYDROCODONE BITARTRATE AND ACETAMINOPHEN 5; 325 MG/1; MG/1
1 TABLET ORAL ONCE
Status: COMPLETED | OUTPATIENT
Start: 2022-12-06 | End: 2022-12-06

## 2022-12-06 RX ORDER — DEXTROSE MONOHYDRATE 100 MG/ML
0-250 INJECTION, SOLUTION INTRAVENOUS AS NEEDED
Status: DISCONTINUED | OUTPATIENT
Start: 2022-12-06 | End: 2022-12-06 | Stop reason: HOSPADM

## 2022-12-06 RX ORDER — ONDANSETRON 2 MG/ML
INJECTION INTRAMUSCULAR; INTRAVENOUS AS NEEDED
Status: DISCONTINUED | OUTPATIENT
Start: 2022-12-06 | End: 2022-12-06 | Stop reason: HOSPADM

## 2022-12-06 RX ORDER — FENTANYL CITRATE 50 UG/ML
25 INJECTION, SOLUTION INTRAMUSCULAR; INTRAVENOUS AS NEEDED
Status: DISCONTINUED | OUTPATIENT
Start: 2022-12-06 | End: 2022-12-06 | Stop reason: HOSPADM

## 2022-12-06 RX ORDER — FAMOTIDINE 20 MG/1
TABLET, FILM COATED ORAL
Status: DISCONTINUED
Start: 2022-12-06 | End: 2022-12-06 | Stop reason: WASHOUT

## 2022-12-06 RX ORDER — SODIUM CHLORIDE 0.9 % (FLUSH) 0.9 %
5-40 SYRINGE (ML) INJECTION EVERY 8 HOURS
Status: DISCONTINUED | OUTPATIENT
Start: 2022-12-06 | End: 2022-12-06 | Stop reason: HOSPADM

## 2022-12-06 RX ORDER — LIDOCAINE HYDROCHLORIDE 20 MG/ML
INJECTION, SOLUTION EPIDURAL; INFILTRATION; INTRACAUDAL; PERINEURAL AS NEEDED
Status: DISCONTINUED | OUTPATIENT
Start: 2022-12-06 | End: 2022-12-06 | Stop reason: HOSPADM

## 2022-12-06 RX ORDER — DEXAMETHASONE SODIUM PHOSPHATE 4 MG/ML
INJECTION, SOLUTION INTRA-ARTICULAR; INTRALESIONAL; INTRAMUSCULAR; INTRAVENOUS; SOFT TISSUE AS NEEDED
Status: DISCONTINUED | OUTPATIENT
Start: 2022-12-06 | End: 2022-12-06 | Stop reason: HOSPADM

## 2022-12-06 RX ADMIN — SODIUM CHLORIDE, SODIUM LACTATE, POTASSIUM CHLORIDE, AND CALCIUM CHLORIDE: 600; 310; 30; 20 INJECTION, SOLUTION INTRAVENOUS at 09:27

## 2022-12-06 RX ADMIN — PROPOFOL 150 MG: 10 INJECTION, EMULSION INTRAVENOUS at 09:34

## 2022-12-06 RX ADMIN — FENTANYL CITRATE 25 MCG: 50 INJECTION, SOLUTION INTRAMUSCULAR; INTRAVENOUS at 09:34

## 2022-12-06 RX ADMIN — PROPOFOL 50 MG: 10 INJECTION, EMULSION INTRAVENOUS at 09:36

## 2022-12-06 RX ADMIN — VANCOMYCIN HYDROCHLORIDE 1750 MG: 10 INJECTION, POWDER, LYOPHILIZED, FOR SOLUTION INTRAVENOUS at 08:58

## 2022-12-06 RX ADMIN — MIDAZOLAM HYDROCHLORIDE 2 MG: 2 INJECTION, SOLUTION INTRAMUSCULAR; INTRAVENOUS at 09:27

## 2022-12-06 RX ADMIN — LIDOCAINE HYDROCHLORIDE 100 MG: 20 INJECTION, SOLUTION EPIDURAL; INFILTRATION; INTRACAUDAL; PERINEURAL at 09:33

## 2022-12-06 RX ADMIN — FENTANYL CITRATE 25 MCG: 50 INJECTION, SOLUTION INTRAMUSCULAR; INTRAVENOUS at 09:58

## 2022-12-06 RX ADMIN — ONDANSETRON 4 MG: 2 INJECTION INTRAMUSCULAR; INTRAVENOUS at 09:45

## 2022-12-06 RX ADMIN — FENTANYL CITRATE 50 MCG: 50 INJECTION, SOLUTION INTRAMUSCULAR; INTRAVENOUS at 09:44

## 2022-12-06 RX ADMIN — FAMOTIDINE 20 MG: 20 TABLET ORAL at 08:40

## 2022-12-06 RX ADMIN — HYDROCODONE BITARTRATE AND ACETAMINOPHEN 1 TABLET: 5; 325 TABLET ORAL at 12:14

## 2022-12-06 RX ADMIN — DEXAMETHASONE SODIUM PHOSPHATE 4 MG: 4 INJECTION, SOLUTION INTRAMUSCULAR; INTRAVENOUS at 09:45

## 2022-12-06 RX ADMIN — SODIUM CHLORIDE, SODIUM LACTATE, POTASSIUM CHLORIDE, AND CALCIUM CHLORIDE 25 ML/HR: 600; 310; 30; 20 INJECTION, SOLUTION INTRAVENOUS at 08:39

## 2022-12-06 NOTE — ANESTHESIA POSTPROCEDURE EVALUATION
Procedure(s):  RIGHT NIPPLE BIOPSY, SUBAREOLAR DUCT EXCISION. general    Anesthesia Post Evaluation      Multimodal analgesia: multimodal analgesia used between 6 hours prior to anesthesia start to PACU discharge  Patient location during evaluation: bedside  Patient participation: complete - patient participated  Level of consciousness: awake  Pain management: adequate  Airway patency: patent  Anesthetic complications: no  Cardiovascular status: stable  Respiratory status: acceptable  Hydration status: acceptable  Post anesthesia nausea and vomiting:  controlled      INITIAL Post-op Vital signs:   Vitals Value Taken Time   /66 12/06/22 1115   Temp 36.2 °C (97.2 °F) 12/06/22 1055   Pulse 66 12/06/22 1117   Resp 16 12/06/22 1117   SpO2 83 % 12/06/22 1117   Vitals shown include unvalidated device data.

## 2022-12-06 NOTE — INTERVAL H&P NOTE
Update History & Physical    The Patient's History and Physical of December 6, 2022 was reviewed with the patient and I examined the patient. There was no change. The surgical site was confirmed by the patient and me. Plan:  The risk, benefits, expected outcome, and alternative to the recommended procedure have been discussed with the patient. Patient understands and wants to proceed with the procedure.     Electronically signed by Halie Guillen DO on 12/6/2022 at 8:20 AM

## 2022-12-06 NOTE — OP NOTES
Right breast subareolar duct excision    Patient Name: Silvestre Rodriguez    SURGERY DATE: 22    : 1965    AGE: 65 y.o. Anesthesiologist: Anesthesiologist: Rubén Zavala MD  CRNA: Sandy Duarte CRNA  SRNA: Ying Taipa    Anesthesia: General    Surgical Assist: Circ-1: Noemi Rosas RN  Surg Asst-1: Soham Leong    PreOp DX: Nipple discharge [N64.52], abnormal breast MRI    PostOp DX: same    Procedure: Right breast subareolar duct excision    Procedure Details: After informed consent was obtained the patient was taken to the operating room and placed in the supine position. General anesthesia was administered by the anesthetist to titrate to effect. The right breast was prepped and draped in the usual sterile fashion and a time out procedure was performed. Next an incision was made along the areola. Sharp dissection was performed directly behind the nipple and circumferentially dissection of the subareolar ducts was performed with bovie electrocautery. The specimen was then marked single short superior, single long lateral, double long anterior and sent of the field. The wound was irrigated and suctioned dry and found to be hemostatic. Clips were placed in the bed of the wound. The deep breast tissue was re-approximated with 2-0 vicryl in a simple interrupted fashion. The deep dermis was then re -approximated with 3-0 vicryl in a simple interrupted fashion and skin was closed with 4-0 monocryl in a subcuticular fashion. Dermabond dressing was applied. The patient was subsequently extubated, tolerated the procedure well and sent to recovery in stable condition.      Implant: * No implants in log *     Estimated Blood Loss:  10cc    Specimens: right breast mass single short superior, single long lateral, double long anterior           Complications: None           Disposition: extubated, tolerated procedure well           Condition: Stable    Eliane Jacobs DO

## 2022-12-06 NOTE — ANESTHESIA PREPROCEDURE EVALUATION
Relevant Problems   RESPIRATORY SYSTEM   (+) Asthma   (+) Community acquired pneumonia of left lung   (+) Dyspnea   (+) Mild intermittent asthma with acute exacerbation      NEUROLOGY   (+) Post traumatic stress disorder      CARDIOVASCULAR   (+) Acute on chronic diastolic congestive heart failure (HCC)   (+) Atypical angina (HCC)   (+) Congestive heart failure (HCC)   (+) Coronary artery disease   (+) Essential hypertension      GASTROINTESTINAL   (+) Gastroesophageal reflux disease      ENDOCRINE   (+) Acquired hypothyroidism   (+) Class 3 severe obesity due to excess calories with serious comorbidity and body mass index (BMI) of 50.0 to 59.9 in adult [facility])       Anesthetic History   No history of anesthetic complications            Review of Systems / Medical History  Patient summary reviewed and pertinent labs reviewed    Pulmonary            Asthma : well controlled       Neuro/Psych         Psychiatric history    Comments: depression Cardiovascular    Hypertension: well controlled          CAD         GI/Hepatic/Renal     GERD: well controlled      Liver disease     Endo/Other      Hypothyroidism: well controlled  Morbid obesity     Other Findings   Comments: Chronic Back Pain           Physical Exam    Airway  Mallampati: III  TM Distance: 4 - 6 cm  Neck ROM: normal range of motion, short neck   Mouth opening: Normal     Cardiovascular  Regular rate and rhythm,  S1 and S2 normal,  no murmur, click, rub, or gallop             Dental      Comments: Edentulous upper   Pulmonary                 Abdominal  GI exam deferred       Other Findings            Anesthetic Plan    ASA: 3  Anesthesia type: general          Induction: Intravenous  Anesthetic plan and risks discussed with: Patient

## 2022-12-06 NOTE — DISCHARGE INSTRUCTIONS
Post Operative Discharge Instructions    No driving for 24 hours after surgery and off of prescription pain medication. Avoid activities that bump or cause jarring movements at the surgical site for 10 days. No lifting more than 10-15 pounds for 6 weeks after surgery or until cleared for activity at your follow up. Walking is encouraged after surgery. Stairs are ok to climb. DIET:    Diet as tolerated. Start with liquids then advance your diet based on how you fell. No alcoholic beverages for 24 hours after surgery or while on antibiotics or pain mdications. Drink plenty of water. MEDICATIONS:    Use daily stool softners (over the counter such as Colace or Senekot) while on pain medications. Resume pre-operative medications. If you are on any blood thinners see special instructions below. Use prescriptions given or Tylenol, Ibuprofen as needed for pain. Do not use more than 4000mg of Tylenol (acetaminophen) per day. Be aware this may be  in your prescription medication as well. Be aware narcotic prescriptions are tightly controlled in the state of [place]. If requiring more than one refill, a follow up appointment will be required. WOUND CARE:      You have skin glue on your incision, you may shower in 24 hours and pat dry. Glue will fall off on it's own. Wear supportive bra at all times until cleared at your follow up    Do not tub bathe, swim, or soak incisions until cleared to do so at your follow up. Ice bag to the affected area; 20 minutes on and 20 minutes off if desired. FOLLOW UP CARE:    You should have an appointment scheduled within 14 days after surgery. If this is not yet scheduled, call the office.   Any forms that you need filled out regarding your medical care can be brought to the office at follow up appointment of faxed to: [id number] Teach Me To Be IF:  Temperature is over 101 degrees, a slight fever can be normal 24-48 hour after surgery. Nausea & vomiting that persists more than 24 hours after surgery. Your wound appears very red, hot, painful or swollen. Excessive bleeding occurs form the incision. *Between the hours 9-5 Monday-Friday please call the office at [phone]. If you do not receive a call back the same day, please do not hesitate to call my cell phone at [phone]    *If there is a medical emergency please go to the nearest emergency room immediately and do not hesitate to call my cell phone    *Weekends, after hours please call my cell phone         DISCHARGE SUMMARY from Nurse    PATIENT INSTRUCTIONS:    After general anesthesia or intravenous sedation, for 24 hours or while taking prescription Narcotics:  Limit your activities  Do not drive and operate hazardous machinery  Do not make important personal or business decisions  Do  not drink alcoholic beverages  If you have not urinated within 8 hours after discharge, please contact your surgeon on call. Report the following to your surgeon:  Excessive pain, swelling, redness or odor of or around the surgical area  Temperature over 100.5  Nausea and vomiting lasting longer than 4 hours or if unable to take medications  Any signs of decreased circulation or nerve impairment to extremity: change in color, persistent  numbness, tingling, coldness or increase pain  Any questions      These are general instructions for a healthy lifestyle:    No smoking/ No tobacco products/ Avoid exposure to second hand smoke  Surgeon General's Warning:  Quitting smoking now greatly reduces serious risk to your health.     Obesity, smoking, and sedentary lifestyle greatly increases your risk for illness    A healthy diet, regular physical exercise & weight monitoring are important for maintaining a healthy lifestyle    You may be retaining fluid if you have a history of heart failure or if you experience any of the following symptoms:  Weight gain of 3 pounds or more overnight or 5 pounds in a week, increased swelling in our hands or feet or shortness of breath while lying flat in bed. Please call your doctor as soon as you notice any of these symptoms; do not wait until your next office visit. The discharge information has been reviewed with the patient and life partner . The patient and life partner verbalized understanding. Discharge medications reviewed with the patient life partner and appropriate educational materials and side effects teaching were provided.   ___________________________________________________________________________________________________________________________________

## 2022-12-15 ENCOUNTER — OFFICE VISIT (OUTPATIENT)
Dept: SURGERY | Age: 57
End: 2022-12-15
Payer: MEDICAID

## 2022-12-15 VITALS
SYSTOLIC BLOOD PRESSURE: 114 MMHG | HEART RATE: 80 BPM | WEIGHT: 293 LBS | HEIGHT: 67 IN | DIASTOLIC BLOOD PRESSURE: 76 MMHG | BODY MASS INDEX: 45.99 KG/M2 | RESPIRATION RATE: 20 BRPM | TEMPERATURE: 98 F

## 2022-12-15 DIAGNOSIS — N64.52 NIPPLE DISCHARGE: Primary | ICD-10-CM

## 2022-12-15 DIAGNOSIS — Z91.89 AT HIGH RISK FOR BREAST CANCER: ICD-10-CM

## 2022-12-15 DIAGNOSIS — N60.11 FIBROCYSTIC BREAST CHANGES, RIGHT: ICD-10-CM

## 2022-12-15 PROCEDURE — 99024 POSTOP FOLLOW-UP VISIT: CPT | Performed by: SURGERY

## 2022-12-15 NOTE — PROGRESS NOTES
CC:   Chief Complaint   Patient presents with    Post OP Follow Up     Right breast subareolar duct excision             Assessment:    ICD-10-CM ICD-9-CM    1. Nipple discharge  N64.52 611.79       2. Fibrocystic breast changes, right  N60.11 610.1       3. At high risk for breast cancer  Z91.89 V49.89           Plan: I reviewed pathology report with the patient demonstrating benign findings of fibrocystic changes. Overall she is doing well since surgery but does have some bruising and superficial breakdown of the skin. She is to change dressing once a day and continue to protect the skin. I would like her to follow back in 3 weeks to ensure proper healing. She is to continue with self breast exams monthly and notify us of any changes. She would like to continue with high risk screening protocols for breast cancer with mammogram and MRI alternating every 6 months. She is due for bilateral mammogram in 6 months after her MRI which we will order for her. She agrees with this plan. HPI: She is here today for her initial follow up right breast subareolar duct excision. She is doing well today with no complaints of fevers or chills. She did have some bloody drainage overnight from the right breast. Overall she has minimal pain. 11/18/2022   She is here today to follow up her breast MRI. She continues to have spontaneous clear nipple discharge from the right breast. She reports no pain. No nipple discharge today. 9/22/2022  Joshua Rodriguez is a 65 y.o. female who is referred for spontaneous clear nipple discharge on the right for the last 6 months. Her most recent bilateral mammogram and right breast ultrasound were normal. She denies any changes to her medication or trauma to her breasts. She has not noticed any changes to her breast except she awoke one morning with clear drainage on her shirt. She reports no pain. She has a sister that was diagnosed with breast cancer age 48.  She does not believe her sister underwent genetic testing. Another sister with lung cancer age 39. Her mother had stomach cancer. There is no family history of pancreatic or ovarian cancer. Allergies: Allergies   Allergen Reactions    Latex, Natural Rubber Itching    Latex Itching    Ansaid [Flurbiprofen] Anaphylaxis    Iron Dextran Anaphylaxis and Itching    Levofloxacin Myalgia     Tendon rupture  Tendon rupture    Adhesive Rash    Codeine Itching and Rash    Keflex [Cephalexin] Hives and Itching    Penicillins Other (comments)     Allergic to penicillin mold like on bread but has had penicillin medication w/o any problem    Clindamycin Hives       Medication Review:  Current Outpatient Medications on File Prior to Visit   Medication Sig Dispense Refill    DULoxetine (CYMBALTA) 30 mg capsule Take 30 mg by mouth as needed. lansoprazole (PREVACID) 30 mg capsule Take 1 Capsule by mouth Daily (before breakfast). (Patient taking differently: Take 30 mg by mouth as needed.) 90 Capsule 1    DULoxetine (CYMBALTA) 60 mg capsule Take 1 Capsule by mouth two (2) times a day. Indications: neuropathic pain 180 Capsule 1    gabapentin (NEURONTIN) 300 mg capsule Take 300 mg by mouth two (2) times daily as needed. levothyroxine (Euthyrox) 100 mcg tablet Take 1 Tablet by mouth Daily (before breakfast). 90 Tablet 1    budesonide (Pulmicort Flexhaler) 90 mcg/actuation aepb inhaler Take 1 Puff by inhalation as needed. spironolactone (ALDACTONE) 25 mg tablet Take 1 Tablet by mouth daily. 30 Tablet 2    atorvastatin (LIPITOR) 20 mg tablet Take 2 Tablets by mouth daily. Increase per Dr. Rosio Vasquez 30 Tablet 2    magnesium oxide (MAG-OX) 400 mg tablet Take 1 Tablet by mouth two (2) times a day. 180 Tablet 1    lisinopriL (PRINIVIL, ZESTRIL) 2.5 mg tablet Take 1 Tablet by mouth daily. 90 Tablet 0    triamcinolone acetonide (KENALOG) 0.1 % topical cream Apply  to affected area two (2) times a day.  use thin layer 45 g 5    clotrimazole (LOTRIMIN) 1 % topical cream Apply  to affected area two (2) times a day. 24 g 0    albuterol (PROVENTIL HFA, VENTOLIN HFA, PROAIR HFA) 90 mcg/actuation inhaler Take 2 Puffs by inhalation every four (4) hours as needed for Wheezing. 1 Inhaler 0    furosemide (Lasix) 40 mg tablet Take 1 Tab by mouth daily. (Patient taking differently: Take 40 mg by mouth two (2) times a day.) 90 Tab 1    traMADoL (ULTRAM) 50 mg tablet Take 100 mg by mouth every eight (8) hours as needed. cetirizine (ZYRTEC) 10 mg tablet Take 30 mg by mouth daily. EPINEPHrine HCl, PF, (ADRENALIN) 1 mg/mL (1 mL) injection once. (Patient not taking: Reported on 12/6/2022)       No current facility-administered medications on file prior to visit. Systems Review:  Review of Systems   Constitutional:  Negative for fatigue, fever and unexpected weight change. Respiratory:  Negative for chest tightness and shortness of breath. Cardiovascular:  Negative for palpitations. Skin:  Negative for pallor, rash and wound.      PMH:  Past Medical History:   Diagnosis Date    Anemia     Angio-edema     Asthma     Blind left eye     D-dimer, elevated     Chronic elevation    Depression     Diverticulosis     Enlargement, spleen     Esophagospasm     Essential hypertension     Fatty liver     GERD (gastroesophageal reflux disease)     Heart failure (HCC)     Right sided    Histoplasmosis     HPV (human papilloma virus) anogenital infection     Lyme disease     Morbid obesity ([address]     Neuropathy     Neuropathy     Upper and lower    PCOS (polycystic ovarian syndrome)     Pituitary abnormality (HCC)     PTSD (post-traumatic stress disorder)     Tachycardia     Thyroid disease     Tricuspid insufficiency        Surgical History:  Past Surgical History:   Procedure Laterality Date    COLONOSCOPY N/A 08/10/2022    COLONOSCOPY performed by Kristyn Lopez DO at [facility] Right 12/6/2022    RIGHT NIPPLE BIOPSY, SUBAREOLAR DUCT EXCISION [facility]    HX [address] Bilateral     HX CATARACT REMOVAL Right     Lens implant    HX CHOLECYSTECTOMY      HX ENDOSCOPY  2022    HX HEART CATHETERIZATION  2010 approx    normal per patient    HX HEMORRHOIDECTOMY      HX OTHER SURGICAL      Vocal cord polyps       Social History:  Social History     Socioeconomic History    Marital status: SINGLE   Tobacco Use    Smoking status: Every Day     Packs/day: 0.50     Years: 15.00     Pack years: 7.50     Types: Cigarettes     Start date: 1985    Smokeless tobacco: Never    Tobacco comments:     less than 10 depending on the day   Vaping Use    Vaping Use: Former   Substance and Sexual Activity    Alcohol use: Yes    Drug use: Not Currently     Types: Marijuana    Sexual activity: Not Currently     Partners: Male       Family History:  Family History   Problem Relation Age of Onset    Cancer Mother         stomach    Seizures Mother     Cancer Sister         double mastectomy    Cancer Sister         lung    Heart Surgery Maternal Grandmother         CABG    Stroke Neg Hx        Admission on 12/06/2022, Discharged on 12/06/2022   Component Date Value Ref Range Status    Sodium 12/06/2022 137  136 - 145 mmol/L Final    Potassium 12/06/2022 4.2  3.5 - 5.5 mmol/L Final    Chloride 12/06/2022 105  100 - 111 mmol/L Final    CO2 12/06/2022 31  21 - 32 mmol/L Final    Anion gap 12/06/2022 1 (A)  3.0 - 18 mmol/L Final    Glucose 12/06/2022 100 (A)  74 - 99 mg/dL Final    BUN 12/06/2022 17  7.0 - 18 MG/DL Final    Creatinine 12/06/2022 0.97  0.6 - 1.3 MG/DL Final    BUN/Creatinine ratio 12/06/2022 18  12 - 20   Final    eGFR 12/06/2022 >60  >60 ml/min/1.73m2 Final    Comment:      Pediatric calculator link: Elias.at. org/professionals/kdoqi/gfr_calculatorped       These results are not intended for use in patients <25years of age.        eGFR results are calculated without a race factor using  the 2021 CKD-EPI equation. Careful clinical correlation is recommended, particularly when comparing to results calculated using previous equations. The CKD-EPI equation is less accurate in patients with extremes of muscle mass, extra-renal metabolism of creatinine, excessive creatine ingestion, or following therapy that affects renal tubular secretion. Calcium 12/06/2022 9.6  8.5 - 10.1 MG/DL Final   Hospital Outpatient Visit on 11/10/2022   Component Date Value Ref Range Status    Iron 11/10/2022 49 (A)  50 - 175 ug/dL Final    Patients receiving metal-binding drugs (e.g. deferoxamine) may show spuriously depressed iron values, as chelated iron may not properly react in the iron assay. TIBC 11/10/2022 342  250 - 450 ug/dL Final    Iron % saturation 11/10/2022 14 (A)  20 - 50 % Final    Ferritin 11/10/2022 73  8 - 388 NG/ML Final    WBC 11/10/2022 8.1  4.6 - 13.2 K/uL Final    RBC 11/10/2022 4.84  4.20 - 5.30 M/uL Final    HGB 11/10/2022 13.8  12.0 - 16.0 g/dL Final    HCT 11/10/2022 43.0  35.0 - 45.0 % Final    MCV 11/10/2022 88.8  78.0 - 100.0 FL Final    MCH 11/10/2022 28.5  24.0 - 34.0 PG Final    MCHC 11/10/2022 32.1  31.0 - 37.0 g/dL Final    RDW 11/10/2022 13.4  11.6 - 14.5 % Final    PLATELET 27/52/4308 462  135 - 420 K/uL Final    MPV 11/10/2022 10.8  9.2 - 11.8 FL Final    NRBC 11/10/2022 0.0  0  WBC Final    ABSOLUTE NRBC 11/10/2022 0.00  0.00 - 0.01 K/uL Final    NEUTROPHILS 11/10/2022 60  40 - 73 % Final    LYMPHOCYTES 11/10/2022 25  21 - 52 % Final    MONOCYTES 11/10/2022 7  3 - 10 % Final    EOSINOPHILS 11/10/2022 7 (A)  0 - 5 % Final    BASOPHILS 11/10/2022 1  0 - 2 % Final    IMMATURE GRANULOCYTES 11/10/2022 1 (A)  0.0 - 0.5 % Final    ABS. NEUTROPHILS 11/10/2022 4.9  1.8 - 8.0 K/UL Final    ABS. LYMPHOCYTES 11/10/2022 2.0  0.9 - 3.6 K/UL Final    ABS. MONOCYTES 11/10/2022 0.6  0.05 - 1.2 K/UL Final    ABS. EOSINOPHILS 11/10/2022 0.5 (A)  0.0 - 0.4 K/UL Final    ABS.  BASOPHILS 11/10/2022 0.1  0.0 - 0.1 K/UL Final    ABS. IMM. GRANS. 11/10/2022 0.0  0.00 - 0.04 K/UL Final    DF 11/10/2022 AUTOMATED    Final   Hospital Outpatient Visit on 11/10/2022   Component Date Value Ref Range Status    Creatinine, POC 11/10/2022 0.7  0.6 - 1.3 MG/DL Final    eGFR (POC) 11/10/2022 >60  >60 ml/min/1.73m2 Final    Comment:      Pediatric calculator link: [employer].at. org/professionals/kdoqi/gfr_calculatorped       Effective Oct 3, 2022       These results are not intended for use in patients <25years of age. eGFR results are calculated without a race factor using  the 2021 CKD-EPI equation. Careful clinical correlation is recommended, particularly when comparing to results calculated using previous equations. The CKD-EPI equation is less accurate in patients with extremes of muscle mass, extra-renal metabolism of creatinine, excessive creatine ingestion, or following therapy that affects renal tubular secretion. MRI BREAST BI W WO CONT  Narrative: MRI OF THE RIGHT AND LEFT BREAST WITH AND WITHOUT CONTRAST:    HISTORY: Clear discharge from the right nipple. Family history of breast   cancer  in sister. COMPARISON:  9/8/2022, 7/31/2020    TECHNIQUE: With the patient in the prone position, axial localizer, axial   T1,  T2, STIR and thin slice dynamic 3-D pre and post gadolinium T1 weighted  sequences with fat suppression were obtained utilizing a 3T GE magnet and   a 16  channel [place] breast coil. 20 cc of Multihance were administered IV for   the  contrast enhanced portion. Rapid imaging with multiple passes through the  breasts was performed. Study interpreted on a [email or web address]. Subtracted, reconstructed and MIP images were generated and reviewed. FINDINGS:  Breast Composition: Fatty  Background enhancement: Minimal.  Contrast Bolus: Adequate  Motion Artifact: Mild    Right axilla: No adenopathy. Left axilla: No adenopathy. No internal mammary adenopathy.     Right Breast:There is an 8 x 5 x 1 mm disc shaped enhancement at the right  nipple with extremely thin linear enhancement extending approximately 4 mm   into  the retroareolar region along the lateral aspect of the enhancement. Left Breast: No suspicious enhancing mass or suspicious enhancement   identified. Impression: 1. Small enhancement at the right nipple with 4 mm thin linear enhancement  extending into the retroareolar region, considered suspicious in the   setting of  clear nipple discharge.  -This is not amenable to needle core biopsy due to its position at the   nipple. -Recommend further management per surgeon. 2. No evidence for malignancy in the left breast.    Right Breast Bi-Rads: BIRADS 4:  Suspicious   Left Breast Bi-Rads:BIRADS 1:  Negative     Narrative & Impression   EXAM: DIGITAL DIAGNOSTIC MAMMOGRAM with CAD  BILATERAL  WITH DIGITAL BREAST  TOMOSYNTHESIS 2D/3D EXAM   AND BREAST  ULTRASOUND LIMITED RIGHT      CLINICAL HISTORY/INDICATION: Spontaneous and expressed right breast clear nipple  discharge x6 months. COMPARISON: Outside exam 2020. TECHNIQUE: Digital 2 D and 3 D  CC and MLO views of both breasts with computer  assisted detection, iCAD Second Look 7.2 . Additional right breast 2-D 3-D ML  and spot compression views obtained of the right breast.     Targeted ultrasound obtained at the right breast .      Breast Composition: The breasts are almost entirely fatty. FINDINGS:      Mammographic:  Asymmetry right breast 12:00 position 5 cm from the nipple  unchanged from 2020. Surgical clip right breast upper outer quadrant. There is  no distortion. There are no suspicious calcifications. No abnormality of the left breast.     Ultrasound:  Targeted ultrasound of the right breast. No mass nor ductal  dilatation demonstrated. Band of mildly heterogeneous echogenic tissue at the  12:00 position 5 cm from the nipple. Morphologically benign lymph node at the right axilla.      IMPRESSION     No suspicious finding for malignancy. BIRADS CATEGORY: BIRADS 2: Benign finding(s)  Management Recommendation: However are clear nipple discharge is a concerning  clinical finding. Recommend follow-up with a surgeon as well as recommend  follow-up breast MRI with and without contrast. The findings and recommendations  were discussed with the patient at the time of the exam. The patient was  referred to Jim Pugh, [address], for  notification of the ordering provider and coordination of care. Physical Exam:  Visit Vitals  /76   Pulse 80   Temp 98 °F (36.7 °C)   Resp 20   Ht 5' 7\" (1.702 m)   Wt (!) 163.3 kg (360 lb)   BMI 56.38 kg/m²      BMI: Body mass index is 56.38 kg/m². Physical Exam  Constitutional:       Appearance: Normal appearance. She is obese. Cardiovascular:      Rate and Rhythm: Normal rate. Chest:   Breasts:     Right: Normal. No swelling, bleeding, inverted nipple, mass, nipple discharge, skin change or tenderness. Comments: Resolving ecchymosis involving nipple with superficial break down of the incision, scant serous drainage, no purulence, appropriately tender  Skin:     General: Skin is warm and dry. Neurological:      General: No focal deficit present. Mental Status: She is alert and oriented to person, place, and time. Mental status is at baseline. Psychiatric:         Mood and Affect: Mood normal.         Behavior: Behavior normal.         Thought Content: Thought content normal.         Judgment: Judgment normal.                      * * *SURGICAL PATHOLOGY REPORT* * *   ==========================================================================         Patient:  DANI GREY                      Specimen #:  [id number]   Age:  1965 (Age: 65)                        Date of Procedure:   12/6/2022   Sex:  F                           Date of Receipt:  12/6/2022   [facility]#:  [id number] Date of Report: 12/7/2022   Med. Record #:  [id number]   [facility]:  [facility] PHASE 2 RECOVERY [facility]   Room/Bed:   [facility]-10   Physician(s):  Kira German DO              PRE-OPERATIVE DIAGNOSIS:   Nipple discharge: N64.52     TYPE OF SPECIMEN:   A: RIGHT BREAST MASS, SINGLE SHORT SUPERIOR, SINGLE LONG LATERAL, DOUBLE   LONG ANTERIOR       ==========================================================================                                    * * *FINAL DIAGNOSIS* * *       RIGHT BREAST MASS, LUMPECTOMY        BENIGN FIBROCYSTIC CHANGE WITH STROMAL FIBROSIS. NO IN SITU OR INVASIVE CARCINOMA IDENTIFIED. SURGICAL MARGINS UNREMARKABLE. GROSS DESCRIPTION:   The specimen is received in formalin labeled with patient's identifiers   and \"right breast mass\". The specimen consists of a 27 g right   lumpectomy. The specimen is oriented as \"single short superior, single   long lateral, double long anterior\". The specimen measures 5.4 cm from   medial to lateral, 2.4 cm from superior to inferior, and 5.3 cm from   anterior to posterior. The specimen is inked as follows: superior-blue,   inferior-green, anterior-orange, posterior-black, lateral-yellow, and   medial-red. The specimen is serially sectioned from medial to lateral.   Sectioning reveals a 1.7 x 1.6 x 1.1 cm white area of nodular fibrous   tissue. The area is 0.6 cm from the superior margin, 3.2 cm from the   anterior margin, 2.5 cm from the lateral margin, and abuts the inferior,   posterior, and medial margins. The remaining breast parenchyma is yellow,   lobulated, fibrofatty. Fibrous tissue comprises 15% of the entire cut   surfaces. No biopsy clips or Faxitron tags are identified in the   specimen.   Representative sections are submitted sequentially from medial   to lateral as follows:     A1     medial margin, perpendicularly   A2     nodular area to superior margin   A3     nodular area to inferior margin   A4     nodular area to posterior margin   A5-7     fibrous tissue   A8     lateral margin, perpendicularly     Breast Fixation Times:   Out of Body Time and Date: 0727 on 12/06/2022   Time and Date Placed in Formalin: 0947 on 12/06/2022   Time and Date Placed on Processor: 1730 on 12/06/2022     SL 12/6/2022 02:39 PM   I have reviewed the information entered by the clinical staff and/or patient and verified it as accurate or edited where necessary.      Electronically signed by:    Zee Pantoja DO, MPH

## 2022-12-21 DIAGNOSIS — I50.32 CHRONIC DIASTOLIC CONGESTIVE HEART FAILURE (HCC): ICD-10-CM

## 2022-12-21 RX ORDER — SPIRONOLACTONE 25 MG/1
TABLET ORAL
Qty: 30 TABLET | Refills: 0 | Status: SHIPPED | OUTPATIENT
Start: 2022-12-21

## 2022-12-21 RX ORDER — LISINOPRIL 2.5 MG/1
TABLET ORAL
Qty: 90 TABLET | Refills: 0 | Status: SHIPPED | OUTPATIENT
Start: 2022-12-21

## 2022-12-28 ENCOUNTER — OFFICE VISIT (OUTPATIENT)
Dept: SURGERY | Age: 57
End: 2022-12-28
Payer: MEDICAID

## 2022-12-28 VITALS
TEMPERATURE: 97.3 F | HEART RATE: 93 BPM | HEIGHT: 67 IN | OXYGEN SATURATION: 99 % | DIASTOLIC BLOOD PRESSURE: 90 MMHG | BODY MASS INDEX: 45.99 KG/M2 | WEIGHT: 293 LBS | RESPIRATION RATE: 16 BRPM | SYSTOLIC BLOOD PRESSURE: 148 MMHG

## 2022-12-28 DIAGNOSIS — T86.828 SKIN FLAP NECROSIS (HCC): Primary | ICD-10-CM

## 2022-12-28 DIAGNOSIS — I96 SKIN FLAP NECROSIS (HCC): Primary | ICD-10-CM

## 2022-12-28 PROCEDURE — 99024 POSTOP FOLLOW-UP VISIT: CPT | Performed by: SURGERY

## 2022-12-28 NOTE — PROGRESS NOTES
Mary Locke is a 65 y.o. female  Chief Complaint   Patient presents with    Post OP Follow Up     12/6/2022 Right breast subareolar duct excision     Visit Vitals  BP (!) 148/90   Pulse 93   Temp 97.3 °F (36.3 °C) (Temporal)   Resp 16   Ht 5' 7\" (1.702 m)   Wt (!) 355 lb (161 kg)   SpO2 99%   BMI 55.60 kg/m²       1. Have you been to the ER, urgent care clinic since your last visit? Hospitalized since your last visit? No    2. Have you seen or consulted any other health care providers outside of the [address] [place] [address] since your last visit? Include any pap smears or colon screening.  No

## 2022-12-28 NOTE — PROGRESS NOTES
CC:   Chief Complaint   Patient presents with    Post OP Follow Up     12/6/2022 Right breast subareolar duct excision          Assessment:    ICD-10-CM ICD-9-CM    1. Skin flap necrosis ([facility].)  T86.828 996.52     I96            Plan: The necrotic skin was excised sharply today and the wound was packed. There are no signs of infection and I would not recommend antibiotics. She will change the outer dressing daily and as needed for saturation. I will see her back on Friday for dressing change and the following week she is scheduled to follow up with wound care and we will request assistant with the wound. HPI: Yesterday she developed moderate amount of bloody drainage from her right breast. No fevers or chills. She has occasional sharp shooting pain. 12/15/2022  She is here today for her initial follow up right breast subareolar duct excision. She is doing well today with no complaints of fevers or chills. She did have some bloody drainage overnight from the right breast. Overall she has minimal pain. 11/18/2022   She is here today to follow up her breast MRI. She continues to have spontaneous clear nipple discharge from the right breast. She reports no pain. No nipple discharge today. 9/22/2022  Cherie Rodriguez is a 65 y.o. female who is referred for spontaneous clear nipple discharge on the right for the last 6 months. Her most recent bilateral mammogram and right breast ultrasound were normal. She denies any changes to her medication or trauma to her breasts. She has not noticed any changes to her breast except she awoke one morning with clear drainage on her shirt. She reports no pain. She has a sister that was diagnosed with breast cancer age 48. She does not believe her sister underwent genetic testing. Another sister with lung cancer age 39. Her mother had stomach cancer. There is no family history of pancreatic or ovarian cancer. Allergies:   Allergies   Allergen Reactions    Latex, Natural Rubber Itching    Latex Itching    Ansaid [Flurbiprofen] Anaphylaxis    Iron Dextran Anaphylaxis and Itching    Levofloxacin Myalgia     Tendon rupture  Tendon rupture    Adhesive Rash    Codeine Itching and Rash    Keflex [Cephalexin] Hives and Itching    Penicillins Other (comments)     Allergic to penicillin mold like on bread but has had penicillin medication w/o any problem    Clindamycin Hives       Medication Review:  Current Outpatient Medications on File Prior to Visit   Medication Sig Dispense Refill    lisinopriL (PRINIVIL, ZESTRIL) 2.5 mg tablet Take 1 tablet by mouth once daily 90 Tablet 0    spironolactone (ALDACTONE) 25 mg tablet Take 1 tablet by mouth once daily 30 Tablet 0    DULoxetine (CYMBALTA) 30 mg capsule Take 30 mg by mouth as needed. lansoprazole (PREVACID) 30 mg capsule Take 1 Capsule by mouth Daily (before breakfast). (Patient taking differently: Take 30 mg by mouth as needed.) 90 Capsule 1    DULoxetine (CYMBALTA) 60 mg capsule Take 1 Capsule by mouth two (2) times a day. Indications: neuropathic pain 180 Capsule 1    gabapentin (NEURONTIN) 300 mg capsule Take 300 mg by mouth two (2) times daily as needed. levothyroxine (Euthyrox) 100 mcg tablet Take 1 Tablet by mouth Daily (before breakfast). 90 Tablet 1    budesonide (Pulmicort Flexhaler) 90 mcg/actuation aepb inhaler Take 1 Puff by inhalation as needed. atorvastatin (LIPITOR) 20 mg tablet Take 2 Tablets by mouth daily. Increase per Dr. Josy Su 30 Tablet 2    magnesium oxide (MAG-OX) 400 mg tablet Take 1 Tablet by mouth two (2) times a day. 180 Tablet 1    triamcinolone acetonide (KENALOG) 0.1 % topical cream Apply  to affected area two (2) times a day. use thin layer 45 g 5    clotrimazole (LOTRIMIN) 1 % topical cream Apply  to affected area two (2) times a day.  24 g 0    albuterol (PROVENTIL HFA, VENTOLIN HFA, PROAIR HFA) 90 mcg/actuation inhaler Take 2 Puffs by inhalation every four (4) hours as needed for Wheezing. 1 Inhaler 0    furosemide (Lasix) 40 mg tablet Take 1 Tab by mouth daily. (Patient taking differently: Take 40 mg by mouth two (2) times a day.) 90 Tab 1    EPINEPHrine HCl, PF, (ADRENALIN) 1 mg/mL (1 mL) injection once. traMADoL (ULTRAM) 50 mg tablet Take 100 mg by mouth every eight (8) hours as needed. cetirizine (ZYRTEC) 10 mg tablet Take 30 mg by mouth daily. No current facility-administered medications on file prior to visit. Systems Review:  Review of Systems   Constitutional:  Negative for fatigue, fever and unexpected weight change. Respiratory:  Negative for chest tightness and shortness of breath. Cardiovascular:  Negative for palpitations. Skin:  Negative for pallor, rash and wound.      PMH:  Past Medical History:   Diagnosis Date    Anemia     Angio-edema     Asthma     Blind left eye     D-dimer, elevated     Chronic elevation    Depression     Diverticulosis     Enlargement, spleen     Esophagospasm     Essential hypertension     Fatty liver     GERD (gastroesophageal reflux disease)     Heart failure (HCC)     Right sided    Histoplasmosis     HPV (human papilloma virus) anogenital infection     Lyme disease     Morbid obesity ([facility]     Neuropathy     Neuropathy     Upper and lower    PCOS (polycystic ovarian syndrome)     Pituitary abnormality (HCC)     PTSD (post-traumatic stress disorder)     Tachycardia     Thyroid disease     Tricuspid insufficiency        Surgical History:  Past Surgical History:   Procedure Laterality Date    COLONOSCOPY N/A 08/10/2022    COLONOSCOPY performed by Joshua Tomas DO at [facility] Right 12/6/2022    RIGHT NIPPLE BIOPSY, SUBAREOLAR DUCT EXCISION performed by Joshua Toams DO at [facility]. Bilateral     HX CATARACT REMOVAL Right     Lens implant    HX CHOLECYSTECTOMY      HX ENDOSCOPY  2022    [id number] [facility]  2010 approx    normal per patient    HX HEMORRHOIDECTOMY      HX OTHER SURGICAL      Vocal cord polyps       Social History:  Social History     Socioeconomic History    Marital status: SINGLE   Tobacco Use    Smoking status: Every Day     Packs/day: 0.50     Years: 15.00     Pack years: 7.50     Types: Cigarettes     Start date: 1985    Smokeless tobacco: Never    Tobacco comments:     less than 10 depending on the day   Vaping Use    Vaping Use: Former   Substance and Sexual Activity    Alcohol use: Yes    Drug use: Not Currently     Types: Marijuana    Sexual activity: Not Currently     Partners: Male       Family History:  Family History   Problem Relation Age of Onset    Cancer Mother         stomach    Seizures Mother     Cancer Sister         double mastectomy    Cancer Sister         lung    Heart Surgery Maternal Grandmother         CABG    Stroke Neg Hx        Admission on 12/06/2022, Discharged on 12/06/2022   Component Date Value Ref Range Status    Sodium 12/06/2022 137  136 - 145 mmol/L Final    Potassium 12/06/2022 4.2  3.5 - 5.5 mmol/L Final    Chloride 12/06/2022 105  100 - 111 mmol/L Final    CO2 12/06/2022 31  21 - 32 mmol/L Final    Anion gap 12/06/2022 1 (A)  3.0 - 18 mmol/L Final    Glucose 12/06/2022 100 (A)  74 - 99 mg/dL Final    BUN 12/06/2022 17  7.0 - 18 MG/DL Final    Creatinine 12/06/2022 0.97  0.6 - 1.3 MG/DL Final    BUN/Creatinine ratio 12/06/2022 18  12 - 20   Final    eGFR 12/06/2022 >60  >60 ml/min/1.73m2 Final    Comment:      Pediatric calculator link: [email or web address]. org/professionals/kdoqi/gfr_calculatorped       These results are not intended for use in patients <25years of age. eGFR results are calculated without a race factor using  the 2021 CKD-EPI equation. Careful clinical correlation is recommended, particularly when comparing to results calculated using previous equations.   The CKD-EPI equation is less accurate in patients with extremes of muscle mass, extra-renal metabolism of creatinine, excessive creatine ingestion, or following therapy that affects renal tubular secretion. Calcium 12/06/2022 9.6  8.5 - 10.1 MG/DL Final   Hospital Outpatient Visit on 11/10/2022   Component Date Value Ref Range Status    Iron 11/10/2022 49 (A)  50 - 175 ug/dL Final    Patients receiving metal-binding drugs (e.g. deferoxamine) may show spuriously depressed iron values, as chelated iron may not properly react in the iron assay. TIBC 11/10/2022 342  250 - 450 ug/dL Final    Iron % saturation 11/10/2022 14 (A)  20 - 50 % Final    Ferritin 11/10/2022 73  8 - 388 NG/ML Final    WBC 11/10/2022 8.1  4.6 - 13.2 K/uL Final    RBC 11/10/2022 4.84  4.20 - 5.30 M/uL Final    HGB 11/10/2022 13.8  12.0 - 16.0 g/dL Final    HCT 11/10/2022 43.0  35.0 - 45.0 % Final    MCV 11/10/2022 88.8  78.0 - 100.0 FL Final    MCH 11/10/2022 28.5  24.0 - 34.0 PG Final    MCHC 11/10/2022 32.1  31.0 - 37.0 g/dL Final    RDW 11/10/2022 13.4  11.6 - 14.5 % Final    PLATELET 07/15/1215 645  135 - 420 K/uL Final    MPV 11/10/2022 10.8  9.2 - 11.8 FL Final    NRBC 11/10/2022 0.0  0  WBC Final    ABSOLUTE NRBC 11/10/2022 0.00  0.00 - 0.01 K/uL Final    NEUTROPHILS 11/10/2022 60  40 - 73 % Final    LYMPHOCYTES 11/10/2022 25  21 - 52 % Final    MONOCYTES 11/10/2022 7  3 - 10 % Final    EOSINOPHILS 11/10/2022 7 (A)  0 - 5 % Final    BASOPHILS 11/10/2022 1  0 - 2 % Final    IMMATURE GRANULOCYTES 11/10/2022 1 (A)  0.0 - 0.5 % Final    ABS. NEUTROPHILS 11/10/2022 4.9  1.8 - 8.0 K/UL Final    ABS. LYMPHOCYTES 11/10/2022 2.0  0.9 - 3.6 K/UL Final    ABS. MONOCYTES 11/10/2022 0.6  0.05 - 1.2 K/UL Final    ABS. EOSINOPHILS 11/10/2022 0.5 (A)  0.0 - 0.4 K/UL Final    ABS. BASOPHILS 11/10/2022 0.1  0.0 - 0.1 K/UL Final    ABS. IMM. GRANS.  11/10/2022 0.0  0.00 - 0.04 K/UL Final    DF 11/10/2022 AUTOMATED    Final   Hospital Outpatient Visit on 11/10/2022   Component Date Value Ref Range Status    Creatinine, POC 11/10/2022 0.7  0.6 - 1.3 MG/DL Final eGFR (POC) 11/10/2022 >60  >60 ml/min/1.73m2 Final    Comment:      Pediatric calculator link: [employer].at. org/professionals/kdoqi/gfr_calculatorped       Effective Oct 3, 2022       These results are not intended for use in patients <25years of age. eGFR results are calculated without a race factor using  the 2021 CKD-EPI equation. Careful clinical correlation is recommended, particularly when comparing to results calculated using previous equations. The CKD-EPI equation is less accurate in patients with extremes of muscle mass, extra-renal metabolism of creatinine, excessive creatine ingestion, or following therapy that affects renal tubular secretion. MRI BREAST BI W WO CONT  Narrative: MRI OF THE RIGHT AND LEFT BREAST WITH AND WITHOUT CONTRAST:    HISTORY: Clear discharge from the right nipple. Family history of breast   cancer  in sister. COMPARISON:  9/8/2022, 7/31/2020    TECHNIQUE: With the patient in the prone position, axial localizer, axial   T1,  T2, STIR and thin slice dynamic 3-D pre and post gadolinium T1 weighted  sequences with fat suppression were obtained utilizing a 3T GE magnet and   a 16  channel [place] breast coil. 20 cc of Multihance were administered IV for   the  contrast enhanced portion. Rapid imaging with multiple passes through the  breasts was performed. Study interpreted on a [employer]. Subtracted, reconstructed and MIP images were generated and reviewed. FINDINGS:  Breast Composition: Fatty  Background enhancement: Minimal.  Contrast Bolus: Adequate  Motion Artifact: Mild    Right axilla: No adenopathy. Left axilla: No adenopathy. No internal mammary adenopathy. Right Breast:There is an 8 x 5 x 1 mm disc shaped enhancement at the right  nipple with extremely thin linear enhancement extending approximately 4 mm   into  the retroareolar region along the lateral aspect of the enhancement.     Left Breast: No suspicious enhancing mass or suspicious enhancement   identified. Impression: 1. Small enhancement at the right nipple with 4 mm thin linear enhancement  extending into the retroareolar region, considered suspicious in the   setting of  clear nipple discharge.  -This is not amenable to needle core biopsy due to its position at the   nipple. -Recommend further management per surgeon. 2. No evidence for malignancy in the left breast.    Right Breast Bi-Rads: BIRADS 4:  Suspicious   Left Breast Bi-Rads:BIRADS 1:  Negative     Narrative & Impression   EXAM: DIGITAL DIAGNOSTIC MAMMOGRAM with CAD  BILATERAL  WITH DIGITAL BREAST  TOMOSYNTHESIS 2D/3D EXAM   AND BREAST  ULTRASOUND LIMITED RIGHT      CLINICAL HISTORY/INDICATION: Spontaneous and expressed right breast clear nipple  discharge x6 months. COMPARISON: Outside exam 2020. TECHNIQUE: Digital 2 D and 3 D  CC and MLO views of both breasts with computer  assisted detection, iCAD Second Look 7.2 . Additional right breast 2-D 3-D ML  and spot compression views obtained of the right breast.     Targeted ultrasound obtained at the right breast .      Breast Composition: The breasts are almost entirely fatty. FINDINGS:      Mammographic:  Asymmetry right breast 12:00 position 5 cm from the nipple  unchanged from 2020. Surgical clip right breast upper outer quadrant. There is  no distortion. There are no suspicious calcifications. No abnormality of the left breast.     Ultrasound:  Targeted ultrasound of the right breast. No mass nor ductal  dilatation demonstrated. Band of mildly heterogeneous echogenic tissue at the  12:00 position 5 cm from the nipple. Morphologically benign lymph node at the right axilla. IMPRESSION     No suspicious finding for malignancy. BIRADS CATEGORY: BIRADS 2: Benign finding(s)  Management Recommendation: However are clear nipple discharge is a concerning  clinical finding.  Recommend follow-up with a surgeon as well as recommend  follow-up breast MRI with and without contrast. The findings and recommendations  were discussed with the patient at the time of the exam. The patient was  referred to [facility] [place], [address], for  notification of the ordering provider and coordination of care. Physical Exam:  Visit Vitals  BP (!) 148/90   Pulse 93   Temp 97.3 °F (36.3 °C) (Temporal)   Resp 16   Ht 5' 7\" (1.702 m)   Wt (!) 161 kg (355 lb)   SpO2 99%   BMI 55.60 kg/m²      BMI: Body mass index is 55.6 kg/m². Physical Exam  Constitutional:       Appearance: Normal appearance. She is obese. Cardiovascular:      Rate and Rhythm: Normal rate. Chest:   Breasts:     Right: Normal. No swelling, bleeding, inverted nipple, mass, nipple discharge, skin change or tenderness. Comments: Resolving ecchymosis involving nipple with full thickness necrosis lateral edge of nipple with serous drainage noted  Skin:     General: Skin is warm and dry. Neurological:      General: No focal deficit present. Mental Status: She is alert and oriented to person, place, and time. Mental status is at baseline. Psychiatric:         Mood and Affect: Mood normal.         Behavior: Behavior normal.         Thought Content: Thought content normal.         Judgment: Judgment normal.                      * * *SURGICAL PATHOLOGY REPORT* * *   ==========================================================================         Patient:  DANI GREY Specimen #:  [id number]   Age:  1965 (Age: 65)                        Date of Procedure:   12/6/2022   Sex:  F                           Date of Receipt:  12/6/2022   Hospital#:  [id number]                  Date of Report: 12/7/2022   Med.  Record #:  [id number]   Location:  [facility] PHASE 2 RECOVERY [facility])   Room/Bed:   [place] [address]   Physician(s):  Aurelia Shipman DO              PRE-OPERATIVE DIAGNOSIS:   Nipple discharge: N64.52     TYPE OF SPECIMEN:   A: RIGHT BREAST MASS, SINGLE SHORT SUPERIOR, SINGLE LONG LATERAL, DOUBLE   LONG ANTERIOR       ==========================================================================                                    * * *FINAL DIAGNOSIS* * *       RIGHT BREAST MASS, LUMPECTOMY        BENIGN FIBROCYSTIC CHANGE WITH STROMAL FIBROSIS. NO IN SITU OR INVASIVE CARCINOMA IDENTIFIED. SURGICAL MARGINS UNREMARKABLE. GROSS DESCRIPTION:   The specimen is received in formalin labeled with patient's identifiers   and \"right breast mass\". The specimen consists of a 27 g right   lumpectomy. The specimen is oriented as \"single short superior, single   long lateral, double long anterior\". The specimen measures 5.4 cm from   medial to lateral, 2.4 cm from superior to inferior, and 5.3 cm from   anterior to posterior. The specimen is inked as follows: superior-blue,   inferior-green, anterior-orange, posterior-black, lateral-yellow, and   medial-red. The specimen is serially sectioned from medial to lateral.   Sectioning reveals a 1.7 x 1.6 x 1.1 cm white area of nodular fibrous   tissue. The area is 0.6 cm from the superior margin, 3.2 cm from the   anterior margin, 2.5 cm from the lateral margin, and abuts the inferior,   posterior, and medial margins. The remaining breast parenchyma is yellow,   lobulated, fibrofatty. Fibrous tissue comprises 15% of the entire cut   surfaces. No biopsy clips or Faxitron tags are identified in the   specimen.   Representative sections are submitted sequentially from medial   to lateral as follows:     A1     medial margin, perpendicularly   A2     nodular area to superior margin   A3     nodular area to inferior margin   A4     nodular area to posterior margin   A5-7     fibrous tissue   A8     lateral margin, perpendicularly     Breast Fixation Times:   Out of Body Time and Date: 8128 on 12/06/2022   Time and Date Placed in Formalin: 7242 on 12/06/2022   Time and Date Placed on Processor: 7900 on 12/06/2022     SL 12/6/2022 02:39 PM   I have reviewed the information entered by the clinical staff and/or patient and verified it as accurate or edited where necessary.      Electronically signed by:    Simon Reynaga DO, MPH

## 2022-12-30 ENCOUNTER — OFFICE VISIT (OUTPATIENT)
Dept: SURGERY | Age: 57
End: 2022-12-30
Payer: MEDICAID

## 2022-12-30 ENCOUNTER — TELEPHONE (OUTPATIENT)
Dept: SURGERY | Age: 57
End: 2022-12-30

## 2022-12-30 VITALS
WEIGHT: 268 LBS | HEART RATE: 78 BPM | OXYGEN SATURATION: 98 % | BODY MASS INDEX: 43.07 KG/M2 | TEMPERATURE: 98 F | HEIGHT: 66 IN | RESPIRATION RATE: 20 BRPM

## 2022-12-30 DIAGNOSIS — T86.828 SKIN FLAP NECROSIS (HCC): Primary | ICD-10-CM

## 2022-12-30 DIAGNOSIS — I96 SKIN FLAP NECROSIS (HCC): Primary | ICD-10-CM

## 2022-12-30 PROCEDURE — 99024 POSTOP FOLLOW-UP VISIT: CPT | Performed by: SURGERY

## 2022-12-30 NOTE — PROGRESS NOTES
CC:   Chief Complaint   Patient presents with    Follow-up     Right breast subareolar duct excision          Assessment:    ICD-10-CM ICD-9-CM    1. Skin flap necrosis ([facility]  T86.828 996.52     I96            Plan: The wound was repacked with aquacel AG. She was instructed to change the outer dressing daily and as needed for any saturation. We can see her back in the office on Tuesday for packing changing with nurse visit and then she will follow-up in the wound clinic next Thursday. She agrees with this plan and will call prior to this time if she has any questions or concerns. HPI: Yesterday she developed moderate amount of bloody drainage from her right breast. No fevers or chills. She has occasional sharp shooting pain. 12/15/2022  She is here today for her initial follow up right breast subareolar duct excision. She is doing well today with no complaints of fevers or chills. She did have some bloody drainage overnight from the right breast. Overall she has minimal pain. 11/18/2022   She is here today to follow up her breast MRI. She continues to have spontaneous clear nipple discharge from the right breast. She reports no pain. No nipple discharge today. 9/22/2022  Madonna Rodriguez is a 65 y.o. female who is referred for spontaneous clear nipple discharge on the right for the last 6 months. Her most recent bilateral mammogram and right breast ultrasound were normal. She denies any changes to her medication or trauma to her breasts. She has not noticed any changes to her breast except she awoke one morning with clear drainage on her shirt. She reports no pain. She has a sister that was diagnosed with breast cancer age 48. She does not believe her sister underwent genetic testing. Another sister with lung cancer age 39. Her mother had stomach cancer. There is no family history of pancreatic or ovarian cancer. Allergies:   Allergies   Allergen Reactions    Latex, Natural Rubber Itching Latex Itching    Ansaid [Flurbiprofen] Anaphylaxis    Iron Dextran Anaphylaxis and Itching    Levofloxacin Myalgia     Tendon rupture  Tendon rupture    Adhesive Rash    Codeine Itching and Rash    Keflex [Cephalexin] Hives and Itching    Penicillins Other (comments)     Allergic to penicillin mold like on bread but has had penicillin medication w/o any problem    Clindamycin Hives       Medication Review:  Current Outpatient Medications on File Prior to Visit   Medication Sig Dispense Refill    lisinopriL (PRINIVIL, ZESTRIL) 2.5 mg tablet Take 1 tablet by mouth once daily 90 Tablet 0    spironolactone (ALDACTONE) 25 mg tablet Take 1 tablet by mouth once daily 30 Tablet 0    DULoxetine (CYMBALTA) 30 mg capsule Take 30 mg by mouth as needed. lansoprazole (PREVACID) 30 mg capsule Take 1 Capsule by mouth Daily (before breakfast). (Patient taking differently: Take 30 mg by mouth as needed.) 90 Capsule 1    DULoxetine (CYMBALTA) 60 mg capsule Take 1 Capsule by mouth two (2) times a day. Indications: neuropathic pain 180 Capsule 1    gabapentin (NEURONTIN) 300 mg capsule Take 300 mg by mouth two (2) times daily as needed. levothyroxine (Euthyrox) 100 mcg tablet Take 1 Tablet by mouth Daily (before breakfast). 90 Tablet 1    budesonide (Pulmicort Flexhaler) 90 mcg/actuation aepb inhaler Take 1 Puff by inhalation as needed. atorvastatin (LIPITOR) 20 mg tablet Take 2 Tablets by mouth daily. Increase per Dr. Blanca Dietz 30 Tablet 2    magnesium oxide (MAG-OX) 400 mg tablet Take 1 Tablet by mouth two (2) times a day. 180 Tablet 1    triamcinolone acetonide (KENALOG) 0.1 % topical cream Apply  to affected area two (2) times a day. use thin layer 45 g 5    clotrimazole (LOTRIMIN) 1 % topical cream Apply  to affected area two (2) times a day. 24 g 0    albuterol (PROVENTIL HFA, VENTOLIN HFA, PROAIR HFA) 90 mcg/actuation inhaler Take 2 Puffs by inhalation every four (4) hours as needed for Wheezing.  1 Inhaler 0 furosemide (Lasix) 40 mg tablet Take 1 Tab by mouth daily. (Patient taking differently: Take 40 mg by mouth two (2) times a day.) 90 Tab 1    traMADoL (ULTRAM) 50 mg tablet Take 100 mg by mouth every eight (8) hours as needed. cetirizine (ZYRTEC) 10 mg tablet Take 30 mg by mouth daily. EPINEPHrine HCl, PF, (ADRENALIN) 1 mg/mL (1 mL) injection once. No current facility-administered medications on file prior to visit. Systems Review:  Review of Systems   Constitutional:  Negative for fatigue, fever and unexpected weight change. Respiratory:  Negative for chest tightness and shortness of breath. Cardiovascular:  Negative for palpitations. Skin:  Negative for pallor, rash and wound.      PMH:  Past Medical History:   Diagnosis Date    Anemia     Angio-edema     Asthma     Blind left eye     D-dimer, elevated     Chronic elevation    Depression     Diverticulosis     Enlargement, spleen     Esophagospasm     Essential hypertension     Fatty liver     GERD (gastroesophageal reflux disease)     Heart failure (HCC)     Right sided    Histoplasmosis     HPV (human papilloma virus) anogenital infection     Lyme disease     Morbid obesity ([address]     Neuropathy     Neuropathy     Upper and lower    PCOS (polycystic ovarian syndrome)     Pituitary abnormality (HCC)     PTSD (post-traumatic stress disorder)     Tachycardia     Thyroid disease     Tricuspid insufficiency        Surgical History:  Past Surgical History:   Procedure Laterality Date    COLONOSCOPY N/A 08/10/2022    COLONOSCOPY performed by Roseanna Dai DO at [facility] Right 12/6/2022    RIGHT NIPPLE BIOPSY, SUBAREOLAR DUCT EXCISION performed by Roseanna Dai DO at [address] [facility]    HX [address] [facility] Bilateral     HX CATARACT REMOVAL Right     Lens implant    HX CHOLECYSTECTOMY      HX ENDOSCOPY  2022    [address] [facility] [address]  2010 approx    normal per patient    HX HEMORRHOIDECTOMY      HX OTHER SURGICAL      Vocal cord polyps       Social History:  Social History     Socioeconomic History    Marital status: SINGLE   Tobacco Use    Smoking status: Every Day     Packs/day: 0.50     Years: 15.00     Pack years: 7.50     Types: Cigarettes     Start date: 1985    Smokeless tobacco: Never    Tobacco comments:     less than 10 depending on the day   Vaping Use    Vaping Use: Former   Substance and Sexual Activity    Alcohol use: Yes    Drug use: Not Currently     Types: Marijuana    Sexual activity: Not Currently     Partners: Male       Family History:  Family History   Problem Relation Age of Onset    Cancer Mother         stomach    Seizures Mother     Cancer Sister         double mastectomy    Cancer Sister         lung    Heart Surgery Maternal Grandmother         CABG    Stroke Neg Hx        Admission on 12/06/2022, Discharged on 12/06/2022   Component Date Value Ref Range Status    Sodium 12/06/2022 137  136 - 145 mmol/L Final    Potassium 12/06/2022 4.2  3.5 - 5.5 mmol/L Final    Chloride 12/06/2022 105  100 - 111 mmol/L Final    CO2 12/06/2022 31  21 - 32 mmol/L Final    Anion gap 12/06/2022 1 (A)  3.0 - 18 mmol/L Final    Glucose 12/06/2022 100 (A)  74 - 99 mg/dL Final    BUN 12/06/2022 17  7.0 - 18 MG/DL Final    Creatinine 12/06/2022 0.97  0.6 - 1.3 MG/DL Final    BUN/Creatinine ratio 12/06/2022 18  12 - 20   Final    eGFR 12/06/2022 >60  >60 ml/min/1.73m2 Final    Comment:      Pediatric calculator link: [email or web address] org/[email or web address]/gfr_calculatorped       These results are not intended for use in patients <25years of age. eGFR results are calculated without a race factor using  the 2021 CKD-EPI equation. Careful clinical correlation is recommended, particularly when comparing to results calculated using previous equations.   The CKD-EPI equation is less accurate in patients with extremes of muscle mass, extra-renal metabolism of creatinine, excessive creatine ingestion, or following therapy that affects renal tubular secretion. Calcium 12/06/2022 9.6  8.5 - 10.1 MG/DL Final   Hospital Outpatient Visit on 11/10/2022   Component Date Value Ref Range Status    Iron 11/10/2022 49 (A)  50 - 175 ug/dL Final    Patients receiving metal-binding drugs (e.g. deferoxamine) may show spuriously depressed iron values, as chelated iron may not properly react in the iron assay. TIBC 11/10/2022 342  250 - 450 ug/dL Final    Iron % saturation 11/10/2022 14 (A)  20 - 50 % Final    Ferritin 11/10/2022 73  8 - 388 NG/ML Final    WBC 11/10/2022 8.1  4.6 - 13.2 K/uL Final    RBC 11/10/2022 4.84  4.20 - 5.30 M/uL Final    HGB 11/10/2022 13.8  12.0 - 16.0 g/dL Final    HCT 11/10/2022 43.0  35.0 - 45.0 % Final    MCV 11/10/2022 88.8  78.0 - 100.0 FL Final    MCH 11/10/2022 28.5  24.0 - 34.0 PG Final    MCHC 11/10/2022 32.1  31.0 - 37.0 g/dL Final    RDW 11/10/2022 13.4  11.6 - 14.5 % Final    PLATELET 45/06/0238 705  135 - 420 K/uL Final    MPV 11/10/2022 10.8  9.2 - 11.8 FL Final    NRBC 11/10/2022 0.0  0  WBC Final    ABSOLUTE NRBC 11/10/2022 0.00  0.00 - 0.01 K/uL Final    NEUTROPHILS 11/10/2022 60  40 - 73 % Final    LYMPHOCYTES 11/10/2022 25  21 - 52 % Final    MONOCYTES 11/10/2022 7  3 - 10 % Final    EOSINOPHILS 11/10/2022 7 (A)  0 - 5 % Final    BASOPHILS 11/10/2022 1  0 - 2 % Final    IMMATURE GRANULOCYTES 11/10/2022 1 (A)  0.0 - 0.5 % Final    ABS. NEUTROPHILS 11/10/2022 4.9  1.8 - 8.0 K/UL Final    ABS. LYMPHOCYTES 11/10/2022 2.0  0.9 - 3.6 K/UL Final    ABS. MONOCYTES 11/10/2022 0.6  0.05 - 1.2 K/UL Final    ABS. EOSINOPHILS 11/10/2022 0.5 (A)  0.0 - 0.4 K/UL Final    ABS. BASOPHILS 11/10/2022 0.1  0.0 - 0.1 K/UL Final    ABS. IMM. GRANS.  11/10/2022 0.0  0.00 - 0.04 K/UL Final    DF 11/10/2022 AUTOMATED    Final   Hospital Outpatient Visit on 11/10/2022   Component Date Value Ref Range Status    Creatinine, POC 11/10/2022 0.7  0.6 - 1.3 MG/DL Final    eGFR (POC) 11/10/2022 >60  >60 ml/min/1.73m2 Final    Comment:      Pediatric calculator link: [facility].at. org/professionals/kdoqi/gfr_calculatorped       Effective Oct 3, 2022       These results are not intended for use in patients <25years of age. eGFR results are calculated without a race factor using  the 2021 CKD-EPI equation. Careful clinical correlation is recommended, particularly when comparing to results calculated using previous equations. The CKD-EPI equation is less accurate in patients with extremes of muscle mass, extra-renal metabolism of creatinine, excessive creatine ingestion, or following therapy that affects renal tubular secretion. MRI BREAST BI W WO CONT  Narrative: MRI OF THE RIGHT AND LEFT BREAST WITH AND WITHOUT CONTRAST:    HISTORY: Clear discharge from the right nipple. Family history of breast   cancer  in sister. COMPARISON:  9/8/2022, 7/31/2020    TECHNIQUE: With the patient in the prone position, axial localizer, axial   T1,  T2, STIR and thin slice dynamic 3-D pre and post gadolinium T1 weighted  sequences with fat suppression were obtained utilizing a 3T GE magnet and   a 16  channel [place] breast coil. 20 cc of Multihance were administered IV for   the  contrast enhanced portion. Rapid imaging with multiple passes through the  breasts was performed. Study interpreted on a [place]. Subtracted, reconstructed and MIP images were generated and reviewed. FINDINGS:  Breast Composition: Fatty  Background enhancement: Minimal.  Contrast Bolus: Adequate  Motion Artifact: Mild    Right axilla: No adenopathy. Left axilla: No adenopathy. No internal mammary adenopathy. Right Breast:There is an 8 x 5 x 1 mm disc shaped enhancement at the right  nipple with extremely thin linear enhancement extending approximately 4 mm   into  the retroareolar region along the lateral aspect of the enhancement.     Left Breast: No suspicious enhancing mass or suspicious enhancement identified. Impression: 1. Small enhancement at the right nipple with 4 mm thin linear enhancement  extending into the retroareolar region, considered suspicious in the   setting of  clear nipple discharge.  -This is not amenable to needle core biopsy due to its position at the   nipple. -Recommend further management per surgeon. 2. No evidence for malignancy in the left breast.    Right Breast Bi-Rads: BIRADS 4:  Suspicious   Left Breast Bi-Rads:BIRADS 1:  Negative     Narrative & Impression   EXAM: DIGITAL DIAGNOSTIC MAMMOGRAM with CAD  BILATERAL  WITH DIGITAL BREAST  TOMOSYNTHESIS 2D/3D EXAM   AND BREAST  ULTRASOUND LIMITED RIGHT      CLINICAL HISTORY/INDICATION: Spontaneous and expressed right breast clear nipple  discharge x6 months. COMPARISON: Outside exam 2020. TECHNIQUE: Digital 2 D and 3 D  CC and MLO views of both breasts with computer  assisted detection, iCAD Second Look 7.2 . Additional right breast 2-D 3-D ML  and spot compression views obtained of the right breast.     Targeted ultrasound obtained at the right breast .      Breast Composition: The breasts are almost entirely fatty. FINDINGS:      Mammographic:  Asymmetry right breast 12:00 position 5 cm from the nipple  unchanged from 2020. Surgical clip right breast upper outer quadrant. There is  no distortion. There are no suspicious calcifications. No abnormality of the left breast.     Ultrasound:  Targeted ultrasound of the right breast. No mass nor ductal  dilatation demonstrated. Band of mildly heterogeneous echogenic tissue at the  12:00 position 5 cm from the nipple. Morphologically benign lymph node at the right axilla. IMPRESSION     No suspicious finding for malignancy. BIRADS CATEGORY: BIRADS 2: Benign finding(s)  Management Recommendation: However are clear nipple discharge is a concerning  clinical finding.  Recommend follow-up with a surgeon as well as recommend  follow-up breast MRI with and without contrast. The findings and recommendations  were discussed with the patient at the time of the exam. The patient was  referred to Sunni Mckeon, [address], for  notification of the ordering provider and coordination of care. Physical Exam:  Visit Vitals  Pulse 78   Temp 98 °F (36.7 °C)   Resp 20   Ht 5' 6\" (1.676 m)   Wt 121.6 kg (268 lb)   SpO2 98%   BMI 43.26 kg/m²      BMI: Body mass index is 43.26 kg/m². Physical Exam  Constitutional:       Appearance: Normal appearance. She is obese. Cardiovascular:      Rate and Rhythm: Normal rate. Chest:   Breasts:     Right: Normal. No swelling, bleeding, inverted nipple, mass, nipple discharge, skin change or tenderness. Comments: Right lateral nipple open wound with serosanguinous drainage, beefy red granulation tissue bed of wound  Skin:     General: Skin is warm and dry. Neurological:      General: No focal deficit present. Mental Status: She is alert and oriented to person, place, and time. Mental status is at baseline. Psychiatric:         Mood and Affect: Mood normal.         Behavior: Behavior normal.         Thought Content: Thought content normal.         Judgment: Judgment normal.                      * * *SURGICAL PATHOLOGY REPORT* * *   ==========================================================================         Patient:  DANI GREY Specimen #:  [id number]   Age:  1965 (Age: 65)                        Date of Procedure:   12/6/2022   Sex:  F                           Date of Receipt:  12/6/2022   Hospital#:  [id number]                  Date of Report: 12/7/2022   Med.  Record #:  [id number]   Location:  [facility] PHASE 2 RECOVERY [facility] [place])   Room/Bed:   [facility]   Physician(s):  Zee Pantoja DO              PRE-OPERATIVE DIAGNOSIS:   Nipple discharge: N64.52     TYPE OF SPECIMEN:   A: RIGHT BREAST MASS, SINGLE SHORT SUPERIOR, SINGLE LONG LATERAL, DOUBLE LONG ANTERIOR       ==========================================================================                                    * * *FINAL DIAGNOSIS* * *       RIGHT BREAST MASS, LUMPECTOMY        BENIGN FIBROCYSTIC CHANGE WITH STROMAL FIBROSIS. NO IN SITU OR INVASIVE CARCINOMA IDENTIFIED. SURGICAL MARGINS UNREMARKABLE. GROSS DESCRIPTION:   The specimen is received in formalin labeled with patient's identifiers   and \"right breast mass\". The specimen consists of a 27 g right   lumpectomy. The specimen is oriented as \"single short superior, single   long lateral, double long anterior\". The specimen measures 5.4 cm from   medial to lateral, 2.4 cm from superior to inferior, and 5.3 cm from   anterior to posterior. The specimen is inked as follows: superior-blue,   inferior-green, anterior-orange, posterior-black, lateral-yellow, and   medial-red. The specimen is serially sectioned from medial to lateral.   Sectioning reveals a 1.7 x 1.6 x 1.1 cm white area of nodular fibrous   tissue. The area is 0.6 cm from the superior margin, 3.2 cm from the   anterior margin, 2.5 cm from the lateral margin, and abuts the inferior,   posterior, and medial margins. The remaining breast parenchyma is yellow,   lobulated, fibrofatty. Fibrous tissue comprises 15% of the entire cut   surfaces. No biopsy clips or Faxitron tags are identified in the   specimen.   Representative sections are submitted sequentially from medial   to lateral as follows:     A1     medial margin, perpendicularly   A2     nodular area to superior margin   A3     nodular area to inferior margin   A4     nodular area to posterior margin   A5-7     fibrous tissue   A8     lateral margin, perpendicularly     Breast Fixation Times:   Out of Body Time and Date: 4709 on 12/06/2022   Time and Date Placed in Formalin: 0947 on 12/06/2022   Time and Date Placed on Processor: 1730 on 12/06/2022     SL 12/6/2022 02:39 PM   I have reviewed the information entered by the clinical staff and/or patient and verified it as accurate or edited where necessary.      Electronically signed by:    Aurelia Shipman DO, MPH

## 2022-12-30 NOTE — PROGRESS NOTES
Chief Complaint   Patient presents with    Follow-up     Right breast subareolar duct excision    1. Have you been to the ER, urgent care clinic since your last visit? Hospitalized since your last visit? No    2. Have you seen or consulted any other health care providers outside of the [address] [place] [address] since your last visit? Include any pap smears or colon screening.  No

## 2023-01-09 ENCOUNTER — HOSPITAL ENCOUNTER (OUTPATIENT)
Dept: LAB | Age: 58
Discharge: HOME | End: 2023-01-09

## 2023-01-09 LAB — XX-LABCORP SPECIMEN COL,LCBCF: NORMAL

## 2023-01-09 PROCEDURE — 99001 SPECIMEN HANDLING PT-LAB: CPT

## 2023-01-11 ENCOUNTER — TELEPHONE (OUTPATIENT)
Dept: FAMILY MEDICINE CLINIC | Age: 58
End: 2023-01-11

## 2023-01-11 DIAGNOSIS — D72.829 LEUKOCYTOSIS, UNSPECIFIED TYPE: Primary | ICD-10-CM

## 2023-01-11 NOTE — TELEPHONE ENCOUNTER
Patient aware of low vitamin  D3 level and MD recommendation to consume OTC D3 2000 units along with a healthy diet and lifestyle. She denies cough and urinary symptoms, but c/o left sided flank pain and lower abdominal pain stating it feels as though her organs are rubbing together.  She is scheduled with physician on 1/17/22 for further evaluation

## 2023-01-11 NOTE — TELEPHONE ENCOUNTER
Patient notified that provider has ordered lab work . She stated she will come in tomorrow and complete it.  Mrs Paulo Castillo is aware of MD recommendation to go to ED if pain worsens two identifiers obtained and verified

## 2023-01-12 ENCOUNTER — HOSPITAL ENCOUNTER (OUTPATIENT)
Dept: LAB | Age: 58
Discharge: HOME | End: 2023-01-12

## 2023-01-12 LAB
A-G RATIO,AGRAT: 1.2 RATIO (ref 1.1–2.6)
ALBUMIN SERPL-MCNC: 4.2 G/DL (ref 3.5–5)
ALP SERPL-CCNC: 112 U/L (ref 25–115)
ALT SERPL-CCNC: 11 U/L (ref 5–40)
ANION GAP SERPL CALC-SCNC: 11 MMOL/L (ref 3–15)
AST SERPL W P-5'-P-CCNC: 13 U/L (ref 10–37)
BILIRUB SERPL-MCNC: 0.5 MG/DL (ref 0.2–1.2)
BILIRUBIN, DIRECT,CBIL: <0.2 MG/DL (ref 0–0.3)
BUN SERPL-MCNC: 20 MG/DL (ref 6–22)
CALCIUM SERPL-MCNC: 10.1 MG/DL (ref 8.4–10.5)
CHLORIDE SERPL-SCNC: 97 MMOL/L (ref 98–110)
CHOLEST SERPL-MCNC: 179 MG/DL (ref 110–200)
CO2 SERPL-SCNC: 27 MMOL/L (ref 20–32)
CREAT SERPL-MCNC: 0.8 MG/DL (ref 0.5–1.2)
GLOBULIN,GLOB: 3.4 G/DL (ref 2–4)
GLOMERULAR FILTRATION RATE: >60 ML/MIN/1.73 SQ.M.
GLUCOSE SERPL-MCNC: 84 MG/DL (ref 70–99)
HDLC SERPL-MCNC: 4.3 MG/DL (ref 0–5)
HDLC SERPL-MCNC: 42 MG/DL
LDL/HDL RATIO,LDHD: 2.6
LDLC SERPL CALC-MCNC: 109 MG/DL (ref 50–99)
NON-HDL CHOLESTEROL, 011976: 137 MG/DL
POTASSIUM SERPL-SCNC: 4.4 MMOL/L (ref 3.5–5.5)
PROT SERPL-MCNC: 7.6 G/DL (ref 6.4–8.3)
SODIUM SERPL-SCNC: 135 MMOL/L (ref 133–145)
TRIGL SERPL-MCNC: 139 MG/DL (ref 40–149)
VLDLC SERPL CALC-MCNC: 28 MG/DL (ref 8–30)
XX-LABCORP SPECIMEN COL,LCBCF: NORMAL

## 2023-01-12 PROCEDURE — 99001 SPECIMEN HANDLING PT-LAB: CPT

## 2023-01-14 LAB
APPEARANCE UR: CLEAR
BACTERIA UR CULT: NORMAL
BILIRUB UR QL STRIP: NEGATIVE
COLOR UR: YELLOW
GLUCOSE UR QL STRIP: NEGATIVE
HGB UR QL STRIP: NEGATIVE
KETONES UR QL STRIP: NEGATIVE
LEUKOCYTE ESTERASE UR QL STRIP: NEGATIVE
MICRO URNS: NORMAL
NITRITE UR QL STRIP: NEGATIVE
PH UR STRIP: 6 [PH] (ref 5–7.5)
PROT UR QL STRIP: NEGATIVE
SP GR UR STRIP: 1.01 (ref 1–1.03)
UROBILINOGEN UR STRIP-MCNC: 0.2 MG/DL (ref 0.2–1)

## 2023-01-16 DIAGNOSIS — D72.829 LEUKOCYTOSIS, UNSPECIFIED TYPE: Primary | ICD-10-CM

## 2023-01-17 ENCOUNTER — OFFICE VISIT (OUTPATIENT)
Dept: FAMILY MEDICINE CLINIC | Age: 58
End: 2023-01-17
Attending: FAMILY MEDICINE
Payer: MEDICAID

## 2023-01-17 ENCOUNTER — HOSPITAL ENCOUNTER (OUTPATIENT)
Dept: LAB | Age: 58
Discharge: HOME | End: 2023-01-17

## 2023-01-17 ENCOUNTER — TRANSCRIBE ORDER (OUTPATIENT)
Dept: SCHEDULING | Age: 58
End: 2023-01-17

## 2023-01-17 ENCOUNTER — TELEPHONE (OUTPATIENT)
Dept: FAMILY MEDICINE CLINIC | Age: 58
End: 2023-01-17

## 2023-01-17 ENCOUNTER — HOSPITAL ENCOUNTER (OUTPATIENT)
Dept: CT IMAGING | Age: 58
Discharge: HOME | End: 2023-01-17
Attending: FAMILY MEDICINE

## 2023-01-17 VITALS
BODY MASS INDEX: 40.02 KG/M2 | HEART RATE: 87 BPM | WEIGHT: 255 LBS | RESPIRATION RATE: 18 BRPM | DIASTOLIC BLOOD PRESSURE: 88 MMHG | HEIGHT: 67 IN | SYSTOLIC BLOOD PRESSURE: 138 MMHG | TEMPERATURE: 98 F | OXYGEN SATURATION: 97 %

## 2023-01-17 DIAGNOSIS — Z87.19 HISTORY OF DIVERTICULITIS: ICD-10-CM

## 2023-01-17 DIAGNOSIS — R19.8 CHANGE IN BOWEL MOVEMENT: ICD-10-CM

## 2023-01-17 DIAGNOSIS — D72.829 LEUKOCYTOSIS, UNSPECIFIED TYPE: Primary | ICD-10-CM

## 2023-01-17 DIAGNOSIS — R10.32 LLQ ABDOMINAL PAIN: ICD-10-CM

## 2023-01-17 DIAGNOSIS — D72.829 LEUKOCYTOSIS, UNSPECIFIED TYPE: ICD-10-CM

## 2023-01-17 DIAGNOSIS — R10.32 LEFT LOWER QUADRANT ABDOMINAL PAIN: ICD-10-CM

## 2023-01-17 DIAGNOSIS — M79.3 PANNICULITIS: ICD-10-CM

## 2023-01-17 LAB — XX-LABCORP SPECIMEN COL,LCBCF: NORMAL

## 2023-01-17 PROCEDURE — 99001 SPECIMEN HANDLING PT-LAB: CPT

## 2023-01-17 NOTE — PROGRESS NOTES
HISTORY OF PRESENT ILLNESS  Max Diaz is a 65 y.o. female. HPI: Here with a complaint of lower abdominal pain. Also recent labs showed leukocytosis. UA showed no signs of infection. No nausea or vomiting. No fever. No blood in the urine or bowel movement. On and off diarrhea and constipation. Currently having diarrhea. Loose movement. 1 episode of fecal smear in undergarments. Currently pain is 5-6/10 intensity. Can go worse to 10/10. Present most of the time. Feeling discomfort over left CVA angle. No history of kidney stone. No vaginal discharge. Feeling it starts from lower abdomen and radiates to pelvic area and lower back. Does have a history of diverticulitis and diverticulosis. Unable to see her gastroenterologist due to change of insurance. Now she has a new insurance and will make a follow-up appointment with GI  She had panniculitis over umbilical area. Has been resolved completely. Visit Vitals  /88 (BP 1 Location: Left upper arm, BP Patient Position: Sitting, BP Cuff Size: Adult)   Pulse 87   Temp 98 °F (36.7 °C)   Resp 18   Ht 5' 7\" (1.702 m)   Wt 255 lb (115.7 kg)   SpO2 97%   BMI 39.94 kg/m²     Review medication list, vitals, problem list,allergies.    Lab Results   Component Value Date/Time    WBC 11.1 (H) 01/09/2023 12:00 AM    HGB 15.1 01/09/2023 12:00 AM    HCT 44.9 01/09/2023 12:00 AM    PLATELET 204 57/66/8558 12:00 AM    MCV 87 01/09/2023 12:00 AM     Lab Results   Component Value Date/Time    Cholesterol, total 179 01/11/2023 03:41 PM    HDL Cholesterol 42 01/11/2023 03:41 PM    LDL, calculated 109 (H) 01/11/2023 03:41 PM    Triglyceride 139 01/11/2023 03:41 PM    CHOL/HDL Ratio 4.1 01/28/2022 11:57 AM     Lab Results   Component Value Date/Time    TSH 4.480 01/09/2023 12:00 AM    T4, Free 1.2 03/03/2021 12:20 PM      Lab Results   Component Value Date/Time    Sodium 135 01/11/2023 03:41 PM    Potassium 4.4 01/11/2023 03:41 PM    Chloride 97 (L) 01/11/2023 03:41 PM    CO2 27 01/11/2023 03:41 PM    Anion gap 11.0 01/11/2023 03:41 PM    Glucose 84 01/11/2023 03:41 PM    BUN 20 01/11/2023 03:41 PM    Creatinine 0.8 01/11/2023 03:41 PM    BUN/Creatinine ratio 18 01/09/2023 12:00 AM    GFR est AA >60 01/28/2022 11:57 AM    GFR est non-AA 58 (L) 01/28/2022 11:57 AM    Calcium 10.1 01/11/2023 03:41 PM      Lab Results   Component Value Date/Time    Sodium 135 01/11/2023 03:41 PM    Potassium 4.4 01/11/2023 03:41 PM    Chloride 97 (L) 01/11/2023 03:41 PM    CO2 27 01/11/2023 03:41 PM    Anion gap 11.0 01/11/2023 03:41 PM    Glucose 84 01/11/2023 03:41 PM    BUN 20 01/11/2023 03:41 PM    Creatinine 0.8 01/11/2023 03:41 PM    BUN/Creatinine ratio 18 01/09/2023 12:00 AM    GFR est AA >60 01/28/2022 11:57 AM    GFR est non-AA 58 (L) 01/28/2022 11:57 AM    Calcium 10.1 01/11/2023 03:41 PM    Bilirubin, total 0.5 01/11/2023 03:41 PM    ALT (SGPT) 11 01/11/2023 03:41 PM    Alk. phosphatase 112 01/11/2023 03:41 PM    Protein, total 7.6 01/11/2023 03:41 PM    Albumin 4.2 01/11/2023 03:41 PM    Globulin 3.4 01/11/2023 03:41 PM    A-G Ratio 1.2 01/11/2023 03:41 PM      Lab Results   Component Value Date/Time    Hemoglobin A1c 5.6 01/09/2023 12:00 AM       URINALYSIS W/ RFLX MICROSCOPIC  Order: [id number]  Collected 1/12/2023 00:00    Status: Final result    Next appt: Today at 04:30 PM in Radiology ([facility] CT RM 1)    1 Result Note  Component Ref Range & Units 1/12/23 0000    Specific Gravity 1.005 - 1.030 1.009    pH (UA) 5.0 - 7.5 6.0    Color Yellow Yellow    Appearance Clear Clear    Leukocyte Esterase Negative Negative    Protein Negative/Trace Negative    Glucose Negative Negative    Ketone Negative Negative    Blood Negative Negative    Bilirubin Negative Negative    Urobilinogen 0.2 - 1.0 mg/dL 0.2    Nitrites Negative Negative    Microscopic Examination  Comment    Comment: Microscopic not indicated and not performed.         ROS: See HPI    Physical Exam  Pulmonary: Effort: Pulmonary effort is normal. No respiratory distress. Abdominal:      General: Bowel sounds are normal.      Palpations: Abdomen is soft. Tenderness: There is abdominal tenderness (left lower abdomen, left CVA and flank area). There is left CVA tenderness and guarding. There is no rebound. Neurological:      Mental Status: She is alert and oriented to person, place, and time. ASSESSMENT and PLAN    ICD-10-CM ICD-9-CM    1. Leukocytosis, unspecified type: Having diarrhea, CVA tenderness obtaining CT scan to rule out diverticulitis. Advised to have a light diet and drink lots of fluids D72.829 288.60 CT ABD PELV W CONT      CANCELED: CT ABD PELV WO CONT      2. Panniculitis: Resolved at this time M79.3 729.30     resolved       3. Change in bowel movement: Advised probiotic. She is on tramadol which she can take as needed R19.8 787.99 CT ABD PELV W CONT      CANCELED: CT ABD PELV WO CONT    diarrhea      4. Left lower quadrant abdominal pain: Getting CT scan to rule out diverticulitis. Advised to go to ER with any worsening of same R10.32 789.04 CT ABD PELV W CONT      CANCELED: CT ABD PELV WO CONT      5. History of diverticulitis  Z87.19 V12.70 CT ABD PELV W CONT      Patient understood agreed with the  Follow-up and Dispositions    Return in about 1 week (around 1/24/2023). Please note that this dictation was completed with [employer], the computer voice recognition software. Quite often unanticipated grammatical, syntax, homophones, and other interpretive errors are inadvertently transcribed by the computer software. Please disregard these errors. Please excuse any errors that have escaped final proofreading.

## 2023-01-17 NOTE — TELEPHONE ENCOUNTER
Spoke with [facility] representative to obtain prior authorization for CT ABD JACQUES W CONT (CPT [id number]). Approval has been received; Approval number: [id number] valid dates 1/17/2023 - 3/03/2023. Case number: [id number]. [facility] to advise approval has been obtained and gave approval number/valid dates/case number. Patient has an appointment for CT today, 1/17/2023 at [facility] at 4:30 PM. Patient stated she has not had anything to eat or drink since 8:00 AM this morning. Patient was told to go directly from [facility] and report downstairs to our [facility] lab for [address] check-in and to get contrast. She verbalized understanding.

## 2023-01-17 NOTE — PROGRESS NOTES
Chief Complaint   Patient presents with    Hypertension    Asthma    Vitamin D Deficiency    Peripheral Neuropathy    Chest Pain (Angina)    Cholesterol Problem     Lung nodule, thyroid nodule    Nicotine Dependence    CHF      Visit Vitals  /88 (BP 1 Location: Left upper arm, BP Patient Position: Sitting, BP Cuff Size: Adult)   Pulse 87   Temp 98 °F (36.7 °C)   Resp 18   Ht 5' 7\" (1.702 m)   Wt 255 lb (115.7 kg)   SpO2 97%   BMI 39.94 kg/m²      3 most recent PHQ Screens 1/17/2023   PHQ Not Done -   Little interest or pleasure in doing things Not at all   Feeling down, depressed, irritable, or hopeless Several days   Total Score PHQ 2 1   Trouble falling or staying asleep, or sleeping too much -   Feeling tired or having little energy -   Poor appetite, weight loss, or overeating -   Feeling bad about yourself - or that you are a failure or have let yourself or your family down -   Trouble concentrating on things such as school, work, reading, or watching TV -   Moving or speaking so slowly that other people could have noticed; or the opposite being so fidgety that others notice -   Thoughts of being better off dead, or hurting yourself in some way -   PHQ 9 Score -   How difficult have these problems made it for you to do your work, take care of your home and get along with others -      Abuse Screening Questionnaire 1/17/2023   Do you ever feel afraid of your partner? N   Are you in a relationship with someone who physically or mentally threatens you? N   Is it safe for you to go home? Y      Fall Risk Assessment, last 12 mths 1/17/2023   Able to walk? Yes   Fall in past 12 months? 0   Do you feel unsteady? 1   Are you worried about falling 0   Is the gait abnormal? 1   Number of falls in past 12 months 2             1. \"Have you been to the ER, urgent care clinic since your last visit? Hospitalized since your last visit? \" No    2.  \"Have you seen or consulted any other health care providers outside of the [facility] since your last visit? \" No     3. For patients aged 39-70: Has the patient had a colonoscopy / FIT/ Cologuard? Yes - no Care Gap present      If the patient is female:    4. For patients aged 41-77: Has the patient had a mammogram within the past 2 years? Yes - no Care Gap present      5. For patients aged 21-65: Has the patient had a pap smear?  No

## 2023-01-19 LAB
25(OH)D3+25(OH)D2 SERPL-MCNC: 25.6 NG/ML (ref 30–100)
ALBUMIN SERPL-MCNC: 4.4 G/DL (ref 3.8–4.9)
ALBUMIN/GLOB SERPL: 1.7 {RATIO} (ref 1.2–2.2)
ALP SERPL-CCNC: 119 IU/L (ref 44–121)
ALT SERPL-CCNC: 11 IU/L (ref 0–32)
AST SERPL-CCNC: 16 IU/L (ref 0–40)
BASOPHILS # BLD AUTO: 0.1 X10E3/UL (ref 0–0.2)
BASOPHILS NFR BLD AUTO: 1 %
BILIRUB SERPL-MCNC: 0.3 MG/DL (ref 0–1.2)
BUN SERPL-MCNC: 13 MG/DL (ref 6–24)
BUN/CREAT SERPL: 16 (ref 9–23)
CALCIUM SERPL-MCNC: 10.1 MG/DL (ref 8.7–10.2)
CHLORIDE SERPL-SCNC: 95 MMOL/L (ref 96–106)
CHOLEST SERPL-MCNC: 179 MG/DL (ref 100–199)
CO2 SERPL-SCNC: 25 MMOL/L (ref 20–29)
CREAT SERPL-MCNC: 0.83 MG/DL (ref 0.57–1)
EGFR: 83 ML/MIN/1.73
EOSINOPHIL # BLD AUTO: 0.3 X10E3/UL (ref 0–0.4)
EOSINOPHIL NFR BLD AUTO: 3 %
ERYTHROCYTE [DISTWIDTH] IN BLOOD BY AUTOMATED COUNT: 13 % (ref 11.7–15.4)
EST. AVERAGE GLUCOSE BLD GHB EST-MCNC: 111 MG/DL
GLOBULIN SER CALC-MCNC: 2.6 G/DL (ref 1.5–4.5)
GLUCOSE SERPL-MCNC: 96 MG/DL (ref 70–99)
HBA1C MFR BLD: 5.5 % (ref 4.8–5.6)
HCT VFR BLD AUTO: 44 % (ref 34–46.6)
HDLC SERPL-MCNC: 46 MG/DL
HGB BLD-MCNC: 15 G/DL (ref 11.1–15.9)
IMM GRANULOCYTES # BLD AUTO: 0 X10E3/UL (ref 0–0.1)
IMM GRANULOCYTES NFR BLD AUTO: 0 %
IMP & REVIEW OF LAB RESULTS: NORMAL
LDLC SERPL CALC-MCNC: 109 MG/DL (ref 0–99)
LYMPHOCYTES # BLD AUTO: 2 X10E3/UL (ref 0.7–3.1)
LYMPHOCYTES NFR BLD AUTO: 21 %
MCH RBC QN AUTO: 29.7 PG (ref 26.6–33)
MCHC RBC AUTO-ENTMCNC: 34.1 G/DL (ref 31.5–35.7)
MCV RBC AUTO: 87 FL (ref 79–97)
MONOCYTES # BLD AUTO: 0.7 X10E3/UL (ref 0.1–0.9)
MONOCYTES NFR BLD AUTO: 7 %
NEUTROPHILS # BLD AUTO: 6.4 X10E3/UL (ref 1.4–7)
NEUTROPHILS NFR BLD AUTO: 68 %
PLATELET # BLD AUTO: 247 X10E3/UL (ref 150–450)
POTASSIUM SERPL-SCNC: 4.6 MMOL/L (ref 3.5–5.2)
PROT SERPL-MCNC: 7 G/DL (ref 6–8.5)
RBC # BLD AUTO: 5.05 X10E6/UL (ref 3.77–5.28)
SODIUM SERPL-SCNC: 137 MMOL/L (ref 134–144)
TRIGL SERPL-MCNC: 137 MG/DL (ref 0–149)
TSH SERPL DL<=0.005 MIU/L-ACNC: 2.81 UIU/ML (ref 0.45–4.5)
VLDLC SERPL CALC-MCNC: 24 MG/DL (ref 5–40)
WBC # BLD AUTO: 9.5 X10E3/UL (ref 3.4–10.8)

## 2023-01-20 NOTE — PROGRESS NOTES
Patient aware of lab results and MD recommendation to consume 2000 units of OTC D3 two patient identifiers obtained and verified

## 2023-01-20 NOTE — PROGRESS NOTES
Labs been stable. Mildly elevated LDL. Low vitamin D. Take over-the-counter 2000 units of vitamin D3 daily.   Further discussion on follow-up visit

## 2023-01-21 ENCOUNTER — HOSPITAL ENCOUNTER (OUTPATIENT)
Dept: CT IMAGING | Age: 58
End: 2023-01-21
Attending: FAMILY MEDICINE
Payer: MEDICAID

## 2023-01-21 PROCEDURE — 74177 CT ABD & PELVIS W/CONTRAST: CPT

## 2023-01-21 PROCEDURE — 74011000636 HC RX REV CODE- 636: Performed by: FAMILY MEDICINE

## 2023-01-21 RX ADMIN — IOPAMIDOL 100 ML: 612 INJECTION, SOLUTION INTRAVENOUS at 14:02

## 2023-01-24 DIAGNOSIS — E78.00 HYPERCHOLESTEREMIA: Primary | ICD-10-CM

## 2023-01-24 RX ORDER — ROSUVASTATIN CALCIUM 40 MG/1
40 TABLET, COATED ORAL
COMMUNITY
End: 2023-01-24 | Stop reason: SDUPTHER

## 2023-01-24 RX ORDER — ROSUVASTATIN CALCIUM 40 MG/1
40 TABLET, COATED ORAL
Qty: 30 TABLET | Refills: 3 | Status: SHIPPED | OUTPATIENT
Start: 2023-01-24

## 2023-01-24 NOTE — TELEPHONE ENCOUNTER
Requested Prescriptions     Pending Prescriptions Disp Refills    rosuvastatin (Crestor) 40 mg tablet 30 Tablet 3     Sig: Take 1 Tablet by mouth nightly.

## 2023-01-24 NOTE — TELEPHONE ENCOUNTER
Left voicemail for patient regarding message below. Will mail lab results & lab order to patient. Also,wanted to inform rx for Crestor will be sent to pharmacy.     ---------------------------------------------------------    Juan Diego Pack MD  [facility] Nurse  Please contact patient and do the following asap     Change Lipitor to Crestor 40 mg/day   Get fasting Lipid profile and LFTs in 6 wks. Please reinforce diet and exercise.

## 2023-01-30 ENCOUNTER — TELEPHONE (OUTPATIENT)
Dept: NEUROLOGY | Age: 58
End: 2023-01-30

## 2023-01-31 DIAGNOSIS — Z91.89 AT HIGH RISK FOR BREAST CANCER: Primary | ICD-10-CM

## 2023-02-01 DIAGNOSIS — E03.9 ACQUIRED HYPOTHYROIDISM: ICD-10-CM

## 2023-02-01 DIAGNOSIS — I50.32 CHRONIC DIASTOLIC CONGESTIVE HEART FAILURE (HCC): ICD-10-CM

## 2023-02-02 RX ORDER — LEVOTHYROXINE SODIUM 100 UG/1
TABLET ORAL
Qty: 90 TABLET | Refills: 0 | Status: SHIPPED | OUTPATIENT
Start: 2023-02-02

## 2023-02-02 RX ORDER — LISINOPRIL 2.5 MG/1
TABLET ORAL
Qty: 90 TABLET | Refills: 0 | Status: SHIPPED | OUTPATIENT
Start: 2023-02-02

## 2023-02-04 DIAGNOSIS — Z91.89 AT HIGH RISK FOR BREAST CANCER: Primary | ICD-10-CM

## 2023-02-05 DIAGNOSIS — E78.00 HYPERCHOLESTEREMIA: Primary | ICD-10-CM

## 2023-02-27 ENCOUNTER — OFFICE VISIT (OUTPATIENT)
Age: 58
End: 2023-02-27
Payer: MEDICAID

## 2023-02-27 VITALS
HEIGHT: 67 IN | HEART RATE: 65 BPM | OXYGEN SATURATION: 97 % | SYSTOLIC BLOOD PRESSURE: 126 MMHG | DIASTOLIC BLOOD PRESSURE: 68 MMHG | BODY MASS INDEX: 40.02 KG/M2 | WEIGHT: 255 LBS

## 2023-02-27 DIAGNOSIS — E78.00 PURE HYPERCHOLESTEROLEMIA: ICD-10-CM

## 2023-02-27 DIAGNOSIS — I10 ESSENTIAL (PRIMARY) HYPERTENSION: ICD-10-CM

## 2023-02-27 DIAGNOSIS — I50.32 CHRONIC DIASTOLIC (CONGESTIVE) HEART FAILURE (HCC): Primary | ICD-10-CM

## 2023-02-27 DIAGNOSIS — Z71.6 TOBACCO ABUSE COUNSELING: ICD-10-CM

## 2023-02-27 DIAGNOSIS — I25.118 ATHEROSCLEROTIC HEART DISEASE OF NATIVE CORONARY ARTERY WITH OTHER FORMS OF ANGINA PECTORIS (HCC): ICD-10-CM

## 2023-02-27 DIAGNOSIS — E66.01 MORBID (SEVERE) OBESITY DUE TO EXCESS CALORIES (HCC): ICD-10-CM

## 2023-02-27 PROCEDURE — 99214 OFFICE O/P EST MOD 30 MIN: CPT | Performed by: INTERNAL MEDICINE

## 2023-02-27 PROCEDURE — 3078F DIAST BP <80 MM HG: CPT | Performed by: INTERNAL MEDICINE

## 2023-02-27 PROCEDURE — 3074F SYST BP LT 130 MM HG: CPT | Performed by: INTERNAL MEDICINE

## 2023-02-27 RX ORDER — SPIRONOLACTONE 50 MG/1
TABLET, FILM COATED ORAL
Qty: 90 TABLET | Refills: 1 | Status: SHIPPED | OUTPATIENT
Start: 2023-02-27

## 2023-02-27 RX ORDER — POTASSIUM CHLORIDE 20 MEQ/1
60 TABLET, EXTENDED RELEASE ORAL DAILY
COMMUNITY
Start: 2019-10-22 | End: 2023-02-27 | Stop reason: ALTCHOICE

## 2023-02-27 RX ORDER — LISINOPRIL 2.5 MG/1
TABLET ORAL
Qty: 90 TABLET | Refills: 1 | Status: SHIPPED | OUTPATIENT
Start: 2023-02-27

## 2023-02-27 RX ORDER — ATORVASTATIN CALCIUM 40 MG/1
40 TABLET, FILM COATED ORAL DAILY
Qty: 90 TABLET | Refills: 1 | Status: SHIPPED | OUTPATIENT
Start: 2023-02-27

## 2023-02-27 RX ORDER — MAGNESIUM OXIDE 400 MG/1
400 TABLET ORAL 2 TIMES DAILY
Qty: 180 TABLET | Refills: 1 | Status: SHIPPED | OUTPATIENT
Start: 2023-02-27

## 2023-02-27 RX ORDER — FUROSEMIDE 40 MG/1
40 TABLET ORAL DAILY
Qty: 90 TABLET | Refills: 1 | Status: SHIPPED | OUTPATIENT
Start: 2023-02-27

## 2023-02-27 ASSESSMENT — ENCOUNTER SYMPTOMS
EYES NEGATIVE: 1
SHORTNESS OF BREATH: 1
GASTROINTESTINAL NEGATIVE: 1

## 2023-02-27 ASSESSMENT — PATIENT HEALTH QUESTIONNAIRE - PHQ9
DEPRESSION UNABLE TO ASSESS: FUNCTIONAL CAPACITY MOTIVATION LIMITS ACCURACY
SUM OF ALL RESPONSES TO PHQ QUESTIONS 1-9: 0
1. LITTLE INTEREST OR PLEASURE IN DOING THINGS: 0
SUM OF ALL RESPONSES TO PHQ QUESTIONS 1-9: 0
SUM OF ALL RESPONSES TO PHQ9 QUESTIONS 1 & 2: 0
SUM OF ALL RESPONSES TO PHQ QUESTIONS 1-9: 0
SUM OF ALL RESPONSES TO PHQ QUESTIONS 1-9: 0
2. FEELING DOWN, DEPRESSED OR HOPELESS: 0

## 2023-02-27 NOTE — PROGRESS NOTES
Ricco Ruelas is a 65y.o. year old female. Follow-up of CHF, hypertension, obesity      3/21 seen as new patient. H/o TR, MR, idiopathic tachycardia, edema, neuropathy   2/23 shortness of breath is improving from but she does not walk much because of her knee situation. Edema is better but still there specially in her abdominal area. Has intermittent atypical chest pain. Review of Systems   Constitutional: Negative. HENT: Negative. Eyes: Negative. Respiratory:  Positive for shortness of breath. Cardiovascular:  Positive for chest pain and leg swelling. Gastrointestinal: Negative. Endocrine: Negative. Genitourinary: Negative. Musculoskeletal: Negative. Neurological: Negative. Psychiatric/Behavioral: Negative. All other systems reviewed and are negative. Physical Exam  Vitals and nursing note reviewed. Constitutional:       Appearance: Normal appearance. She is obese. HENT:      Head: Normocephalic and atraumatic. Nose: Nose normal.   Eyes:      Conjunctiva/sclera: Conjunctivae normal.   Cardiovascular:      Rate and Rhythm: Normal rate and regular rhythm. Pulses: Normal pulses. Heart sounds: Normal heart sounds. Pulmonary:      Effort: Pulmonary effort is normal.      Breath sounds: Normal breath sounds. Abdominal:      General: Bowel sounds are normal.      Palpations: Abdomen is soft. Musculoskeletal:         General: Normal range of motion. Right lower leg: No edema. Left lower leg: No edema. Skin:     General: Skin is warm and dry. Neurological:      General: No focal deficit present. Mental Status: She is alert and oriented to person, place, and time.    Psychiatric:         Mood and Affect: Mood normal.         Behavior: Behavior normal.      Allergies   Allergen Reactions    Latex Itching    Flurbiprofen Anaphylaxis    Iron Dextran Anaphylaxis and Itching    Levofloxacin Myalgia     Tendon rupture  Tendon rupture    Adhesive Tape Rash    Cephalexin Hives and Itching    Codeine Itching and Rash    Penicillins Other (See Comments)     Allergic to penicillin mold like on bread but has had penicillin medication w/o any problem    Clindamycin Hives       Past Medical History:   Diagnosis Date    Anemia     Angio-edema     Asthma     Blind left eye     D-dimer, elevated     Chronic elevation    Depression     Diverticulosis     Enlargement, spleen     Esophagospasm     Essential hypertension     Fatty liver     GERD (gastroesophageal reflux disease)     Heart failure (HCC)     Right sided    Histoplasmosis     HPV (human papilloma virus) anogenital infection     Lyme disease     Morbid obesity (HCC)     Neuropathy     Upper and lower    Neuropathy     PCOS (polycystic ovarian syndrome)     Pituitary abnormality (HCC)     PTSD (post-traumatic stress disorder)     Tachycardia     Thyroid disease     Tricuspid insufficiency        Family History   Problem Relation Age of Onset    Seizures Mother     Cancer Sister         double mastectomy    Cancer Sister         lung    Heart Surgery Maternal Grandmother         CABG    Stroke Neg Hx     Cancer Mother         stomach       Social History     Tobacco Use    Smoking status: Every Day     Packs/day: 0.50     Types: Cigarettes     Start date: 1/1/1985    Smokeless tobacco: Never   Substance Use Topics    Alcohol use: Yes    Drug use: Not Currently     Types: Marijuana (Weed)        Current Outpatient Medications   Medication Sig Dispense Refill    albuterol sulfate HFA (PROVENTIL;VENTOLIN;PROAIR) 108 (90 Base) MCG/ACT inhaler Inhale 2 puffs into the lungs every 4 hours as needed      atorvastatin (LIPITOR) 20 MG tablet Take 40 mg by mouth daily      budesonide (PULMICORT) 90 MCG/ACT AEPB inhaler Inhale 1 puff into the lungs as needed      cetirizine (ZYRTEC) 10 MG tablet Take 30 mg by mouth daily      clotrimazole (LOTRIMIN) 1 % cream Apply topically 2 times daily       DULoxetine (CYMBALTA) 30 MG extended release capsule Take 30 mg by mouth as needed      DULoxetine (CYMBALTA) 60 MG extended release capsule Take 60 mg by mouth 2 times daily      EPINEPHrine PF 1 MG/ML injection (Anaphylaxis) once      furosemide (LASIX) 40 MG tablet Take 40 mg by mouth daily      gabapentin (NEURONTIN) 300 MG capsule Take 300 mg by mouth 2 times daily as needed. lansoprazole (PREVACID) 30 MG delayed release capsule Take 30 mg by mouth every morning (before breakfast)      levothyroxine (SYNTHROID) 100 MCG tablet Take 100 mcg by mouth every morning (before breakfast)      lisinopril (PRINIVIL;ZESTRIL) 2.5 MG tablet Take 1 tablet by mouth once daily      magnesium oxide (MAG-OX) 400 MG tablet Take 400 mg by mouth 2 times daily      spironolactone (ALDACTONE) 25 MG tablet Take 1 tablet by mouth once daily      traMADol (ULTRAM) 50 MG tablet Take 100 mg by mouth every 8 hours as needed. triamcinolone (KENALOG) 0.1 % cream Apply topically 2 times daily       No current facility-administered medications for this visit.         Past Surgical History:   Procedure Laterality Date    BREAST BIOPSY Right 12/6/2022    RIGHT NIPPLE BIOPSY, SUBAREOLAR DUCT EXCISION performed by Maylin Lopez DO at [address] [facility]  2010 approx    normal per patient    CARPAL TUNNEL RELEASE Bilateral     CATARACT REMOVAL Right     Lens implant    CHOLECYSTECTOMY      COLONOSCOPY N/A 08/10/2022    COLONOSCOPY performed by Maylin Lopez DO at [address] [facility]      Vocal cord polyps    UPPER GASTROINTESTINAL ENDOSCOPY  2022       Vitals:    02/27/23 1311   BP: 126/68   Site: Left Upper Arm   Position: Sitting   Cuff Size: Large Adult   Pulse: 65   SpO2: 97%   Weight: 255 lb (115.7 kg)   Height: 5' 7\" (1.702 m)          Diagnostic Studies:  I have reviewed the relevant tests done on the patient and show as follows  EKG tracings reviewed by me today. No results found for this or any previous visit (from the past 4464 hour(s)). Xray Result (most recent):  No results found for this or any previous visit from the past 3650 days. 04/26/21 04/26/2021 11:14 AM, 04/26/2021 12:00 AM (Final)    Narrative  This is a summary report. The complete report is available in the patient's medical record. If you cannot access the medical record, please contact the sending organization for a detailed fax or copy. · Contrast used: DEFINITY. · Image quality for this study was technically difficult. · LV: Estimated LVEF is 60 - 65%. Normal cavity size and systolic function (ejection fraction normal). Moderate concentric hypertrophy. Wall motion: normal. Moderate (grade 2) left ventricular diastolic dysfunction. · RV: Mildly dilated right ventricle. · RA: Mildly dilated right atrium. · TV: Right Ventricular Arterial Pressure (RVSP) is 23 mmHg. Pulmonary hypertension not suggested by Doppler findings. Signed by: Gabriel Vu MD on 4/26/2021 11:14 AM, Signed by: Unknown Provider Result on 4/26/2021 12:00 AM    11/20 echo     ECHOCARDIOGRAM COMPLETE  (DOPPLER ECHO)11/20/2020   [facility]       Result Impression     :   POOR QUALITY 2-DIMENSIONAL IMAGES   DEFINITY CONTRAST UTILIZED TO ENHANCE ENDOCARDIAL BORDER DEFINITION   LEFT VENTRICULAR HYPERTROPHY    GROSSLY NORMAL LEFT VENTRICULAR CAVITY SIZE AND SYSTOLIC FUNCTION   COULD NOT ACCURATELY QUANTIFY EJECTION FRACTION   GRADE 1 DIASTOLIC DYSFUNCTION   RIGHT VENTRICLE NOT VISUALIZED   GROSSLY NORMAL LEFT ATRIAL SIZE   RIGHT ATRIUM  NOT VISUALIZED   NO OBVIOUS HEMODYNAMICALLY SIGNIFICANT VALVE PATHOLOGY   RVSP - NA            04/26/21     ECHO ADULT COMPLETE 04/26/2021 4/26/2021                       10/12/21      NUCLEAR CARDIAC STRESS TEST 10/19/2021 10/21/2021      Interpretation Summary   · Baseline ECG: Sinus rhythm, interventricular conduction delay.    · Stress test: Stress EKG normal.   · SPECT: Left ventricular function post-stress was normal. Calculated ejection fraction is 84% (normal EF value is equal to or greater than 50%). Left ventricular wall motion was normal at stress, no regional wall motion abnormality noted. The TID  ratio is 1.21.   · SPECT: Myocardial perfusion imaging defect 1: There is a defect that is medium in size with a moderate reduction in uptake present in the basal-mid and apical segment(s) of the inferior wall(s) that is partially reversible. There is normal wall motion  in the defect area. Viability in the area is good. The defect appears to be ischemia. · SPECT: Left ventricular perfusion is abnormal. Myocardial perfusion imaging supports an intermediate risk stress test.      Signed by: Severa Carrier, MD on 10/19/2021 12:03 PM   12/21 CTA chest with coronary angiography   Impression   --------------------   Nonobstructive coronary artery disease                Ms. Sade Hare has a reminder for a \"due or due soon\" health maintenance. I have asked that she contact her primary care provider for follow-up on this health maintenance.     ASSESSMENT & PLAN    Lab Results   Component Value Date    CHOL 179 01/17/2023    CHOL 179 01/11/2023    CHOL 174 01/09/2023    CHOL 199 07/16/2022    CHOL 184 01/28/2022    CHOL 194 12/30/2021    CHOL 206 (H) 03/03/2021     Lab Results   Component Value Date    TRIG 137 01/17/2023    TRIG 139 01/11/2023    TRIG 125 01/09/2023    TRIG 307 (H) 07/16/2022    TRIG 119 01/28/2022    TRIG 219 (H) 12/30/2021    TRIG 161 (H) 03/03/2021     Lab Results   Component Value Date    HDL 46 01/17/2023    HDL 42 01/11/2023    HDL 4.3 01/11/2023    HDL 41 01/09/2023    HDL 34 (L) 07/16/2022    HDL 5.9 07/16/2022    HDL 45 01/28/2022    HDL 37 (L) 12/30/2021    HDL 5.2 12/30/2021     Lab Results   Component Value Date    LDLCALC 109 (H) 01/17/2023    LDLCALC 109 (H) 01/11/2023    LDLCALC 110 (H) 01/09/2023    LDLCALC 104 (H) 07/16/2022    LDLCALC 115.2 (H) 01/28/2022    LDLCALC 113 (H) 12/30/2021    LDLCALC 128.8 (H) 03/03/2021     Lab Results   Component Value Date    LABVLDL 28 01/11/2023    LABVLDL 61 (H) 07/16/2022    LABVLDL 23.8 01/28/2022    LABVLDL 44 (H) 12/30/2021    LABVLDL 32.2 03/03/2021    VLDL 24 01/17/2023    VLDL 23 01/09/2023     Lab Results   Component Value Date    CHOLHDLRATIO 4.1 01/28/2022    CHOLHDLRATIO 4.6 03/03/2021       ASSESSMENT and PLAN      LIPIDS :    Results for DAVON Griffith (MRN [id number]) as of 7/18/2022 13:42               Ref. Range  12/30/2021 11:04  1/14/2022 15:07  1/28/2022 11:57  1/28/2022 12:00  7/16/2022 11:56     Triglyceride  Latest Ref Range: 40 - 149 mg/dL  219 (H)    119    307 (H)     Cholesterol, total  Latest Ref Range: 110 - 200 mg/dL  194    184    199     HDL Cholesterol  Latest Ref Range: >=40 mg/dL  37 (L)    45    34 (L)     CHOLESTEROL/HDL  Latest Ref Range: 0.0 - 5.0   5.2        5.9     Non-HDL Cholesterol  Latest Ref Range: <130 mg/dL  157 (H)        165 (H)     CHOL/HDL Ratio  Latest Ref Range: 0 - 5.0        4.1         VLDL, calculated  Latest Ref Range: 8 - 30 mg/dL  44 (H)    23.8    61 (H)     LDL, calculated  Latest Ref Range: 50 - 99 mg/dL  113 (H)    115.2 (H)    104 (H)              ACC CVD risk score as of 3/21 is 5.9%. Continue diet and exercise for now. 3/19/2021 seen as new patient for changing the cardiologist.  She cannot locate her old cardiologist and so is changing. She has acute on chronic CHF which is diastolic and right heart failure mostly. Blood pressure is elevated today but is normal most of the times at home. CHF is secondary to morbid obesity most likely. She will need to be worked up for sleep apnea. History of Lyme disease for which she was treated with doxycycline a year. Apparently she is still positive for it and I recommended ID opinion for that. Increase Lasix to 40 twice daily add spironolactone.   Told her not to use any over-the-counter potassium but does take the prescription pills so we can follow the labs closely and replace as needed. Continue magnesium. History of significant electrolyte problems in the past.   Told her to come to ER if she does not feel well. Extensive review of previous records and face-to-face time was more than 60 minutes. 4/26/2021 CHF persisting. Patient not taking lasix as advised. She will now take lasix 40 mg/day. Follow labs closely. Add acei and f/u BP chart at home. Diet and exercise discussed. Refer to bariatrics. 8/2/2021 CHF is compensated. Blood pressure is controlled and is low normal.  Reduce lisinopril. Check labs to follow-up on potassium due to Aldactone. She is lost significant weight for which she was congratulated. Recommended to continue to lose more weight with which she agrees and is trying. 9/27/2021 CHF is compensated NYHA class II. Blood pressure is controlled. Chest pain continues and seems worse at times. She thinks she has esophageal spasm but will recommend a  stress test to evaluate ischemia. She is gradually losing weight for which she was congratulated. Healthy heart habits discussed and smoking cessation reinforced. 10/25/2021 seen due to abnormal stress test with a possible ischemia in the inferior wall. Patient continues to have atypical angina. CHF is compensated. She is losing weight but states that sometimes it is involuntary. Continues to smoke and tobacco cessation discussed. After discussing cardiac catheterization and CTA, she would prefer CT of the heart which was ordered to evaluate coronaries. 1/31/2022 CHF is improving well. She is losing weight well for which she was congratulated. CAD stable. Since there is nonobstructive disease by CTA of the chest, goal LDL should be less than 70. Diet weight and exercise discussed. Tobacco cessation reinforced but she does not seem to be motivated to do it.    We will hold potassium replacement as K was high normal and follow the labs in 2 weeks and lipids and LFTs in 4 weeks. 7/18/2022  CAD stable. CHF is compensated. No significant fluid overload today NYHA class II-III. Activity limited because of joint pains also. Blood pressure is controlled   Diet weight discussed and she understands the importance of weight loss and exercise. Will check magnesium level and decide whether she needs to continue to take magnesium. Barrie Weber was seen today for follow-up. Diagnoses and all orders for this visit:    Chronic diastolic (congestive) heart failure (HCC)  -     magnesium oxide (MAG-OX) 400 MG tablet; Take 1 tablet by mouth 2 times daily  -     furosemide (LASIX) 40 MG tablet; Take 1 tablet by mouth daily  -     Magnesium; Future    Atherosclerotic heart disease of native coronary artery with other forms of angina pectoris ([address]    Essential (primary) hypertension  -     lisinopril (PRINIVIL;ZESTRIL) 2.5 MG tablet; Take 1 tablet by mouth once daily  -     spironolactone (ALDACTONE) 50 MG tablet; Take 1 tablet by mouth once daily  -     atorvastatin (LIPITOR) 40 MG tablet; Take 1 tablet by mouth daily  -     magnesium oxide (MAG-OX) 400 MG tablet; Take 1 tablet by mouth 2 times daily  -     furosemide (LASIX) 40 MG tablet; Take 1 tablet by mouth daily  -     Basic Metabolic Panel; Future  -     Lipid Panel; Future  -     Hepatic Function Panel; Future    Morbid (severe) obesity due to excess calories (HCC)    Tobacco abuse counseling    Pure hypercholesterolemia  -     atorvastatin (LIPITOR) 40 MG tablet; Take 1 tablet by mouth daily  -     Lipid Panel; Future  -     Hepatic Function Panel; Future        Pertinent laboratory and test data reviewed and discussed with patient.   See patient instructions also for other medical advice given    Medications Discontinued During This Encounter   Medication Reason    potassium chloride (KLOR-CON M) 20 MEQ extended release tablet Therapy completed    atorvastatin (LIPITOR) 20 MG tablet REORDER    furosemide (LASIX) 40 MG tablet REORDER    lisinopril (PRINIVIL;ZESTRIL) 2.5 MG tablet REORDER    magnesium oxide (MAG-OX) 400 MG tablet REORDER    spironolactone (ALDACTONE) 25 MG tablet REORDER       Follow-up and Dispositions    Return in about 6 months (around 8/27/2023), or if symptoms worsen or fail to improve, for post test.           Return to ER if any significant CP not relieved by s/l NTG, severe SOB, severe palpitations, loss of consciousness    2/27/2023 CHF is clinically improving she has lost weight involuntarily. Likely she has lost significant edema. Still feels that her abdomen is full of fluid. Will increase Aldactone and discontinue potassium pill. Follow the electrolytes closely. Blood pressure is controlled but elevated at times at home. Hopefully this will also respond to increasing Aldactone. Repeat lipids and other labs along with magnesium as ordered in 2 weeks. She states she does not eat much and tries to follow the diet.

## 2023-02-27 NOTE — PROGRESS NOTES
1. Have you been to the ER, urgent care clinic since your last visit? Hospitalized since your last visit? No      2. Where do you normally have your labs drawn? [facility]    3. Do you need any refills today? Yes    4. Which local pharmacy do you use (enter pharmacy)? [facility]    5. Which mail order pharmacy do you use (enter pharmacy)? No     6. Are you here for surgical clearance and if so who will be doing your     procedure/surgery (care team)?    No

## 2023-05-01 ENCOUNTER — HOSPITAL ENCOUNTER (OUTPATIENT)
Facility: HOSPITAL | Age: 58
Discharge: HOME | End: 2023-05-04
Payer: MEDICAID

## 2023-05-01 DIAGNOSIS — Z91.89 AT HIGH RISK FOR BREAST CANCER: ICD-10-CM

## 2023-05-01 PROCEDURE — 77063 BREAST TOMOSYNTHESIS BI: CPT

## 2023-06-24 LAB
A/G RATIO: 1.5 RATIO (ref 1.1–2.6)
ALBUMIN SERPL-MCNC: 4.1 G/DL (ref 3.5–5)
ALP BLD-CCNC: 111 U/L (ref 25–115)
ALT SERPL-CCNC: 16 U/L (ref 5–40)
ANION GAP SERPL CALCULATED.3IONS-SCNC: 10 MMOL/L (ref 3–15)
AST SERPL-CCNC: 19 U/L (ref 10–37)
BILIRUB SERPL-MCNC: 0.3 MG/DL (ref 0.2–1.2)
BILIRUBIN DIRECT: <0.2 MG/DL (ref 0–0.3)
BUN BLDV-MCNC: 22 MG/DL (ref 6–22)
CALCIUM SERPL-MCNC: 9.5 MG/DL (ref 8.4–10.5)
CHLORIDE BLD-SCNC: 99 MMOL/L (ref 98–110)
CHOLESTEROL/HDL RATIO: 4 (ref 0–5)
CHOLESTEROL: 186 MG/DL (ref 110–200)
CO2: 28 MMOL/L (ref 20–32)
CREAT SERPL-MCNC: 0.8 MG/DL (ref 0.5–1.2)
GLOBULIN: 2.8 G/DL (ref 2–4)
GLOMERULAR FILTRATION RATE: >60 ML/MIN/1.73 SQ.M.
GLUCOSE: 87 MG/DL (ref 70–99)
HDLC SERPL-MCNC: 46 MG/DL
LDL CHOLESTEROL CALCULATED: 106 MG/DL (ref 50–99)
LDL/HDL RATIO: 2.3
NON-HDL CHOLESTEROL: 140 MG/DL
POTASSIUM SERPL-SCNC: 4 MMOL/L (ref 3.5–5.5)
SODIUM BLD-SCNC: 137 MMOL/L (ref 133–145)
TOTAL PROTEIN: 6.9 G/DL (ref 6.4–8.3)
TRIGL SERPL-MCNC: 171 MG/DL (ref 40–149)
VLDLC SERPL CALC-MCNC: 34 MG/DL (ref 8–30)

## 2023-06-26 ENCOUNTER — TELEPHONE (OUTPATIENT)
Age: 58
End: 2023-06-26

## 2023-06-26 DIAGNOSIS — E78.00 PURE HYPERCHOLESTEROLEMIA: Primary | ICD-10-CM

## 2023-06-26 RX ORDER — ROSUVASTATIN CALCIUM 40 MG/1
40 TABLET, COATED ORAL EVERY EVENING
COMMUNITY
End: 2023-06-26 | Stop reason: SDUPTHER

## 2023-06-26 RX ORDER — ROSUVASTATIN CALCIUM 40 MG/1
40 TABLET, COATED ORAL EVERY EVENING
Qty: 30 TABLET | Refills: 3 | Status: SHIPPED | OUTPATIENT
Start: 2023-06-26

## 2023-07-19 DIAGNOSIS — I10 ESSENTIAL (PRIMARY) HYPERTENSION: ICD-10-CM

## 2023-07-19 RX ORDER — LISINOPRIL 2.5 MG/1
TABLET ORAL
Qty: 90 TABLET | Refills: 1 | Status: SHIPPED | OUTPATIENT
Start: 2023-07-19

## 2023-07-19 NOTE — TELEPHONE ENCOUNTER
Requested Prescriptions     Pending Prescriptions Disp Refills    lisinopril (PRINIVIL;ZESTRIL) 2.5 MG tablet 90 tablet 1     Sig: Take 1 tablet by mouth once daily

## 2023-08-28 ENCOUNTER — OFFICE VISIT (OUTPATIENT)
Age: 58
End: 2023-08-28

## 2023-08-28 VITALS
SYSTOLIC BLOOD PRESSURE: 138 MMHG | BODY MASS INDEX: 42.91 KG/M2 | WEIGHT: 267 LBS | DIASTOLIC BLOOD PRESSURE: 73 MMHG | HEART RATE: 67 BPM | HEIGHT: 66 IN | OXYGEN SATURATION: 95 %

## 2023-08-28 DIAGNOSIS — I50.32 CHRONIC DIASTOLIC (CONGESTIVE) HEART FAILURE (HCC): Primary | ICD-10-CM

## 2023-08-28 DIAGNOSIS — E66.01 SEVERE OBESITY (BMI >= 40) (HCC): ICD-10-CM

## 2023-08-28 DIAGNOSIS — I10 ESSENTIAL (PRIMARY) HYPERTENSION: ICD-10-CM

## 2023-08-28 DIAGNOSIS — E78.2 MIXED HYPERLIPIDEMIA: ICD-10-CM

## 2023-08-28 RX ORDER — SPIRONOLACTONE 50 MG/1
TABLET, FILM COATED ORAL
Qty: 90 TABLET | Refills: 1 | Status: SHIPPED | OUTPATIENT
Start: 2023-08-28

## 2023-08-28 RX ORDER — FUROSEMIDE 40 MG/1
40 TABLET ORAL DAILY
Qty: 90 TABLET | Refills: 1 | Status: SHIPPED | OUTPATIENT
Start: 2023-08-28

## 2023-08-28 RX ORDER — EZETIMIBE 10 MG/1
10 TABLET ORAL DAILY
Qty: 90 TABLET | Refills: 3 | Status: SHIPPED | OUTPATIENT
Start: 2023-08-28

## 2023-08-28 RX ORDER — ROSUVASTATIN CALCIUM 40 MG/1
40 TABLET, COATED ORAL EVERY EVENING
Qty: 90 TABLET | Refills: 3 | Status: SHIPPED | OUTPATIENT
Start: 2023-08-28

## 2023-08-28 RX ORDER — PANTOPRAZOLE SODIUM 40 MG/1
TABLET, DELAYED RELEASE ORAL
COMMUNITY
Start: 2023-06-28

## 2023-08-28 RX ORDER — NAPROXEN 500 MG/1
500 TABLET ORAL 2 TIMES DAILY
COMMUNITY
Start: 2020-09-30

## 2023-08-28 ASSESSMENT — PATIENT HEALTH QUESTIONNAIRE - PHQ9
1. LITTLE INTEREST OR PLEASURE IN DOING THINGS: 0
SUM OF ALL RESPONSES TO PHQ QUESTIONS 1-9: 0
2. FEELING DOWN, DEPRESSED OR HOPELESS: 0
SUM OF ALL RESPONSES TO PHQ QUESTIONS 1-9: 0
SUM OF ALL RESPONSES TO PHQ9 QUESTIONS 1 & 2: 0
DEPRESSION UNABLE TO ASSESS: FUNCTIONAL CAPACITY MOTIVATION LIMITS ACCURACY

## 2023-08-28 ASSESSMENT — ENCOUNTER SYMPTOMS
GASTROINTESTINAL NEGATIVE: 1
SHORTNESS OF BREATH: 1
EYES NEGATIVE: 1

## 2023-08-28 NOTE — PROGRESS NOTES
1. Have you been to the ER, urgent care clinic since your last visit? Hospitalized since your last visit? No      2. Where do you normally have your labs drawn? [facility]    3. Do you need any refills today? Yes    4. Which local pharmacy do you use (enter pharmacy)? [facility]    5. Which mail order pharmacy do you use (enter pharmacy)? No     6. Are you here for surgical clearance and if so who will be doing your     procedure/surgery (care team)?    No
tablet by mouth 2 times daily      pantoprazole (PROTONIX) 40 MG tablet       lisinopril (PRINIVIL;ZESTRIL) 2.5 MG tablet Take 1 tablet by mouth once daily 90 tablet 1    rosuvastatin (CRESTOR) 40 MG tablet Take 1 tablet by mouth every evening 30 tablet 3    magnesium oxide (MAG-OX) 400 (240 Mg) MG tablet Take 1 tablet by mouth twice daily 180 tablet 0    spironolactone (ALDACTONE) 50 MG tablet Take 1 tablet by mouth once daily 90 tablet 1    furosemide (LASIX) 40 MG tablet Take 1 tablet by mouth daily 90 tablet 1    albuterol sulfate HFA (PROVENTIL;VENTOLIN;PROAIR) 108 (90 Base) MCG/ACT inhaler Inhale 2 puffs into the lungs every 4 hours as needed      budesonide (PULMICORT) 90 MCG/ACT AEPB inhaler Inhale 1 puff into the lungs as needed      cetirizine (ZYRTEC) 10 MG tablet Take 3 tablets by mouth daily      clotrimazole (LOTRIMIN) 1 % cream Apply topically 2 times daily      DULoxetine (CYMBALTA) 30 MG extended release capsule Take 1 capsule by mouth as needed      DULoxetine (CYMBALTA) 60 MG extended release capsule Take 1 capsule by mouth 2 times daily      EPINEPHrine PF 1 MG/ML injection (Anaphylaxis) once      lansoprazole (PREVACID) 30 MG delayed release capsule Take 1 capsule by mouth every morning (before breakfast)      levothyroxine (SYNTHROID) 100 MCG tablet Take 1 tablet by mouth every morning (before breakfast)      traMADol (ULTRAM) 50 MG tablet Take 2 tablets by mouth every 8 hours as needed. triamcinolone (KENALOG) 0.1 % cream Apply topically 2 times daily      atorvastatin (LIPITOR) 40 MG tablet Take 1 tablet by mouth daily (Patient not taking: Reported on 6/26/2023) 90 tablet 1    gabapentin (NEURONTIN) 300 MG capsule Take 300 mg by mouth 2 times daily as needed. (Patient not taking: Reported on 8/28/2023)       No current facility-administered medications for this visit.         Past Surgical History:   Procedure Laterality Date    BREAST BIOPSY Right 12/6/2022    RIGHT NIPPLE BIOPSY,

## 2023-10-08 ENCOUNTER — HOSPITAL ENCOUNTER (EMERGENCY)
Age: 58
Discharge: HOME | End: 2023-10-08
Attending: EMERGENCY MEDICINE
Payer: MEDICAID

## 2023-10-08 ENCOUNTER — APPOINTMENT (OUTPATIENT)
Age: 58
End: 2023-10-08
Payer: MEDICAID

## 2023-10-08 VITALS
HEART RATE: 89 BPM | OXYGEN SATURATION: 99 % | SYSTOLIC BLOOD PRESSURE: 138 MMHG | RESPIRATION RATE: 18 BRPM | DIASTOLIC BLOOD PRESSURE: 75 MMHG | BODY MASS INDEX: 45.52 KG/M2 | TEMPERATURE: 97.7 F | WEIGHT: 282 LBS

## 2023-10-08 DIAGNOSIS — S62.524A CLOSED NONDISPLACED FRACTURE OF DISTAL PHALANX OF RIGHT THUMB, INITIAL ENCOUNTER: Primary | ICD-10-CM

## 2023-10-08 DIAGNOSIS — I10 ESSENTIAL HYPERTENSION: ICD-10-CM

## 2023-10-08 PROCEDURE — 6370000000 HC RX 637 (ALT 250 FOR IP): Performed by: EMERGENCY MEDICINE

## 2023-10-08 PROCEDURE — 73140 X-RAY EXAM OF FINGER(S): CPT

## 2023-10-08 PROCEDURE — 99283 EMERGENCY DEPT VISIT LOW MDM: CPT

## 2023-10-08 RX ORDER — ACETAMINOPHEN 325 MG/1
650 TABLET ORAL
Status: COMPLETED | OUTPATIENT
Start: 2023-10-08 | End: 2023-10-08

## 2023-10-08 RX ADMIN — ACETAMINOPHEN 650 MG: 325 TABLET ORAL at 23:08

## 2023-10-08 ASSESSMENT — ENCOUNTER SYMPTOMS
GASTROINTESTINAL NEGATIVE: 1
RESPIRATORY NEGATIVE: 1

## 2023-10-09 NOTE — ED PROVIDER NOTES
[facility] EMERGENCY DEPT  EMERGENCY DEPARTMENT HISTORY AND PHYSICAL EXAM      Date: 10/8/2023  Patient Name: May Felix  MRN: [id number]  [facility] [facility] 1965  Date of evaluation: 10/8/2023  Provider: Kaila Joe MD   Note Started: 11:25 PM 10/8/23    HISTORY OF PRESENT ILLNESS     Chief Complaint   Patient presents with    Finger Pain       History Provided By: Patient    HPI: May Felix, 63 y.o. female Multiple medical problems presents with fall and injury to right thumb. It occurred at 1 pm today. Thumb became discolored and cool to touch. She is a smoker. She has used nothing for pain. It is acute and moderate.     HPI        PAST MEDICAL HISTORY   Past Medical History:  Past Medical History:   Diagnosis Date    Anemia     Angio-edema     Asthma     Blind left eye     D-dimer, elevated     Chronic elevation    Depression     Diverticulosis     Enlargement, spleen     Esophagospasm     Essential hypertension     Fatty liver     GERD (gastroesophageal reflux disease)     Heart failure (HCC)     Right sided    Histoplasmosis     HPV (human papilloma virus) anogenital infection     Lyme disease     Morbid obesity ([address])     Neuropathy     Upper and lower    Neuropathy     PCOS (polycystic ovarian syndrome)     Pituitary abnormality (HCC)     PTSD (post-traumatic stress disorder)     Tachycardia     Thyroid disease     Tricuspid insufficiency        Past Surgical History:  Past Surgical History:   Procedure Laterality Date    BREAST BIOPSY Right 12/6/2022    RIGHT NIPPLE BIOPSY, SUBAREOLAR DUCT EXCISION performed by Brianna Montero DO at [address] [facility] [address]  2010 approx    normal per patient    CARPAL TUNNEL RELEASE Bilateral     CATARACT REMOVAL Right     Lens implant    CHOLECYSTECTOMY      COLONOSCOPY N/A 08/10/2022    COLONOSCOPY performed by Brianna Montero DO at [address] [place] [address] HISTORY      Vocal cord polyps    UPPER GASTROINTESTINAL

## 2023-10-09 NOTE — ED TRIAGE NOTES
PT states she fell earlier today and is having pain to her R thumb. PT states she thinks it is fractured. Swelling and bruising noted. PT states she is more concerned because the thumb feels cold to her. Upon inspection thumb cool to touch, however the rest of her R hand and her L hand is cool to touch as well.

## 2023-11-19 LAB
A/G RATIO: 1.6 RATIO (ref 1.1–2.6)
ALBUMIN SERPL-MCNC: 4 G/DL (ref 3.5–5)
ALBUMIN: 51.4 % (ref 46.6–62.6)
ALBUMIN: 51.4 % (ref 46.6–62.6)
ALP BLD-CCNC: 94 U/L (ref 25–115)
ALPHA-1-GLOBULIN: 4.2 % (ref 1.7–4.1)
ALPHA-1-GLOBULIN: 4.2 % (ref 1.7–4.1)
ALPHA-2-GLOBULIN: 11.9 % (ref 5.9–13.5)
ALPHA-2-GLOBULIN: 11.9 % (ref 5.9–13.5)
ALT SERPL-CCNC: 13 U/L (ref 5–40)
ANION GAP SERPL CALCULATED.3IONS-SCNC: 9 MMOL/L (ref 3–15)
AST SERPL-CCNC: 17 U/L (ref 10–37)
BETA GLOBULIN: 18.1 % (ref 10.9–18.9)
BETA GLOBULIN: 18.1 % (ref 10.9–18.9)
BILIRUB SERPL-MCNC: 0.4 MG/DL (ref 0.2–1.2)
BILIRUBIN DIRECT: <0.2 MG/DL (ref 0–0.3)
BUN BLDV-MCNC: 16 MG/DL (ref 6–22)
CALCIUM SERPL-MCNC: 9.6 MG/DL (ref 8.4–10.5)
CHLORIDE BLD-SCNC: 98 MMOL/L (ref 98–110)
CHOLESTEROL/HDL RATIO: 3.5 (ref 0–5)
CHOLESTEROL: 205 MG/DL (ref 110–200)
CO2: 27 MMOL/L (ref 20–32)
CREAT SERPL-MCNC: 0.8 MG/DL (ref 0.5–1.2)
FREE KAPPA LIGHT CHAINS: 33.4 MG/L (ref 3.3–19.4)
FREE LAMBDA LIGHT CHAINS: 24.8 MG/L (ref 5.7–26.3)
GAMMA GLOBULIN: 14.4 % (ref 11.6–24.4)
GAMMA GLOBULIN: 14.4 % (ref 11.6–24.4)
GLOBULIN: 2.5 G/DL (ref 2–4)
GLOMERULAR FILTRATION RATE: >60 ML/MIN/1.73 SQ.M.
GLUCOSE: 108 MG/DL (ref 70–99)
HDLC SERPL-MCNC: 59 MG/DL
IMMUNOFIXATION, SERUM: ABNORMAL
KAPPA/LAMBDA RATIO: 1.35 (ref 0.26–1.65)
LDL CHOLESTEROL CALCULATED: 115 MG/DL (ref 50–99)
LDL/HDL RATIO: 1.9
NON-HDL CHOLESTEROL: 146 MG/DL
PE INTERPRETATTION: ABNORMAL
POTASSIUM SERPL-SCNC: 4.3 MMOL/L (ref 3.5–5.5)
SODIUM BLD-SCNC: 134 MMOL/L (ref 133–145)
TOTAL PROTEIN: 6.5 G/DL (ref 6.4–8.3)
TOTAL PROTEIN: 6.7 G/DL (ref 6.4–8.3)
TOTAL PROTEIN: 6.7 G/DL (ref 6.4–8.3)
TRIGL SERPL-MCNC: 155 MG/DL (ref 40–149)
VLDLC SERPL CALC-MCNC: 31 MG/DL (ref 8–30)

## 2023-11-20 ENCOUNTER — TELEPHONE (OUTPATIENT)
Age: 58
End: 2023-11-20

## 2023-11-20 DIAGNOSIS — I50.32 CHRONIC DIASTOLIC (CONGESTIVE) HEART FAILURE (HCC): Primary | ICD-10-CM

## 2023-11-20 NOTE — TELEPHONE ENCOUNTER
Spoke with patient regarding message below. Patient states she is taking Rosuvastatin 40 mg nightly and Ezetimibe 10 mg daily. Patient states she is reluctant to start taking Nexletol until she finds out the results of the Kappa/Lambda Quantitative Free Light Chains test, please advise.     ----- Message from Elizabeth Falcon MD sent at 11/20/2023  8:31 AM EST -----  Please contact patient and do the following asap  Let her know that lipids are not at goal and check if she is also willing to add Nexletol  Check if compliant with cholesterol meds  Get the names and doses of meds that patient takes and show me asap  Please reinforce diet and exercise.

## 2023-11-20 NOTE — TELEPHONE ENCOUNTER
Her free light chains are mildly elevated. I would still like to see her cholesterol a little lower and Nexletol is not going to interfere with light chains component. Please get nuclear amyloid scan. I am ordering that.

## 2023-11-21 NOTE — TELEPHONE ENCOUNTER
Patient states she is willing to start Nexletol and will have the  nuclear amyloid scan done as soon as possible.

## 2023-11-21 NOTE — TELEPHONE ENCOUNTER
The 10-year ASCVD risk score (Kathy HENDERSON, et al., 2019) is: 8.3%    Values used to calculate the score:      Age: 63 years      Sex: Female      Is Non- : No      Diabetic: No      Tobacco smoker: Yes      Systolic Blood Pressure: 908 mmHg      Is BP treated: Yes      HDL Cholesterol: 59 mg/dL      Total Cholesterol: 205 mg/dL      I sent a prescription to pharmacy.   I also ordered labs in 6 weeks

## 2023-11-21 NOTE — TELEPHONE ENCOUNTER
Relayed message to patient medication sent to pharmacy and lab work in 6 weeks. Patient voiced understanding.

## 2023-11-28 ENCOUNTER — HOSPITAL ENCOUNTER (OUTPATIENT)
Facility: HOSPITAL | Age: 58
Discharge: HOME | End: 2023-12-01
Payer: MEDICAID

## 2023-11-28 ENCOUNTER — OFFICE VISIT (OUTPATIENT)
Age: 58
End: 2023-11-28
Payer: MEDICAID

## 2023-11-28 VITALS
WEIGHT: 279.2 LBS | HEART RATE: 76 BPM | BODY MASS INDEX: 43.82 KG/M2 | TEMPERATURE: 97 F | DIASTOLIC BLOOD PRESSURE: 82 MMHG | HEIGHT: 67 IN | SYSTOLIC BLOOD PRESSURE: 142 MMHG | RESPIRATION RATE: 16 BRPM | OXYGEN SATURATION: 97 %

## 2023-11-28 DIAGNOSIS — K21.9 GASTROESOPHAGEAL REFLUX DISEASE WITHOUT ESOPHAGITIS: ICD-10-CM

## 2023-11-28 DIAGNOSIS — J45.909 UNCOMPLICATED ASTHMA, UNSPECIFIED ASTHMA SEVERITY, UNSPECIFIED WHETHER PERSISTENT: ICD-10-CM

## 2023-11-28 DIAGNOSIS — Z01.818 PRE-OPERATIVE CLEARANCE: Primary | ICD-10-CM

## 2023-11-28 DIAGNOSIS — I10 ESSENTIAL HYPERTENSION: ICD-10-CM

## 2023-11-28 DIAGNOSIS — R79.89 ABNORMAL CBC: ICD-10-CM

## 2023-11-28 DIAGNOSIS — I50.9 CONGESTIVE HEART FAILURE, UNSPECIFIED HF CHRONICITY, UNSPECIFIED HEART FAILURE TYPE (HCC): ICD-10-CM

## 2023-11-28 DIAGNOSIS — E03.9 ACQUIRED HYPOTHYROIDISM: ICD-10-CM

## 2023-11-28 DIAGNOSIS — I25.10 CORONARY ARTERY DISEASE INVOLVING NATIVE CORONARY ARTERY OF NATIVE HEART WITHOUT ANGINA PECTORIS: ICD-10-CM

## 2023-11-28 LAB
BASOPHILS # BLD: 0 K/UL (ref 0–0.1)
BASOPHILS NFR BLD: 0 % (ref 0–2)
DIFFERENTIAL METHOD BLD: NORMAL
EOSINOPHIL # BLD: 0 K/UL (ref 0–0.4)
EOSINOPHIL NFR BLD: 0 % (ref 0–5)
ERYTHROCYTE [DISTWIDTH] IN BLOOD BY AUTOMATED COUNT: 13.2 % (ref 11.6–14.5)
HCT VFR BLD AUTO: 42 % (ref 35–45)
HGB BLD-MCNC: 13 G/DL (ref 12–16)
IMM GRANULOCYTES # BLD AUTO: 0 K/UL (ref 0–0.04)
IMM GRANULOCYTES NFR BLD AUTO: 0 % (ref 0–0.5)
LYMPHOCYTES # BLD: 1.9 K/UL (ref 0.9–3.6)
LYMPHOCYTES NFR BLD: 22 % (ref 21–52)
MCH RBC QN AUTO: 28.8 PG (ref 24–34)
MCHC RBC AUTO-ENTMCNC: 31 G/DL (ref 31–37)
MCV RBC AUTO: 92.9 FL (ref 78–100)
MONOCYTES # BLD: 0.5 K/UL (ref 0.05–1.2)
MONOCYTES NFR BLD: 5 % (ref 3–10)
NEUTS SEG # BLD: 6.4 K/UL (ref 1.8–8)
NEUTS SEG NFR BLD: 73 % (ref 40–73)
NRBC # BLD: 0 K/UL (ref 0–0.01)
NRBC BLD-RTO: 0 PER 100 WBC
PLATELET # BLD AUTO: 200 K/UL (ref 135–420)
PMV BLD AUTO: 10 FL (ref 9.2–11.8)
RBC # BLD AUTO: 4.52 M/UL (ref 4.2–5.3)
TSH SERPL DL<=0.05 MIU/L-ACNC: 2.1 UIU/ML (ref 0.36–3.74)
WBC # BLD AUTO: 8.8 K/UL (ref 4.6–13.2)

## 2023-11-28 PROCEDURE — 84443 ASSAY THYROID STIM HORMONE: CPT

## 2023-11-28 PROCEDURE — 3074F SYST BP LT 130 MM HG: CPT | Performed by: FAMILY MEDICINE

## 2023-11-28 PROCEDURE — 3078F DIAST BP <80 MM HG: CPT | Performed by: FAMILY MEDICINE

## 2023-11-28 PROCEDURE — 99214 OFFICE O/P EST MOD 30 MIN: CPT | Performed by: FAMILY MEDICINE

## 2023-11-28 PROCEDURE — 36415 COLL VENOUS BLD VENIPUNCTURE: CPT

## 2023-11-28 PROCEDURE — 85025 COMPLETE CBC W/AUTO DIFF WBC: CPT

## 2023-11-28 RX ORDER — PANTOPRAZOLE SODIUM 40 MG/1
40 TABLET, DELAYED RELEASE ORAL DAILY
Qty: 90 TABLET | Refills: 0 | Status: SHIPPED | OUTPATIENT
Start: 2023-11-28

## 2023-11-28 RX ORDER — LEVOTHYROXINE SODIUM 0.1 MG/1
100 TABLET ORAL
Qty: 90 TABLET | Refills: 1 | Status: SHIPPED | OUTPATIENT
Start: 2023-11-28

## 2023-11-28 RX ORDER — BUDESONIDE AND FORMOTEROL FUMARATE DIHYDRATE 160; 4.5 UG/1; UG/1
AEROSOL RESPIRATORY (INHALATION)
COMMUNITY
Start: 2023-11-06

## 2023-11-28 RX ORDER — FLUTICASONE PROPIONATE 50 MCG
SPRAY, SUSPENSION (ML) NASAL
COMMUNITY
Start: 2023-11-06

## 2023-11-28 RX ORDER — BENRALIZUMAB 30 MG/ML
INJECTION, SOLUTION SUBCUTANEOUS
COMMUNITY
Start: 2023-11-13

## 2023-11-29 NOTE — TELEPHONE ENCOUNTER
Requested Prescriptions     Pending Prescriptions Disp Refills    Bempedoic Acid 180 MG TABS 30 tablet 5     Sig: Take 180 mg by mouth daily

## 2023-12-01 NOTE — PROGRESS NOTES
1. \"Have you been to the ER, urgent care clinic since your last visit? Hospitalized since your last visit? \" Yes When: F 10/08/23. 2. \"Have you seen or consulted any [facility] health care providers outside of the [address] since your last visit? \" Dr. Sandi Dawn, podiatry. Dr. Dereje Villalta and Dr. Dimitris Arreola for back. Dr. Gerardo Jacobo. [id number] [facility]. 3. For patients aged 43-73: Has the patient had a colonoscopy / FIT/ Cologuard? Yes - no Care Gap present      If the patient is female:    4. For patients aged 43-66: Has the patient had a mammogram within the past 2 years? Yes - no Care Gap present      5. For patients aged 21-65: Has the patient had a pap smear? No      Chief Complaint   Patient presents with    Pre-op Exam     Left foot surgery on 12/13/23 - Dr. Yuliet Howard.  Dr. Brandy Mckenna will do D&C and remove fibroid on 12/13/23 after foot surgery    leukocytosis    Results
Grandmother         CABG    Stroke Neg Hx     Cancer Mother         stomach     Social History     Tobacco Use    Smoking status: Every Day     Packs/day: .5     Types: Cigarettes     Start date: 1/1/1985    Smokeless tobacco: Never   Substance Use Topics    Alcohol use: Yes        Allergies   Allergen Reactions    Latex Itching    Flurbiprofen Anaphylaxis    Iron Dextran Anaphylaxis and Itching    Levofloxacin Myalgia     Tendon rupture  Tendon rupture    Adhesive Tape Rash    Cephalexin Hives and Itching    Codeine Itching and Rash    Penicillins Other (See Comments)     Allergic to penicillin mold like on bread but has had penicillin medication w/o any problem    Clindamycin Hives       ROS: Denies any headache, dizziness, no chest pain , no arm or leg weakness. No nausea or vomiting, no weight or appetite changes, no mood changes . No urine or bowel complains, no palpitation, no diaphoresis. No abdominal pain. No cold or cough. + leg swelling. No fever. No sleep trouble.      Review of Systems : see above     Medication :  Current Outpatient Medications   Medication Sig Dispense Refill    fluticasone (FLONASE) 50 MCG/ACT nasal spray       SYMBICORT 160-4.5 MCG/ACT AERO       FASENRA PEN 30 MG/ML SOAJ       levothyroxine (SYNTHROID) 100 MCG tablet Take 1 tablet by mouth every morning (before breakfast) 90 tablet 1    pantoprazole (PROTONIX) 40 MG tablet Take 1 tablet by mouth daily 90 tablet 0    naproxen (NAPROSYN) 500 MG tablet Take 1 tablet by mouth 2 times daily      rosuvastatin (CRESTOR) 40 MG tablet Take 1 tablet by mouth every evening 90 tablet 3    spironolactone (ALDACTONE) 50 MG tablet Take 1 tablet by mouth once daily 90 tablet 1    furosemide (LASIX) 40 MG tablet Take 1 tablet by mouth daily 90 tablet 1    dapagliflozin (FARXIGA) 10 MG tablet Take 1 tablet by mouth every morning 30 tablet 3    ezetimibe (ZETIA) 10 MG tablet Take 1 tablet by mouth daily 90 tablet 3    lisinopril (PRINIVIL;ZESTRIL) 2.5

## 2023-12-11 ENCOUNTER — HOSPITAL ENCOUNTER (OUTPATIENT)
Facility: HOSPITAL | Age: 58
Discharge: HOME | End: 2023-12-14
Attending: INTERNAL MEDICINE
Payer: MEDICAID

## 2023-12-11 DIAGNOSIS — I50.32 CHRONIC DIASTOLIC (CONGESTIVE) HEART FAILURE (HCC): ICD-10-CM

## 2023-12-11 RX ADMIN — Medication 21 MILLICURIE: at 10:08

## 2023-12-12 PROBLEM — I50.32 CHRONIC DIASTOLIC (CONGESTIVE) HEART FAILURE (HCC): Status: ACTIVE | Noted: 2021-05-25

## 2024-01-10 ENCOUNTER — OFFICE VISIT (OUTPATIENT)
Age: 59
End: 2024-01-10
Payer: MEDICAID

## 2024-01-10 VITALS
SYSTOLIC BLOOD PRESSURE: 130 MMHG | TEMPERATURE: 98.2 F | RESPIRATION RATE: 18 BRPM | OXYGEN SATURATION: 97 % | DIASTOLIC BLOOD PRESSURE: 78 MMHG | WEIGHT: 272.2 LBS | HEART RATE: 83 BPM | HEIGHT: 67 IN | BODY MASS INDEX: 42.72 KG/M2

## 2024-01-10 DIAGNOSIS — R79.89 LOW VITAMIN B12 LEVEL: ICD-10-CM

## 2024-01-10 DIAGNOSIS — R77.8 ABNORMAL SPEP: ICD-10-CM

## 2024-01-10 DIAGNOSIS — G62.9 POLYNEUROPATHY, UNSPECIFIED: ICD-10-CM

## 2024-01-10 PROCEDURE — 3078F DIAST BP <80 MM HG: CPT | Performed by: NURSE PRACTITIONER

## 2024-01-10 PROCEDURE — 3075F SYST BP GE 130 - 139MM HG: CPT | Performed by: NURSE PRACTITIONER

## 2024-01-10 PROCEDURE — 99213 OFFICE O/P EST LOW 20 MIN: CPT

## 2024-01-10 PROCEDURE — 99213 OFFICE O/P EST LOW 20 MIN: CPT | Performed by: NURSE PRACTITIONER

## 2024-01-10 RX ORDER — POTASSIUM CHLORIDE 1.5 G/1.58G
20 POWDER, FOR SOLUTION ORAL 2 TIMES DAILY
COMMUNITY

## 2024-01-10 RX ORDER — DULOXETIN HYDROCHLORIDE 30 MG/1
30 CAPSULE, DELAYED RELEASE ORAL DAILY
Qty: 30 CAPSULE | Refills: 11 | Status: SHIPPED | OUTPATIENT
Start: 2024-01-10

## 2024-01-10 RX ORDER — DULOXETIN HYDROCHLORIDE 60 MG/1
60 CAPSULE, DELAYED RELEASE ORAL DAILY
Qty: 30 CAPSULE | Refills: 11 | Status: SHIPPED | OUTPATIENT
Start: 2024-01-10

## 2024-01-10 NOTE — PATIENT INSTRUCTIONS
Patient instructions:  -duloxetine/Cymbalta for neuropathic pain- increase to 30 mg in AM and 60 mg in PM gradually.  -referral to hematology: [facility] - [facility] [phone]. (elevated free kappa light chains.) Call next week if you don't hear from them.

## 2024-01-10 NOTE — PROGRESS NOTES
neuropathy.  Will continue her regimen of Cymbalta for neuropathic pain.  She would like to continue at 30 mg in a.m. and 60 mg in p.m.  She has been off the medication, so advised to restart gradually.  When taking 60 mg twice daily, the 60 mg in the morning was too sedating.  Will recheck her vitamin B12 level if not done with hematology due to history of low level.  She is concerned about finding of elevated free kappa light chains on recent blood work.  Will refer to hematology for evaluation.  She would like to hold off on neuropsychological evaluation at this time.  Follow up in 6 to 12 months.    Jessie was seen today for follow-up.    Diagnoses and all orders for this visit:    Polyneuropathy, unspecified  -     DULoxetine (CYMBALTA) 30 MG extended release capsule; Take 1 capsule by mouth daily In AM.  -     DULoxetine (CYMBALTA) 60 MG extended release capsule; Take 1 capsule by mouth daily In PM.  -     Vitamin B12 & Folate; Future    Abnormal SPEP  -     Cancel: External Referral To Hematology Oncology  -     External Referral To Hematology Oncology  -     Vitamin B12 & Folate; Future    Low vitamin B12 level  -     Vitamin B12 & Folate; Future      Signed By: ELLIOT EDDY - DELROY        PLEASE NOTE:   Portions of this document may have been produced using voice recognition software. Unrecognized errors in transcription may be present.

## 2024-01-30 ENCOUNTER — OFFICE VISIT (OUTPATIENT)
Age: 59
End: 2024-01-30
Payer: MEDICAID

## 2024-01-30 VITALS
WEIGHT: 273.2 LBS | HEART RATE: 78 BPM | SYSTOLIC BLOOD PRESSURE: 148 MMHG | BODY MASS INDEX: 42.88 KG/M2 | DIASTOLIC BLOOD PRESSURE: 84 MMHG | TEMPERATURE: 97.2 F | OXYGEN SATURATION: 96 % | HEIGHT: 67 IN | RESPIRATION RATE: 16 BRPM

## 2024-01-30 DIAGNOSIS — Z98.890 STATUS POST FOOT SURGERY: ICD-10-CM

## 2024-01-30 DIAGNOSIS — D89.89 LIGHT CHAIN DISEASE, KAPPA TYPE (HCC): ICD-10-CM

## 2024-01-30 DIAGNOSIS — I50.32 CHRONIC DIASTOLIC (CONGESTIVE) HEART FAILURE (HCC): ICD-10-CM

## 2024-01-30 DIAGNOSIS — I10 ESSENTIAL HYPERTENSION: Primary | ICD-10-CM

## 2024-01-30 DIAGNOSIS — E03.9 ACQUIRED HYPOTHYROIDISM: ICD-10-CM

## 2024-01-30 DIAGNOSIS — J45.909 MILD ASTHMA WITHOUT COMPLICATION, UNSPECIFIED WHETHER PERSISTENT: ICD-10-CM

## 2024-01-30 DIAGNOSIS — N89.8 VAGINAL ITCHING: ICD-10-CM

## 2024-01-30 PROCEDURE — 99213 OFFICE O/P EST LOW 20 MIN: CPT | Performed by: FAMILY MEDICINE

## 2024-01-30 PROCEDURE — 3077F SYST BP >= 140 MM HG: CPT | Performed by: FAMILY MEDICINE

## 2024-01-30 PROCEDURE — 3079F DIAST BP 80-89 MM HG: CPT | Performed by: FAMILY MEDICINE

## 2024-01-30 RX ORDER — FLUCONAZOLE 150 MG/1
150 TABLET ORAL ONCE
Qty: 1 TABLET | Refills: 0 | Status: SHIPPED | OUTPATIENT
Start: 2024-01-30 | End: 2024-01-30

## 2024-01-30 RX ORDER — CLOTRIMAZOLE AND BETAMETHASONE DIPROPIONATE 10; .64 MG/G; MG/G
CREAM TOPICAL
COMMUNITY
Start: 2024-01-08

## 2024-01-30 RX ORDER — PREDNISONE 20 MG/1
TABLET ORAL
COMMUNITY
Start: 2024-01-24

## 2024-01-30 RX ORDER — LEVOTHYROXINE SODIUM 0.1 MG/1
100 TABLET ORAL
Qty: 90 TABLET | Refills: 1 | Status: SHIPPED | OUTPATIENT
Start: 2024-01-30

## 2024-01-30 RX ORDER — AZITHROMYCIN 250 MG/1
TABLET, FILM COATED ORAL
COMMUNITY
Start: 2024-01-24 | End: 2024-01-30 | Stop reason: ALTCHOICE

## 2024-01-30 SDOH — ECONOMIC STABILITY: FOOD INSECURITY: WITHIN THE PAST 12 MONTHS, THE FOOD YOU BOUGHT JUST DIDN'T LAST AND YOU DIDN'T HAVE MONEY TO GET MORE.: NEVER TRUE

## 2024-01-30 SDOH — ECONOMIC STABILITY: HOUSING INSECURITY
IN THE LAST 12 MONTHS, WAS THERE A TIME WHEN YOU DID NOT HAVE A STEADY PLACE TO SLEEP OR SLEPT IN A SHELTER (INCLUDING NOW)?: NO

## 2024-01-30 SDOH — ECONOMIC STABILITY: INCOME INSECURITY: HOW HARD IS IT FOR YOU TO PAY FOR THE VERY BASICS LIKE FOOD, HOUSING, MEDICAL CARE, AND HEATING?: SOMEWHAT HARD

## 2024-01-30 SDOH — ECONOMIC STABILITY: FOOD INSECURITY: WITHIN THE PAST 12 MONTHS, YOU WORRIED THAT YOUR FOOD WOULD RUN OUT BEFORE YOU GOT MONEY TO BUY MORE.: SOMETIMES TRUE

## 2024-01-30 ASSESSMENT — PATIENT HEALTH QUESTIONNAIRE - PHQ9
SUM OF ALL RESPONSES TO PHQ QUESTIONS 1-9: 1
7. TROUBLE CONCENTRATING ON THINGS, SUCH AS READING THE NEWSPAPER OR WATCHING TELEVISION: 0
2. FEELING DOWN, DEPRESSED OR HOPELESS: 1
9. THOUGHTS THAT YOU WOULD BE BETTER OFF DEAD, OR OF HURTING YOURSELF: 0
1. LITTLE INTEREST OR PLEASURE IN DOING THINGS: 0
SUM OF ALL RESPONSES TO PHQ QUESTIONS 1-9: 1
3. TROUBLE FALLING OR STAYING ASLEEP: 0
SUM OF ALL RESPONSES TO PHQ QUESTIONS 1-9: 1
SUM OF ALL RESPONSES TO PHQ9 QUESTIONS 1 & 2: 1
10. IF YOU CHECKED OFF ANY PROBLEMS, HOW DIFFICULT HAVE THESE PROBLEMS MADE IT FOR YOU TO DO YOUR WORK, TAKE CARE OF THINGS AT HOME, OR GET ALONG WITH OTHER PEOPLE: 0
5. POOR APPETITE OR OVEREATING: 0
8. MOVING OR SPEAKING SO SLOWLY THAT OTHER PEOPLE COULD HAVE NOTICED. OR THE OPPOSITE, BEING SO FIGETY OR RESTLESS THAT YOU HAVE BEEN MOVING AROUND A LOT MORE THAN USUAL: 0
4. FEELING TIRED OR HAVING LITTLE ENERGY: 0
SUM OF ALL RESPONSES TO PHQ QUESTIONS 1-9: 1
6. FEELING BAD ABOUT YOURSELF - OR THAT YOU ARE A FAILURE OR HAVE LET YOURSELF OR YOUR FAMILY DOWN: 0

## 2024-01-30 NOTE — PROGRESS NOTES
Jessie Momin is a 58 y.o. female complains of   Chief Complaint   Patient presents with    Hypertension    Congestive Heart Failure    Asthma    Thyroid Problem    Gastroesophageal Reflux    abnormal CBC    Medication Refill     Just finished antibiotic yesterday and requests Rx for Diflucan in case of yeast infection    referred to [facility]      By Dr. Green and Dr. Childs ([facility])       HPI: Here for follow-up.  History of hypertension, CHF, hypothyroidism, asthma.  Recently workup by cardiology done to rule out amyloidosis.  Also during workup noted kappa light chain abnormality.  She is referred to oncologist  Blood pressure mildly elevated.  She is asymptomatic.  Taking medication with compliance.  Stated she is having pain over left foot as she recently had surgery done.  Said her pain has been much improved since surgery and following podiatry.  Stated recently for asthma exacerbation she was treated with antibiotic by Dr. Fraga who is her pulmonologist.  Said that she wants to get the Diflucan in case as she gets the vaginal itching or yeast infection.  Currently sitting without any acute distress.  No signs of volume overload.  Able to talk in full sentence and not using any extra respiratory muscle.  No audible wheezing.  Compliant with taking medications and denies any side effects.  Compliant with follow-up with pulmonary and cardiologist.  No trouble swallowing or change in voice.  No appetite or weight changes.  CMP:  Lab Results   Component Value Date/Time     11/16/2023 03:11 PM    K 4.3 11/16/2023 03:11 PM    CL 98 11/16/2023 03:11 PM    CO2 27 11/16/2023 03:11 PM    BUN 16 11/16/2023 03:11 PM    CREATININE 0.8 11/16/2023 03:11 PM    GLUCOSE 108 11/16/2023 03:11 PM    CALCIUM 9.6 11/16/2023 03:11 PM    PROT 6.7 11/16/2023 03:11 PM    PROT 6.7 11/16/2023 03:11 PM    PROT 6.5 11/16/2023 03:11 PM    LABALBU 51.4 11/16/2023 03:11 PM    LABALBU

## 2024-01-30 NOTE — PATIENT INSTRUCTIONS
[facility] Financial Resources*  (Call 211 if need more resources.)   Medical  [facility] Financial Assistance  What they offer: The [employer] Financial Assistance Program helps uninsured patients who do not qualify for government-sponsored health insurance and cannot afford to pay for their medical care. Insured patient may also qualify for assistance based on family income, family size, and medical needs.   Phone Number: [phone]  How to apply for the [employer] Financial Assistance Program:  Option 1: To apply for financial assistance, a patient (or their family or other               provider) should fill out the Financial Assistance Application. Copies of the Financial Assistance Application and the FAP may be obtained for free by calling the [employer] service department at [phone].  Option 2:  The Financial Assistance Application and policy may be obtained for free by downloading a copy from the [employer] website:                       https://www.doggyloot/patient-resources/financial-assistance                         [facility] Uninsured Program       www.[employer].org  What they offer: The [facility] Uninsured Program can assist you if you’re uninsured and have been diagnosed with chronic, debilitating, or life-threatening disease.   Phone Number: 1-235.465.8763  Website: www.[employer].org    [employer]  What they offer: Mobile health clinic for uninsured community members  Phone number: 927.333.2100    Utilities  [employer]  What they offer: Partnership with the [place] Department of . Assist with finding and applying for government funded programs and benefits.  You can also update your benefits or report changes through [employer].  Website: https://www.[employer].[place].gov/  Phone Number: 8335CALLVA (747-224-6443)    [employer] [place] [employer]  What they offer: [employer] is [employer]’s energy assistance program of last resort

## 2024-01-30 NOTE — PROGRESS NOTES
1. \"Have you been to the ER, urgent care clinic since your last visit?  Hospitalized since your last visit?\" Yes When: [facility] LOV: 12/13/23 for fusion, [facility] - Dr. Conrad Renner.  Dr. Wilkerson gynecology surgery for D&C and fibroid removal on 12/13/23.    2. \"Have you seen or consulted any other health care providers outside of the [facility] since your last visit?\" Dr. Green, pulmonary LOV: 1/24/23 - currently on steroid and antibiotics      Chief Complaint   Patient presents with    Hypertension    Congestive Heart Failure    Asthma    Thyroid Problem    Gastroesophageal Reflux    abnormal CBC    Medication Refill     Just finished antibiotic yesterday and requests Rx for Diflucan in case of yeast infection    referred to [facility]      By Dr. Green and Dr. Childs ([facility])

## 2024-02-02 DIAGNOSIS — I50.32 CHRONIC DIASTOLIC (CONGESTIVE) HEART FAILURE (HCC): Primary | ICD-10-CM

## 2024-02-02 DIAGNOSIS — I10 ESSENTIAL (PRIMARY) HYPERTENSION: ICD-10-CM

## 2024-02-02 RX ORDER — LANOLIN ALCOHOL/MO/W.PET/CERES
400 CREAM (GRAM) TOPICAL 2 TIMES DAILY
Qty: 180 TABLET | Refills: 3 | Status: SHIPPED | OUTPATIENT
Start: 2024-02-02

## 2024-02-02 RX ORDER — LISINOPRIL 2.5 MG/1
TABLET ORAL
Qty: 90 TABLET | Refills: 3 | Status: SHIPPED | OUTPATIENT
Start: 2024-02-02

## 2024-02-02 NOTE — TELEPHONE ENCOUNTER
Requested Prescriptions     Pending Prescriptions Disp Refills    lisinopril (PRINIVIL;ZESTRIL) 2.5 MG tablet 90 tablet 3     Sig: Take 1 tablet by mouth once daily    magnesium oxide (MAG-OX) 400 (240 Mg) MG tablet 180 tablet 3     Sig: Take 1 tablet by mouth 2 times daily

## 2024-02-09 DIAGNOSIS — K21.9 GASTROESOPHAGEAL REFLUX DISEASE WITHOUT ESOPHAGITIS: ICD-10-CM

## 2024-02-14 RX ORDER — PANTOPRAZOLE SODIUM 40 MG/1
40 TABLET, DELAYED RELEASE ORAL DAILY
Qty: 90 TABLET | Refills: 0 | Status: SHIPPED | OUTPATIENT
Start: 2024-02-14

## 2024-02-20 NOTE — TELEPHONE ENCOUNTER
To Whom It May Concern:    Rosi was informed that she should have medication applied to her eyes every three hours for pink eye.    Please all our office if you have any questions.    Sincerely,        Gerald Mancuso, DO     Spoke with Ruth at [employer] prior authorization department. Prior authorization approved for Lansoprazole 30 mg caps. Prior authorization valid 10/22/2021-10/22/2022.

## 2024-03-14 DIAGNOSIS — I10 ESSENTIAL (PRIMARY) HYPERTENSION: ICD-10-CM

## 2024-03-14 RX ORDER — SPIRONOLACTONE 50 MG/1
TABLET, FILM COATED ORAL
Qty: 90 TABLET | Refills: 0 | Status: SHIPPED | OUTPATIENT
Start: 2024-03-14

## 2024-03-22 ENCOUNTER — HOSPITAL ENCOUNTER (OUTPATIENT)
Age: 59
End: 2024-03-22
Payer: MEDICAID

## 2024-03-22 DIAGNOSIS — E78.2 MIXED HYPERLIPIDEMIA: ICD-10-CM

## 2024-03-22 DIAGNOSIS — I50.32 CHRONIC DIASTOLIC (CONGESTIVE) HEART FAILURE (HCC): ICD-10-CM

## 2024-03-22 DIAGNOSIS — G62.9 POLYNEUROPATHY, UNSPECIFIED: ICD-10-CM

## 2024-03-22 DIAGNOSIS — R79.89 ABNORMAL CBC: ICD-10-CM

## 2024-03-22 DIAGNOSIS — R79.89 LOW VITAMIN B12 LEVEL: ICD-10-CM

## 2024-03-22 DIAGNOSIS — R77.8 ABNORMAL SPEP: ICD-10-CM

## 2024-03-22 DIAGNOSIS — E03.9 ACQUIRED HYPOTHYROIDISM: ICD-10-CM

## 2024-03-22 LAB
ALBUMIN SERPL-MCNC: 3.3 G/DL (ref 3.4–5)
ALBUMIN SERPL-MCNC: 3.3 G/DL (ref 3.4–5)
ALBUMIN/GLOB SERPL: 0.9 (ref 0.8–1.7)
ALBUMIN/GLOB SERPL: 0.9 (ref 0.8–1.7)
ALP SERPL-CCNC: 120 U/L (ref 45–117)
ALP SERPL-CCNC: 125 U/L (ref 45–117)
ALT SERPL-CCNC: 18 U/L (ref 13–56)
ALT SERPL-CCNC: 19 U/L (ref 13–56)
ANION GAP SERPL CALC-SCNC: 5 MMOL/L (ref 3–18)
APPEARANCE UR: CLEAR
AST SERPL W P-5'-P-CCNC: 10 U/L (ref 10–38)
AST SERPL W P-5'-P-CCNC: 11 U/L (ref 10–38)
BACTERIA URNS QL MICRO: ABNORMAL /HPF
BASOPHILS # BLD: 0 K/UL (ref 0–0.1)
BASOPHILS NFR BLD: 0 % (ref 0–2)
BILIRUB DIRECT SERPL-MCNC: <0.1 MG/DL (ref 0–0.2)
BILIRUB DIRECT SERPL-MCNC: <0.1 MG/DL (ref 0–0.2)
BILIRUB SERPL-MCNC: 0.3 MG/DL (ref 0.2–1)
BILIRUB SERPL-MCNC: 0.3 MG/DL (ref 0.2–1)
BILIRUB UR QL: NEGATIVE
BUN SERPL-MCNC: 12 MG/DL (ref 7–18)
BUN/CREAT SERPL: 13 (ref 12–20)
CA-I BLD-MCNC: 9.5 MG/DL (ref 8.5–10.1)
CHLORIDE SERPL-SCNC: 103 MMOL/L (ref 100–111)
CHOLEST SERPL-MCNC: 214 MG/DL
CHOLEST SERPL-MCNC: 221 MG/DL
CO2 SERPL-SCNC: 30 MMOL/L (ref 21–32)
COLOR UR: YELLOW
CREAT SERPL-MCNC: 0.92 MG/DL (ref 0.6–1.3)
DIFFERENTIAL METHOD BLD: NORMAL
EOSINOPHIL # BLD: 0 K/UL (ref 0–0.4)
EOSINOPHIL NFR BLD: 0 % (ref 0–5)
EPITH CASTS URNS QL MICRO: ABNORMAL /LPF (ref 0–20)
ERYTHROCYTE [DISTWIDTH] IN BLOOD BY AUTOMATED COUNT: 13.5 % (ref 11.6–14.5)
GLOBULIN SER CALC-MCNC: 3.5 G/DL (ref 2–4)
GLOBULIN SER CALC-MCNC: 3.5 G/DL (ref 2–4)
GLUCOSE SERPL-MCNC: 126 MG/DL (ref 74–99)
GLUCOSE UR STRIP.AUTO-MCNC: NEGATIVE MG/DL
HCT VFR BLD AUTO: 43.6 % (ref 35–45)
HDLC SERPL-MCNC: 52 MG/DL (ref 40–60)
HDLC SERPL-MCNC: 54 MG/DL (ref 40–60)
HDLC SERPL: 4.1 (ref 0–5)
HDLC SERPL: 4.1 (ref 0–5)
HGB BLD-MCNC: 13.7 G/DL (ref 12–16)
HGB UR QL STRIP: NEGATIVE
IMM GRANULOCYTES # BLD AUTO: 0 K/UL (ref 0–0.04)
IMM GRANULOCYTES NFR BLD AUTO: 0 % (ref 0–0.5)
KETONES UR QL STRIP.AUTO: NEGATIVE MG/DL
LDLC SERPL CALC-MCNC: 131.4 MG/DL (ref 0–100)
LDLC SERPL CALC-MCNC: 137.2 MG/DL (ref 0–100)
LEUKOCYTE ESTERASE UR QL STRIP.AUTO: ABNORMAL
LIPID PANEL: ABNORMAL
LIPID PANEL: ABNORMAL
LYMPHOCYTES # BLD: 3 K/UL (ref 0.9–3.6)
LYMPHOCYTES NFR BLD: 29 % (ref 21–52)
MCH RBC QN AUTO: 28.9 PG (ref 24–34)
MCHC RBC AUTO-ENTMCNC: 31.4 G/DL (ref 31–37)
MCV RBC AUTO: 92 FL (ref 78–100)
MONOCYTES # BLD: 0.6 K/UL (ref 0.05–1.2)
MONOCYTES NFR BLD: 6 % (ref 3–10)
NEUTS SEG # BLD: 6.5 K/UL (ref 1.8–8)
NEUTS SEG NFR BLD: 65 % (ref 40–73)
NITRITE UR QL STRIP.AUTO: NEGATIVE
NRBC # BLD: 0 K/UL (ref 0–0.01)
NRBC BLD-RTO: 0 PER 100 WBC
PH UR STRIP: 6.5 (ref 5–8)
PLATELET # BLD AUTO: 224 K/UL (ref 135–420)
PMV BLD AUTO: 9.5 FL (ref 9.2–11.8)
POTASSIUM SERPL-SCNC: 4.1 MMOL/L (ref 3.5–5.5)
PROT SERPL-MCNC: 6.8 G/DL (ref 6.4–8.2)
PROT SERPL-MCNC: 6.8 G/DL (ref 6.4–8.2)
PROT UR STRIP-MCNC: NEGATIVE MG/DL
RBC # BLD AUTO: 4.74 M/UL (ref 4.2–5.3)
RBC #/AREA URNS HPF: ABNORMAL /HPF (ref 0–2)
SODIUM SERPL-SCNC: 138 MMOL/L (ref 136–145)
SP GR UR REFRACTOMETRY: 1.02 (ref 1–1.03)
TRIGL SERPL-MCNC: 149 MG/DL
TRIGL SERPL-MCNC: 153 MG/DL
TSH SERPL DL<=0.05 MIU/L-ACNC: 2.7 UIU/ML (ref 0.36–3.74)
UROBILINOGEN UR QL STRIP.AUTO: 0.2 EU/DL (ref 0.2–1)
VLDLC SERPL CALC-MCNC: 29.8 MG/DL
VLDLC SERPL CALC-MCNC: 30.6 MG/DL
WBC # BLD AUTO: 10.2 K/UL (ref 4.6–13.2)
WBC URNS QL MICRO: ABNORMAL /HPF (ref 0–4)

## 2024-03-22 PROCEDURE — 36415 COLL VENOUS BLD VENIPUNCTURE: CPT

## 2024-03-22 PROCEDURE — 80048 BASIC METABOLIC PNL TOTAL CA: CPT

## 2024-03-22 PROCEDURE — 80061 LIPID PANEL: CPT

## 2024-03-22 PROCEDURE — 84443 ASSAY THYROID STIM HORMONE: CPT

## 2024-03-22 PROCEDURE — 82607 VITAMIN B-12: CPT

## 2024-03-22 PROCEDURE — 81001 URINALYSIS AUTO W/SCOPE: CPT

## 2024-03-22 PROCEDURE — 82746 ASSAY OF FOLIC ACID SERUM: CPT

## 2024-03-22 PROCEDURE — 85025 COMPLETE CBC W/AUTO DIFF WBC: CPT

## 2024-03-22 PROCEDURE — 80076 HEPATIC FUNCTION PANEL: CPT

## 2024-03-23 LAB
FOLATE SERPL-MCNC: 15.3 NG/ML (ref 3.1–17.5)
VIT B12 SERPL-MCNC: 320 PG/ML (ref 211–911)

## 2024-03-25 ENCOUNTER — TELEPHONE (OUTPATIENT)
Age: 59
End: 2024-03-25

## 2024-03-25 NOTE — RESULT ENCOUNTER NOTE
Please contact patient and do the following asap    Check if compliant with cholesterol meds  Get the names and doses of meds that patient takes and show me asap  Please reinforce diet and exercise.

## 2024-03-25 NOTE — TELEPHONE ENCOUNTER
Spoke with patient regarding labs per Dr. Polanco. Lab reviewed.   Please contact patient and do the following asap     Check if compliant with cholesterol meds  Get the names and doses of meds that patient takes and show me asap  Please reinforce diet and exercise.     Patient states she she you tomorrow she will discuss with you in the office. She voices understanding and acceptance of this advice and will call back if any further questions or concerns.

## 2024-03-26 ENCOUNTER — OFFICE VISIT (OUTPATIENT)
Age: 59
End: 2024-03-26
Payer: MEDICAID

## 2024-03-26 VITALS
HEART RATE: 69 BPM | WEIGHT: 275 LBS | BODY MASS INDEX: 48.73 KG/M2 | SYSTOLIC BLOOD PRESSURE: 153 MMHG | OXYGEN SATURATION: 99 % | HEIGHT: 63 IN | DIASTOLIC BLOOD PRESSURE: 82 MMHG

## 2024-03-26 DIAGNOSIS — Z71.6 TOBACCO ABUSE COUNSELING: ICD-10-CM

## 2024-03-26 DIAGNOSIS — E78.2 MIXED HYPERLIPIDEMIA: ICD-10-CM

## 2024-03-26 DIAGNOSIS — G62.9 POLYNEUROPATHY, UNSPECIFIED: ICD-10-CM

## 2024-03-26 DIAGNOSIS — I10 ESSENTIAL (PRIMARY) HYPERTENSION: ICD-10-CM

## 2024-03-26 DIAGNOSIS — E66.01 MORBID (SEVERE) OBESITY DUE TO EXCESS CALORIES (HCC): ICD-10-CM

## 2024-03-26 DIAGNOSIS — I50.32 CHRONIC DIASTOLIC (CONGESTIVE) HEART FAILURE (HCC): Primary | ICD-10-CM

## 2024-03-26 PROCEDURE — 99214 OFFICE O/P EST MOD 30 MIN: CPT | Performed by: INTERNAL MEDICINE

## 2024-03-26 PROCEDURE — 3079F DIAST BP 80-89 MM HG: CPT | Performed by: INTERNAL MEDICINE

## 2024-03-26 PROCEDURE — 3077F SYST BP >= 140 MM HG: CPT | Performed by: INTERNAL MEDICINE

## 2024-03-26 RX ORDER — LISINOPRIL 10 MG/1
10 TABLET ORAL DAILY
Qty: 90 TABLET | Refills: 3 | Status: SHIPPED | OUTPATIENT
Start: 2024-03-26

## 2024-03-26 RX ORDER — DAPAGLIFLOZIN 10 MG/1
10 TABLET, FILM COATED ORAL EVERY MORNING
Qty: 90 TABLET | Refills: 1 | Status: SHIPPED | OUTPATIENT
Start: 2024-03-26

## 2024-03-26 RX ORDER — EZETIMIBE 10 MG/1
10 TABLET ORAL DAILY
Qty: 90 TABLET | Refills: 3 | Status: SHIPPED | OUTPATIENT
Start: 2024-03-26

## 2024-03-26 ASSESSMENT — PATIENT HEALTH QUESTIONNAIRE - PHQ9
SUM OF ALL RESPONSES TO PHQ QUESTIONS 1-9: 0
SUM OF ALL RESPONSES TO PHQ9 QUESTIONS 1 & 2: 0
SUM OF ALL RESPONSES TO PHQ QUESTIONS 1-9: 0
2. FEELING DOWN, DEPRESSED OR HOPELESS: NOT AT ALL
1. LITTLE INTEREST OR PLEASURE IN DOING THINGS: NOT AT ALL

## 2024-03-26 ASSESSMENT — ENCOUNTER SYMPTOMS
SHORTNESS OF BREATH: 1
GASTROINTESTINAL NEGATIVE: 1
EYES NEGATIVE: 1

## 2024-03-26 NOTE — PROGRESS NOTES
trying.      9/27/2021 CHF is compensated NYHA class II.  Blood pressure is controlled.  Chest pain continues and seems worse at times.  She thinks she has esophageal spasm but will recommend a  stress test to evaluate ischemia.   She is gradually losing weight for which she was congratulated.   Healthy heart habits discussed and smoking cessation reinforced.      10/25/2021 seen due to abnormal stress test with a possible ischemia in the inferior wall.  Patient continues to have atypical angina.   CHF is compensated.  She is losing weight but states that sometimes it is involuntary.   Continues to smoke and tobacco cessation discussed.   After discussing cardiac catheterization and CTA, she would prefer CT of the heart which was ordered to evaluate coronaries.      1/31/2022 CHF is improving well.  She is losing weight well for which she was congratulated.   CAD stable.  Since there is nonobstructive disease by CTA of the chest, goal LDL should be less than 70.   Diet weight and exercise discussed.   Tobacco cessation reinforced but she does not seem to be motivated to do it.   We will hold potassium replacement as K was high normal and follow the labs in 2 weeks and lipids and LFTs in 4 weeks.        7/18/2022  CAD stable.  CHF is compensated.  No significant fluid overload today NYHA class II-III.  Activity limited because of joint pains also.   Blood pressure is controlled   Diet weight discussed and she understands the importance of weight loss and exercise.   Will check magnesium level and decide whether she needs to continue to take magnesium.     2/27/2023 CHF is clinically improving she has lost weight involuntarily.  Likely she has lost significant edema.  Still feels that her abdomen is full of fluid.  Will increase Aldactone and discontinue potassium pill.  Follow the electrolytes closely.  Blood pressure is controlled but elevated at times at home.  Hopefully this will also respond to increasing

## 2024-04-14 DIAGNOSIS — I10 ESSENTIAL (PRIMARY) HYPERTENSION: ICD-10-CM

## 2024-04-15 RX ORDER — SPIRONOLACTONE 50 MG/1
TABLET, FILM COATED ORAL
Qty: 90 TABLET | Refills: 0 | Status: SHIPPED | OUTPATIENT
Start: 2024-04-15

## 2024-04-30 ENCOUNTER — TRANSCRIBE ORDERS (OUTPATIENT)
Facility: HOSPITAL | Age: 59
End: 2024-04-30

## 2024-04-30 DIAGNOSIS — Z12.31 VISIT FOR SCREENING MAMMOGRAM: Primary | ICD-10-CM

## 2024-05-07 ENCOUNTER — HOSPITAL ENCOUNTER (OUTPATIENT)
Facility: HOSPITAL | Age: 59
Discharge: HOME | End: 2024-05-10
Attending: SURGERY
Payer: MEDICAID

## 2024-05-07 VITALS — BODY MASS INDEX: 48.73 KG/M2 | HEIGHT: 63 IN

## 2024-05-07 DIAGNOSIS — Z12.31 VISIT FOR SCREENING MAMMOGRAM: ICD-10-CM

## 2024-05-07 PROCEDURE — 77063 BREAST TOMOSYNTHESIS BI: CPT

## 2024-05-20 DIAGNOSIS — K21.9 GASTROESOPHAGEAL REFLUX DISEASE WITHOUT ESOPHAGITIS: ICD-10-CM

## 2024-05-21 RX ORDER — PANTOPRAZOLE SODIUM 40 MG/1
40 TABLET, DELAYED RELEASE ORAL DAILY
Qty: 90 TABLET | Refills: 1 | Status: SHIPPED | OUTPATIENT
Start: 2024-05-21

## 2024-08-05 ENCOUNTER — OFFICE VISIT (OUTPATIENT)
Facility: CLINIC | Age: 59
End: 2024-08-05
Payer: MEDICAID

## 2024-08-05 VITALS
HEIGHT: 67 IN | WEIGHT: 263.6 LBS | OXYGEN SATURATION: 98 % | HEART RATE: 86 BPM | BODY MASS INDEX: 41.37 KG/M2 | TEMPERATURE: 97.5 F | RESPIRATION RATE: 16 BRPM | SYSTOLIC BLOOD PRESSURE: 124 MMHG | DIASTOLIC BLOOD PRESSURE: 74 MMHG

## 2024-08-05 DIAGNOSIS — E04.9 ENLARGED THYROID GLAND: ICD-10-CM

## 2024-08-05 DIAGNOSIS — R19.8 CHANGE IN BOWEL MOVEMENT: ICD-10-CM

## 2024-08-05 DIAGNOSIS — I10 ESSENTIAL (PRIMARY) HYPERTENSION: ICD-10-CM

## 2024-08-05 DIAGNOSIS — K21.9 GASTROESOPHAGEAL REFLUX DISEASE WITHOUT ESOPHAGITIS: ICD-10-CM

## 2024-08-05 DIAGNOSIS — E03.9 ACQUIRED HYPOTHYROIDISM: ICD-10-CM

## 2024-08-05 DIAGNOSIS — H53.9 VISION CHANGES: ICD-10-CM

## 2024-08-05 DIAGNOSIS — E55.9 VITAMIN D DEFICIENCY: ICD-10-CM

## 2024-08-05 DIAGNOSIS — E28.2 POLYCYSTIC OVARY SYNDROME: ICD-10-CM

## 2024-08-05 DIAGNOSIS — Z72.0 TOBACCO ABUSE: ICD-10-CM

## 2024-08-05 DIAGNOSIS — R07.89 CHEST DISCOMFORT: ICD-10-CM

## 2024-08-05 DIAGNOSIS — I50.32 CHRONIC DIASTOLIC (CONGESTIVE) HEART FAILURE (HCC): Primary | ICD-10-CM

## 2024-08-05 DIAGNOSIS — I25.10 CORONARY ARTERY DISEASE INVOLVING NATIVE CORONARY ARTERY OF NATIVE HEART WITHOUT ANGINA PECTORIS: ICD-10-CM

## 2024-08-05 DIAGNOSIS — S32.2XXS CLOSED FRACTURE OF COCCYX, SEQUELA: ICD-10-CM

## 2024-08-05 DIAGNOSIS — I87.8 VENOUS STASIS: ICD-10-CM

## 2024-08-05 DIAGNOSIS — E78.2 MIXED HYPERLIPIDEMIA: ICD-10-CM

## 2024-08-05 DIAGNOSIS — J45.20 MILD INTERMITTENT ASTHMA WITHOUT COMPLICATION: ICD-10-CM

## 2024-08-05 DIAGNOSIS — G62.9 POLYNEUROPATHY, UNSPECIFIED: ICD-10-CM

## 2024-08-05 DIAGNOSIS — R77.8 ABNORMAL SPEP: ICD-10-CM

## 2024-08-05 DIAGNOSIS — F43.10 POST TRAUMATIC STRESS DISORDER: ICD-10-CM

## 2024-08-05 PROCEDURE — 99214 OFFICE O/P EST MOD 30 MIN: CPT | Performed by: FAMILY MEDICINE

## 2024-08-05 PROCEDURE — 3078F DIAST BP <80 MM HG: CPT | Performed by: FAMILY MEDICINE

## 2024-08-05 PROCEDURE — 3074F SYST BP LT 130 MM HG: CPT | Performed by: FAMILY MEDICINE

## 2024-08-05 RX ORDER — EZETIMIBE 10 MG/1
10 TABLET ORAL DAILY
Qty: 90 TABLET | Refills: 3 | Status: CANCELLED | OUTPATIENT
Start: 2024-08-05

## 2024-08-05 RX ORDER — FAMOTIDINE 20 MG/1
TABLET, FILM COATED ORAL
COMMUNITY
Start: 2024-08-02

## 2024-08-05 RX ORDER — FUROSEMIDE 40 MG/1
40 TABLET ORAL DAILY
Qty: 90 TABLET | Refills: 1 | Status: CANCELLED | OUTPATIENT
Start: 2024-08-05

## 2024-08-05 RX ORDER — PANTOPRAZOLE SODIUM 40 MG/1
40 TABLET, DELAYED RELEASE ORAL DAILY
Qty: 90 TABLET | Refills: 1 | Status: SHIPPED | OUTPATIENT
Start: 2024-08-05

## 2024-08-05 RX ORDER — AZITHROMYCIN 250 MG/1
TABLET, FILM COATED ORAL
COMMUNITY

## 2024-08-05 RX ORDER — DOXYCYCLINE HYCLATE 100 MG/1
CAPSULE ORAL
COMMUNITY

## 2024-08-05 RX ORDER — DULOXETIN HYDROCHLORIDE 30 MG/1
30 CAPSULE, DELAYED RELEASE ORAL DAILY
Qty: 30 CAPSULE | Refills: 11 | Status: CANCELLED | OUTPATIENT
Start: 2024-08-05

## 2024-08-05 RX ORDER — LEVOTHYROXINE SODIUM 0.1 MG/1
100 TABLET ORAL
Qty: 90 TABLET | Refills: 1 | Status: SHIPPED | OUTPATIENT
Start: 2024-08-05

## 2024-08-05 RX ORDER — DULOXETIN HYDROCHLORIDE 60 MG/1
60 CAPSULE, DELAYED RELEASE ORAL DAILY
Qty: 30 CAPSULE | Refills: 11 | Status: CANCELLED | OUTPATIENT
Start: 2024-08-05

## 2024-08-05 RX ORDER — DAPAGLIFLOZIN 10 MG/1
10 TABLET, FILM COATED ORAL EVERY MORNING
Qty: 90 TABLET | Refills: 1 | Status: CANCELLED | OUTPATIENT
Start: 2024-08-05

## 2024-08-05 NOTE — PROGRESS NOTES
\"Have you been to the ER, urgent care clinic since your last visit?  Hospitalized since your last visit?\"    NO    “Have you seen or consulted any other health care providers outside of [facility] since your last visit?”    [facility] (ortho) GERALD Borges LOV: 4/16/24 for inflammation of sacroiliac joint; Low back pain. [facility] GERALD Mukherjee LOV: 3/19/24 for bunion. [facility] GERALD Segovia LOV: 2/26/24 for pain of left hand.     “Have you had a pap smear?”    NO    No cervical cancer screening on file             Click Here for Release of Records Request  
fracture of coccyx, sequela  Comments:  following ortho . now time to repeat MRi again. will follow ortho recommendations.  18. Vision changes  19. Enlarged thyroid gland  -     US THYROID; Future    Return in about 4 months (around 12/5/2024).       Subjective   History of Present Illness  The patient presents for a follow-up visit.    Three weeks ago, she experienced an episode of difficulty walking and heavy breathing during hot weather. She reported low blood pressure and pulse oximetry readings, along with a burning sensation in her chest upon inhalation. This was not associated with acid reflux, which she has a history of. The episode lasted a few seconds and recurred twice, each time less severe than the previous. She has since scheduled appointments with pulmonology and cardiology. She is currently on Z-Jake and steroids prescribed by Dr. Lee for her lungs, to be used as needed. Her chest pain has resolved and she reports no cold, cough, or wheezing symptoms. She felt unwell yesterday, experiencing chills and fatigue, but feels better today. She has been evaluated for sleep apnea, which was negative. She has a history of asthma, which is stable, and she reports no increased wheezing. She has reduced her smoking habits.    She is adhering to her prescribed blood pressure medication regimen. She also takes Crestor at night for cholesterol management. She reports that her leg swelling has worsened. She experiences significant heaviness in her legs, likening it to walking through deep sand. She manages this by elevating her feet and wearing socks most of the time, although the recent heat has made this uncomfortable. She has been seeing a vascular specialist for venous stasis and changes in skin color. She had an incident where she noticed blood on her leg, which she initially thought was from an insect bite. Upon examination, her veins were found to be in good condition.    She is under the care of a

## 2024-08-09 ENCOUNTER — HOSPITAL ENCOUNTER (OUTPATIENT)
Facility: HOSPITAL | Age: 59
End: 2024-08-09
Attending: FAMILY MEDICINE
Payer: MEDICAID

## 2024-08-09 DIAGNOSIS — E03.9 ACQUIRED HYPOTHYROIDISM: ICD-10-CM

## 2024-08-09 DIAGNOSIS — E04.9 ENLARGED THYROID GLAND: ICD-10-CM

## 2024-08-09 PROCEDURE — 76536 US EXAM OF HEAD AND NECK: CPT

## 2024-08-30 ENCOUNTER — APPOINTMENT (OUTPATIENT)
Facility: HOSPITAL | Age: 59
End: 2024-08-30
Attending: INTERNAL MEDICINE
Payer: MEDICAID

## 2024-08-30 ENCOUNTER — HOSPITAL ENCOUNTER (EMERGENCY)
Age: 59
Discharge: ANOTHER HOSPITAL | End: 2024-08-30
Attending: FAMILY MEDICINE
Payer: MEDICAID

## 2024-08-30 ENCOUNTER — APPOINTMENT (OUTPATIENT)
Age: 59
End: 2024-08-30
Payer: MEDICAID

## 2024-08-30 ENCOUNTER — HOSPITAL ENCOUNTER (INPATIENT)
Facility: HOSPITAL | Age: 59
LOS: 2 days | Discharge: HOME | End: 2024-09-01
Attending: EMERGENCY MEDICINE | Admitting: INTERNAL MEDICINE
Payer: MEDICAID

## 2024-08-30 VITALS
WEIGHT: 281.9 LBS | RESPIRATION RATE: 10 BRPM | OXYGEN SATURATION: 100 % | DIASTOLIC BLOOD PRESSURE: 68 MMHG | TEMPERATURE: 98.4 F | SYSTOLIC BLOOD PRESSURE: 112 MMHG | BODY MASS INDEX: 44.15 KG/M2 | HEART RATE: 77 BPM

## 2024-08-30 DIAGNOSIS — I21.3 ST ELEVATION MYOCARDIAL INFARCTION (STEMI), UNSPECIFIED ARTERY (HCC): Primary | ICD-10-CM

## 2024-08-30 DIAGNOSIS — I21.3 STEMI (ST ELEVATION MYOCARDIAL INFARCTION) (HCC): Primary | ICD-10-CM

## 2024-08-30 DIAGNOSIS — I21.3 ST ELEVATION MYOCARDIAL INFARCTION (STEMI), UNSPECIFIED ARTERY (HCC): ICD-10-CM

## 2024-08-30 PROBLEM — I21.4 NSTEMI (NON-ST ELEVATION MYOCARDIAL INFARCTION) (HCC): Status: ACTIVE | Noted: 2024-08-30

## 2024-08-30 LAB
ACT BLD: 159 SEC (ref 74–125)
ACT BLD: 177 SEC (ref 74–125)
ALBUMIN SERPL-MCNC: 3.3 G/DL (ref 3.4–5)
ALBUMIN/GLOB SERPL: 1.2 (ref 0.8–1.7)
ALP SERPL-CCNC: 96 U/L (ref 45–117)
ALT SERPL-CCNC: 16 U/L (ref 13–56)
ANION GAP SERPL CALC-SCNC: 9 MMOL/L (ref 3–18)
APTT PPP: 36.3 SEC (ref 23–36.4)
APTT PPP: >153 SEC (ref 21.2–34.1)
AST SERPL W P-5'-P-CCNC: 17 U/L (ref 10–38)
BASOPHILS # BLD: 0 K/UL (ref 0–0.1)
BASOPHILS NFR BLD: 0 % (ref 0–2)
BILIRUB SERPL-MCNC: 0.3 MG/DL (ref 0.2–1)
BNP SERPL-MCNC: 44 PG/ML (ref 0–900)
BUN SERPL-MCNC: 14 MG/DL (ref 7–18)
BUN/CREAT SERPL: 13 (ref 12–20)
CA-I BLD-MCNC: 9.5 MG/DL (ref 8.5–10.1)
CHLORIDE SERPL-SCNC: 100 MMOL/L (ref 100–111)
CHOLEST SERPL-MCNC: 147 MG/DL
CO2 SERPL-SCNC: 32 MMOL/L (ref 21–32)
CREAT SERPL-MCNC: 1.04 MG/DL (ref 0.6–1.3)
D DIMER PPP FEU-MCNC: 1.11 UG/ML(FEU)
DIFFERENTIAL METHOD BLD: NORMAL
ECHO AO ROOT DIAM: 3 CM
ECHO AO ROOT INDEX: 1.37 CM/M2
ECHO AV AREA PEAK VELOCITY: 4.5 CM2
ECHO AV AREA VTI: 4.6 CM2
ECHO AV AREA/BSA PEAK VELOCITY: 2.1 CM2/M2
ECHO AV AREA/BSA VTI: 2.1 CM2/M2
ECHO AV MEAN GRADIENT: 4 MMHG
ECHO AV MEAN VELOCITY: 0.9 M/S
ECHO AV PEAK GRADIENT: 7 MMHG
ECHO AV PEAK VELOCITY: 1.4 M/S
ECHO AV VELOCITY RATIO: 0.93
ECHO AV VTI: 27.8 CM
ECHO BSA: 2.33 M2
ECHO IVC EXP: 2.3 CM
ECHO LV EF PHYSICIAN: 60 %
ECHO LV EJECTION FRACTION BIPLANE: 60 % (ref 55–100)
ECHO LV FRACTIONAL SHORTENING: 18 % (ref 28–44)
ECHO LV INTERNAL DIMENSION DIASTOLE INDEX: 2.28 CM/M2
ECHO LV INTERNAL DIMENSION DIASTOLIC: 5 CM (ref 3.9–5.3)
ECHO LV INTERNAL DIMENSION SYSTOLIC INDEX: 1.87 CM/M2
ECHO LV INTERNAL DIMENSION SYSTOLIC: 4.1 CM
ECHO LV IVSD: 1.2 CM (ref 0.6–0.9)
ECHO LV MASS 2D: 247.6 G (ref 67–162)
ECHO LV MASS INDEX 2D: 113.1 G/M2 (ref 43–95)
ECHO LV POSTERIOR WALL DIASTOLIC: 1.3 CM (ref 0.6–0.9)
ECHO LV RELATIVE WALL THICKNESS RATIO: 0.52
ECHO LVOT AREA: 4.5 CM2
ECHO LVOT AV VTI INDEX: 1.02
ECHO LVOT DIAM: 2.4 CM
ECHO LVOT MEAN GRADIENT: 4 MMHG
ECHO LVOT PEAK GRADIENT: 7 MMHG
ECHO LVOT PEAK VELOCITY: 1.3 M/S
ECHO LVOT STROKE VOLUME INDEX: 58.6 ML/M2
ECHO LVOT SV: 128.4 ML
ECHO LVOT VTI: 28.4 CM
ECHO MV A VELOCITY: 1.01 M/S
ECHO MV E DECELERATION TIME (DT): 220 MS
ECHO MV E VELOCITY: 0.96 M/S
ECHO MV E/A RATIO: 0.95
ECHO RV TAPSE: 2.1 CM (ref 1.7–?)
EKG ATRIAL RATE: 73 BPM
EKG DIAGNOSIS: NORMAL
EKG P AXIS: 66 DEGREES
EKG P-R INTERVAL: 186 MS
EKG Q-T INTERVAL: 392 MS
EKG QRS DURATION: 114 MS
EKG QTC CALCULATION (BAZETT): 431 MS
EKG R AXIS: -31 DEGREES
EKG T AXIS: 43 DEGREES
EKG VENTRICULAR RATE: 73 BPM
EOSINOPHIL # BLD: 0 K/UL (ref 0–0.4)
EOSINOPHIL NFR BLD: 0 % (ref 0–5)
ERYTHROCYTE [DISTWIDTH] IN BLOOD BY AUTOMATED COUNT: 12.9 % (ref 11.5–14.5)
ERYTHROCYTE [DISTWIDTH] IN BLOOD BY AUTOMATED COUNT: 13 % (ref 11.6–14.5)
GLOBULIN SER CALC-MCNC: 2.7 G/DL (ref 2–4)
GLUCOSE SERPL-MCNC: 137 MG/DL (ref 74–99)
HCT VFR BLD AUTO: 39.3 % (ref 35–47)
HCT VFR BLD AUTO: 41.9 % (ref 35–45)
HDLC SERPL-MCNC: 42 MG/DL
HDLC SERPL: 3.5 (ref 0–5)
HGB BLD-MCNC: 12.6 G/DL (ref 11.5–16)
HGB BLD-MCNC: 13.5 G/DL (ref 12–16)
IMM GRANULOCYTES # BLD AUTO: 0 K/UL (ref 0–0.04)
IMM GRANULOCYTES NFR BLD AUTO: 0 % (ref 0–0.5)
INR PPP: 1 (ref 0.9–1.1)
INR PPP: 1 (ref 0.9–1.1)
LDLC SERPL CALC-MCNC: 91.8 MG/DL (ref 0–100)
LIPID PANEL: NORMAL
LYMPHOCYTES # BLD: 2.3 K/UL (ref 0.9–3.6)
LYMPHOCYTES NFR BLD: 22 % (ref 21–52)
MAGNESIUM SERPL-MCNC: 2 MG/DL (ref 1.6–2.6)
MCH RBC QN AUTO: 30 PG (ref 26–34)
MCH RBC QN AUTO: 30.2 PG (ref 24–34)
MCHC RBC AUTO-ENTMCNC: 32.1 G/DL (ref 30–36.5)
MCHC RBC AUTO-ENTMCNC: 32.2 G/DL (ref 31–37)
MCV RBC AUTO: 93.6 FL (ref 80–99)
MCV RBC AUTO: 93.7 FL (ref 78–100)
MONOCYTES # BLD: 0.5 K/UL (ref 0.05–1.2)
MONOCYTES NFR BLD: 5 % (ref 3–10)
NEUTS SEG # BLD: 7.5 K/UL (ref 1.8–8)
NEUTS SEG NFR BLD: 73 % (ref 40–73)
NRBC # BLD: 0 K/UL (ref 0–0.01)
NRBC # BLD: 0 K/UL (ref 0–0.01)
NRBC BLD-RTO: 0 PER 100 WBC
NRBC BLD-RTO: 0 PER 100 WBC
PERFORMED BY:: ABNORMAL
PERFORMED BY:: ABNORMAL
PLATELET # BLD AUTO: 227 K/UL (ref 150–400)
PLATELET # BLD AUTO: 242 K/UL (ref 135–420)
PMV BLD AUTO: 10.7 FL (ref 9.2–11.8)
PMV BLD AUTO: 9.6 FL (ref 8.9–12.9)
POTASSIUM SERPL-SCNC: 4 MMOL/L (ref 3.5–5.5)
PROT SERPL-MCNC: 6 G/DL (ref 6.4–8.2)
PROTHROMBIN TIME: 13.1 SEC (ref 11.9–14.9)
PROTHROMBIN TIME: 14 SEC (ref 11.9–14.6)
RBC # BLD AUTO: 4.2 M/UL (ref 3.8–5.2)
RBC # BLD AUTO: 4.47 M/UL (ref 4.2–5.3)
SODIUM SERPL-SCNC: 141 MMOL/L (ref 136–145)
THERAPEUTIC RANGE: ABNORMAL SEC (ref 82–109)
THERAPEUTIC RANGE: NORMAL SEC (ref 73–110)
TRIGL SERPL-MCNC: 66 MG/DL
TROPONIN I SERPL HS-MCNC: 505 NG/L (ref 0–51)
TROPONIN I SERPL HS-MCNC: 91 NG/L (ref 0–54)
UFH PPP CHRO-ACNC: 0.63 IU/ML
VLDLC SERPL CALC-MCNC: 13.2 MG/DL
WBC # BLD AUTO: 10 K/UL (ref 3.6–11)
WBC # BLD AUTO: 10.3 K/UL (ref 4.6–13.2)

## 2024-08-30 PROCEDURE — 85025 COMPLETE CBC W/AUTO DIFF WBC: CPT

## 2024-08-30 PROCEDURE — 85730 THROMBOPLASTIN TIME PARTIAL: CPT

## 2024-08-30 PROCEDURE — 2700000000 HC OXYGEN THERAPY PER DAY

## 2024-08-30 PROCEDURE — 83735 ASSAY OF MAGNESIUM: CPT

## 2024-08-30 PROCEDURE — 85379 FIBRIN DEGRADATION QUANT: CPT

## 2024-08-30 PROCEDURE — 6360000002 HC RX W HCPCS: Performed by: INTERNAL MEDICINE

## 2024-08-30 PROCEDURE — 99285 EMERGENCY DEPT VISIT HI MDM: CPT

## 2024-08-30 PROCEDURE — 6370000000 HC RX 637 (ALT 250 FOR IP): Performed by: INTERNAL MEDICINE

## 2024-08-30 PROCEDURE — 4A023N7 MEASUREMENT OF CARDIAC SAMPLING AND PRESSURE, LEFT HEART, PERCUTANEOUS APPROACH: ICD-10-PCS | Performed by: INTERNAL MEDICINE

## 2024-08-30 PROCEDURE — 85347 COAGULATION TIME ACTIVATED: CPT

## 2024-08-30 PROCEDURE — 85027 COMPLETE CBC AUTOMATED: CPT

## 2024-08-30 PROCEDURE — 2709999900 HC NON-CHARGEABLE SUPPLY: Performed by: INTERNAL MEDICINE

## 2024-08-30 PROCEDURE — 93005 ELECTROCARDIOGRAM TRACING: CPT | Performed by: INTERNAL MEDICINE

## 2024-08-30 PROCEDURE — C1760 CLOSURE DEV, VASC: HCPCS | Performed by: INTERNAL MEDICINE

## 2024-08-30 PROCEDURE — 93005 ELECTROCARDIOGRAM TRACING: CPT | Performed by: FAMILY MEDICINE

## 2024-08-30 PROCEDURE — 2580000003 HC RX 258: Performed by: INTERNAL MEDICINE

## 2024-08-30 PROCEDURE — 96376 TX/PRO/DX INJ SAME DRUG ADON: CPT

## 2024-08-30 PROCEDURE — 99284 EMERGENCY DEPT VISIT MOD MDM: CPT

## 2024-08-30 PROCEDURE — G0378 HOSPITAL OBSERVATION PER HR: HCPCS

## 2024-08-30 PROCEDURE — C8929 TTE W OR WO FOL WCON,DOPPLER: HCPCS

## 2024-08-30 PROCEDURE — 80061 LIPID PANEL: CPT

## 2024-08-30 PROCEDURE — 96365 THER/PROPH/DIAG IV INF INIT: CPT

## 2024-08-30 PROCEDURE — 6360000004 HC RX CONTRAST MEDICATION: Performed by: INTERNAL MEDICINE

## 2024-08-30 PROCEDURE — 94664 DEMO&/EVAL PT USE INHALER: CPT

## 2024-08-30 PROCEDURE — C1874 STENT, COATED/COV W/DEL SYS: HCPCS | Performed by: INTERNAL MEDICINE

## 2024-08-30 PROCEDURE — 84484 ASSAY OF TROPONIN QUANT: CPT

## 2024-08-30 PROCEDURE — 6360000002 HC RX W HCPCS

## 2024-08-30 PROCEDURE — 2500000003 HC RX 250 WO HCPCS: Performed by: INTERNAL MEDICINE

## 2024-08-30 PROCEDURE — 85520 HEPARIN ASSAY: CPT

## 2024-08-30 PROCEDURE — C9600 PERC DRUG-EL COR STENT SING: HCPCS | Performed by: INTERNAL MEDICINE

## 2024-08-30 PROCEDURE — 85610 PROTHROMBIN TIME: CPT

## 2024-08-30 PROCEDURE — 93458 L HRT ARTERY/VENTRICLE ANGIO: CPT | Performed by: INTERNAL MEDICINE

## 2024-08-30 PROCEDURE — 96375 TX/PRO/DX INJ NEW DRUG ADDON: CPT

## 2024-08-30 PROCEDURE — 83880 ASSAY OF NATRIURETIC PEPTIDE: CPT

## 2024-08-30 PROCEDURE — C1769 GUIDE WIRE: HCPCS | Performed by: INTERNAL MEDICINE

## 2024-08-30 PROCEDURE — 36415 COLL VENOUS BLD VENIPUNCTURE: CPT

## 2024-08-30 PROCEDURE — 6360000004 HC RX CONTRAST MEDICATION

## 2024-08-30 PROCEDURE — 94669 MECHANICAL CHEST WALL OSCILL: CPT

## 2024-08-30 PROCEDURE — 6370000000 HC RX 637 (ALT 250 FOR IP): Performed by: FAMILY MEDICINE

## 2024-08-30 PROCEDURE — 99153 MOD SED SAME PHYS/QHP EA: CPT | Performed by: INTERNAL MEDICINE

## 2024-08-30 PROCEDURE — 71045 X-RAY EXAM CHEST 1 VIEW: CPT

## 2024-08-30 PROCEDURE — 99152 MOD SED SAME PHYS/QHP 5/>YRS: CPT | Performed by: INTERNAL MEDICINE

## 2024-08-30 PROCEDURE — 6360000002 HC RX W HCPCS: Performed by: FAMILY MEDICINE

## 2024-08-30 PROCEDURE — 93005 ELECTROCARDIOGRAM TRACING: CPT | Performed by: EMERGENCY MEDICINE

## 2024-08-30 PROCEDURE — 80053 COMPREHEN METABOLIC PANEL: CPT

## 2024-08-30 PROCEDURE — C1887 CATHETER, GUIDING: HCPCS | Performed by: INTERNAL MEDICINE

## 2024-08-30 PROCEDURE — C1894 INTRO/SHEATH, NON-LASER: HCPCS | Performed by: INTERNAL MEDICINE

## 2024-08-30 PROCEDURE — C1725 CATH, TRANSLUMIN NON-LASER: HCPCS | Performed by: INTERNAL MEDICINE

## 2024-08-30 PROCEDURE — 2000000000 HC ICU R&B

## 2024-08-30 PROCEDURE — 027034Z DILATION OF CORONARY ARTERY, ONE ARTERY WITH DRUG-ELUTING INTRALUMINAL DEVICE, PERCUTANEOUS APPROACH: ICD-10-PCS | Performed by: INTERNAL MEDICINE

## 2024-08-30 PROCEDURE — 94761 N-INVAS EAR/PLS OXIMETRY MLT: CPT

## 2024-08-30 PROCEDURE — 94640 AIRWAY INHALATION TREATMENT: CPT

## 2024-08-30 PROCEDURE — B2111ZZ FLUOROSCOPY OF MULTIPLE CORONARY ARTERIES USING LOW OSMOLAR CONTRAST: ICD-10-PCS | Performed by: INTERNAL MEDICINE

## 2024-08-30 DEVICE — STENT ONYXNG35012UX ONYX 3.50X12RX
Type: IMPLANTABLE DEVICE | Status: FUNCTIONAL
Brand: ONYX FRONTIER™

## 2024-08-30 RX ORDER — SODIUM CHLORIDE 0.9 % (FLUSH) 0.9 %
5-40 SYRINGE (ML) INJECTION EVERY 12 HOURS SCHEDULED
Status: DISCONTINUED | OUTPATIENT
Start: 2024-08-30 | End: 2024-09-01 | Stop reason: HOSPADM

## 2024-08-30 RX ORDER — SODIUM CHLORIDE 0.9 % (FLUSH) 0.9 %
5-40 SYRINGE (ML) INJECTION PRN
Status: DISCONTINUED | OUTPATIENT
Start: 2024-08-30 | End: 2024-09-01 | Stop reason: HOSPADM

## 2024-08-30 RX ORDER — SODIUM CHLORIDE 9 MG/ML
INJECTION, SOLUTION INTRAVENOUS PRN
Status: DISCONTINUED | OUTPATIENT
Start: 2024-08-30 | End: 2024-09-01 | Stop reason: HOSPADM

## 2024-08-30 RX ORDER — TRAMADOL HYDROCHLORIDE 50 MG/1
50 TABLET ORAL EVERY 8 HOURS PRN
Status: DISCONTINUED | OUTPATIENT
Start: 2024-08-30 | End: 2024-09-01 | Stop reason: HOSPADM

## 2024-08-30 RX ORDER — HEPARIN SODIUM 200 [USP'U]/100ML
INJECTION, SOLUTION INTRAVENOUS CONTINUOUS PRN
Status: COMPLETED | OUTPATIENT
Start: 2024-08-30 | End: 2024-08-30

## 2024-08-30 RX ORDER — DULOXETIN HYDROCHLORIDE 30 MG/1
60 CAPSULE, DELAYED RELEASE ORAL
Status: DISCONTINUED | OUTPATIENT
Start: 2024-08-30 | End: 2024-09-01 | Stop reason: HOSPADM

## 2024-08-30 RX ORDER — IOPAMIDOL 755 MG/ML
INJECTION, SOLUTION INTRAVASCULAR PRN
Status: DISCONTINUED | OUTPATIENT
Start: 2024-08-30 | End: 2024-08-30 | Stop reason: HOSPADM

## 2024-08-30 RX ORDER — LEVOTHYROXINE SODIUM 100 UG/1
100 TABLET ORAL
Status: DISCONTINUED | OUTPATIENT
Start: 2024-08-31 | End: 2024-09-01 | Stop reason: HOSPADM

## 2024-08-30 RX ORDER — HEPARIN SODIUM 1000 [USP'U]/ML
4000 INJECTION, SOLUTION INTRAVENOUS; SUBCUTANEOUS PRN
Status: DISCONTINUED | OUTPATIENT
Start: 2024-08-30 | End: 2024-08-30 | Stop reason: HOSPADM

## 2024-08-30 RX ORDER — LOSARTAN POTASSIUM 25 MG/1
25 TABLET ORAL DAILY
Status: DISCONTINUED | OUTPATIENT
Start: 2024-08-30 | End: 2024-08-30

## 2024-08-30 RX ORDER — FAMOTIDINE 20 MG/1
20 TABLET, FILM COATED ORAL DAILY
Status: DISCONTINUED | OUTPATIENT
Start: 2024-08-30 | End: 2024-09-01 | Stop reason: HOSPADM

## 2024-08-30 RX ORDER — DULOXETIN HYDROCHLORIDE 30 MG/1
30 CAPSULE, DELAYED RELEASE ORAL DAILY
Status: DISCONTINUED | OUTPATIENT
Start: 2024-08-30 | End: 2024-09-01 | Stop reason: HOSPADM

## 2024-08-30 RX ORDER — HEPARIN SODIUM 10000 [USP'U]/100ML
5-30 INJECTION, SOLUTION INTRAVENOUS CONTINUOUS
Status: DISCONTINUED | OUTPATIENT
Start: 2024-08-30 | End: 2024-08-30

## 2024-08-30 RX ORDER — ONDANSETRON 2 MG/ML
4 INJECTION INTRAMUSCULAR; INTRAVENOUS ONCE
Status: COMPLETED | OUTPATIENT
Start: 2024-08-30 | End: 2024-08-30

## 2024-08-30 RX ORDER — 0.9 % SODIUM CHLORIDE 0.9 %
INTRAVENOUS SOLUTION INTRAVENOUS CONTINUOUS PRN
Status: COMPLETED | OUTPATIENT
Start: 2024-08-30 | End: 2024-08-30

## 2024-08-30 RX ORDER — MIDAZOLAM HYDROCHLORIDE 1 MG/ML
INJECTION INTRAMUSCULAR; INTRAVENOUS PRN
Status: DISCONTINUED | OUTPATIENT
Start: 2024-08-30 | End: 2024-08-30 | Stop reason: HOSPADM

## 2024-08-30 RX ORDER — BUDESONIDE AND FORMOTEROL FUMARATE DIHYDRATE 160; 4.5 UG/1; UG/1
1 AEROSOL RESPIRATORY (INHALATION)
Status: DISCONTINUED | OUTPATIENT
Start: 2024-08-30 | End: 2024-09-01 | Stop reason: HOSPADM

## 2024-08-30 RX ORDER — FAMOTIDINE 20 MG/1
40 TABLET, FILM COATED ORAL DAILY
Status: DISCONTINUED | OUTPATIENT
Start: 2024-08-30 | End: 2024-08-30

## 2024-08-30 RX ORDER — LISINOPRIL 5 MG/1
10 TABLET ORAL DAILY
Status: DISCONTINUED | OUTPATIENT
Start: 2024-08-30 | End: 2024-09-01 | Stop reason: HOSPADM

## 2024-08-30 RX ORDER — LIDOCAINE HYDROCHLORIDE 10 MG/ML
INJECTION, SOLUTION INFILTRATION; PERINEURAL PRN
Status: DISCONTINUED | OUTPATIENT
Start: 2024-08-30 | End: 2024-08-30 | Stop reason: HOSPADM

## 2024-08-30 RX ORDER — HEPARIN SODIUM 1000 [USP'U]/ML
2000 INJECTION, SOLUTION INTRAVENOUS; SUBCUTANEOUS PRN
Status: DISCONTINUED | OUTPATIENT
Start: 2024-08-30 | End: 2024-08-30 | Stop reason: HOSPADM

## 2024-08-30 RX ORDER — ASPIRIN 81 MG/1
81 TABLET, CHEWABLE ORAL DAILY
Status: DISCONTINUED | OUTPATIENT
Start: 2024-08-31 | End: 2024-09-01 | Stop reason: HOSPADM

## 2024-08-30 RX ORDER — ACETAMINOPHEN 325 MG/1
650 TABLET ORAL EVERY 4 HOURS PRN
Status: DISCONTINUED | OUTPATIENT
Start: 2024-08-30 | End: 2024-09-01 | Stop reason: HOSPADM

## 2024-08-30 RX ORDER — HEPARIN SODIUM 1000 [USP'U]/ML
4000 INJECTION, SOLUTION INTRAVENOUS; SUBCUTANEOUS ONCE
Status: DISCONTINUED | OUTPATIENT
Start: 2024-08-30 | End: 2024-08-30 | Stop reason: HOSPADM

## 2024-08-30 RX ORDER — HEPARIN SODIUM 1000 [USP'U]/ML
4000 INJECTION, SOLUTION INTRAVENOUS; SUBCUTANEOUS PRN
Status: DISCONTINUED | OUTPATIENT
Start: 2024-08-30 | End: 2024-08-30

## 2024-08-30 RX ORDER — HEPARIN SODIUM 10000 [USP'U]/100ML
5-30 INJECTION, SOLUTION INTRAVENOUS CONTINUOUS
Status: DISCONTINUED | OUTPATIENT
Start: 2024-08-30 | End: 2024-08-30 | Stop reason: HOSPADM

## 2024-08-30 RX ORDER — IPRATROPIUM BROMIDE AND ALBUTEROL SULFATE 2.5; .5 MG/3ML; MG/3ML
1 SOLUTION RESPIRATORY (INHALATION) EVERY 4 HOURS PRN
Status: DISCONTINUED | OUTPATIENT
Start: 2024-08-30 | End: 2024-09-01 | Stop reason: HOSPADM

## 2024-08-30 RX ORDER — ROSUVASTATIN CALCIUM 20 MG/1
20 TABLET, COATED ORAL NIGHTLY
Status: DISCONTINUED | OUTPATIENT
Start: 2024-08-30 | End: 2024-08-31

## 2024-08-30 RX ORDER — MORPHINE SULFATE 2 MG/ML
2 INJECTION, SOLUTION INTRAMUSCULAR; INTRAVENOUS
Status: COMPLETED | OUTPATIENT
Start: 2024-08-30 | End: 2024-08-30

## 2024-08-30 RX ORDER — HEPARIN SODIUM 1000 [USP'U]/ML
2000 INJECTION, SOLUTION INTRAVENOUS; SUBCUTANEOUS PRN
Status: DISCONTINUED | OUTPATIENT
Start: 2024-08-30 | End: 2024-08-30

## 2024-08-30 RX ORDER — HEPARIN SODIUM 1000 [USP'U]/ML
60 INJECTION, SOLUTION INTRAVENOUS; SUBCUTANEOUS ONCE
Status: DISCONTINUED | OUTPATIENT
Start: 2024-08-30 | End: 2024-08-30

## 2024-08-30 RX ORDER — HEPARIN SODIUM 1000 [USP'U]/ML
INJECTION, SOLUTION INTRAVENOUS; SUBCUTANEOUS PRN
Status: DISCONTINUED | OUTPATIENT
Start: 2024-08-30 | End: 2024-08-30 | Stop reason: HOSPADM

## 2024-08-30 RX ORDER — LANOLIN ALCOHOL/MO/W.PET/CERES
400 CREAM (GRAM) TOPICAL 2 TIMES DAILY
Status: DISCONTINUED | OUTPATIENT
Start: 2024-08-30 | End: 2024-09-01 | Stop reason: HOSPADM

## 2024-08-30 RX ORDER — HEPARIN SODIUM 10000 [USP'U]/100ML
INJECTION, SOLUTION INTRAVENOUS
Status: COMPLETED
Start: 2024-08-30 | End: 2024-08-30

## 2024-08-30 RX ORDER — CARVEDILOL 3.12 MG/1
6.25 TABLET ORAL 2 TIMES DAILY WITH MEALS
Status: DISCONTINUED | OUTPATIENT
Start: 2024-08-30 | End: 2024-09-01 | Stop reason: HOSPADM

## 2024-08-30 RX ORDER — DIPHENHYDRAMINE HYDROCHLORIDE 50 MG/ML
INJECTION INTRAMUSCULAR; INTRAVENOUS PRN
Status: DISCONTINUED | OUTPATIENT
Start: 2024-08-30 | End: 2024-08-30 | Stop reason: HOSPADM

## 2024-08-30 RX ORDER — SODIUM CHLORIDE 9 MG/ML
INJECTION, SOLUTION INTRAVENOUS CONTINUOUS PRN
Status: COMPLETED | OUTPATIENT
Start: 2024-08-30 | End: 2024-08-30

## 2024-08-30 RX ADMIN — FAMOTIDINE 20 MG: 20 TABLET, FILM COATED ORAL at 12:50

## 2024-08-30 RX ADMIN — HYDROMORPHONE HYDROCHLORIDE 0.5 MG: 1 INJECTION, SOLUTION INTRAMUSCULAR; INTRAVENOUS; SUBCUTANEOUS at 05:15

## 2024-08-30 RX ADMIN — HEPARIN SODIUM 4000 UNITS: 1000 INJECTION INTRAVENOUS; SUBCUTANEOUS at 04:24

## 2024-08-30 RX ADMIN — Medication 400 MG: at 12:32

## 2024-08-30 RX ADMIN — HYDROMORPHONE HYDROCHLORIDE 0.25 MG: 1 INJECTION, SOLUTION INTRAMUSCULAR; INTRAVENOUS; SUBCUTANEOUS at 04:01

## 2024-08-30 RX ADMIN — CARVEDILOL 6.25 MG: 3.12 TABLET, FILM COATED ORAL at 12:36

## 2024-08-30 RX ADMIN — ONDANSETRON 4 MG: 2 INJECTION INTRAMUSCULAR; INTRAVENOUS at 03:11

## 2024-08-30 RX ADMIN — Medication 0.5 MG: at 05:15

## 2024-08-30 RX ADMIN — HYDROMORPHONE HYDROCHLORIDE 0.5 MG: 1 INJECTION, SOLUTION INTRAMUSCULAR; INTRAVENOUS; SUBCUTANEOUS at 04:35

## 2024-08-30 RX ADMIN — Medication 400 MG: at 20:28

## 2024-08-30 RX ADMIN — IPRATROPIUM BROMIDE AND ALBUTEROL SULFATE 1 DOSE: 2.5; .5 SOLUTION RESPIRATORY (INHALATION) at 23:07

## 2024-08-30 RX ADMIN — MORPHINE SULFATE 2 MG: 2 INJECTION, SOLUTION INTRAMUSCULAR; INTRAVENOUS at 03:12

## 2024-08-30 RX ADMIN — Medication 0.5 MG: at 04:35

## 2024-08-30 RX ADMIN — TICAGRELOR 180 MG: 90 TABLET ORAL at 04:33

## 2024-08-30 RX ADMIN — DULOXETINE HYDROCHLORIDE 30 MG: 30 CAPSULE, DELAYED RELEASE ORAL at 12:32

## 2024-08-30 RX ADMIN — LISINOPRIL 10 MG: 10 TABLET ORAL at 12:50

## 2024-08-30 RX ADMIN — DULOXETINE HYDROCHLORIDE 60 MG: 30 CAPSULE, DELAYED RELEASE ORAL at 20:28

## 2024-08-30 RX ADMIN — HEPARIN SODIUM 7 UNITS/KG/HR: 10000 INJECTION, SOLUTION INTRAVENOUS at 04:28

## 2024-08-30 RX ADMIN — ROSUVASTATIN CALCIUM 20 MG: 20 TABLET, COATED ORAL at 20:28

## 2024-08-30 RX ADMIN — TICAGRELOR 90 MG: 90 TABLET ORAL at 18:07

## 2024-08-30 RX ADMIN — SODIUM CHLORIDE, PRESERVATIVE FREE 10 ML: 5 INJECTION INTRAVENOUS at 19:35

## 2024-08-30 RX ADMIN — PERFLUTREN 1.5 ML: 6.52 INJECTION, SUSPENSION INTRAVENOUS at 12:14

## 2024-08-30 ASSESSMENT — ENCOUNTER SYMPTOMS
SHORTNESS OF BREATH: 1
GASTROINTESTINAL NEGATIVE: 1
EYES NEGATIVE: 1

## 2024-08-30 ASSESSMENT — PAIN SCALES - GENERAL
PAINLEVEL_OUTOF10: 0

## 2024-08-30 ASSESSMENT — LIFESTYLE VARIABLES
HOW MANY STANDARD DRINKS CONTAINING ALCOHOL DO YOU HAVE ON A TYPICAL DAY: PATIENT DOES NOT DRINK
HOW MANY STANDARD DRINKS CONTAINING ALCOHOL DO YOU HAVE ON A TYPICAL DAY: 1 OR 2
HOW OFTEN DO YOU HAVE A DRINK CONTAINING ALCOHOL: NEVER
HOW OFTEN DO YOU HAVE A DRINK CONTAINING ALCOHOL: MONTHLY OR LESS

## 2024-08-30 ASSESSMENT — PAIN - FUNCTIONAL ASSESSMENT: PAIN_FUNCTIONAL_ASSESSMENT: NONE - DENIES PAIN

## 2024-08-30 NOTE — PLAN OF CARE
Problem: Skin/Tissue Integrity  Goal: Absence of new skin breakdown  Description: 1.  Monitor for areas of redness and/or skin breakdown  2.  Assess vascular access sites hourly  3.  Every 4-6 hours minimum:  Change oxygen saturation probe site  4.  Every 4-6 hours:  If on nasal continuous positive airway pressure, respiratory therapy assess nares and determine need for appliance change or resting period.  Outcome: Progressing     Problem: Safety - Adult  Goal: Free from fall injury  Outcome: Progressing     Problem: Respiratory - Adult  Goal: Achieves optimal ventilation and oxygenation  Outcome: Progressing

## 2024-08-30 NOTE — DISCHARGE INSTRUCTIONS
IMPORTANT    People who undergo angioplasty and/or stent placement procedures are prescribed aspirin along with certain medications called anticlotting or antiplatelet medications. These medications include: Plavix (clopidogrel), Effient (prasugrel), and Brilinta (ticagrelor). It is important to take the aspirin and the anticlotting medication that you were prescribed every day in order to reduce the probability that the stent will become filled with blood clot. Angioplasty and/or stent placement procedures are done so that a blocked artery can be opened. If the stent becomes filled with blood clot, the artery becomes blocked off again. This can result in a heart attack, or even death.    It is extremely important to take all the medicines prescribed by your cardiologist exactly as they were prescribed. Failure to take certain medications - particularly aspirin along with Plavix (clopidogrel),  Effient (prasugrel), or Brilinta(ticagrelor) - can result in a heart attack, or even death. Do not miss any doses. It is vital that aspirin along with Plavix, Effient, or Brilinta be taken every single day. If you have any questions or concerns regarding your medications, please consult your cardiologist. He/she is the only person who can adjust or stop these medications.    You must fill the prescription for these medications immediately upon discharge so that you do not miss any doses. If you cannot afford the medication prescribed to you, please let your cardiologist know immediately.  _____________________________________________________________________________________________________________________________    Your cardiologist has ordered cardiac rehabilitation for you as part of your recovery process. Cardiac rehab is a very important way to help you to feel better and stronger more quickly as well as reduce the risks of heart problems in the future.    Cardiac rehabilitation services are provided at:    [facility]

## 2024-08-30 NOTE — ED PROVIDER NOTES
[facility] EMERGENCY DEPT  EMERGENCY DEPARTMENT HISTORY AND PHYSICAL EXAM      Date: 8/30/2024  Patient Name: Jessie Momin  MRN: [id number]  Birthdate 1965  Date of evaluation: 8/30/2024  Provider: Wan Connor DO   Note Started: 6:46 AM EDT 8/30/24    HISTORY OF PRESENT ILLNESS     Chief Complaint   Patient presents with    Transfer of Care     History Provided By: Patient and Nursing Staff    HPI: Jessie Momin is a 59 y.o. female with past medical history of below  who presents with chest discomfort.  Symptoms started yesterday.  She was seen at outside facility, [facility], where she was diagnosed with a STEMI.  She was transferred here for further evaluation.  She states that her chest discomfort has improved.  Denies any leg swelling.    PAST MEDICAL HISTORY   Past Medical History:  Past Medical History:   Diagnosis Date    Anemia     Angio-edema     Asthma     Blind left eye     D-dimer, elevated     Chronic elevation    Depression     Diverticulosis     Enlargement, spleen     Esophagospasm     Essential hypertension     Fatty liver     GERD (gastroesophageal reflux disease)     Heart failure (HCC)     Right sided    Histoplasmosis     HPV (human papilloma virus) anogenital infection     Lyme disease     Morbid obesity (HCC)     Neuropathy     Upper and lower    Neuropathy     PCOS (polycystic ovarian syndrome)     Pituitary abnormality (HCC)     PTSD (post-traumatic stress disorder)     Tachycardia     Thyroid disease     Tricuspid insufficiency        Past Surgical History:  Past Surgical History:   Procedure Laterality Date    BREAST BIOPSY Right 12/6/2022    RIGHT NIPPLE BIOPSY, SUBAREOLAR DUCT EXCISION performed by Sandra Painter DO at [facility] MAIN OR    CARDIAC CATHETERIZATION  2010 approx    normal per patient    CARPAL TUNNEL RELEASE Bilateral     CATARACT REMOVAL Right     Lens implant    CHOLECYSTECTOMY      COLONOSCOPY N/A 08/10/2022    COLONOSCOPY performed by Inocencio  puffs into the lungs every 4 hours as needed      cetirizine (ZYRTEC) 10 MG tablet Take 3 tablets by mouth daily      clotrimazole (LOTRIMIN) 1 % cream Apply topically 2 times daily      EPINEPHrine PF 1 MG/ML injection (Anaphylaxis) once      traMADol (ULTRAM) 50 MG tablet Take 2 tablets by mouth every 8 hours as needed.      triamcinolone (KENALOG) 0.1 % cream Apply topically 2 times daily       Social Determinants of Health:   Social Determinants of Health     Tobacco Use: High Risk (8/30/2024)    Patient History     Smoking Tobacco Use: Every Day     Smokeless Tobacco Use: Never     Passive Exposure: Current   Alcohol Use: Not At Risk (8/30/2024)    AUDIT-C     Frequency of Alcohol Consumption: Never     Average Number of Drinks: Patient does not drink     Frequency of Binge Drinking: Never   Financial Resource Strain: Medium Risk (1/30/2024)    Overall Financial Resource Strain (CARDIA)     Difficulty of Paying Living Expenses: Somewhat hard   Food Insecurity: Food Insecurity Present (1/30/2024)    Hunger Vital Sign     Worried About Running Out of Food in the Last Year: Sometimes true     Ran Out of Food in the Last Year: Never true   Transportation Needs: Unmet Transportation Needs (1/30/2024)    PRAPARE - Transportation     Lack of Transportation (Medical): Not on file     Lack of Transportation (Non-Medical): Yes   Physical Activity: Not on file   Stress: Not on file   Social Connections: Not on file   Intimate Partner Violence: Not on file   Depression: Not at risk (3/26/2024)    PHQ-2     PHQ-2 Score: 0   Housing Stability: Unknown (1/30/2024)    Housing Stability Vital Sign     Unable to Pay for Housing in the Last Year: Not on file     Number of Places Lived in the Last Year: Not on file     Unstable Housing in the Last Year: No   Interpersonal Safety: Not At Risk (8/30/2024)    Interpersonal Safety Domain Source: IP Abuse Screening     Physical abuse: Denies     Verbal abuse: Denies     Emotional

## 2024-08-30 NOTE — H&P
History & Physical    Primary Care Provider: Susana Allen MD  Source of Information: Patient/family     Chief complaint:   Chief Complaint   Patient presents with    Transfer of Care        History of Presenting Illness:   Jessie Momin is a 59 y.o. female with multiple medical problems including mild intermittent asthma, essential hypertension and hyperlipidemia presenting to the hospital from outlying facility with reports of chest pain.  Chest pain described as pressure to the left substernal area with intermittent radiation to the back.  Not associated with shortness of breath diaphoresis nausea or vomiting.  Pain has been intermittent for the last 2 to 3 days.  Evaluated at outlying facility and noted with STEMI involving the inferior leads.  Presented as a STEMI alert and was immediately taken to the cardiac catheterization lab where a drug-eluting stent was placed to the RCA.  No complications postprocedure.  Transferred to the intensive care unit for continuous care.  She is awake she is alert oriented x 4.  No reports of chest pain.  Case was discussed with cardiologist and patient will be monitored closely.       Review of Systems:  A comprehensive review of systems was negative except for that written in the History of Present Illness.     Past Medical History:   Diagnosis Date    Anemia     Angio-edema     Asthma     Blind left eye     D-dimer, elevated     Chronic elevation    Depression     Diverticulosis     Enlargement, spleen     Esophagospasm     Essential hypertension     Fatty liver     GERD (gastroesophageal reflux disease)     Heart failure (HCC)     Right sided    Histoplasmosis     HPV (human papilloma virus) anogenital infection     Lyme disease     Morbid obesity (HCC)     Neuropathy     Upper and lower    Neuropathy     PCOS (polycystic ovarian syndrome)     Pituitary abnormality (HCC)     PTSD (post-traumatic stress disorder)     Tachycardia     Thyroid disease     Tricuspid  cannula    General:  Alert, cooperative, no distress, appears stated age.   Head:  Normocephalic, without obvious abnormality, atraumatic.   Eyes:  Conjunctivae/corneas clear. PERRL, EOMs intact.   Nose: Nares normal.  No drainage or sinus tenderness.   Throat: Lips, mucosa, and tongue normal.    Neck: Supple, symmetrical, trachea midline, no adenopathy, thyroid: no enlargement/tenderness/nodules, no carotid bruit and no JVD.   Back:   Symmetric, no curvature. ROM normal. No CVA tenderness.   Lungs:   Clear to auscultation bilaterally.   Chest wall:  No tenderness or deformity.   Heart:  Regular rate and rhythm, S1, S2 normal, no murmur, click, rub or gallop.   Abdomen:   Soft, non-tender. Bowel sounds normal. No masses,  No organomegaly.   Extremities: Extremities normal, atraumatic, no cyanosis or edema.   Pulses: 2+ and symmetric all extremities.   Skin: Skin color, texture, turgor normal. No rashes or lesions   Neurologic: CNII-XII intact. No motor or sensory deficits.        24 Hour Results:    Recent Results (from the past 24 hour(s))   EKG 12 Lead    Collection Time: 08/30/24  2:53 AM   Result Value Ref Range    Ventricular Rate 73 BPM    Atrial Rate 73 BPM    P-R Interval 186 ms    QRS Duration 114 ms    Q-T Interval 392 ms    QTc Calculation (Bazett) 431 ms    P Axis 66 degrees    R Axis -31 degrees    T Axis 43 degrees    Diagnosis       Normal sinus rhythm with sinus arrhythmia  Left axis deviation  Low voltage QRS  Incomplete right bundle branch block  Abnormal ECG  No previous ECGs available  Confirmed by ERNIE QUEZADA ([id number]) on 8/30/2024 8:47:55 AM     CBC with Auto Differential    Collection Time: 08/30/24  3:10 AM   Result Value Ref Range    WBC 10.3 4.6 - 13.2 K/uL    RBC 4.47 4.20 - 5.30 M/uL    Hemoglobin 13.5 12.0 - 16.0 g/dL    Hematocrit 41.9 35.0 - 45.0 %    MCV 93.7 78.0 - 100.0 FL    MCH 30.2 24.0 - 34.0 PG    MCHC 32.2 31.0 - 37.0 g/dL    RDW 13.0 11.6 - 14.5 %    Platelets 242 135 - 420

## 2024-08-30 NOTE — ED TRIAGE NOTES
Patient reports intermittent chest pain previously that was worked up with pulmonology because they found nodules. States the pain came back today  and has been consistent since [id number]. Patient states she has tried heating pad, inhalers, and stretching without relief. Patient was given 324 mg of Aspirin and 3 SL Nitro en route. Patient also had 1 in of Nitropaste placed by EMS. Patient reports pain radiates to her back from the left side of her chest.

## 2024-08-30 NOTE — ED TRIAGE NOTES
Pt transferred from [facility] for STEMI. Pt not currently having chest pain on arrival to our ED. Cath lab made aware.

## 2024-08-30 NOTE — ED PROVIDER NOTES
[facility] EMERGENCY DEPT  EMERGENCY DEPARTMENT ENCOUNTER      Pt Name: Jessie Momin  MRN: [id number]  Birthdate 1965  Date of evaluation: 8/30/2024  Provider: José Matta,   5:22 AM    CHIEF COMPLAINT       Chief Complaint   Patient presents with    Chest Pain         HISTORY OF PRESENT ILLNESS    Jessie Momin is a 59 y.o. female who presents to the emergency department chest pain     Patient presents to the ED via EMS for chest pain. Symptoms began last night, no known inciting event. Chest pain is left sided, radiating to the back, left arm, and left jaw. Nothing makes it better or worse. Patient tried deep breathing, albuterol, and soda without relief. She reports SOB at baseline, history of asthma and obesity, but states she is at her baseline SOB. She sees Dr. Polanco, Cardiology, in [place]. She reports unremarkable stress test within the last 5yr, unsure of her last echo but states she was diagnosed with right sided CHF, last cath approx 9yr ago and was unremarkable. She also reports history of elevated d-dimer without history of PE or DVT. EMS gave ASA 325mg, nitro SLx3, and nitro paste without relief of chest pain.    The history is provided by the patient, the EMS personnel and medical records.       Nursing Notes were reviewed.    REVIEW OF SYSTEMS       Review of Systems   Constitutional: Negative.    HENT: Negative.     Eyes: Negative.    Respiratory:  Positive for shortness of breath.    Cardiovascular:  Positive for chest pain. Negative for palpitations and leg swelling.   Gastrointestinal: Negative.    Genitourinary: Negative.    Musculoskeletal: Negative.    Neurological: Negative.    All other systems reviewed and are negative.      Except as noted above the remainder of the review of systems was reviewed and negative.       PAST MEDICAL HISTORY     Past Medical History:   Diagnosis Date    Anemia     Angio-edema     Asthma     Blind left eye     D-dimer, elevated     Chronic

## 2024-08-30 NOTE — ED NOTES
Patient complaining of increased pain. Heart rate noted to drop to 45. Patient's rhythm on tele monitor shows slight elevation. EKG performed and given to ED doc shows STEMI. Transfer center called. Nitro paste removed per MD.

## 2024-08-30 NOTE — PROGRESS NOTES
Information about the importance of aspirin and antiplatelet compliance and outpatient cardiac rehab will be included with the patient's printed discharge instructions. Patient was also provided with a folder containing this information from the cath lab staff after the procedure.      An outpatient cardiac referral has been made to [facility]. Patient's information was forwarded to this facility. Patient will be contacted by this facility to schedule an initial appointment. This referral information has been included in the patient's discharge instructions. Contact information for cardiac rehab facility was provided as well.

## 2024-08-30 NOTE — CONSULTS
Consult    NAME: Jessie Momin   :  1965   MRN:  [id number]     Date/Time:  2024 9:07 AM    Patient PCP: Susana Allen MD  ________________________________________________________________________      Subjective:   CHIEF COMPLAINT: STEMI alert was announced and I responded.    HISTORY OF PRESENT ILLNESS:   Chart reviewed.  Patient examined.  Patient stated that she has a chest pain going on since yesterday and couple days prior to that she had some discomfort on and off.  Pain was in the retrosternal area and she says it felt like a some burning.  She did become nauseated and short of breath.  She went to [facility] emergency room and initial EKG was concerning for ST elevation in inferior leads and patient was given heparin, Brilinta, aspirin and was transferred here.    Patient did mention that almost for a month or longer she has a discomfort in the chest though it was not as a severe, for a month or longer.           Past Medical History:   Diagnosis Date    Anemia     Angio-edema     Asthma     Blind left eye     D-dimer, elevated     Chronic elevation    Depression     Diverticulosis     Enlargement, spleen     Esophagospasm     Essential hypertension     Fatty liver     GERD (gastroesophageal reflux disease)     Heart failure (HCC)     Right sided    Histoplasmosis     HPV (human papilloma virus) anogenital infection     Lyme disease     Morbid obesity (HCC)     Neuropathy     Upper and lower    Neuropathy     PCOS (polycystic ovarian syndrome)     Pituitary abnormality (HCC)     PTSD (post-traumatic stress disorder)     Tachycardia     Thyroid disease     Tricuspid insufficiency       Past Surgical History:   Procedure Laterality Date    BREAST BIOPSY Right 2022    RIGHT NIPPLE BIOPSY, SUBAREOLAR DUCT EXCISION performed by Sandra Painter DO at [facility] MAIN OR    CARDIAC CATHETERIZATION   approx    normal per patient    CARPAL TUNNEL RELEASE Bilateral     CATARACT     WBC 10.3 4.6 - 13.2 K/uL    RBC 4.47 4.20 - 5.30 M/uL    Hemoglobin 13.5 12.0 - 16.0 g/dL    Hematocrit 41.9 35.0 - 45.0 %    MCV 93.7 78.0 - 100.0 FL    MCH 30.2 24.0 - 34.0 PG    MCHC 32.2 31.0 - 37.0 g/dL    RDW 13.0 11.6 - 14.5 %    Platelets 242 135 - 420 K/uL    MPV 10.7 9.2 - 11.8 FL    Nucleated RBCs 0.0 0.0  WBC    nRBC 0.00 0.00 - 0.01 K/uL    Neutrophils % 73 40 - 73 %    Lymphocytes % 22 21 - 52 %    Monocytes % 5 3 - 10 %    Eosinophils % 0 0 - 5 %    Basophils % 0 0 - 2 %    Immature Granulocytes % 0 0 - 0.5 %    Neutrophils Absolute 7.5 1.8 - 8.0 K/UL    Lymphocytes Absolute 2.3 0.9 - 3.6 K/UL    Monocytes Absolute 0.5 0.05 - 1.2 K/UL    Eosinophils Absolute 0.0 0.0 - 0.4 K/UL    Basophils Absolute 0.0 0.0 - 0.1 K/UL    Immature Granulocytes Absolute 0.0 0.00 - 0.04 K/UL    Differential Type AUTOMATED     D-Dimer, Quantitative    Collection Time: 08/30/24  3:10 AM   Result Value Ref Range    D-Dimer, Quant 1.11 (H) <0.46 ug/ml(FEU)   CMP    Collection Time: 08/30/24  3:40 AM   Result Value Ref Range    Sodium 141 136 - 145 mmol/L    Potassium 4.0 3.5 - 5.5 mmol/L    Chloride 100 100 - 111 mmol/L    CO2 32 21 - 32 mmol/L    Anion Gap 9 3.0 - 18.0 mmol/L    Glucose 137 (H) 74 - 99 mg/dL    BUN 14 7 - 18 mg/dL    Creatinine 1.04 0.60 - 1.30 mg/dL    BUN/Creatinine Ratio 13 12 - 20      Est, Glom Filt Rate 62 >60 ml/min/1.73m2    Calcium 9.5 8.5 - 10.1 mg/dL    Total Bilirubin 0.3 0.2 - 1.0 mg/dL    AST 17 10 - 38 U/L    ALT 16 13 - 56 U/L    Alk Phosphatase 96 45 - 117 U/L    Total Protein 6.0 (L) 6.4 - 8.2 g/dL    Albumin 3.3 (L) 3.4 - 5.0 g/dL    Globulin 2.7 2.0 - 4.0 g/dL    Albumin/Globulin Ratio 1.2 0.8 - 1.7     Magnesium    Collection Time: 08/30/24  3:40 AM   Result Value Ref Range    Magnesium 2.0 1.6 - 2.6 mg/dL   Troponin    Collection Time: 08/30/24  3:40 AM   Result Value Ref Range    Troponin, High Sensitivity 91 (H) 0 - 54 ng/L   Brain Natriuretic Peptide    Collection Time:

## 2024-08-30 NOTE — PROGRESS NOTES
Spiritual Health Assessment/Progress Note  [facility]    Crisis (STEMI), Code STEMI,  ,      Name: Jessie Momin MRN: [id number]    Age: 59 y.o.     Sex: female   Language: English   Yazidi: Other   <principal problem not specified>     Date: 8/30/2024            Total Time Calculated: 69 min              Spiritual Assessment began in [facility] EMERGENCY DEPT        Referral/Consult From: Other (comment) ()   Encounter Overview/Reason: Crisis (STEMI)  Service Provided For: Patient    Divine, Belief, Meaning:   Patient unable to communicate at this time  Family/Friends No family/friends present      Importance and Influence:  Patient unable to communicate at this time  Family/Friends no family/friends present    Community:  Patient Other: unable to assess at this time due to the pt's medical exam  Family/Friends Other: no family present at this time    Assessment and Plan of Care:     Patient Interventions include: Other: Informed the ER nurse to contact the spiritual health team if needed  Family/Friends Interventions include: Other: none    Patient Plan of Care: Spiritual Care available upon further referral  Family/Friends Plan of Care: Spiritual Care available upon further referral     received a page for STEMI in ER/C2 at 0528 (ETA 1 hour). The patient was transferring from [facility], and no family present at this time.  was told that the patient went to Cath Lab and will transfer to ICU. Please, contact the spiritual health team if there are any spiritual/emotional support needs.  is available if needed.    Rev. Josiah Em Mdiv.  Chaplain Resident  Page a : [phone]     Electronically signed by Chaplain Noelle Resident on 8/30/2024 at 6:37 AM

## 2024-08-31 LAB
ANION GAP SERPL CALC-SCNC: 5 MMOL/L (ref 5–15)
BUN SERPL-MCNC: 8 MG/DL (ref 6–20)
BUN/CREAT SERPL: 9 (ref 12–20)
CA-I BLD-MCNC: 9.1 MG/DL (ref 8.5–10.1)
CHLORIDE SERPL-SCNC: 111 MMOL/L (ref 97–108)
CO2 SERPL-SCNC: 21 MMOL/L (ref 21–32)
CREAT SERPL-MCNC: 0.87 MG/DL (ref 0.55–1.02)
EKG ATRIAL RATE: 47 BPM
EKG ATRIAL RATE: 70 BPM
EKG ATRIAL RATE: 72 BPM
EKG DIAGNOSIS: NORMAL
EKG P AXIS: 46 DEGREES
EKG P AXIS: 69 DEGREES
EKG P AXIS: 70 DEGREES
EKG P-R INTERVAL: 176 MS
EKG P-R INTERVAL: 184 MS
EKG P-R INTERVAL: 194 MS
EKG Q-T INTERVAL: 406 MS
EKG Q-T INTERVAL: 408 MS
EKG Q-T INTERVAL: 452 MS
EKG QRS DURATION: 106 MS
EKG QRS DURATION: 108 MS
EKG QRS DURATION: 96 MS
EKG QTC CALCULATION (BAZETT): 400 MS
EKG QTC CALCULATION (BAZETT): 440 MS
EKG QTC CALCULATION (BAZETT): 444 MS
EKG R AXIS: -32 DEGREES
EKG R AXIS: -37 DEGREES
EKG R AXIS: -44 DEGREES
EKG T AXIS: -12 DEGREES
EKG T AXIS: -22 DEGREES
EKG T AXIS: 26 DEGREES
EKG VENTRICULAR RATE: 47 BPM
EKG VENTRICULAR RATE: 70 BPM
EKG VENTRICULAR RATE: 72 BPM
ERYTHROCYTE [DISTWIDTH] IN BLOOD BY AUTOMATED COUNT: 12.8 % (ref 11.5–14.5)
EST. AVERAGE GLUCOSE BLD GHB EST-MCNC: 108 MG/DL
GLUCOSE SERPL-MCNC: 97 MG/DL (ref 65–100)
HBA1C MFR BLD: 5.4 % (ref 4–5.6)
HCT VFR BLD AUTO: 41.7 % (ref 35–47)
HGB BLD-MCNC: 12.8 G/DL (ref 11.5–16)
MCH RBC QN AUTO: 29.5 PG (ref 26–34)
MCHC RBC AUTO-ENTMCNC: 30.7 G/DL (ref 30–36.5)
MCV RBC AUTO: 96.1 FL (ref 80–99)
NRBC # BLD: 0 K/UL (ref 0–0.01)
NRBC BLD-RTO: 0 PER 100 WBC
PLATELET # BLD AUTO: 218 K/UL (ref 150–400)
PMV BLD AUTO: 9.8 FL (ref 8.9–12.9)
POTASSIUM SERPL-SCNC: 4.3 MMOL/L (ref 3.5–5.1)
RBC # BLD AUTO: 4.34 M/UL (ref 3.8–5.2)
SODIUM SERPL-SCNC: 137 MMOL/L (ref 136–145)
TROPONIN I SERPL HS-MCNC: 8062 NG/L (ref 0–51)
TROPONIN I SERPL HS-MCNC: 8424 NG/L (ref 0–51)
WBC # BLD AUTO: 8.5 K/UL (ref 3.6–11)

## 2024-08-31 PROCEDURE — 6370000000 HC RX 637 (ALT 250 FOR IP): Performed by: HOSPITALIST

## 2024-08-31 PROCEDURE — 94761 N-INVAS EAR/PLS OXIMETRY MLT: CPT

## 2024-08-31 PROCEDURE — 2700000000 HC OXYGEN THERAPY PER DAY

## 2024-08-31 PROCEDURE — 2580000003 HC RX 258: Performed by: INTERNAL MEDICINE

## 2024-08-31 PROCEDURE — 6370000000 HC RX 637 (ALT 250 FOR IP): Performed by: INTERNAL MEDICINE

## 2024-08-31 PROCEDURE — 85027 COMPLETE CBC AUTOMATED: CPT

## 2024-08-31 PROCEDURE — 80048 BASIC METABOLIC PNL TOTAL CA: CPT

## 2024-08-31 PROCEDURE — 36415 COLL VENOUS BLD VENIPUNCTURE: CPT

## 2024-08-31 PROCEDURE — 94640 AIRWAY INHALATION TREATMENT: CPT

## 2024-08-31 PROCEDURE — G0378 HOSPITAL OBSERVATION PER HR: HCPCS

## 2024-08-31 PROCEDURE — 84484 ASSAY OF TROPONIN QUANT: CPT

## 2024-08-31 PROCEDURE — 2060000000 HC ICU INTERMEDIATE R&B

## 2024-08-31 PROCEDURE — 83036 HEMOGLOBIN GLYCOSYLATED A1C: CPT

## 2024-08-31 PROCEDURE — 93005 ELECTROCARDIOGRAM TRACING: CPT | Performed by: INTERNAL MEDICINE

## 2024-08-31 RX ORDER — CETIRIZINE HYDROCHLORIDE 10 MG/1
10 TABLET ORAL DAILY
Status: DISCONTINUED | OUTPATIENT
Start: 2024-08-31 | End: 2024-09-01 | Stop reason: HOSPADM

## 2024-08-31 RX ORDER — ATORVASTATIN CALCIUM 40 MG/1
80 TABLET, FILM COATED ORAL NIGHTLY
Status: DISCONTINUED | OUTPATIENT
Start: 2024-08-31 | End: 2024-09-01 | Stop reason: HOSPADM

## 2024-08-31 RX ADMIN — TICAGRELOR 90 MG: 90 TABLET ORAL at 20:40

## 2024-08-31 RX ADMIN — CARVEDILOL 6.25 MG: 3.12 TABLET, FILM COATED ORAL at 08:44

## 2024-08-31 RX ADMIN — IPRATROPIUM BROMIDE AND ALBUTEROL SULFATE 1 DOSE: 2.5; .5 SOLUTION RESPIRATORY (INHALATION) at 16:40

## 2024-08-31 RX ADMIN — CARVEDILOL 6.25 MG: 3.12 TABLET, FILM COATED ORAL at 17:14

## 2024-08-31 RX ADMIN — CETIRIZINE HYDROCHLORIDE 10 MG: 10 TABLET, FILM COATED ORAL at 08:45

## 2024-08-31 RX ADMIN — FAMOTIDINE 20 MG: 20 TABLET, FILM COATED ORAL at 08:45

## 2024-08-31 RX ADMIN — SODIUM CHLORIDE, PRESERVATIVE FREE 10 ML: 5 INJECTION INTRAVENOUS at 20:41

## 2024-08-31 RX ADMIN — Medication 400 MG: at 08:51

## 2024-08-31 RX ADMIN — IPRATROPIUM BROMIDE AND ALBUTEROL SULFATE 1 DOSE: 2.5; .5 SOLUTION RESPIRATORY (INHALATION) at 21:32

## 2024-08-31 RX ADMIN — Medication 400 MG: at 20:41

## 2024-08-31 RX ADMIN — LISINOPRIL 10 MG: 10 TABLET ORAL at 08:44

## 2024-08-31 RX ADMIN — ATORVASTATIN CALCIUM 80 MG: 40 TABLET, FILM COATED ORAL at 20:40

## 2024-08-31 RX ADMIN — LEVOTHYROXINE SODIUM 100 MCG: 0.1 TABLET ORAL at 08:45

## 2024-08-31 RX ADMIN — TICAGRELOR 90 MG: 90 TABLET ORAL at 08:44

## 2024-08-31 RX ADMIN — DULOXETINE HYDROCHLORIDE 30 MG: 30 CAPSULE, DELAYED RELEASE ORAL at 08:44

## 2024-08-31 RX ADMIN — DULOXETINE HYDROCHLORIDE 60 MG: 30 CAPSULE, DELAYED RELEASE ORAL at 20:41

## 2024-08-31 RX ADMIN — SODIUM CHLORIDE, PRESERVATIVE FREE 10 ML: 5 INJECTION INTRAVENOUS at 08:51

## 2024-08-31 RX ADMIN — ASPIRIN 81 MG CHEWABLE TABLET 81 MG: 81 TABLET CHEWABLE at 08:44

## 2024-08-31 ASSESSMENT — PAIN SCALES - GENERAL
PAINLEVEL_OUTOF10: 0

## 2024-08-31 NOTE — CARE COORDINATION
08/31/24 1715   Service Assessment   Patient Orientation Alert and Oriented   Cognition Alert   History Provided By Patient   Primary Caregiver Self   Support Systems Family Members   Patient's Healthcare Decision Maker is: Patient Declined (Legal Next of Kin Remains as Decision Maker)   PCP Verified by CM Yes   Last Visit to PCP Within last 3 months   Prior Functional Level Independent in ADLs/IADLs   Current Functional Level Independent in ADLs/IADLs   Can patient return to prior living arrangement Yes   Ability to make needs known: Good   Family able to assist with home care needs: No   Would you like for me to discuss the discharge plan with any other family members/significant others, and if so, who? No     Advance Care Planning     General Advance Care Planning (ACP) Conversation    Date of Conversation: 8/31/2024  Conducted with: Patient with Decision Making Capacity  Other persons present: None    Healthcare Decision Maker: No healthcare decision makers have been documented.     Today we Pt declined.    Length of Voluntary ACP Conversation in minutes:  <16 minutes (Non-Billable)    MAURO Medeiros      CM met w/ pt at bedside to discuss dc planning. Pt is indep in ADLs, lives with family. Pt declines providing [facility] information. Pt has no dme/hh/rehab hx. Pt uses [facility] for Rx. No anticipated dc needs at this time. CM team to follow for needs.

## 2024-08-31 NOTE — PROGRESS NOTES
..4 Eyes Skin Assessment     NAME:  Jessie Momin  YOB: 1965  MEDICAL RECORD NUMBER:  [id number]    The patient is being assessed for  ICU transfer    I agree that at least one RN has performed a thorough Head to Toe Skin Assessment on the patient. ALL assessment sites listed below have been assessed.      Areas assessed by both nurses:    Head, Face, Ears, Shoulders, Back, Chest, Arms, Elbows, Hands, Sacrum. Buttock, Coccyx, Ischium, Legs. Feet and Heels, and Under Medical Devices         Does the Patient have a Wound? No noted wound(s)       Jono Prevention initiated by RN: Yes  Wound Care Orders initiated by RN: No    Pressure Injury (Stage 3,4, Unstageable, DTI, NWPT, and Complex wounds) if present, place Wound referral order by RN under : No    New Ostomies, if present place, Ostomy referral order under : No     Nurse 1 eSignature: Electronically signed by Guido De La Garza RN on 8/31/24 at 12:14 PM EDT    **SHARE this note so that the co-signing nurse can place an eSignature**    Nurse 2 eSignature: Electronically signed by Monique Verdin RN on 8/31/24 at 1:22 PM EDT

## 2024-08-31 NOTE — PROGRESS NOTES
Hospitalist Progress Note               Daily Progress Note: 8/31/2024      Hospital Day: 2     Chief complaint:   Chief Complaint   Patient presents with    Transfer of Care        Subjective:   Hospital course to date: A 59-year-old morbidly obese lady with history of mild intermittent asthma, essential hypertension and hyperlipidemia admitted to the hospital as a STEMI alert.  Underwent cardiac catheterization August 30 with stent placement to the RCA.  Monitored closely in the intensive care unit overnight.      --------  Patient is seen today for follow-up.  Offers no complaints.  No reports of chest pain or shortness of breath.  Anxious to go home        Medications reviewed  Current Facility-Administered Medications   Medication Dose Route Frequency    cetirizine (ZYRTEC) tablet 10 mg  10 mg Oral Daily    sodium chloride flush 0.9 % injection 5-40 mL  5-40 mL IntraVENous 2 times per day    sodium chloride flush 0.9 % injection 5-40 mL  5-40 mL IntraVENous PRN    0.9 % sodium chloride infusion   IntraVENous PRN    acetaminophen (TYLENOL) tablet 650 mg  650 mg Oral Q4H PRN    aspirin chewable tablet 81 mg  81 mg Oral Daily    ticagrelor (BRILINTA) tablet 90 mg  90 mg Oral BID    rosuvastatin (CRESTOR) tablet 20 mg  20 mg Oral Nightly    carvedilol (COREG) tablet 6.25 mg  6.25 mg Oral BID WC    empagliflozin (JARDIANCE) tablet 10 mg  10 mg Oral Daily    DULoxetine (CYMBALTA) extended release capsule 30 mg  30 mg Oral Daily    DULoxetine (CYMBALTA) extended release capsule 60 mg  60 mg Oral QHS    levothyroxine (SYNTHROID) tablet 100 mcg  100 mcg Oral QAM AC    lisinopril (PRINIVIL;ZESTRIL) tablet 10 mg  10 mg Oral Daily    magnesium oxide (MAG-OX) tablet 400 mg  400 mg Oral BID    [Held by provider] budesonide-formoterol (SYMBICORT) 160-4.5 MCG/ACT inhaler 1 puff  1 puff Inhalation BID RT    traMADol (ULTRAM) tablet 50 mg  50 mg Oral Q8H PRN    ipratropium 0.5 mg-albuterol 2.5 mg (DUONEB) nebulizer solution  220.0 ms    MV A Velocity 1.01 m/s    MV E Velocity 0.96 m/s    TAPSE 2.1 1.7 cm    Fractional Shortening 2D 18 28 - 44 %    LVIDd Index 2.28 cm/m2    LVIDs Index 1.87 cm/m2    LV RWT Ratio 0.52     LV Mass 2D 247.6 (A) 67 - 162 g    LV Mass 2D Index 113.1 (A) 43 - 95 g/m2    MV E/A 0.95     LVOT Stroke Volume Index 58.6 mL/m2    Ao Root Index 1.37 cm/m2    AV Velocity Ratio 0.93     LVOT:AV VTI Index 1.02     ELIEL/BSA VTI 2.1 cm2/m2    ELIEL/BSA Peak Velocity 2.1 cm2/m2    EF Physician 60 %    EF BP 60 55 - 100 %   CBC    Collection Time: 08/31/24  2:49 AM   Result Value Ref Range    WBC 8.5 3.6 - 11.0 K/uL    RBC 4.34 3.80 - 5.20 M/uL    Hemoglobin 12.8 11.5 - 16.0 g/dL    Hematocrit 41.7 35.0 - 47.0 %    MCV 96.1 80.0 - 99.0 FL    MCH 29.5 26.0 - 34.0 PG    MCHC 30.7 30.0 - 36.5 g/dL    RDW 12.8 11.5 - 14.5 %    Platelets 218 150 - 400 K/uL    MPV 9.8 8.9 - 12.9 FL    Nucleated RBCs 0.0 0.0  WBC    nRBC 0.00 0.00 - 0.01 K/uL   Basic Metabolic Panel    Collection Time: 08/31/24  2:49 AM   Result Value Ref Range    Sodium 137 136 - 145 mmol/L    Potassium 4.3 3.5 - 5.1 mmol/L    Chloride 111 (H) 97 - 108 mmol/L    CO2 21 21 - 32 mmol/L    Anion Gap 5 5 - 15 mmol/L    Glucose 97 65 - 100 mg/dL    BUN 8 6 - 20 mg/dL    Creatinine 0.87 0.55 - 1.02 mg/dL    BUN/Creatinine Ratio 9 (L) 12 - 20      Est, Glom Filt Rate 77 >60 ml/min/1.73m2    Calcium 9.1 8.5 - 10.1 mg/dL   Troponin    Collection Time: 08/31/24  2:49 AM   Result Value Ref Range    Troponin, High Sensitivity 8,424 (HH) 0 - 51 ng/L       No orders to display          Discussion/MDM:     [] High (any 2)    A. Problems (any 1)  [x] Acute/Chronic Illness/injury posing threat to life or bodily function:    [] Severe exacerbation of chronic illness:    ---------------------------------------------------------------------  B. Risk of Treatment (any 1)   [] Drugs/treatments that require intensive monitoring for toxicity include:    [] IV ABX requiring serial

## 2024-08-31 NOTE — PLAN OF CARE
Problem: Chronic Conditions and Co-morbidities  Goal: Patient's chronic conditions and co-morbidity symptoms are monitored and maintained or improved  Outcome: Progressing     Problem: Discharge Planning  Goal: Discharge to home or other facility with appropriate resources  Outcome: Progressing     Problem: Skin/Tissue Integrity  Goal: Absence of new skin breakdown  Description: 1.  Monitor for areas of redness and/or skin breakdown  2.  Assess vascular access sites hourly  3.  Every 4-6 hours minimum:  Change oxygen saturation probe site  4.  Every 4-6 hours:  If on nasal continuous positive airway pressure, respiratory therapy assess nares and determine need for appliance change or resting period.  Outcome: Progressing     Problem: Safety - Adult  Goal: Free from fall injury  Outcome: Progressing     Problem: ABCDS Injury Assessment  Goal: Absence of physical injury  Outcome: Progressing     Problem: Respiratory - Adult  Goal: Achieves optimal ventilation and oxygenation  Outcome: Progressing     Problem: Cardiovascular - Adult  Goal: Maintains optimal cardiac output and hemodynamic stability  Outcome: Progressing  Goal: Absence of cardiac dysrhythmias or at baseline  Outcome: Progressing     Problem: Gastrointestinal - Adult  Goal: Minimal or absence of nausea and vomiting  Outcome: Progressing  Goal: Maintains or returns to baseline bowel function  Outcome: Progressing  Goal: Maintains adequate nutritional intake  Outcome: Progressing     Problem: Genitourinary - Adult  Goal: Absence of urinary retention  Outcome: Progressing     Problem: Coping  Goal: Pt/Family able to verbalize concerns and demonstrate effective coping strategies  Description: INTERVENTIONS:  1. Assist patient/family to identify coping skills, available support systems and cultural and spiritual values  2. Provide emotional support, including active listening and acknowledgement of concerns of patient and caregivers  3. Reduce environmental

## 2024-08-31 NOTE — PROGRESS NOTES
Progress Note      8/31/2024 2:16 PM  NAME: Jessie Momin   MRN:[id number]   Admit Diagnosis: STEMI (ST elevation myocardial infarction) (Formerly [facility]) [I21.3]  NSTEMI (non-ST elevation myocardial infarction) (Formerly [facility]) [I21.4]      Subjective:   Chart reviewed.  Cardiac catheterization and angioplasty stent insertion procedure and findings explained to the patient.  Patient feels much better.  Her chest tightness has resolved.  Still has some shortness of breath.    Review of Systems:    Symptom Y/N Comments  Symptom Y/N Comments   Fever/Chills n   Chest Pain n    Poor Appetite    Edema     Cough    Abdominal Pain n    Sputum    Joint Pain     SOB/GRIFFIN y   Pruritis/Rash     Nausea/vomit    Other     Diarrhea         Constipation           Could NOT obtain due to:      Objective:          Physical Exam:    Last 24hrs VS reviewed since prior progress note. Most recent are:    /81   Pulse 73   Temp 97.7 °F (36.5 °C) (Oral)   Resp 22   Ht 1.6 m (5' 3\")   Wt 122 kg (269 lb)   SpO2 98%   BMI 47.65 kg/m²     Intake/Output Summary (Last 24 hours) at 8/31/2024 1416  Last data filed at 8/31/2024 0700  Gross per 24 hour   Intake 1500 ml   Output 700 ml   Net 800 ml        General Appearance: Well developed, well nourished, alert & oriented x 3,    no acute distress.  Ears/Nose/Mouth/Throat: Hearing grossly normal.  Neck: Supple.  Chest: Lungs clear to auscultation bilaterally.  Cardiovascular: Regular rate and rhythm, S1,S2 normal, no murmur.  Abdomen: Soft, non-tender, bowel sounds are active.  Extremities: No edema bilaterally.  Skin: Warm and dry.    [x]         Post-cath site without hematoma, bruit, tenderness, or thrill.  Distal pulses intact.    PMH/SH reviewed - no change compared to H&P    Data Review    Telemetry: normal sinus rhythm     EKG:   []  No new EKG for review    Lab Data Personally Reviewed:    Recent Labs     08/30/24  0956 08/31/24  0249   WBC 10.0 8.5   HGB 12.6 12.8   HCT 39.3 41.7   PLT  PRN    ipratropium 0.5 mg-albuterol 2.5 mg (DUONEB) nebulizer solution 1 Dose  1 Dose Inhalation Q4H PRN    famotidine (PEPCID) tablet 20 mg  20 mg Oral Daily           Problem List:   Patient had a acute MI and received a stent to very large distal right coronary artery with angiographically excellent results.  Hypertension.  Morbid exogenous obesity.  History of histoplasmosis.  Scoliosis.  Thyroid nodule and hypothyroidism.  History of PTSD.  Tobacco use.       Assessment/Plan:   I will continue dual antiplatelet medications.  Again explained patient to refrain from smoking and explained consequences.  Encouraged activity and ambulation.  I will repeat troponin I level tomorrow.  If remains stable may discharge her tomorrow.  Her condition should be considered critical.  Thank you.           [x]       High complexity decision making was performed in this patient at high risk for decompensation with multiple organ involvement.    Collin Sosa MD

## 2024-09-01 VITALS
TEMPERATURE: 98.6 F | HEIGHT: 63 IN | HEART RATE: 71 BPM | WEIGHT: 269 LBS | DIASTOLIC BLOOD PRESSURE: 72 MMHG | SYSTOLIC BLOOD PRESSURE: 105 MMHG | OXYGEN SATURATION: 97 % | RESPIRATION RATE: 20 BRPM | BODY MASS INDEX: 47.66 KG/M2

## 2024-09-01 LAB
ANION GAP SERPL CALC-SCNC: 7 MMOL/L (ref 5–15)
BUN SERPL-MCNC: 8 MG/DL (ref 6–20)
BUN/CREAT SERPL: 10 (ref 12–20)
CA-I BLD-MCNC: 9.4 MG/DL (ref 8.5–10.1)
CHLORIDE SERPL-SCNC: 111 MMOL/L (ref 97–108)
CO2 SERPL-SCNC: 25 MMOL/L (ref 21–32)
CREAT SERPL-MCNC: 0.79 MG/DL (ref 0.55–1.02)
EKG ATRIAL RATE: 45 BPM
EKG ATRIAL RATE: 70 BPM
EKG DIAGNOSIS: NORMAL
EKG DIAGNOSIS: NORMAL
EKG P AXIS: 56 DEGREES
EKG P AXIS: 71 DEGREES
EKG P-R INTERVAL: 174 MS
EKG P-R INTERVAL: 284 MS
EKG Q-T INTERVAL: 404 MS
EKG Q-T INTERVAL: 416 MS
EKG QRS DURATION: 114 MS
EKG QRS DURATION: 86 MS
EKG QTC CALCULATION (BAZETT): 359 MS
EKG QTC CALCULATION (BAZETT): 436 MS
EKG R AXIS: -51 DEGREES
EKG R AXIS: -9 DEGREES
EKG T AXIS: -37 DEGREES
EKG T AXIS: 72 DEGREES
EKG VENTRICULAR RATE: 45 BPM
EKG VENTRICULAR RATE: 70 BPM
GLUCOSE SERPL-MCNC: 102 MG/DL (ref 65–100)
POTASSIUM SERPL-SCNC: 3.7 MMOL/L (ref 3.5–5.1)
SODIUM SERPL-SCNC: 143 MMOL/L (ref 136–145)
TROPONIN I SERPL HS-MCNC: 4899 NG/L (ref 0–51)

## 2024-09-01 PROCEDURE — 2580000003 HC RX 258: Performed by: INTERNAL MEDICINE

## 2024-09-01 PROCEDURE — 94667 MNPJ CHEST WALL 1ST: CPT

## 2024-09-01 PROCEDURE — 6370000000 HC RX 637 (ALT 250 FOR IP): Performed by: INTERNAL MEDICINE

## 2024-09-01 PROCEDURE — G0378 HOSPITAL OBSERVATION PER HR: HCPCS

## 2024-09-01 PROCEDURE — 94761 N-INVAS EAR/PLS OXIMETRY MLT: CPT

## 2024-09-01 PROCEDURE — 93005 ELECTROCARDIOGRAM TRACING: CPT | Performed by: INTERNAL MEDICINE

## 2024-09-01 PROCEDURE — 80048 BASIC METABOLIC PNL TOTAL CA: CPT

## 2024-09-01 PROCEDURE — 6370000000 HC RX 637 (ALT 250 FOR IP): Performed by: HOSPITALIST

## 2024-09-01 PROCEDURE — 36415 COLL VENOUS BLD VENIPUNCTURE: CPT

## 2024-09-01 PROCEDURE — 84484 ASSAY OF TROPONIN QUANT: CPT

## 2024-09-01 RX ORDER — CARVEDILOL 6.25 MG/1
6.25 TABLET ORAL 2 TIMES DAILY WITH MEALS
Qty: 60 TABLET | Refills: 3 | Status: SHIPPED | OUTPATIENT
Start: 2024-09-01

## 2024-09-01 RX ORDER — LISINOPRIL 10 MG/1
10 TABLET ORAL DAILY
Qty: 30 TABLET | Refills: 3 | Status: SHIPPED | OUTPATIENT
Start: 2024-09-02

## 2024-09-01 RX ORDER — ATORVASTATIN CALCIUM 80 MG/1
80 TABLET, FILM COATED ORAL NIGHTLY
Qty: 30 TABLET | Refills: 3 | Status: SHIPPED | OUTPATIENT
Start: 2024-09-01

## 2024-09-01 RX ORDER — LOPERAMIDE HCL 2 MG
4 CAPSULE ORAL ONCE
Status: COMPLETED | OUTPATIENT
Start: 2024-09-01 | End: 2024-09-01

## 2024-09-01 RX ORDER — ASPIRIN 81 MG/1
81 TABLET ORAL DAILY
Qty: 90 TABLET | Refills: 0 | Status: SHIPPED | OUTPATIENT
Start: 2024-09-01

## 2024-09-01 RX ADMIN — LOPERAMIDE HYDROCHLORIDE 4 MG: 2 CAPSULE ORAL at 13:01

## 2024-09-01 RX ADMIN — FAMOTIDINE 20 MG: 20 TABLET, FILM COATED ORAL at 09:38

## 2024-09-01 RX ADMIN — LISINOPRIL 10 MG: 10 TABLET ORAL at 10:33

## 2024-09-01 RX ADMIN — EMPAGLIFLOZIN 10 MG: 10 TABLET, FILM COATED ORAL at 09:37

## 2024-09-01 RX ADMIN — CETIRIZINE HYDROCHLORIDE 10 MG: 10 TABLET, FILM COATED ORAL at 09:36

## 2024-09-01 RX ADMIN — DULOXETINE HYDROCHLORIDE 30 MG: 30 CAPSULE, DELAYED RELEASE ORAL at 09:36

## 2024-09-01 RX ADMIN — CARVEDILOL 6.25 MG: 3.12 TABLET, FILM COATED ORAL at 09:36

## 2024-09-01 RX ADMIN — LEVOTHYROXINE SODIUM 100 MCG: 0.1 TABLET ORAL at 05:04

## 2024-09-01 RX ADMIN — ASPIRIN 81 MG CHEWABLE TABLET 81 MG: 81 TABLET CHEWABLE at 09:36

## 2024-09-01 RX ADMIN — Medication 400 MG: at 09:36

## 2024-09-01 RX ADMIN — TICAGRELOR 90 MG: 90 TABLET ORAL at 09:36

## 2024-09-01 RX ADMIN — SODIUM CHLORIDE, PRESERVATIVE FREE 5 ML: 5 INJECTION INTRAVENOUS at 10:27

## 2024-09-01 ASSESSMENT — PAIN SCALES - GENERAL
PAINLEVEL_OUTOF10: 0
PAINLEVEL_OUTOF10: 0

## 2024-09-01 NOTE — PROGRESS NOTES
Progress Note      9/1/2024 2:03 PM  NAME: Jessie Momin   MRN:[id number]   Admit Diagnosis: STEMI (ST elevation myocardial infarction) ([facility]) [I21.3]  NSTEMI (non-ST elevation myocardial infarction) ([facility]) [I21.4]      Subjective:   09/01/2024 Chart reviewed.  Cardiac catheterization and angioplasty stent insertion procedure and findings explained to the patient.  Patient feels much better.  Her chest tightness has resolved.  Still has some shortness of breath.    Discussed with the multiple family members were at bedside.  Patient did ambulate around.    Review of Systems:    Symptom Y/N Comments  Symptom Y/N Comments   Fever/Chills n   Chest Pain n    Poor Appetite    Edema     Cough    Abdominal Pain n    Sputum    Joint Pain     SOB/GRIFFIN y Improved  Pruritis/Rash     Nausea/vomit    Other     Diarrhea         Constipation           Could NOT obtain due to:      Objective:          Physical Exam:    Last 24hrs VS reviewed since prior progress note. Most recent are:    /72   Pulse 71   Temp 98.6 °F (37 °C)   Resp 20   Ht 1.6 m (5' 3\")   Wt 122 kg (269 lb)   SpO2 97%   BMI 47.65 kg/m²   No intake or output data in the 24 hours ending 09/01/24 1403       General Appearance: Well developed, well nourished, alert & oriented x 3,    no acute distress.  Ears/Nose/Mouth/Throat: Hearing grossly normal.  Neck: Supple.  Chest: Lungs clear to auscultation bilaterally.  Cardiovascular: Regular rate and rhythm, S1,S2 normal, no murmur.  Abdomen: Soft, non-tender, bowel sounds are active.  Extremities: No edema bilaterally.  Skin: Warm and dry.    [x]         Post-cath site without hematoma, bruit, tenderness, or thrill.  Distal pulses intact.    PMH/SH reviewed - no change compared to H&P    Data Review    Telemetry: normal sinus rhythm     EKG:   []  No new EKG for review    Lab Data Personally Reviewed:    Recent Labs     08/30/24  0956 08/31/24  0249   WBC 10.0 8.5   HGB 12.6 12.8   HCT 39.3 41.7  BID RT    traMADol (ULTRAM) tablet 50 mg  50 mg Oral Q8H PRN    ipratropium 0.5 mg-albuterol 2.5 mg (DUONEB) nebulizer solution 1 Dose  1 Dose Inhalation Q4H PRN    famotidine (PEPCID) tablet 20 mg  20 mg Oral Daily           Problem List:   Patient had a acute MI and received a stent to very large distal right coronary artery with angiographically excellent results.  Hypertension.  Morbid exogenous obesity.  History of histoplasmosis.  Scoliosis.  Thyroid nodule and hypothyroidism.  History of PTSD.  Tobacco use.  Troponin I has improved significantly.       Assessment/Plan:   I will continue dual antiplatelet medications.  Again explained patient to refrain from smoking and explained consequences.  Encouraged activity and ambulation.  I will repeat troponin I level tomorrow.  Okay to discharge home from [facility] and I will be happy to follow in a week or 2.  Thank you.           [x]       High complexity decision making was performed in this patient at high risk for decompensation with multiple organ involvement.    Collin Sosa MD

## 2024-09-01 NOTE — DISCHARGE SUMMARY
START taking these medications      aspirin 81 MG EC tablet  Take 1 tablet by mouth daily     atorvastatin 80 MG tablet  Commonly known as: LIPITOR  Take 1 tablet by mouth nightly     carvedilol 6.25 MG tablet  Commonly known as: COREG  Take 1 tablet by mouth 2 times daily (with meals)     dapagliflozin 10 MG tablet  Commonly known as: Farxiga  Take 1 tablet by mouth every morning     ticagrelor 90 MG Tabs tablet  Commonly known as: BRILINTA  Take 1 tablet by mouth 2 times daily            CHANGE how you take these medications      lisinopril 10 MG tablet  Commonly known as: PRINIVIL;ZESTRIL  Take 1 tablet by mouth daily  Start taking on: September 2, 2024  What changed: additional instructions            CONTINUE taking these medications      albuterol sulfate  (90 Base) MCG/ACT inhaler  Commonly known as: PROVENTIL;VENTOLIN;PROAIR     cetirizine 10 MG tablet  Commonly known as: ZYRTEC     * DULoxetine 30 MG extended release capsule  Commonly known as: CYMBALTA  Take 1 capsule by mouth daily In AM.     * DULoxetine 60 MG extended release capsule  Commonly known as: CYMBALTA  Take 1 capsule by mouth daily In PM.     EPINEPHrine PF 1 MG/ML injection     famotidine 20 MG tablet  Commonly known as: PEPCID     Fasenra Pen 30 MG/ML Soaj  Generic drug: Benralizumab     furosemide 40 MG tablet  Commonly known as: LASIX  Take 1 tablet by mouth daily     levothyroxine 100 MCG tablet  Commonly known as: SYNTHROID  Take 1 tablet by mouth every morning (before breakfast)     magnesium oxide 400 (240 Mg) MG tablet  Commonly known as: MAG-OX  Take 1 tablet by mouth 2 times daily     pantoprazole 40 MG tablet  Commonly known as: PROTONIX  Take 1 tablet by mouth daily     POTASSIUM PO     spironolactone 50 MG tablet  Commonly known as: ALDACTONE  Take 1 tablet by mouth once daily     Symbicort 160-4.5 MCG/ACT Aero  Generic drug: budesonide-formoterol     traMADol 50 MG tablet  Commonly known as: ULTRAM           * This  cardiology    Significant Diagnostic Studies:   8/30/2024: BUN 14 mg/dL (Ref range: 7 - 18 mg/dL); Calcium 9.5 mg/dL (Ref range: 8.5 - 10.1 mg/dL); Chloride 100 mmol/L (Ref range: 100 - 111 mmol/L); CO2 32 mmol/L (Ref range: 21 - 32 mmol/L); Creatinine 1.04 mg/dL (Ref range: 0.60 - 1.30 mg/dL); Glucose 137 mg/dL (H; Ref range: 74 - 99 mg/dL); Hematocrit 41.9 % (Ref range: 35.0 - 45.0 %); Hematocrit 39.3 % (Ref range: 35.0 - 47.0 %); Hemoglobin 13.5 g/dL (Ref range: 12.0 - 16.0 g/dL); Hemoglobin 12.6 g/dL (Ref range: 11.5 - 16.0 g/dL); Potassium 4.0 mmol/L (Ref range: 3.5 - 5.5 mmol/L); Sodium 141 mmol/L (Ref range: 136 - 145 mmol/L)  8/31/2024: BUN 8 mg/dL (Ref range: 6 - 20 mg/dL); Calcium 9.1 mg/dL (Ref range: 8.5 - 10.1 mg/dL); Chloride 111 mmol/L (H; Ref range: 97 - 108 mmol/L); CO2 21 mmol/L (Ref range: 21 - 32 mmol/L); Creatinine 0.87 mg/dL (Ref range: 0.55 - 1.02 mg/dL); Glucose 97 mg/dL (Ref range: 65 - 100 mg/dL); Hematocrit 41.7 % (Ref range: 35.0 - 47.0 %); Hemoglobin 12.8 g/dL (Ref range: 11.5 - 16.0 g/dL); Potassium 4.3 mmol/L (Ref range: 3.5 - 5.1 mmol/L); Sodium 137 mmol/L (Ref range: 136 - 145 mmol/L)  Recent Labs     08/30/24  0956 08/31/24  0249   WBC 10.0 8.5   HGB 12.6 12.8   HCT 39.3 41.7    218     Recent Labs     08/30/24  0340 08/31/24  0249 09/01/24  0416    137 143   K 4.0 4.3 3.7    111* 111*   CO2 32 21 25   BUN 14 8 8   CREATININE 1.04 0.87 0.79   GLUCOSE 137* 97 102*   CALCIUM 9.5 9.1 9.4   MG 2.0  --   --      Recent Labs     08/30/24  0340   AST 17   ALT 16   ALKPHOS 96   BILITOT 0.3   GLOB 2.7     Recent Labs     08/30/24  0352 08/30/24  0956   INR 1.0 1.0   PROTIME 13.1 14.0   APTT 36.3 >153.0*        Discharge time spent 35 minutes    Signed:  Mary Ann Higgins MD  9/1/2024  2:09 PM

## 2024-09-01 NOTE — DISCHARGE INSTR - DIET

## 2024-09-03 NOTE — PROGRESS NOTES
Post Cardiac Cath Follow Up Phone Call    Attempted to call patient. Left a voicemail to call the cath lab back for follow up.

## 2024-09-03 NOTE — PROGRESS NOTES
Post Cardiac Cath Follow Up Phone Call    Are you having pain today?    No    Have you had any bruising/bleeding/oozing/swelling at the cath site?    No     Are you having any numbness or tingling in the fingers or hands?    No; groin procedure     If PCI:  Have you filled your prescription for antiplatelet medication (Brilinta/Effient/Plavix)?     Yes     Have you missed any doses?    No     Reinforced compliance with prescribed medications, discharge instructions, and follow up appointment. Patient verbalized understanding.      Do you have any additional questions or concerns?   No concerns noted. Follow up appointment was made. Patient verbalizes understanding of discharge information.

## 2024-09-04 ENCOUNTER — OFFICE VISIT (OUTPATIENT)
Facility: CLINIC | Age: 59
End: 2024-09-04
Payer: MEDICAID

## 2024-09-04 VITALS
WEIGHT: 271.8 LBS | TEMPERATURE: 97.6 F | HEIGHT: 67 IN | RESPIRATION RATE: 16 BRPM | SYSTOLIC BLOOD PRESSURE: 130 MMHG | DIASTOLIC BLOOD PRESSURE: 78 MMHG | HEART RATE: 57 BPM | OXYGEN SATURATION: 100 % | BODY MASS INDEX: 42.66 KG/M2

## 2024-09-04 DIAGNOSIS — J45.20 MILD INTERMITTENT ASTHMA WITHOUT COMPLICATION: ICD-10-CM

## 2024-09-04 DIAGNOSIS — R19.7 DIARRHEA, UNSPECIFIED TYPE: ICD-10-CM

## 2024-09-04 DIAGNOSIS — R25.1 TREMOR: ICD-10-CM

## 2024-09-04 DIAGNOSIS — R91.1 LUNG NODULE: ICD-10-CM

## 2024-09-04 DIAGNOSIS — I21.11 ST ELEVATION MYOCARDIAL INFARCTION INVOLVING RIGHT CORONARY ARTERY (HCC): ICD-10-CM

## 2024-09-04 DIAGNOSIS — Z09 HOSPITAL DISCHARGE FOLLOW-UP: Primary | ICD-10-CM

## 2024-09-04 PROCEDURE — 3075F SYST BP GE 130 - 139MM HG: CPT | Performed by: FAMILY MEDICINE

## 2024-09-04 PROCEDURE — 3078F DIAST BP <80 MM HG: CPT | Performed by: FAMILY MEDICINE

## 2024-09-04 PROCEDURE — 1111F DSCHRG MED/CURRENT MED MERGE: CPT | Performed by: FAMILY MEDICINE

## 2024-09-04 PROCEDURE — 99214 OFFICE O/P EST MOD 30 MIN: CPT | Performed by: FAMILY MEDICINE

## 2024-09-04 NOTE — PROGRESS NOTES
\"Have you been to the ER, urgent care clinic since your last visit?  Hospitalized since your last visit?\"    [facility] LOV: 8/30/24 - 9/01/24. [facility] ED LOV: 8/30/24.    “Have you seen or consulted any other health care providers outside of [facility] since your last visit?”    NO     “Have you had a pap smear?”    NO    No cervical cancer screening on file     Chief Complaint   Patient presents with    Follow-Up from [facility] 8/30/24 - 9/01/24. [facility] ED on 8/30/24.    Nodule     Patient states she nodule on right lung - sees Dr. Green and Monique Marcus for pulmonary.              Click Here for Release of Records Request

## 2024-09-04 NOTE — PROGRESS NOTES
Jessie Momin (:  1965) is a 59 y.o. female, Established patient, here for evaluation of the following chief complaint(s):  Follow-Up from Hospital ([facility] 24 - 24. [facility] ED on 24.), Nodule (Patient states she nodule on right lung - sees Dr. Green and Monique Macrus for pulmonary. ), Mass (Mass in the Coccyx - sees Dr. Sandoval at [facility] ), and Tremors (Tremors in hands)         Assessment & Plan  1. Post-myocardial infarction status.  She experienced a massive heart attack with a 90% blockage and elevated troponin levels (initially 8062, reduced to 4899). A drug-eluting stent was placed in the RCA. She reports no current chest pain but has soreness in her shoulders. Her shortness of breath has improved, though she also has asthma. She is advised to continue Brilinta, aspirin, carvedilol, and lisinopril. The Lipitor dose has been increased to 80 mg at bedtime. She has a follow-up appointment with Dr. Polanco on 2024 and is advised to attend cardiac rehabilitation.    2. Right lower lobe lung nodule.  A CT scan revealed an 8 mm nodule in the right lower lobe. A PET scan is scheduled for 2024 to assess activity. She is advised to continue follow-up with pulmonology, specifically with Dr. Green and Monique Cates.    3. Benign osseous lesion on the coccyx.  An MRI of the pelvis showed a benign-appearing osseous lesion on the left paracentral coccyx, likely a small chondroma or fibro-osseous lesion with no aggressive characteristics. No immediate intervention is required.    4. Diarrhea.  She experienced diarrhea starting during her hospital stay, which has improved but persists. She is taking omeprazole. If symptoms do not improve, further stool studies will be considered.    5. Tremors.  She reports new-onset tremors in her hands. An appointment with neurologist Maritza Childs is scheduled for 2024.    6. Recent exposure to COVID-19.  Her sister and

## 2024-09-25 ENCOUNTER — OFFICE VISIT (OUTPATIENT)
Age: 59
End: 2024-09-25
Payer: MEDICAID

## 2024-09-25 VITALS
WEIGHT: 264 LBS | HEART RATE: 71 BPM | DIASTOLIC BLOOD PRESSURE: 64 MMHG | BODY MASS INDEX: 41.44 KG/M2 | SYSTOLIC BLOOD PRESSURE: 134 MMHG | OXYGEN SATURATION: 97 % | HEIGHT: 67 IN

## 2024-09-25 DIAGNOSIS — I25.118 CORONARY ARTERY DISEASE INVOLVING NATIVE CORONARY ARTERY OF NATIVE HEART WITH OTHER FORM OF ANGINA PECTORIS (HCC): Primary | ICD-10-CM

## 2024-09-25 DIAGNOSIS — Z95.5 HISTORY OF CORONARY ARTERY STENT PLACEMENT: ICD-10-CM

## 2024-09-25 DIAGNOSIS — I10 ESSENTIAL HYPERTENSION: ICD-10-CM

## 2024-09-25 DIAGNOSIS — E03.9 ACQUIRED HYPOTHYROIDISM: ICD-10-CM

## 2024-09-25 DIAGNOSIS — E66.01 SEVERE OBESITY (BMI >= 40): ICD-10-CM

## 2024-09-25 DIAGNOSIS — I10 ESSENTIAL (PRIMARY) HYPERTENSION: ICD-10-CM

## 2024-09-25 DIAGNOSIS — Z71.6 TOBACCO ABUSE COUNSELING: ICD-10-CM

## 2024-09-25 DIAGNOSIS — I50.32 CHRONIC DIASTOLIC (CONGESTIVE) HEART FAILURE (HCC): ICD-10-CM

## 2024-09-25 PROBLEM — Z72.0 TOBACCO ABUSE: Status: RESOLVED | Noted: 2019-10-09 | Resolved: 2024-09-25

## 2024-09-25 PROBLEM — E66.813 CLASS 3 SEVERE OBESITY DUE TO EXCESS CALORIES WITH SERIOUS COMORBIDITY AND BODY MASS INDEX (BMI) OF 50.0 TO 59.9 IN ADULT: Status: RESOLVED | Noted: 2019-10-08 | Resolved: 2024-09-25

## 2024-09-25 PROBLEM — R06.00 DYSPNEA: Status: RESOLVED | Noted: 2021-05-25 | Resolved: 2024-09-25

## 2024-09-25 PROCEDURE — 99214 OFFICE O/P EST MOD 30 MIN: CPT | Performed by: INTERNAL MEDICINE

## 2024-09-25 PROCEDURE — 3078F DIAST BP <80 MM HG: CPT | Performed by: INTERNAL MEDICINE

## 2024-09-25 PROCEDURE — 3075F SYST BP GE 130 - 139MM HG: CPT | Performed by: INTERNAL MEDICINE

## 2024-09-25 RX ORDER — FUROSEMIDE 40 MG
40 TABLET ORAL DAILY
Qty: 90 TABLET | Refills: 1 | Status: SHIPPED | OUTPATIENT
Start: 2024-09-25

## 2024-09-25 RX ORDER — DAPAGLIFLOZIN 10 MG/1
10 TABLET, FILM COATED ORAL EVERY MORNING
Qty: 90 TABLET | Refills: 1 | Status: SHIPPED | OUTPATIENT
Start: 2024-09-25

## 2024-09-25 RX ORDER — LISINOPRIL 10 MG/1
10 TABLET ORAL DAILY
Qty: 90 TABLET | Refills: 3 | Status: SHIPPED | OUTPATIENT
Start: 2024-09-25

## 2024-09-25 RX ORDER — SPIRONOLACTONE 50 MG/1
TABLET, FILM COATED ORAL
Qty: 90 TABLET | Refills: 0 | Status: SHIPPED | OUTPATIENT
Start: 2024-09-25

## 2024-09-25 RX ORDER — CARVEDILOL 6.25 MG/1
6.25 TABLET ORAL 2 TIMES DAILY WITH MEALS
Qty: 180 TABLET | Refills: 3 | Status: SHIPPED | OUTPATIENT
Start: 2024-09-25

## 2024-09-25 RX ORDER — ATORVASTATIN CALCIUM 80 MG/1
80 TABLET, FILM COATED ORAL NIGHTLY
Qty: 90 TABLET | Refills: 3 | Status: SHIPPED | OUTPATIENT
Start: 2024-09-25

## 2024-09-25 RX ORDER — NAPROXEN SODIUM 220 MG
220 TABLET ORAL 2 TIMES DAILY WITH MEALS
COMMUNITY
End: 2024-09-25 | Stop reason: SINTOL

## 2024-09-25 RX ORDER — ASPIRIN 81 MG/1
81 TABLET ORAL DAILY
Qty: 100 TABLET | Status: SHIPPED | OUTPATIENT
Start: 2024-09-25

## 2024-09-25 ASSESSMENT — ENCOUNTER SYMPTOMS
GASTROINTESTINAL NEGATIVE: 1
SHORTNESS OF BREATH: 1
EYES NEGATIVE: 1

## 2024-09-27 ENCOUNTER — TELEPHONE (OUTPATIENT)
Age: 59
End: 2024-09-27

## 2024-09-27 NOTE — TELEPHONE ENCOUNTER
----- Message from Apple HART sent at 9/25/2024  3:20 PM EDT -----  Please schedule cardiac cath with possible pci with Dr Ehsan Rivera, Shelli TOLBERT LPN  Brothers, KALE Lucas         Previous Messages       ----- Message -----  From: Chance Polanco MD  Sent: 9/26/2024   8:26 AM EDT  To: Shelli Rivera LPN    I had ordered a cardiac cath with Dr. Moser yesterday.  Could you please make sure we do not miss it  ----- Message -----  From: Jim Moser MD  Sent: 9/25/2024   9:49 PM EDT  To: Chance Polanco MD    Agree.    Circ and Diagonal both look significant.      I don't think circ is intermediate    Looks severe    I can take care of those    Thanks  TV  ----- Message -----  From: Chance Polanco MD  Sent: 9/25/2024   3:01 PM EDT  To: Jim Moser MD    This patient had a STEMI recently and had PCI in [place].  She is getting frequent chest pain still and has 2 residual lesions.  [place] is about to go to the hospital and you may be able to see the films in the system.  I am going to schedule her for cath possible but would you kindly evaluate the films and in case you think, she does not need a cath let us know and we will cancel her and treat her medically.

## 2024-10-04 PROBLEM — I25.10 CAD (CORONARY ARTERY DISEASE): Status: ACTIVE | Noted: 2024-09-25

## 2024-10-07 ENCOUNTER — OFFICE VISIT (OUTPATIENT)
Age: 59
End: 2024-10-07
Payer: MEDICAID

## 2024-10-07 VITALS
BODY MASS INDEX: 41.75 KG/M2 | WEIGHT: 266 LBS | SYSTOLIC BLOOD PRESSURE: 120 MMHG | OXYGEN SATURATION: 99 % | HEART RATE: 78 BPM | TEMPERATURE: 98.4 F | DIASTOLIC BLOOD PRESSURE: 78 MMHG | HEIGHT: 67 IN

## 2024-10-07 DIAGNOSIS — R29.898 WEAKNESS OF BOTH HANDS: ICD-10-CM

## 2024-10-07 DIAGNOSIS — G62.9 POLYNEUROPATHY, UNSPECIFIED: ICD-10-CM

## 2024-10-07 DIAGNOSIS — R25.1 TREMOR: ICD-10-CM

## 2024-10-07 DIAGNOSIS — Z98.890 HISTORY OF CARPAL TUNNEL RELEASE OF BOTH WRISTS: ICD-10-CM

## 2024-10-07 PROCEDURE — 99214 OFFICE O/P EST MOD 30 MIN: CPT | Performed by: NURSE PRACTITIONER

## 2024-10-07 PROCEDURE — 3074F SYST BP LT 130 MM HG: CPT | Performed by: NURSE PRACTITIONER

## 2024-10-07 PROCEDURE — 3078F DIAST BP <80 MM HG: CPT | Performed by: NURSE PRACTITIONER

## 2024-10-07 NOTE — PATIENT INSTRUCTIONS
Patient instructions:  -EMG/nerve conduction study at [place] (both upper extremities)    -OT referral at [facility]  Consider adaptive equipment- [employer], pens      [email or web address] - Resources - Living with ET - Coping with ET - go to the bottom left Coping Tips for Everyday Living and click English

## 2024-10-07 NOTE — PROGRESS NOTES
or fatigue  CARDIAC: No CP or SOB  PULMONARY: No cough or SOB  MUSCULOSKELETAL: No new joint pain. +chronic joint pains.   NEURO: SEE HPI    Physical Examination:  /78 (Site: Right Upper Arm, Position: Sitting, Cuff Size: Large Adult)   Pulse 78   Temp 98.4 °F (36.9 °C) (Oral)   Ht 1.702 m (5' 7\")   Wt 120.7 kg (266 lb)   SpO2 99%   BMI 41.66 kg/m²     Alert, in NAD. Heart is regular. Oriented x3. Speech is fluent. Speech clear. No facial asymmetry. Strength and tone are normal. No drift of the bilateral upper extremities. Mild RUE tremor with outstretched arms and on action with FNF.  No resting tremor. No cogwheeling. Tinel negative at wrists. Phalen- a little tingling in the fingertips both hands. Abreu negative bilaterally. Decreased PP in UE distally and in the LE. Gait is antalgic with cane.  Handwriting sample and swirl pattern will be provided to be scanned into the chart.      Impression/Plan:  This is a 59-year-old left-handed female who presents in follow-up for neuropathy. Will continue her regimen of Cymbalta for neuropathic pain (30 mg in a.m. and 60 mg in p.m.)  She reports that she has had a worsened tremor over the past 5 months or so.  May be related to her neuropathy.  She does not exhibit signs of parkinsons.  Will obtain an EMG/NCS.  Will refer to OT for tremor, neuropathy, history of bilateral carpal tunnel, and hands weakness/ dropping things.  She would like to proceed with the following plan and hold off on additional medications at this time.  Considered primidone or Lyrica.  She did not tolerate gabapentin in the past at a dose of 300 mg BID or TID and would not like to take it again.  She follows with hematology for elevated free kappa light chains.  Follow up in 6 to 12 months.     Jessie was seen today for follow-up.    Diagnoses and all orders for this visit:    Polyneuropathy, unspecified  -     [facility]  -     EMG

## 2024-10-08 NOTE — TELEPHONE ENCOUNTER
Per cath lab:    Andreina Vale    Ms Magallon stated she wanted to have her cath at [facility] not [facility]      As far as I know [facility] does not do cardiac cath outpatient any more.

## 2024-10-09 NOTE — TELEPHONE ENCOUNTER
Called and spoke with patient and she would like to go ahead and have the cath done at . Awaiting for the date of 10/21/24.

## 2024-10-10 NOTE — TELEPHONE ENCOUNTER
October 10, 2024 spoke with patient and she is aware of their Cath, date time, location and instructions. Was advised to call the office if he has any questions or concerns.

## 2024-10-10 NOTE — TELEPHONE ENCOUNTER
[facility]      Left and Right Heart Catheterization Instructions    You are scheduled to have a Left and Right Heart Catheterization on 10/21/2024 at [facility]. Please arrive and check in at time provided by cath lab.     Please go to [facility] ([address] [place] [address]) and park in the outpatient parking lot that is located around to the back of the hospital ([facility]). You will enter through the [facility] entrance. Once you arrive, check in with the  at the [facility]  with the .     You are not to eat or drink anything after midnight the night before your procedure. Small sips of water with medications is OK.    If you are diabetic, do not take your insulin/sugar pill the morning of the procedure.     Medication Instructions:  Please Take your morning medications with the following special instructions:    [x] Please make sure to take your blood pressure medications with just enough water to swallow    [x]Take your Aspirin and or Plavix/Brilinta     [] Hold (DO NOT TAKE) your [Eliquis, Xarelto, Coumadin, and or Metformin] on . Ask your nurse after the procedure when to resume these mediations.     []  If you have an allergy to Iodine, Contrast, or Shellfish: take Prednisone 60 mg and Benadryl 25 mg by mouth at at bed time the night before the procedure and again morning of the procedure.     6.  We encourage families to wait in the waiting room on the first floor while the procedure is being done. The Doctor will come out and talk with you as soon as the procedure is through. You will need someone to drive you home after you have been discharged from the hospital.     7.  There is a possibility you may spend the night in the hospital depending on the results of the procedure. This will be determined after the procedure is done.     8. If you or your family have any questions, please call our office

## 2024-10-15 NOTE — FLOWSHEET NOTE
Called to verify arrival time of 0645 for 0800 procedure. Pre-procedure instructions verified including NPO after midnight and which meds to take and/or hold. All questions answered, patient verbalized understanding.

## 2024-10-17 ENCOUNTER — TELEPHONE (OUTPATIENT)
Age: 59
End: 2024-10-17

## 2024-10-21 ENCOUNTER — HOSPITAL ENCOUNTER (OUTPATIENT)
Facility: HOSPITAL | Age: 59
Setting detail: OBSERVATION
Discharge: HOME | End: 2024-10-21
Attending: INTERNAL MEDICINE | Admitting: INTERNAL MEDICINE
Payer: MEDICAID

## 2024-10-21 VITALS
RESPIRATION RATE: 11 BRPM | SYSTOLIC BLOOD PRESSURE: 128 MMHG | HEIGHT: 67 IN | DIASTOLIC BLOOD PRESSURE: 76 MMHG | WEIGHT: 266 LBS | HEART RATE: 67 BPM | BODY MASS INDEX: 41.75 KG/M2 | OXYGEN SATURATION: 97 % | TEMPERATURE: 98.5 F

## 2024-10-21 DIAGNOSIS — Z95.5 STENTED CORONARY ARTERY: ICD-10-CM

## 2024-10-21 DIAGNOSIS — I25.118 ATHSCL HEART DISEASE OF NATIVE COR ART W OTH ANG PCTRS (HCC): ICD-10-CM

## 2024-10-21 DIAGNOSIS — I50.32 CHRONIC DIASTOLIC HEART FAILURE (HCC): ICD-10-CM

## 2024-10-21 LAB
ACT BLD: 336 SECS (ref 79–138)
ANION GAP SERPL CALC-SCNC: 5 MMOL/L (ref 3–18)
BASOPHILS # BLD: 0 K/UL (ref 0–0.1)
BASOPHILS NFR BLD: 0 % (ref 0–2)
BUN SERPL-MCNC: 25 MG/DL (ref 7–18)
BUN/CREAT SERPL: 19 (ref 12–20)
CALCIUM SERPL-MCNC: 9.8 MG/DL (ref 8.5–10.1)
CHLORIDE SERPL-SCNC: 99 MMOL/L (ref 100–111)
CO2 SERPL-SCNC: 26 MMOL/L (ref 21–32)
CREAT SERPL-MCNC: 1.29 MG/DL (ref 0.6–1.3)
DIFFERENTIAL METHOD BLD: ABNORMAL
ECHO BSA: 2.39 M2
EKG ATRIAL RATE: 68 BPM
EKG DIAGNOSIS: NORMAL
EKG P AXIS: 71 DEGREES
EKG P-R INTERVAL: 176 MS
EKG Q-T INTERVAL: 420 MS
EKG QRS DURATION: 108 MS
EKG QTC CALCULATION (BAZETT): 446 MS
EKG R AXIS: -36 DEGREES
EKG T AXIS: 11 DEGREES
EKG VENTRICULAR RATE: 68 BPM
EOSINOPHIL # BLD: 0.1 K/UL (ref 0–0.4)
EOSINOPHIL NFR BLD: 1 % (ref 0–5)
ERYTHROCYTE [DISTWIDTH] IN BLOOD BY AUTOMATED COUNT: 12.8 % (ref 11.6–14.5)
GLUCOSE SERPL-MCNC: 113 MG/DL (ref 74–99)
HCT VFR BLD AUTO: 39.2 % (ref 35–45)
HGB BLD-MCNC: 12.7 G/DL (ref 12–16)
IMM GRANULOCYTES # BLD AUTO: 0.1 K/UL (ref 0–0.04)
IMM GRANULOCYTES NFR BLD AUTO: 0 % (ref 0–0.5)
INR PPP: 1.1 (ref 0.9–1.1)
LYMPHOCYTES # BLD: 3.2 K/UL (ref 0.9–3.6)
LYMPHOCYTES NFR BLD: 22 % (ref 21–52)
MCH RBC QN AUTO: 28.5 PG (ref 24–34)
MCHC RBC AUTO-ENTMCNC: 32.4 G/DL (ref 31–37)
MCV RBC AUTO: 88.1 FL (ref 78–100)
MONOCYTES # BLD: 1 K/UL (ref 0.05–1.2)
MONOCYTES NFR BLD: 7 % (ref 3–10)
NEUTS SEG # BLD: 10 K/UL (ref 1.8–8)
NEUTS SEG NFR BLD: 70 % (ref 40–73)
NRBC # BLD: 0 K/UL (ref 0–0.01)
NRBC BLD-RTO: 0 PER 100 WBC
PLATELET # BLD AUTO: 272 K/UL (ref 135–420)
PMV BLD AUTO: 9 FL (ref 9.2–11.8)
POTASSIUM SERPL-SCNC: 4.8 MMOL/L (ref 3.5–5.5)
PROTHROMBIN TIME: 14 SEC (ref 11.9–14.9)
RBC # BLD AUTO: 4.45 M/UL (ref 4.2–5.3)
SODIUM SERPL-SCNC: 130 MMOL/L (ref 136–145)
WBC # BLD AUTO: 14.3 K/UL (ref 4.6–13.2)

## 2024-10-21 PROCEDURE — G0378 HOSPITAL OBSERVATION PER HR: HCPCS

## 2024-10-21 PROCEDURE — C1725 CATH, TRANSLUMIN NON-LASER: HCPCS | Performed by: INTERNAL MEDICINE

## 2024-10-21 PROCEDURE — 80048 BASIC METABOLIC PNL TOTAL CA: CPT

## 2024-10-21 PROCEDURE — 99152 MOD SED SAME PHYS/QHP 5/>YRS: CPT | Performed by: INTERNAL MEDICINE

## 2024-10-21 PROCEDURE — C1760 CLOSURE DEV, VASC: HCPCS | Performed by: INTERNAL MEDICINE

## 2024-10-21 PROCEDURE — 85025 COMPLETE CBC W/AUTO DIFF WBC: CPT

## 2024-10-21 PROCEDURE — 2580000003 HC RX 258: Performed by: INTERNAL MEDICINE

## 2024-10-21 PROCEDURE — 6360000004 HC RX CONTRAST MEDICATION: Performed by: INTERNAL MEDICINE

## 2024-10-21 PROCEDURE — 92978 ENDOLUMINL IVUS OCT C 1ST: CPT | Performed by: INTERNAL MEDICINE

## 2024-10-21 PROCEDURE — 6370000000 HC RX 637 (ALT 250 FOR IP): Performed by: INTERNAL MEDICINE

## 2024-10-21 PROCEDURE — C1874 STENT, COATED/COV W/DEL SYS: HCPCS | Performed by: INTERNAL MEDICINE

## 2024-10-21 PROCEDURE — 76937 US GUIDE VASCULAR ACCESS: CPT | Performed by: INTERNAL MEDICINE

## 2024-10-21 PROCEDURE — C9600 PERC DRUG-EL COR STENT SING: HCPCS | Performed by: INTERNAL MEDICINE

## 2024-10-21 PROCEDURE — 99153 MOD SED SAME PHYS/QHP EA: CPT | Performed by: INTERNAL MEDICINE

## 2024-10-21 PROCEDURE — C1753 CATH, INTRAVAS ULTRASOUND: HCPCS | Performed by: INTERNAL MEDICINE

## 2024-10-21 PROCEDURE — 2500000003 HC RX 250 WO HCPCS: Performed by: INTERNAL MEDICINE

## 2024-10-21 PROCEDURE — 76000 FLUOROSCOPY <1 HR PHYS/QHP: CPT | Performed by: INTERNAL MEDICINE

## 2024-10-21 PROCEDURE — 6360000002 HC RX W HCPCS: Performed by: INTERNAL MEDICINE

## 2024-10-21 PROCEDURE — 93458 L HRT ARTERY/VENTRICLE ANGIO: CPT | Performed by: INTERNAL MEDICINE

## 2024-10-21 PROCEDURE — 93010 ELECTROCARDIOGRAM REPORT: CPT | Performed by: INTERNAL MEDICINE

## 2024-10-21 PROCEDURE — 85347 COAGULATION TIME ACTIVATED: CPT

## 2024-10-21 PROCEDURE — C1769 GUIDE WIRE: HCPCS | Performed by: INTERNAL MEDICINE

## 2024-10-21 PROCEDURE — C1894 INTRO/SHEATH, NON-LASER: HCPCS | Performed by: INTERNAL MEDICINE

## 2024-10-21 PROCEDURE — 93005 ELECTROCARDIOGRAM TRACING: CPT | Performed by: INTERNAL MEDICINE

## 2024-10-21 PROCEDURE — C1713 ANCHOR/SCREW BN/BN,TIS/BN: HCPCS | Performed by: INTERNAL MEDICINE

## 2024-10-21 PROCEDURE — 85610 PROTHROMBIN TIME: CPT

## 2024-10-21 PROCEDURE — C1887 CATHETER, GUIDING: HCPCS | Performed by: INTERNAL MEDICINE

## 2024-10-21 PROCEDURE — 2709999900 HC NON-CHARGEABLE SUPPLY: Performed by: INTERNAL MEDICINE

## 2024-10-21 DEVICE — STENT ONYXNG40022UX ONYX 4.00X22RX
Type: IMPLANTABLE DEVICE | Status: FUNCTIONAL
Brand: ONYX FRONTIER™

## 2024-10-21 RX ORDER — CLOPIDOGREL 300 MG/1
TABLET, FILM COATED ORAL
Status: DISCONTINUED
Start: 2024-10-21 | End: 2024-10-21 | Stop reason: WASHOUT

## 2024-10-21 RX ORDER — HEPARIN SODIUM 200 [USP'U]/100ML
INJECTION, SOLUTION INTRAVENOUS
Status: DISCONTINUED
Start: 2024-10-21 | End: 2024-10-21 | Stop reason: HOSPADM

## 2024-10-21 RX ORDER — HEPARIN SODIUM 1000 [USP'U]/ML
INJECTION, SOLUTION INTRAVENOUS; SUBCUTANEOUS PRN
Status: DISCONTINUED | OUTPATIENT
Start: 2024-10-21 | End: 2024-10-21 | Stop reason: HOSPADM

## 2024-10-21 RX ORDER — MIDAZOLAM HYDROCHLORIDE 1 MG/ML
INJECTION, SOLUTION INTRAMUSCULAR; INTRAVENOUS PRN
Status: DISCONTINUED | OUTPATIENT
Start: 2024-10-21 | End: 2024-10-21 | Stop reason: HOSPADM

## 2024-10-21 RX ORDER — SODIUM CHLORIDE 9 MG/ML
INJECTION, SOLUTION INTRAVENOUS PRN
Status: DISCONTINUED | OUTPATIENT
Start: 2024-10-21 | End: 2024-10-21

## 2024-10-21 RX ORDER — MORPHINE SULFATE 2 MG/ML
2 INJECTION, SOLUTION INTRAMUSCULAR; INTRAVENOUS EVERY 4 HOURS PRN
Status: DISCONTINUED | OUTPATIENT
Start: 2024-10-21 | End: 2024-10-21 | Stop reason: HOSPADM

## 2024-10-21 RX ORDER — SODIUM CHLORIDE 0.9 % (FLUSH) 0.9 %
5-40 SYRINGE (ML) INJECTION PRN
Status: DISCONTINUED | OUTPATIENT
Start: 2024-10-21 | End: 2024-10-21 | Stop reason: HOSPADM

## 2024-10-21 RX ORDER — ACETAMINOPHEN 325 MG/1
650 TABLET ORAL EVERY 4 HOURS PRN
Status: DISCONTINUED | OUTPATIENT
Start: 2024-10-21 | End: 2024-10-21 | Stop reason: HOSPADM

## 2024-10-21 RX ORDER — ASPIRIN 81 MG/1
81 TABLET, CHEWABLE ORAL ONCE
Status: DISCONTINUED | OUTPATIENT
Start: 2024-10-21 | End: 2024-10-21 | Stop reason: HOSPADM

## 2024-10-21 RX ORDER — MIDAZOLAM HYDROCHLORIDE 1 MG/ML
INJECTION, SOLUTION INTRAMUSCULAR; INTRAVENOUS
Status: DISCONTINUED
Start: 2024-10-21 | End: 2024-10-21 | Stop reason: HOSPADM

## 2024-10-21 RX ORDER — SODIUM CHLORIDE 9 MG/ML
INJECTION, SOLUTION INTRAVENOUS CONTINUOUS
Status: DISCONTINUED | OUTPATIENT
Start: 2024-10-21 | End: 2024-10-21

## 2024-10-21 RX ORDER — FENTANYL CITRATE 50 UG/ML
INJECTION, SOLUTION INTRAMUSCULAR; INTRAVENOUS PRN
Status: DISCONTINUED | OUTPATIENT
Start: 2024-10-21 | End: 2024-10-21 | Stop reason: HOSPADM

## 2024-10-21 RX ORDER — CLOPIDOGREL BISULFATE 75 MG/1
TABLET ORAL PRN
Status: DISCONTINUED | OUTPATIENT
Start: 2024-10-21 | End: 2024-10-21 | Stop reason: HOSPADM

## 2024-10-21 RX ORDER — HYDROMORPHONE HYDROCHLORIDE 2 MG/ML
0.5 INJECTION, SOLUTION INTRAMUSCULAR; INTRAVENOUS; SUBCUTANEOUS EVERY 4 HOURS PRN
Status: DISCONTINUED | OUTPATIENT
Start: 2024-10-21 | End: 2024-10-21 | Stop reason: HOSPADM

## 2024-10-21 RX ORDER — HEPARIN SODIUM 1000 [USP'U]/ML
INJECTION, SOLUTION INTRAVENOUS; SUBCUTANEOUS
Status: DISCONTINUED
Start: 2024-10-21 | End: 2024-10-21 | Stop reason: HOSPADM

## 2024-10-21 RX ORDER — FENTANYL CITRATE 50 UG/ML
INJECTION, SOLUTION INTRAMUSCULAR; INTRAVENOUS
Status: DISCONTINUED
Start: 2024-10-21 | End: 2024-10-21 | Stop reason: HOSPADM

## 2024-10-21 RX ORDER — IOPAMIDOL 612 MG/ML
INJECTION, SOLUTION INTRAVASCULAR PRN
Status: DISCONTINUED | OUTPATIENT
Start: 2024-10-21 | End: 2024-10-21 | Stop reason: HOSPADM

## 2024-10-21 RX ORDER — DIPHENHYDRAMINE HYDROCHLORIDE 50 MG/ML
INJECTION INTRAMUSCULAR; INTRAVENOUS PRN
Status: DISCONTINUED | OUTPATIENT
Start: 2024-10-21 | End: 2024-10-21 | Stop reason: HOSPADM

## 2024-10-21 RX ORDER — SODIUM CHLORIDE 0.9 % (FLUSH) 0.9 %
5-40 SYRINGE (ML) INJECTION EVERY 12 HOURS SCHEDULED
Status: DISCONTINUED | OUTPATIENT
Start: 2024-10-21 | End: 2024-10-21

## 2024-10-21 RX ORDER — DIPHENHYDRAMINE HYDROCHLORIDE 50 MG/ML
INJECTION INTRAMUSCULAR; INTRAVENOUS
Status: DISCONTINUED
Start: 2024-10-21 | End: 2024-10-21 | Stop reason: HOSPADM

## 2024-10-21 RX ADMIN — MORPHINE SULFATE 2 MG: 2 INJECTION, SOLUTION INTRAMUSCULAR; INTRAVENOUS at 11:25

## 2024-10-21 RX ADMIN — HYDROMORPHONE HYDROCHLORIDE 0.5 MG: 2 INJECTION, SOLUTION INTRAMUSCULAR; INTRAVENOUS; SUBCUTANEOUS at 11:57

## 2024-10-21 RX ADMIN — SODIUM CHLORIDE 75 ML/HR: 9 INJECTION, SOLUTION INTRAVENOUS at 11:43

## 2024-10-21 RX ADMIN — SODIUM CHLORIDE 75 ML/HR: 9 INJECTION, SOLUTION INTRAVENOUS at 08:07

## 2024-10-21 ASSESSMENT — PAIN SCALES - GENERAL
PAINLEVEL_OUTOF10: 5
PAINLEVEL_OUTOF10: 9

## 2024-10-21 ASSESSMENT — PAIN DESCRIPTION - DESCRIPTORS: DESCRIPTORS: HEAVINESS;ACHING

## 2024-10-21 ASSESSMENT — PAIN DESCRIPTION - LOCATION
LOCATION: HEAD;CHEST
LOCATION: CHEST;JAW

## 2024-10-21 NOTE — PROGRESS NOTES
Patient insistent  on receiving more pain medication. She was advised that this will happen when Dr. QUINTON Moser MD places orders to antonieta effect. Patient persistent on demanding  more pain medication

## 2024-10-21 NOTE — PROGRESS NOTES
Cardiac Catheterization with Percutaneous Coronary Intervention     Percutaneous Coronary Intervention: What to Expect at Home  Percutaneous coronary intervention (PCI) is the name for procedures that are used to open a narrowed or blocked coronary artery. The two most common PCI procedures are coronary angioplasty and coronary stent placement.  Your groin or arm may have a bruise and feel sore for a day or two after a percutaneous coronary intervention (PCI). You can do light activities around the house, but nothing strenuous for several days.  Make sure to carry your stent card with you.  Medication compliance is key in preventing future heart attacks and keeping your artery open.   This care sheet gives you a general idea about how long it will take for you to recover. But each person recovers at a different pace. Follow the steps below to get better as quickly as possible.  If you have any questions about follow up instructions please call [phone], or your cardiologist's office line. If there is an emergency call 911.     Site Care  Check puncture site frequently for swelling or bleeding. If there is any bleeding, lie down (if access is groin), hold firm pressure with a clean towel or wash cloth. If bleeding doesn't stop, call 911. Notify your doctor for any redness, swelling, drainage, or oozing from the puncture site.   If extremity becomes cold, numb or painful go to the emergency room.   You may remove the bandage from your Right, Arm in 24 hours. You may shower at that point. No hot tubs, bath tubs, or swimming for 1 week.   After shower, pat the incision dry and you can place a clean band-aid over the site.   No lotions, ointments, or powders over puncture site for 1 week.   Use a clean band-aid over the puncture site each day for 5 days. Change band-aid daily.   Cold pack or ice can be placed on the area for 10 to 20 minutes to help with the soreness of the site.     Activity  Do not do strenuous

## 2024-10-21 NOTE — PROGRESS NOTES
Right wrist band removed, no bleeding or swelling. Sterile hemostatic dressing applied. Normal radial pulse, normal distal circulation and neuro check. Safety splint applied. Safety instructions reviewed with the patient.

## 2024-10-21 NOTE — PROGRESS NOTES
A+Ox4, ambulatory without difficulty with cane. Reports she feels her lips are swelling. No swelling noticed.

## 2024-10-21 NOTE — DISCHARGE INSTRUCTIONS
monitor (like a TV screen) to move the catheter to your heart. The doctor then puts a dye into the catheter. This makes your heart's arteries show up on a screen. The doctor can then see any arteries that are blocked or narrowed. You may feel warm or flushed for a short time when the doctor injects dye into your artery.  If you have a blocked or narrow artery, the doctor uses a catheter with a tiny balloon at the tip. The doctor puts it into the blocked or narrow area and inflates it. The balloon presses the fatty buildup against the walls of the artery. This buildup is called plaque. This creates more room for blood to flow. In most cases, the doctor then puts a stent in the artery. A stent is a small, expandable tube. It presses against the walls of the artery. The stent is left in the artery to keep the artery open. This helps blood flow. The catheter is removed from your body.  What happens right after the procedure?  The catheter will be removed. Pressure may be applied to the area where the catheter was put into your blood vessel. This will help prevent bleeding. A small device may also be used to close the blood vessel. You may have a bandage or a compression device on the catheter site. After the procedure, you will be taken to a room where the catheter site and your heart rate, blood pressure, and temperature will be checked several times. If the catheter was put in your groin, you will need to lie still and keep your leg straight for up to a few hours. If the catheter was put in your wrist, you may need to keep your arm still for at least 2 hours.  You may go home the same day. Or you may stay at least 1 night in the hospital. When you go home, you will get instructions from your doctor to help you recover well and prevent problems.  Make sure to drink plenty of fluids (unless your doctor tells you not to) for several hours after the procedure. This will help flush the dye out of your body.  Follow-up care

## 2024-10-21 NOTE — PROGRESS NOTES
Pre-Discharge Checklist for Same Day Discharge Post PCI      Confirm that loading dose of P2Y12i has been administered.  YES    Confirm the patient has received prescriptions for at least 30 days of P2Y12i.  YES    Confirm prescription for aspirin and statin.  YES    Confirm referral to cardiac rehab.  YES    Charge nurse plans on calling patient the day after discharge.  YES    The cath holding nurse has provided education to patient on how to monitor access site, and the importance of taking DAPT as prescribed and the specific risks of premature discontinuation.  YES    The cath holding nurse has provided the patient with an emergency number to call.  YES    The cath holding nurse has scheduled a follow up appointment.  YES  Pre-Discharge Checklist for Same Day Discharge Post PCI      Confirm that loading dose of P2Y12i has been administered.  YES    Confirm the patient has received prescriptions for at least 30 days of P2Y12i.  YES    Confirm prescription for aspirin and statin.  YES    Confirm referral to cardiac rehab.  YES    Charge nurse plans on calling patient the day after discharge.  YES    The cath holding nurse has provided education to patient on how to monitor access site, and the importance of taking DAPT as prescribed and the specific risks of premature discontinuation.  YES    The cath holding nurse has provided the patient with an emergency number to call.  YES    The cath holding nurse has scheduled a follow up appointment.  YES

## 2024-10-21 NOTE — CONSULTS
[facility] - Consult Note    Date of  Admission: 10/21/2024  6:57 AM   Primary Care Physician:  Susana Allen MD       Assessment:     Patient Active Problem List    Diagnosis Date Noted    ST elevation myocardial infarction involving right coronary artery (HCC) 08/30/2024    CAD (coronary artery disease) 09/25/2024    Lung nodule 09/04/2024    Coronary artery disease 01/31/2022    Atypical angina (HCC) 10/25/2021    Tobacco abuse counseling 10/25/2021    Polycystic ovary syndrome 06/13/2021    Lyme disease 06/13/2021    Diverticulitis 06/11/2021    Gastroesophageal reflux disease 06/11/2021    Asthma 06/11/2021    Essential hypertension 06/11/2021    Congestive heart failure (HCC) 06/11/2021    Chronic diastolic (congestive) heart failure (HCC) 05/25/2021    Neuropathy 05/25/2021    Severe obesity (BMI >= 40) 05/25/2021    Chorioretinal scar, left eye 02/18/2021    Chorioretinal inflammation, bilateral 02/18/2021    Vitamin D deficiency 10/27/2020    Post traumatic stress disorder 10/10/2019    Venous stasis 10/09/2019    Mild intermittent asthma with acute exacerbation 10/08/2019    Acquired hypothyroidism 10/08/2019    Community acquired pneumonia of left lung 10/08/2019       IMPRESSION  Progressive refractory angina  Coronary disease status post stent to RCA  Tobacco abuse  Hypertension  Chronic heart failure preserved ejection fraction    PLAN  The benefits and risks of cardiac catheterization have been discussed in detail with the patient or healthcare power of . Patient understands  risk of potential cath complications including but not limited to bleeding, infection, dialysis or renal function decline, difficulty healing the arteriotomy access site which may require surgical repair, potential thromboembolic complications which could result in stroke, myocardial infarction, vascular injury, loss of limb or organ function and/or death and potential allergic reaction to contrast dye or

## 2024-10-21 NOTE — FLOWSHEET NOTE
Received from cath lab c/o pain 9/10 in chest, head, jaw, face.   12 lead ECG obtained (NSR). Reported to QUINTON Moser MD

## 2024-10-22 DIAGNOSIS — Z95.5 STENTED CORONARY ARTERY: Primary | ICD-10-CM

## 2024-10-23 ENCOUNTER — TELEPHONE (OUTPATIENT)
Age: 59
End: 2024-10-23

## 2024-10-28 ENCOUNTER — OFFICE VISIT (OUTPATIENT)
Facility: CLINIC | Age: 59
End: 2024-10-28
Payer: MEDICAID

## 2024-10-28 ENCOUNTER — HOSPITAL ENCOUNTER (OUTPATIENT)
Facility: HOSPITAL | Age: 59
Discharge: HOME | End: 2024-10-31
Payer: MEDICAID

## 2024-10-28 VITALS
OXYGEN SATURATION: 99 % | HEIGHT: 63 IN | BODY MASS INDEX: 47.66 KG/M2 | RESPIRATION RATE: 16 BRPM | TEMPERATURE: 97.7 F | SYSTOLIC BLOOD PRESSURE: 110 MMHG | DIASTOLIC BLOOD PRESSURE: 70 MMHG | WEIGHT: 269 LBS | HEART RATE: 70 BPM

## 2024-10-28 DIAGNOSIS — B37.9 YEAST INFECTION: ICD-10-CM

## 2024-10-28 DIAGNOSIS — M47.816 LUMBAR SPONDYLOSIS: ICD-10-CM

## 2024-10-28 DIAGNOSIS — M25.562 CHRONIC PAIN OF BOTH KNEES: ICD-10-CM

## 2024-10-28 DIAGNOSIS — R35.0 URINE FREQUENCY: ICD-10-CM

## 2024-10-28 DIAGNOSIS — M25.561 CHRONIC PAIN OF BOTH KNEES: ICD-10-CM

## 2024-10-28 DIAGNOSIS — R79.89 ABNORMAL CBC: ICD-10-CM

## 2024-10-28 DIAGNOSIS — N28.9 ACUTE RENAL INSUFFICIENCY: ICD-10-CM

## 2024-10-28 DIAGNOSIS — R25.1 TREMOR: ICD-10-CM

## 2024-10-28 DIAGNOSIS — N95.0 POST-MENOPAUSAL BLEEDING: ICD-10-CM

## 2024-10-28 DIAGNOSIS — G89.29 CHRONIC PAIN OF BOTH KNEES: ICD-10-CM

## 2024-10-28 DIAGNOSIS — M79.672 LEFT FOOT PAIN: ICD-10-CM

## 2024-10-28 DIAGNOSIS — E87.1 HYPONATREMIA: ICD-10-CM

## 2024-10-28 DIAGNOSIS — I21.11 ST ELEVATION MYOCARDIAL INFARCTION INVOLVING RIGHT CORONARY ARTERY (HCC): ICD-10-CM

## 2024-10-28 DIAGNOSIS — R79.89 ABNORMAL CBC: Primary | ICD-10-CM

## 2024-10-28 DIAGNOSIS — Z98.890 STATUS POST CARDIAC CATHETERIZATION: ICD-10-CM

## 2024-10-28 DIAGNOSIS — Z87.891 QUIT SMOKING: ICD-10-CM

## 2024-10-28 DIAGNOSIS — Z23 NEEDS FLU SHOT: ICD-10-CM

## 2024-10-28 LAB
ANION GAP SERPL CALC-SCNC: 5 MMOL/L (ref 3–18)
BASOPHILS # BLD: 0 K/UL (ref 0–0.1)
BASOPHILS NFR BLD: 0 % (ref 0–2)
BUN SERPL-MCNC: 16 MG/DL (ref 7–18)
BUN/CREAT SERPL: 15 (ref 12–20)
CALCIUM SERPL-MCNC: 9.7 MG/DL (ref 8.5–10.1)
CHLORIDE SERPL-SCNC: 99 MMOL/L (ref 100–111)
CO2 SERPL-SCNC: 31 MMOL/L (ref 21–32)
CREAT SERPL-MCNC: 1.06 MG/DL (ref 0.6–1.3)
DIFFERENTIAL METHOD BLD: ABNORMAL
EOSINOPHIL # BLD: 0 K/UL (ref 0–0.4)
EOSINOPHIL NFR BLD: 0 % (ref 0–5)
ERYTHROCYTE [DISTWIDTH] IN BLOOD BY AUTOMATED COUNT: 12.7 % (ref 11.6–14.5)
GLUCOSE SERPL-MCNC: 112 MG/DL (ref 74–99)
HCT VFR BLD AUTO: 37.5 % (ref 35–45)
HGB BLD-MCNC: 11.9 G/DL (ref 12–16)
IMM GRANULOCYTES # BLD AUTO: 0 K/UL (ref 0–0.04)
IMM GRANULOCYTES NFR BLD AUTO: 0 % (ref 0–0.5)
LYMPHOCYTES # BLD: 2.5 K/UL (ref 0.9–3.6)
LYMPHOCYTES NFR BLD: 23 % (ref 21–52)
MCH RBC QN AUTO: 28.7 PG (ref 24–34)
MCHC RBC AUTO-ENTMCNC: 31.7 G/DL (ref 31–37)
MCV RBC AUTO: 90.6 FL (ref 78–100)
MONOCYTES # BLD: 0.9 K/UL (ref 0.05–1.2)
MONOCYTES NFR BLD: 8 % (ref 3–10)
NEUTS SEG # BLD: 7.3 K/UL (ref 1.8–8)
NEUTS SEG NFR BLD: 68 % (ref 40–73)
NRBC # BLD: 0 K/UL (ref 0–0.01)
NRBC BLD-RTO: 0 PER 100 WBC
PLATELET # BLD AUTO: 278 K/UL (ref 135–420)
PMV BLD AUTO: 9 FL (ref 9.2–11.8)
POTASSIUM SERPL-SCNC: 4.9 MMOL/L (ref 3.5–5.5)
RBC # BLD AUTO: 4.14 M/UL (ref 4.2–5.3)
SODIUM SERPL-SCNC: 135 MMOL/L (ref 136–145)
WBC # BLD AUTO: 10.8 K/UL (ref 4.6–13.2)

## 2024-10-28 PROCEDURE — 85025 COMPLETE CBC W/AUTO DIFF WBC: CPT

## 2024-10-28 PROCEDURE — 90471 IMMUNIZATION ADMIN: CPT | Performed by: FAMILY MEDICINE

## 2024-10-28 PROCEDURE — 3074F SYST BP LT 130 MM HG: CPT | Performed by: FAMILY MEDICINE

## 2024-10-28 PROCEDURE — 90661 CCIIV3 VAC ABX FR 0.5 ML IM: CPT | Performed by: FAMILY MEDICINE

## 2024-10-28 PROCEDURE — 36415 COLL VENOUS BLD VENIPUNCTURE: CPT

## 2024-10-28 PROCEDURE — 3078F DIAST BP <80 MM HG: CPT | Performed by: FAMILY MEDICINE

## 2024-10-28 PROCEDURE — 80048 BASIC METABOLIC PNL TOTAL CA: CPT

## 2024-10-28 PROCEDURE — 99214 OFFICE O/P EST MOD 30 MIN: CPT | Performed by: FAMILY MEDICINE

## 2024-10-28 RX ORDER — FLUCONAZOLE 150 MG/1
150 TABLET ORAL ONCE
Qty: 1 TABLET | Refills: 0 | Status: SHIPPED | OUTPATIENT
Start: 2024-10-28 | End: 2024-10-28

## 2024-10-28 NOTE — PROGRESS NOTES
Jessie Momin (:  1965) is a 59 y.o. female, Established patient, here for evaluation of the following chief complaint(s):  Pain (WOULD LIKE TORADOL) and Vaginal Bleeding         Assessment & Plan  1. Vaginal bleeding.  She is experiencing vaginal bleeding spotting/ postmenopausal , on a blood thinner. Blood work indicates an elevated white cell count, decreased kidney function, and low sodium levels. Tried to get urine sample today but she could not collect it. Sending to lab.  blood work for kidney function and blood count will be repeated. A referral to nephrology will be made to rule out any acute injury. She is advised to maintain good hydration. Diflucan will be prescribed.    2. Chronic back pain.  She is advised to discuss the use of a lidocaine patch with Dr. Alanis. She is currently on Vicodin and tramadol, rotating between them for pain management.advise to follow pain management/ ortho.     3. Yeast infection.  She reports a yeast infection likely due to Brilinta. Diflucan will be prescribed to manage the infection.    4. Elevated white blood cell count.  Blood work shows an elevated white blood cell count. A urine test will be conducted in the office today, and blood work for kidney function and blood count will be repeated. A referral to nephrology will be made to rule out any acute injury.will discuss further once we have blood test result and urinalysis result. She could not collected sample today     5. Decreased kidney function.  Blood work indicates decreased kidney function. A urine test will be conducted in the office today, and blood work for kidney function and blood count will be repeated. A referral to nephrology will be made to rule out any acute injury. She is advised to maintain good hydration. She had cardiac cath done as well. Will repeat labs. Avoid NSAIDs otc     6. Low sodium levels.  Blood work indicates low sodium levels. A urine test will be conducted in the

## 2025-02-03 DIAGNOSIS — I50.32 CHRONIC DIASTOLIC (CONGESTIVE) HEART FAILURE (HCC): ICD-10-CM

## 2025-02-03 RX ORDER — LANOLIN ALCOHOL/MO/W.PET/CERES
400 CREAM (GRAM) TOPICAL 2 TIMES DAILY
Qty: 180 TABLET | Refills: 3 | Status: SHIPPED | OUTPATIENT
Start: 2025-02-03

## 2025-02-03 RX ORDER — DAPAGLIFLOZIN 10 MG/1
10 TABLET, FILM COATED ORAL EVERY MORNING
Qty: 90 TABLET | Refills: 3 | Status: SHIPPED | OUTPATIENT
Start: 2025-02-03 | End: 2025-02-04 | Stop reason: SDUPTHER

## 2025-02-03 RX ORDER — LANOLIN ALCOHOL/MO/W.PET/CERES
CREAM (GRAM) TOPICAL 2 TIMES DAILY
Qty: 180 TABLET | Refills: 0 | Status: SHIPPED | OUTPATIENT
Start: 2025-02-03 | End: 2025-02-04 | Stop reason: ALTCHOICE

## 2025-02-04 ENCOUNTER — OFFICE VISIT (OUTPATIENT)
Age: 60
End: 2025-02-04
Payer: MEDICAID

## 2025-02-04 VITALS
SYSTOLIC BLOOD PRESSURE: 100 MMHG | HEART RATE: 65 BPM | DIASTOLIC BLOOD PRESSURE: 67 MMHG | WEIGHT: 269.6 LBS | OXYGEN SATURATION: 98 % | BODY MASS INDEX: 47.76 KG/M2

## 2025-02-04 DIAGNOSIS — I25.118 ATHSCL HEART DISEASE OF NATIVE COR ART W OTH ANG PCTRS (HCC): Primary | ICD-10-CM

## 2025-02-04 DIAGNOSIS — I50.32 CHRONIC DIASTOLIC HEART FAILURE (HCC): ICD-10-CM

## 2025-02-04 DIAGNOSIS — Z71.6 TOBACCO ABUSE COUNSELING: ICD-10-CM

## 2025-02-04 DIAGNOSIS — I50.32 CHRONIC DIASTOLIC (CONGESTIVE) HEART FAILURE (HCC): ICD-10-CM

## 2025-02-04 DIAGNOSIS — Z95.5 STENTED CORONARY ARTERY: ICD-10-CM

## 2025-02-04 DIAGNOSIS — R60.0 LEG EDEMA, LEFT: ICD-10-CM

## 2025-02-04 DIAGNOSIS — I10 ESSENTIAL HYPERTENSION: ICD-10-CM

## 2025-02-04 DIAGNOSIS — E66.01 SEVERE OBESITY (BMI >= 40): ICD-10-CM

## 2025-02-04 PROCEDURE — 99214 OFFICE O/P EST MOD 30 MIN: CPT | Performed by: NURSE PRACTITIONER

## 2025-02-04 PROCEDURE — 3078F DIAST BP <80 MM HG: CPT | Performed by: NURSE PRACTITIONER

## 2025-02-04 PROCEDURE — 3074F SYST BP LT 130 MM HG: CPT | Performed by: NURSE PRACTITIONER

## 2025-02-04 RX ORDER — DAPAGLIFLOZIN 10 MG/1
10 TABLET, FILM COATED ORAL EVERY MORNING
Qty: 30 TABLET | Refills: 11 | Status: SHIPPED | OUTPATIENT
Start: 2025-02-04

## 2025-02-04 ASSESSMENT — PATIENT HEALTH QUESTIONNAIRE - PHQ9
SUM OF ALL RESPONSES TO PHQ9 QUESTIONS 1 & 2: 0
SUM OF ALL RESPONSES TO PHQ QUESTIONS 1-9: 0
DEPRESSION UNABLE TO ASSESS: PT REFUSES
SUM OF ALL RESPONSES TO PHQ QUESTIONS 1-9: 0
SUM OF ALL RESPONSES TO PHQ QUESTIONS 1-9: 0
2. FEELING DOWN, DEPRESSED OR HOPELESS: NOT AT ALL
1. LITTLE INTEREST OR PLEASURE IN DOING THINGS: NOT AT ALL
SUM OF ALL RESPONSES TO PHQ QUESTIONS 1-9: 0

## 2025-02-04 ASSESSMENT — ENCOUNTER SYMPTOMS
SHORTNESS OF BREATH: 0
EYES NEGATIVE: 1
GASTROINTESTINAL NEGATIVE: 1

## 2025-02-04 NOTE — PROGRESS NOTES
1. Have you been to the ER, urgent care clinic since your last visit?  Hospitalized since your last visit?     No    2. Have you seen or consulted any other health care providers outside of the [facility] since your last visit?  Include any pap smears or colon screening.      No

## 2025-02-04 NOTE — PROGRESS NOTES
Jessie Momin is a 59 y.o. year old female.    Follow-up of CHF, hypertension, obesity, CAD, MANA in RCA (8/24)  History of idiopathic angioedema      3/21 seen as new patient. H/o TR, MR, idiopathic tachycardia, edema, neuropathy   2/23 shortness of breath is improving from but she does not walk much because of her knee situation.  Edema is better but still there specially in her abdominal area.  Has intermittent atypical chest pain.  8/28/2023 gets dyspnea even while walking inside the house.  Does not ambulate much because of knee arthritis and back pains.  Edema is stable as long as she takes diuretics.  Intermittent chest pains continue which are atypical.  She is concerned about having transthyretin amyloid heart disease due to her carpal tunnel history.  3/26/2024 likely not taking Zetia.  Continues to have intermittent chest pains and thinks these are esophageal spasms.  Edema is reducing well.  Dyspnea persist on walking in the house.  No dizziness or palpitations.  9/25/2024 GRIFFIN in the house.  Status post STEMI inferior and status post emergent PCI of the distal RCA on 8/30/2024.  Noncritical lesions in  LAD and Lcx arteries. No edema, dizziness.  Has quit smoking since MI.  Gets chest pains intermittently.  2/4/2025  Seen following cardiac cath 10/21/2024 with stent placed to LCX. She c/o left lower leg pain. Recent URI treated with antibiotics, steroids,Had BLE edema - which improved with additional lasix x 2 days.  Denies chest pain, shortness of breath or palpitations.            Review of Systems   Constitutional: Negative.    HENT: Negative.     Eyes: Negative.    Respiratory:  Negative for shortness of breath.    Cardiovascular:  Negative for chest pain and leg swelling.   Gastrointestinal: Negative.    Endocrine: Negative.    Genitourinary: Negative.    Musculoskeletal: Negative.    Neurological: Negative.    Psychiatric/Behavioral: Negative.     All other systems reviewed and are

## 2025-02-07 NOTE — RESULT ENCOUNTER NOTE
Please contact patient and do the following asap    Check if compliant with cholesterol meds  Get the names and doses of meds that patient takes and show me asap  Please reinforce diet and exercise.  with pt

## 2025-02-12 ENCOUNTER — APPOINTMENT (OUTPATIENT)
Age: 60
End: 2025-02-12
Payer: MEDICAID

## 2025-02-12 ENCOUNTER — HOSPITAL ENCOUNTER (OUTPATIENT)
Age: 60
Discharge: HOME | End: 2025-02-14
Payer: MEDICAID

## 2025-02-12 DIAGNOSIS — R60.0 LEG EDEMA, LEFT: ICD-10-CM

## 2025-02-12 PROCEDURE — 93971 EXTREMITY STUDY: CPT

## 2025-02-17 ENCOUNTER — TELEPHONE (OUTPATIENT)
Age: 60
End: 2025-02-17

## 2025-02-17 NOTE — TELEPHONE ENCOUNTER
----- Message from ELLIOT Goodman NP sent at 2/17/2025  3:20 PM EST -----  Please call patient, advise PVL negative for DVT  Follow up as scheduled

## 2025-02-17 NOTE — TELEPHONE ENCOUNTER
Spoke to patient per Rene Noe NP-ROSENDO regarding test. Test reviewed. Please call patient, advise PVL negative for DVT  Follow up as scheduled. She voices understanding and acceptance of this advice and will call back if any further questions or concerns.

## 2025-02-28 ENCOUNTER — PROCEDURE VISIT (OUTPATIENT)
Age: 60
End: 2025-02-28

## 2025-02-28 VITALS
BODY MASS INDEX: 47.73 KG/M2 | TEMPERATURE: 98.1 F | WEIGHT: 269.4 LBS | HEART RATE: 64 BPM | SYSTOLIC BLOOD PRESSURE: 98 MMHG | DIASTOLIC BLOOD PRESSURE: 60 MMHG | HEIGHT: 63 IN | OXYGEN SATURATION: 98 %

## 2025-02-28 DIAGNOSIS — G56.03 BILATERAL CARPAL TUNNEL SYNDROME: ICD-10-CM

## 2025-02-28 DIAGNOSIS — R29.898 WEAKNESS OF BOTH HANDS: Primary | ICD-10-CM

## 2025-02-28 DIAGNOSIS — R94.131 ABNORMAL EMG: ICD-10-CM

## 2025-02-28 DIAGNOSIS — G56.21 CUBITAL TUNNEL SYNDROME ON RIGHT: ICD-10-CM

## 2025-02-28 DIAGNOSIS — R25.1 TREMOR: ICD-10-CM

## 2025-02-28 DIAGNOSIS — Z98.890 HISTORY OF CARPAL TUNNEL RELEASE OF BOTH WRISTS: ICD-10-CM

## 2025-02-28 NOTE — PROGRESS NOTES
[facility]  [facility] [facility], [address]  [place] [address]  Phone: [phone]  Fax: [phone]    Jessie Momin  : 1965  PCP: Susana Allen MD  2025    ELECTROMYOGRAPHY AND NERVE CONDUCTION STUDIES    Jessie Moimn was referred by ELLIOT Camargo - * for electrodiagnostic evaluation of weakness of both arms.    NCV & EMG Findings:  Evaluation of the left median (APB) motor nerve showed prolonged distal onset latency (4.6 ms) and decreased conduction velocity (49 m/s).  The right median (APB) motor nerve showed prolonged distal onset latency (5.0 ms).  The left ulnar (ADM) motor and the right ulnar (ADM) motor nerves showed reduced amplitude (L6.9, R7.8 mV), decreased conduction velocity (Bel Elbow-Wrist, L44, R46 m/s), and decreased conduction velocity (Abv Elbow-Bel Elbow, L37, R38 m/s).  The left median sensory and the right median sensory nerves showed prolonged distal onset latency (L3.9, R3.6 ms) and prolonged distal peak latency (L4.4, R4.7 ms).  The left ulnar sensory nerve showed no response.  The right ulnar sensory nerve showed reduced amplitude (5 µV).  All remaining nerves (as indicated in the following tables) were within normal limits.    All examined muscles (as indicated in the following table) showed no evidence of electrical instability     INTERPRETATION  This is an abnormal electrodiagnostic examination. These findings may be consistent with:  Residual at least moderate median mononeuropathy at the wrists bilaterally (carpal tunnel syndrome)   At least Moderate ulnar mononeuropathy at the elbows bilaterally (cubital tunnel syndrome)   Length-dependent sensorimotor peripheral polyneuropathy     There are no electrodiagnostic findings consistent with cervical radiculopathy, brachial plexopathy, myopathy, or any other mononeuropathy.        CLINICAL INTERPRETATION  The electrodiagnostic findings of median and ulnar

## 2025-03-03 SDOH — HEALTH STABILITY: PHYSICAL HEALTH
ON AVERAGE, HOW MANY DAYS PER WEEK DO YOU ENGAGE IN MODERATE TO STRENUOUS EXERCISE (LIKE A BRISK WALK)?: PATIENT DECLINED

## 2025-03-05 ENCOUNTER — PROCEDURE VISIT (OUTPATIENT)
Age: 60
End: 2025-03-05

## 2025-03-05 VITALS
WEIGHT: 262.2 LBS | RESPIRATION RATE: 18 BRPM | DIASTOLIC BLOOD PRESSURE: 58 MMHG | TEMPERATURE: 97.4 F | SYSTOLIC BLOOD PRESSURE: 89 MMHG | BODY MASS INDEX: 46.46 KG/M2 | HEIGHT: 63 IN | HEART RATE: 68 BPM

## 2025-03-05 DIAGNOSIS — M79.18 CHRONIC PRIMARY MUSCULOSKELETAL PAIN: ICD-10-CM

## 2025-03-05 DIAGNOSIS — M25.551 PAIN OF RIGHT HIP: ICD-10-CM

## 2025-03-05 DIAGNOSIS — R94.131 ABNORMAL EMG: ICD-10-CM

## 2025-03-05 DIAGNOSIS — M25.561 CHRONIC PAIN OF BOTH KNEES: ICD-10-CM

## 2025-03-05 DIAGNOSIS — M25.562 CHRONIC PAIN OF BOTH KNEES: ICD-10-CM

## 2025-03-05 DIAGNOSIS — G62.9 NEUROPATHY: Primary | ICD-10-CM

## 2025-03-05 DIAGNOSIS — M48.062 SPINAL STENOSIS OF LUMBAR REGION WITH NEUROGENIC CLAUDICATION: ICD-10-CM

## 2025-03-05 DIAGNOSIS — G89.29 CHRONIC PAIN OF BOTH KNEES: ICD-10-CM

## 2025-03-05 DIAGNOSIS — M25.511 CHRONIC RIGHT SHOULDER PAIN: ICD-10-CM

## 2025-03-05 DIAGNOSIS — I50.32 CHRONIC DIASTOLIC (CONGESTIVE) HEART FAILURE (HCC): ICD-10-CM

## 2025-03-05 DIAGNOSIS — M54.2 CERVICAL PAIN: ICD-10-CM

## 2025-03-05 DIAGNOSIS — G89.29 CHRONIC PRIMARY MUSCULOSKELETAL PAIN: ICD-10-CM

## 2025-03-05 DIAGNOSIS — M47.816 LUMBAR FACET ARTHROPATHY: ICD-10-CM

## 2025-03-05 DIAGNOSIS — G89.29 CHRONIC RIGHT SHOULDER PAIN: ICD-10-CM

## 2025-03-05 DIAGNOSIS — M54.50 LUMBAR PAIN: ICD-10-CM

## 2025-03-05 RX ORDER — LANOLIN ALCOHOL/MO/W.PET/CERES
CREAM (GRAM) TOPICAL 2 TIMES DAILY
Qty: 90 TABLET | Refills: 0 | Status: SHIPPED | OUTPATIENT
Start: 2025-03-05

## 2025-03-05 RX ORDER — PREGABALIN 150 MG/1
150 CAPSULE ORAL 2 TIMES DAILY
Qty: 60 CAPSULE | Refills: 5 | Status: SHIPPED | OUTPATIENT
Start: 2025-03-05 | End: 2025-09-01

## 2025-03-05 NOTE — PATIENT INSTRUCTIONS
Corey Allen's Big three exercises link:  [email or web address]

## 2025-03-05 NOTE — PROGRESS NOTES
[facility] CARDIAC CATH LAB    CARDIAC PROCEDURE N/A 08/30/2024    Insert stent michael coronary performed by Collin Sosa MD at [facility] CARDIAC CATH LAB    CARDIAC PROCEDURE N/A 10/21/2024    Percutaneous coronary intervention performed by Jim Moser MD at [facility] CARDIAC CATH LAB    CARDIAC PROCEDURE N/A 10/21/2024    Left heart cath / coronary angiography performed by Jim Moser MD at [facility] CARDIAC CATH LAB    CARDIAC PROCEDURE N/A 10/21/2024    Intravascular ultrasound performed by Jim Moser MD at [facility] CARDIAC CATH LAB    CARPAL TUNNEL RELEASE Bilateral     CATARACT REMOVAL Right     Lens implant    CHOLECYSTECTOMY      COLONOSCOPY N/A 08/10/2022    COLONOSCOPY performed by Sandra Painter DO at [facility] ENDOSCOPY    FOOT SURGERY      HAND SURGERY  ?    HEMORRHOID SURGERY      OTHER SURGICAL HISTORY      Vocal cord polyps    UPPER GASTROINTESTINAL ENDOSCOPY  2022       MEDICATIONS      Current Outpatient Medications   Medication Sig Dispense Refill    lisinopril (PRINIVIL;ZESTRIL) 10 MG tablet Take 1 tablet by mouth daily 90 tablet 3    dapagliflozin (FARXIGA) 10 MG tablet Take 1 tablet by mouth every morning 30 tablet 11    magnesium oxide (MAG-OX) 400 (240 Mg) MG tablet Take 1 tablet by mouth 2 times daily 180 tablet 3    atorvastatin (LIPITOR) 80 MG tablet Take 1 tablet by mouth nightly 90 tablet 3    carvedilol (COREG) 6.25 MG tablet Take 1 tablet by mouth 2 times daily (with meals) 180 tablet 3    furosemide (LASIX) 40 MG tablet Take 1 tablet by mouth daily 90 tablet 1    spironolactone (ALDACTONE) 50 MG tablet Take 1 tablet by mouth once daily 90 tablet 0    ticagrelor (BRILINTA) 90 MG TABS tablet Take 1 tablet by mouth 2 times daily 180 tablet 3    aspirin 81 MG EC tablet Take 1 tablet by mouth daily 100 tablet PRN    famotidine (PEPCID) 20 MG tablet       levothyroxine (SYNTHROID) 100 MCG tablet Take 1 tablet by mouth every morning (before breakfast) 90 tablet 1    pantoprazole (PROTONIX) 40 MG tablet

## 2025-03-06 ENCOUNTER — OFFICE VISIT (OUTPATIENT)
Age: 60
End: 2025-03-06

## 2025-03-06 ENCOUNTER — TELEPHONE (OUTPATIENT)
Age: 60
End: 2025-03-06

## 2025-03-06 ENCOUNTER — OFFICE VISIT (OUTPATIENT)
Age: 60
End: 2025-03-06
Payer: MEDICAID

## 2025-03-06 VITALS
WEIGHT: 263 LBS | OXYGEN SATURATION: 98 % | DIASTOLIC BLOOD PRESSURE: 67 MMHG | HEART RATE: 68 BPM | SYSTOLIC BLOOD PRESSURE: 111 MMHG | BODY MASS INDEX: 46.6 KG/M2 | HEIGHT: 63 IN

## 2025-03-06 DIAGNOSIS — I10 ESSENTIAL HYPERTENSION: Primary | ICD-10-CM

## 2025-03-06 DIAGNOSIS — I50.32 CHRONIC DIASTOLIC (CONGESTIVE) HEART FAILURE (HCC): ICD-10-CM

## 2025-03-06 DIAGNOSIS — M25.511 CHRONIC RIGHT SHOULDER PAIN: Primary | ICD-10-CM

## 2025-03-06 DIAGNOSIS — Z95.5 STENTED CORONARY ARTERY: ICD-10-CM

## 2025-03-06 DIAGNOSIS — Z71.6 TOBACCO ABUSE COUNSELING: ICD-10-CM

## 2025-03-06 DIAGNOSIS — M75.51 SUBACROMIAL BURSITIS OF RIGHT SHOULDER JOINT: ICD-10-CM

## 2025-03-06 DIAGNOSIS — I25.118 ATHSCL HEART DISEASE OF NATIVE COR ART W OTH ANG PCTRS: ICD-10-CM

## 2025-03-06 DIAGNOSIS — E66.01 SEVERE OBESITY (BMI >= 40): ICD-10-CM

## 2025-03-06 DIAGNOSIS — G89.29 CHRONIC RIGHT SHOULDER PAIN: Primary | ICD-10-CM

## 2025-03-06 PROCEDURE — 93000 ELECTROCARDIOGRAM COMPLETE: CPT | Performed by: NURSE PRACTITIONER

## 2025-03-06 PROCEDURE — 3078F DIAST BP <80 MM HG: CPT | Performed by: NURSE PRACTITIONER

## 2025-03-06 PROCEDURE — 3074F SYST BP LT 130 MM HG: CPT | Performed by: NURSE PRACTITIONER

## 2025-03-06 PROCEDURE — 99214 OFFICE O/P EST MOD 30 MIN: CPT | Performed by: NURSE PRACTITIONER

## 2025-03-06 RX ORDER — LISINOPRIL 2.5 MG/1
2.5 TABLET ORAL DAILY
Qty: 90 TABLET | Refills: 3 | Status: SHIPPED | OUTPATIENT
Start: 2025-03-06

## 2025-03-06 RX ORDER — TRIAMCINOLONE ACETONIDE 40 MG/ML
40 INJECTION, SUSPENSION INTRA-ARTICULAR; INTRAMUSCULAR ONCE
Status: COMPLETED | OUTPATIENT
Start: 2025-03-06 | End: 2025-03-06

## 2025-03-06 RX ADMIN — TRIAMCINOLONE ACETONIDE 40 MG: 40 INJECTION, SUSPENSION INTRA-ARTICULAR; INTRAMUSCULAR at 14:04

## 2025-03-06 ASSESSMENT — PATIENT HEALTH QUESTIONNAIRE - PHQ9
2. FEELING DOWN, DEPRESSED OR HOPELESS: NOT AT ALL
SUM OF ALL RESPONSES TO PHQ QUESTIONS 1-9: 0
SUM OF ALL RESPONSES TO PHQ QUESTIONS 1-9: 0
1. LITTLE INTEREST OR PLEASURE IN DOING THINGS: NOT AT ALL
SUM OF ALL RESPONSES TO PHQ QUESTIONS 1-9: 0
SUM OF ALL RESPONSES TO PHQ QUESTIONS 1-9: 0

## 2025-03-06 ASSESSMENT — ENCOUNTER SYMPTOMS
SHORTNESS OF BREATH: 0
EYES NEGATIVE: 1
GASTROINTESTINAL NEGATIVE: 1

## 2025-03-06 NOTE — PROGRESS NOTES
Jessie Momin is a 59 y.o. year old female.    Follow-up of CHF, hypertension, obesity, CAD, MANA in RCA (8/24)  History of idiopathic angioedema      3/21 seen as new patient. H/o TR, MR, idiopathic tachycardia, edema, neuropathy   2/23 shortness of breath is improving from but she does not walk much because of her knee situation.  Edema is better but still there specially in her abdominal area.  Has intermittent atypical chest pain.  8/28/2023 gets dyspnea even while walking inside the house.  Does not ambulate much because of knee arthritis and back pains.  Edema is stable as long as she takes diuretics.  Intermittent chest pains continue which are atypical.  She is concerned about having transthyretin amyloid heart disease due to her carpal tunnel history.  3/26/2024 likely not taking Zetia.  Continues to have intermittent chest pains and thinks these are esophageal spasms.  Edema is reducing well.  Dyspnea persist on walking in the house.  No dizziness or palpitations.  9/25/2024 GRIFFIN in the house.  Status post STEMI inferior and status post emergent PCI of the distal RCA on 8/30/2024.  Noncritical lesions in  LAD and Lcx arteries. No edema, dizziness.  Has quit smoking since MI.  Gets chest pains intermittently.  2/4/2025  Seen following cardiac cath 10/21/2024 with stent placed to LCX. She c/o left lower leg pain. Recent URI treated with antibiotics, steroids,Had BLE edema - which improved with additional lasix x 2 days.  Denies chest pain, shortness of breath or palpitations.  3/6/2025  Patient seen for episode of atypical chest pain which she believes was related to jackhammer esophagus this resolved after drinking [employer] and belching she also complains of hypotension with dizziness which began after increased dose of lisinopril.  Denies associated shortness of breath diaphoresis palpitations left arm or jaw pain            Review of Systems   Constitutional: Negative.    HENT: Negative.     Eyes:

## 2025-03-06 NOTE — PROGRESS NOTES
Jessie Momin  1965   Chief Complaint   Patient presents with    Shoulder Pain     Right shoulder        HISTORY OF PRESENT ILLNESS  Jessie Momin is a 59 y.o. female who presents today for evaluation of right shoulder pain.  Pain is a 6/10. Pain has been present for a month. Denies any recent injury or fall. Her pain increases with certain movement such as overhead and reaching.     Has tried following treatments: Injections:No; Brace:No; Therapy:No; Cane/Crutch:Yes      Allergies   Allergen Reactions    Latex Itching    Flurbiprofen Anaphylaxis    Iron Dextran Anaphylaxis and Itching    Levofloxacin Myalgia     Tendon rupture      Adhesive Tape Rash    Cephalexin Hives and Itching    Codeine Itching and Rash    Clindamycin Hives    Molds & Smuts      Allergic to penicillin mold like on bread but has had penicillin medication w/o any problem        Past Medical History:   Diagnosis Date    Anemia     Angio-edema     Asthma     Baker's cyst     Blind left eye     Carpal tunnel syndrome     D-dimer, elevated     Chronic elevation    Depression     Disease of blood and blood forming organ     Diverticulosis     Enlargement, spleen     Esophagospasm     Essential hypertension     Fatty liver     GERD (gastroesophageal reflux disease)     Heart disease     Heart failure (HCC)     Right sided    Histoplasmosis     HPV (human papilloma virus) anogenital infection     Lyme disease     Morbid obesity     Neuropathy     Upper and lower    Neuropathy     Osteoarthritis     PCOS (polycystic ovarian syndrome)     Pituitary abnormality     Pneumonia     PTSD (post-traumatic stress disorder)     Tachycardia     Thyroid disease     Tricuspid insufficiency       Social History       Tobacco History       Smoking Status  Former Smoking Start Date  1/1/1985 Current Packs/Day  0.5 packs/day Average Packs/Day  0.5 packs/day for 40.2 years (20.1 ttl pk-yrs) Smoking Tobacco Type  Cigarettes since

## 2025-03-06 NOTE — PROGRESS NOTES
1. Have you been to the ER, urgent care clinic since your last visit?  Hospitalized since your last visit?     no    2. Have you seen or consulted any other health care providers outside of the [facility] since your last visit?  Include any pap smears or colon screening.       Yes pcp

## 2025-03-07 ENCOUNTER — HOSPITAL ENCOUNTER (OUTPATIENT)
Age: 60
Discharge: HOME | End: 2025-03-10
Payer: MEDICAID

## 2025-03-07 DIAGNOSIS — J45.50 SEVERE PERSISTENT ASTHMA WITHOUT COMPLICATION (HCC): ICD-10-CM

## 2025-03-07 DIAGNOSIS — N18.31 STAGE 3A CHRONIC KIDNEY DISEASE (HCC): ICD-10-CM

## 2025-03-07 DIAGNOSIS — E78.2 MIXED HYPERLIPIDEMIA: Primary | ICD-10-CM

## 2025-03-07 PROCEDURE — 76770 US EXAM ABDO BACK WALL COMP: CPT

## 2025-03-07 PROCEDURE — 71046 X-RAY EXAM CHEST 2 VIEWS: CPT

## 2025-03-07 RX ORDER — ATORVASTATIN CALCIUM 80 MG/1
80 TABLET, FILM COATED ORAL NIGHTLY
Qty: 90 TABLET | Refills: 3 | Status: SHIPPED | OUTPATIENT
Start: 2025-03-07

## 2025-03-13 ENCOUNTER — TELEPHONE (OUTPATIENT)
Age: 60
End: 2025-03-13

## 2025-03-13 ENCOUNTER — OFFICE VISIT (OUTPATIENT)
Age: 60
End: 2025-03-13
Payer: MEDICAID

## 2025-03-13 VITALS — HEIGHT: 63 IN | BODY MASS INDEX: 46.8 KG/M2 | WEIGHT: 264.11 LBS

## 2025-03-13 DIAGNOSIS — Z98.890 HISTORY OF BILATERAL CARPAL TUNNEL RELEASE: ICD-10-CM

## 2025-03-13 DIAGNOSIS — G56.23 CUBITAL TUNNEL SYNDROME, BILATERAL: ICD-10-CM

## 2025-03-13 DIAGNOSIS — G62.9 PERIPHERAL POLYNEUROPATHY: Primary | ICD-10-CM

## 2025-03-13 DIAGNOSIS — G56.03 BILATERAL CARPAL TUNNEL SYNDROME: ICD-10-CM

## 2025-03-13 PROCEDURE — 99214 OFFICE O/P EST MOD 30 MIN: CPT | Performed by: ORTHOPAEDIC SURGERY

## 2025-03-13 NOTE — PROGRESS NOTES
Jessie Momin is a 59 y.o. female right handed.  Worker's Compensation and legal considerations: none    Chief Complaint   Patient presents with    Carpal Tunnel     Bilateral     Pain Score:   0 - No pain    Subjective:     Initial HPI: Patient presents today with a history of bilateral carpal tunnel syndrome treated with releases in the past.  She has recently had EMGs significant for both bilateral carpal tunnel syndrome and bilateral cubital tunnel syndrome as well as a coexisting peripheral neuropathy.  She is here to \"establish care\".    Date of onset: Chronic  Injury: No  Prior Treatment:  No  Contributory history: History of bilateral carpal tunnel releases    ROS: Review of Systems - General ROS: negative except HPI    Past Medical History:   Diagnosis Date    Anemia     Angio-edema     Asthma     Baker's cyst     Blind left eye     Carpal tunnel syndrome     D-dimer, elevated     Chronic elevation    Depression     Disease of blood and blood forming organ     Diverticulosis     Enlargement, spleen     Esophagospasm     Essential hypertension     Fatty liver     GERD (gastroesophageal reflux disease)     Heart disease     Heart failure (HCC)     Right sided    Histoplasmosis     HPV (human papilloma virus) anogenital infection     Lyme disease     Morbid obesity     Neuropathy     Upper and lower    Neuropathy     Osteoarthritis     PCOS (polycystic ovarian syndrome)     Pituitary abnormality     Pneumonia     PTSD (post-traumatic stress disorder)     Tachycardia     Thyroid disease     Tricuspid insufficiency        Past Surgical History:   Procedure Laterality Date    BREAST BIOPSY Right 12/6/2022    RIGHT NIPPLE BIOPSY, SUBAREOLAR DUCT EXCISION performed by Sandra Painter DO at [facility] MAIN OR    CARDIAC CATHETERIZATION  2010 approx    normal per patient    CARDIAC PROCEDURE N/A 08/30/2024    Left heart cath / coronary angiography performed by Collin Sosa MD at [facility] CARDIAC CATH LAB

## 2025-03-13 NOTE — TELEPHONE ENCOUNTER
Patient would like Dr. Alanis to know that lyrica makes her very loopy. She said it takes about a whole day for it to get our of her system. She says she doesn't know is she will be able to do drive to physical therapy while she is on the medication.

## 2025-03-14 ENCOUNTER — OFFICE VISIT (OUTPATIENT)
Age: 60
End: 2025-03-14

## 2025-03-14 VITALS — BODY MASS INDEX: 47.34 KG/M2 | HEIGHT: 63 IN | WEIGHT: 267.2 LBS

## 2025-03-14 DIAGNOSIS — M17.12 PRIMARY OSTEOARTHRITIS OF LEFT KNEE: ICD-10-CM

## 2025-03-14 DIAGNOSIS — M17.11 PRIMARY OSTEOARTHRITIS OF RIGHT KNEE: Primary | ICD-10-CM

## 2025-03-14 DIAGNOSIS — M23.52 CHRONIC KNEE INSTABILITY, LEFT: ICD-10-CM

## 2025-03-14 DIAGNOSIS — I25.10 CORONARY ARTERY DISEASE INVOLVING NATIVE CORONARY ARTERY OF NATIVE HEART WITHOUT ANGINA PECTORIS: ICD-10-CM

## 2025-03-14 DIAGNOSIS — E66.01 SEVERE OBESITY (BMI >= 40): ICD-10-CM

## 2025-03-14 DIAGNOSIS — G62.9 NEUROPATHY: ICD-10-CM

## 2025-03-14 DIAGNOSIS — A69.20 LYME DISEASE: ICD-10-CM

## 2025-03-14 NOTE — PROGRESS NOTES
Patient: Jessie Momin                MRN: [id number]       SSN: [id number]  YOB: 1965        AGE: 59 y.o.        SEX: female  BMI: Body mass index is 47.33 kg/m².    PCP: Susana Allen MD  03/14/25    Chief Complaint: New Patient (Bilateral knee pain )      1. Primary osteoarthritis of right knee  -     AMB POC XRAY, KNEE; COMPLETE, 4+ VIEW  -     External Referral To Physical Therapy  2. Primary osteoarthritis of left knee  -     AMB POC XRAY, KNEE; COMPLETE, 4+ VIEW  -     External Referral To Physical Therapy  -     DME Order for (Specify) as OP  3. Chronic knee instability, left  -     DME Order for (Specify) as OP  4. Coronary artery disease involving native coronary artery of native heart without angina pectoris  5. Neuropathy  6. Lyme disease  7. Severe obesity (BMI >= 40)        SUBJECTIVE:    History of Present Illness  The patient is a 59-year-old female who presents for evaluation of bilateral knee pain.    She reports experiencing bilateral knee pain, with the left knee being more severely affected. She describes a sensation of instability in the left knee, as if the patella is attempting to dislocate during ambulation. This has not resulted in any actual dislocation or locking episodes. The pain is described as sharp and severe enough to cause her to feel as though she might collapse while walking. She has been managing this pain for over a year since her last nerve ablation procedure. She has also undergone cortisone injections, which have provided minimal relief. She has attempted to use braces for support but has found them to be ill-fitting and prone to slipping down. She is currently attending physical therapy sessions.    Supplemental Information  She had a heart attack on 08/30/2024. She talked to cardiology recently because it was after Dr. Alanis did the EMGs and the muscle studies with all the needles. There was slight bleeding, but nothing major. She asked

## 2025-03-19 ENCOUNTER — OFFICE VISIT (OUTPATIENT)
Age: 60
End: 2025-03-19
Payer: MEDICAID

## 2025-03-19 VITALS
DIASTOLIC BLOOD PRESSURE: 77 MMHG | HEIGHT: 63 IN | WEIGHT: 257 LBS | TEMPERATURE: 97.7 F | SYSTOLIC BLOOD PRESSURE: 115 MMHG | OXYGEN SATURATION: 99 % | HEART RATE: 70 BPM | BODY MASS INDEX: 45.54 KG/M2

## 2025-03-19 DIAGNOSIS — M54.16 CHRONIC LUMBAR RADICULOPATHY: ICD-10-CM

## 2025-03-19 DIAGNOSIS — G56.03 BILATERAL CARPAL TUNNEL SYNDROME: ICD-10-CM

## 2025-03-19 DIAGNOSIS — R25.1 TREMOR: ICD-10-CM

## 2025-03-19 DIAGNOSIS — R29.898 WEAKNESS OF BOTH HANDS: ICD-10-CM

## 2025-03-19 DIAGNOSIS — G62.9 POLYNEUROPATHY, UNSPECIFIED: ICD-10-CM

## 2025-03-19 DIAGNOSIS — G56.23 CUBITAL TUNNEL SYNDROME, BILATERAL: ICD-10-CM

## 2025-03-19 DIAGNOSIS — Z98.890 HISTORY OF CARPAL TUNNEL RELEASE OF BOTH WRISTS: ICD-10-CM

## 2025-03-19 DIAGNOSIS — R79.89 LOW VITAMIN D LEVEL: ICD-10-CM

## 2025-03-19 PROCEDURE — 3074F SYST BP LT 130 MM HG: CPT | Performed by: NURSE PRACTITIONER

## 2025-03-19 PROCEDURE — 99213 OFFICE O/P EST LOW 20 MIN: CPT | Performed by: NURSE PRACTITIONER

## 2025-03-19 PROCEDURE — 3078F DIAST BP <80 MM HG: CPT | Performed by: NURSE PRACTITIONER

## 2025-03-19 RX ORDER — DULOXETIN HYDROCHLORIDE 30 MG/1
CAPSULE, DELAYED RELEASE ORAL
Qty: 90 CAPSULE | Refills: 5 | Status: SHIPPED | OUTPATIENT
Start: 2025-03-19

## 2025-03-19 RX ORDER — DULOXETIN HYDROCHLORIDE 30 MG/1
30 CAPSULE, DELAYED RELEASE ORAL 2 TIMES DAILY
COMMUNITY
End: 2025-03-19

## 2025-03-19 RX ORDER — CHOLECALCIFEROL (VITAMIN D3) 25 MCG
1000 TABLET ORAL DAILY
Qty: 30 TABLET | Refills: 6 | Status: SHIPPED | OUTPATIENT
Start: 2025-03-19

## 2025-03-19 NOTE — PATIENT INSTRUCTIONS
Patient instructions:  -referral to occupational therapy for hands strength/function  [address] in [place]    -vitamin D 1000 units daily (rx sent)    -continue duloxetine 30 mg in AM and 60 mg in PM    -lab for hepatic function panel and vit B12 level     -Lyrica per ortho    -follow with ortho/spine

## 2025-03-19 NOTE — PROGRESS NOTES
[facility]  [address] [place] [address]  Office:  [phone]  Fax: [phone]  Chief Complaint   Patient presents with    Follow-up     Polyneuropathy        HPI: Jessie Momin presents in follow-up for neuropathy.  She was last seen here on 10/7/2024.  EMG to be repeated and she was referred to OT.  Continued Cymbalta 30 mg in a.m. and 60 mg in p.m.    EMG of BUE 2/28/2025:  \"INTERPRETATION  This is an abnormal electrodiagnostic examination. These findings may be consistent with:  Residual at least moderate median mononeuropathy at the wrists bilaterally (carpal tunnel syndrome)   At least Moderate ulnar mononeuropathy at the elbows bilaterally (cubital tunnel syndrome)   Length-dependent sensorimotor peripheral polyneuropathy      There are no electrodiagnostic findings consistent with cervical radiculopathy, brachial plexopathy, myopathy, or any other mononeuropathy.         CLINICAL INTERPRETATION  The electrodiagnostic findings of median and ulnar mononeuropathies and polyneuropathy appear consistent with her hand symptoms.\"      EMG of BLE 3/5/2025:  \"INTERPRETATION  This is an abnormal electrodiagnostic examination. These findings may be consistent with:  Length-dependent sensorimotor polyneuropathy  Chronic left L4 radiculopathy -based on polyphasic waves found in tibialis anterior and rectus femoris. This is appears improved from her last study.      There are no electrodiagnostic findings consistent with lumbosacral plexopathy, myopathy, or any other lumbar radiculopathy.        CLINICAL INTERPRETATION     Jessie Momin is a 59 female c/o lumbar pain and B/L leg pain. Her symptoms likely derive from spinal stenosis with neurogenic claudication, neuropathy, lumbar facet arthropathy, and chronic primary musculoskeletal pain.     Pt also c/o multiple joint pains and was previously treated at [facility]. Pt has scheduled new patient appointments for further treatment

## 2025-03-20 ENCOUNTER — HOSPITAL ENCOUNTER (OUTPATIENT)
Age: 60
Discharge: HOME | End: 2025-03-23
Payer: MEDICAID

## 2025-03-20 DIAGNOSIS — G62.9 POLYNEUROPATHY, UNSPECIFIED: ICD-10-CM

## 2025-03-20 LAB
ALBUMIN SERPL-MCNC: 3.6 G/DL (ref 3.4–5)
ALBUMIN/GLOB SERPL: 1 (ref 0.8–1.7)
ALP SERPL-CCNC: 126 U/L (ref 45–117)
ALT SERPL-CCNC: 14 U/L (ref 13–56)
AST SERPL W P-5'-P-CCNC: 10 U/L (ref 10–38)
BILIRUB DIRECT SERPL-MCNC: 0.2 MG/DL (ref 0–0.2)
BILIRUB SERPL-MCNC: 0.7 MG/DL (ref 0.2–1)
GLOBULIN SER CALC-MCNC: 3.5 G/DL (ref 2–4)
PROT SERPL-MCNC: 7.1 G/DL (ref 6.4–8.2)

## 2025-03-20 PROCEDURE — 80076 HEPATIC FUNCTION PANEL: CPT

## 2025-03-20 PROCEDURE — 83921 ORGANIC ACID SINGLE QUANT: CPT

## 2025-03-20 PROCEDURE — 36415 COLL VENOUS BLD VENIPUNCTURE: CPT

## 2025-03-20 PROCEDURE — 82746 ASSAY OF FOLIC ACID SERUM: CPT

## 2025-03-20 PROCEDURE — 82607 VITAMIN B-12: CPT

## 2025-03-21 LAB
FOLATE SERPL-MCNC: 10.5 NG/ML (ref 3.1–17.5)
VIT B12 SERPL-MCNC: 346 PG/ML (ref 211–911)

## 2025-03-24 LAB
Lab: ABNORMAL
METHYLMALONATE SERPL-SCNC: 542 NMOL/L (ref 0–378)

## 2025-03-25 ENCOUNTER — OFFICE VISIT (OUTPATIENT)
Age: 60
End: 2025-03-25
Payer: MEDICAID

## 2025-03-25 DIAGNOSIS — M79.672 LEFT FOOT PAIN: ICD-10-CM

## 2025-03-25 DIAGNOSIS — Z87.39 H/O HAMMER TOE CORRECTION: ICD-10-CM

## 2025-03-25 DIAGNOSIS — M25.572 CHRONIC PAIN OF LEFT ANKLE: ICD-10-CM

## 2025-03-25 DIAGNOSIS — G89.29 CHRONIC PAIN OF LEFT ANKLE: ICD-10-CM

## 2025-03-25 DIAGNOSIS — M14.672 CHARCOT JOINT OF LEFT FOOT: ICD-10-CM

## 2025-03-25 DIAGNOSIS — M19.072 PRIMARY OSTEOARTHRITIS OF LEFT FOOT: ICD-10-CM

## 2025-03-25 DIAGNOSIS — E10.42 DIABETIC POLYNEUROPATHY ASSOCIATED WITH TYPE 1 DIABETES MELLITUS: Primary | ICD-10-CM

## 2025-03-25 DIAGNOSIS — Z98.890 H/O HAMMER TOE CORRECTION: ICD-10-CM

## 2025-03-25 PROCEDURE — 73630 X-RAY EXAM OF FOOT: CPT | Performed by: ORTHOPAEDIC SURGERY

## 2025-03-25 PROCEDURE — 99214 OFFICE O/P EST MOD 30 MIN: CPT | Performed by: ORTHOPAEDIC SURGERY

## 2025-03-25 PROCEDURE — 73600 X-RAY EXAM OF ANKLE: CPT | Performed by: ORTHOPAEDIC SURGERY

## 2025-03-25 RX ORDER — ACETAMINOPHEN 500 MG
500 TABLET ORAL 2 TIMES DAILY PRN
Qty: 60 TABLET | Refills: 0 | Status: SHIPPED | OUTPATIENT
Start: 2025-03-25

## 2025-03-25 NOTE — PROGRESS NOTES
AMBULATORY PROGRESS NOTE      Patient: Jessie Momin             MRN: [id number]     SSN: [id number] There is no height or weight on file to calculate BMI.  YOB: 1965     AGE: 59 y.o.       EX: female    PCP: Susana Allen MD       IMPRESSION //  DIAGNOSIS AND TREATMENT PLAN      Jessie Momin has a diagnosis of:        Jessie Momin has profound OA to the left midfoot, the left second, and third tarsometatarsal joint articulations, with some OA changes seen across the first TMT region, but to a lesser extent.  She is also status post a left great toe first MTP joint arthrodesis, and multiple hammertoe 2, 3, 4 hammertoe procedures by a local podiatrist, ADILIA Rivas.    X-rays, 3 views, of her left foot, AP, lateral, oblique, images, nonweightbearing, shows postop changes, from the left great toe first MTP.  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  The weightbearing lateral component, shows OA of the midfoot, at the navicular cuneiform joint articulation primarily, I do not see any inferior translation of the cuneiform bones, or the cuboid, and this lateral weightbearing film, although there is pes planovalgus alignment, the apex of the deformity being at the naviculocuneiform region.  There does look like the 1 screw, along the proximal portion of the plate construct, is a little bit more proud (these are typically nonlocked cortical screws).  The ankle component, ankle alignment, appears to be well-maintained, weightbearing AP, weightbearing oblique, of this left ankle.    Right ankle, AP, mortise, appears normal alignment, weightbearing AP view, right foot,

## 2025-03-26 ENCOUNTER — TELEPHONE (OUTPATIENT)
Age: 60
End: 2025-03-26

## 2025-03-26 NOTE — TELEPHONE ENCOUNTER
Patient states that Dr. Alanis referred her to Dr. Marie office does not accept her insurance.    Patient tel [phone]    Patient also want to know if the physical therapy order can be sent to [facility]  -phone [phone]  -fax [phone]

## 2025-03-27 NOTE — TELEPHONE ENCOUNTER
Dr. Oviedo wants CT scan done as weightbearing.  [facility] is the only local facility that does weightbearing CT scans.  
Patient states that [facility] wanted her to get the CT scan done at [facility], but the patient states that the [place] can complete the CT and the patient wants to know if it is ok for her to complete the CT at [place].    Patient tel [phone]   
[facility] does not accept pt's insurance ([facility] Medicaid) so a non WB order for CT was placed.  Pt was notified and she was going to call the scheduling dept herself and schedule it at [facility].  
(4) no impairment

## 2025-03-31 ENCOUNTER — CLINICAL DOCUMENTATION (OUTPATIENT)
Age: 60
End: 2025-03-31

## 2025-03-31 NOTE — TELEPHONE ENCOUNTER
Order fax to [facility] with completed fax sheet  
Patient states that she still has not received the call from anyone regarding her left knee brace    Patient tel [phone]  
right knee

## 2025-04-03 DIAGNOSIS — R79.89 LOW VITAMIN B12 LEVEL: Primary | ICD-10-CM

## 2025-04-03 RX ORDER — CALCIUM CARBONATE/VITAMIN D3 600 MG-10
250 TABLET ORAL DAILY
Qty: 30 TABLET | Refills: 5 | Status: SHIPPED | OUTPATIENT
Start: 2025-04-03

## 2025-04-04 ENCOUNTER — RESULTS FOLLOW-UP (OUTPATIENT)
Age: 60
End: 2025-04-04

## 2025-04-08 ENCOUNTER — HOSPITAL ENCOUNTER (OUTPATIENT)
Age: 60
Discharge: HOME | End: 2025-04-11
Payer: MEDICAID

## 2025-04-08 ENCOUNTER — TELEPHONE (OUTPATIENT)
Age: 60
End: 2025-04-08

## 2025-04-08 DIAGNOSIS — M19.072 PRIMARY OSTEOARTHRITIS OF LEFT FOOT: ICD-10-CM

## 2025-04-08 DIAGNOSIS — Z98.890 H/O HAMMER TOE CORRECTION: ICD-10-CM

## 2025-04-08 DIAGNOSIS — G89.29 CHRONIC PAIN OF LEFT ANKLE: ICD-10-CM

## 2025-04-08 DIAGNOSIS — M14.672 CHARCOT JOINT OF LEFT FOOT: ICD-10-CM

## 2025-04-08 DIAGNOSIS — Z87.39 H/O HAMMER TOE CORRECTION: ICD-10-CM

## 2025-04-08 DIAGNOSIS — M25.572 CHRONIC PAIN OF LEFT ANKLE: ICD-10-CM

## 2025-04-08 PROCEDURE — 73700 CT LOWER EXTREMITY W/O DYE: CPT

## 2025-04-08 NOTE — TELEPHONE ENCOUNTER
Spoke to patient and informed patient that paperwork regarding the approval of the brace with fax over yesterday and hopefully will heard back form [employer]ir by Friday    Patient verbalized understanding.

## 2025-04-08 NOTE — TELEPHONE ENCOUNTER
Patient called in and stated that she still have not received a call from [employer] regarding the brace.  Patient has also requested c call and wants to know who is the DME Company and phone number  Please advise patient @ [phone]

## 2025-04-21 ENCOUNTER — CLINICAL DOCUMENTATION (OUTPATIENT)
Age: 60
End: 2025-04-21

## 2025-05-01 ENCOUNTER — TELEPHONE (OUTPATIENT)
Age: 60
End: 2025-05-01

## 2025-05-04 DIAGNOSIS — I10 ESSENTIAL (PRIMARY) HYPERTENSION: ICD-10-CM

## 2025-05-05 RX ORDER — SPIRONOLACTONE 50 MG/1
50 TABLET, FILM COATED ORAL DAILY
Qty: 90 TABLET | Refills: 3 | Status: SHIPPED | OUTPATIENT
Start: 2025-05-05

## 2025-05-20 ENCOUNTER — HOSPITAL ENCOUNTER (OUTPATIENT)
Age: 60
Discharge: HOME | End: 2025-05-20
Payer: MEDICAID

## 2025-05-20 ENCOUNTER — OFFICE VISIT (OUTPATIENT)
Facility: CLINIC | Age: 60
End: 2025-05-20
Payer: MEDICAID

## 2025-05-20 VITALS
WEIGHT: 267.2 LBS | TEMPERATURE: 99.2 F | HEART RATE: 74 BPM | BODY MASS INDEX: 47.34 KG/M2 | DIASTOLIC BLOOD PRESSURE: 68 MMHG | SYSTOLIC BLOOD PRESSURE: 122 MMHG | HEIGHT: 63 IN | OXYGEN SATURATION: 97 % | RESPIRATION RATE: 16 BRPM

## 2025-05-20 DIAGNOSIS — Z12.31 ENCOUNTER FOR SCREENING MAMMOGRAM FOR MALIGNANT NEOPLASM OF BREAST: ICD-10-CM

## 2025-05-20 DIAGNOSIS — G89.29 CHRONIC PAIN OF LEFT KNEE: ICD-10-CM

## 2025-05-20 DIAGNOSIS — E53.8 B12 DEFICIENCY: ICD-10-CM

## 2025-05-20 DIAGNOSIS — E55.9 VITAMIN D DEFICIENCY: ICD-10-CM

## 2025-05-20 DIAGNOSIS — J43.9 PULMONARY EMPHYSEMA, UNSPECIFIED EMPHYSEMA TYPE (HCC): ICD-10-CM

## 2025-05-20 DIAGNOSIS — J45.21 MILD INTERMITTENT ASTHMA WITH ACUTE EXACERBATION: ICD-10-CM

## 2025-05-20 DIAGNOSIS — M25.562 CHRONIC PAIN OF LEFT KNEE: ICD-10-CM

## 2025-05-20 DIAGNOSIS — Z87.891 QUIT SMOKING: ICD-10-CM

## 2025-05-20 DIAGNOSIS — R73.01 IMPAIRED FASTING BLOOD SUGAR: ICD-10-CM

## 2025-05-20 DIAGNOSIS — R53.83 OTHER FATIGUE: ICD-10-CM

## 2025-05-20 DIAGNOSIS — R51.9 FREQUENT HEADACHES: ICD-10-CM

## 2025-05-20 DIAGNOSIS — R23.3 EASY BRUISING: ICD-10-CM

## 2025-05-20 DIAGNOSIS — R07.9 CHEST PAIN, UNSPECIFIED TYPE: ICD-10-CM

## 2025-05-20 DIAGNOSIS — K21.9 GASTROESOPHAGEAL REFLUX DISEASE WITHOUT ESOPHAGITIS: ICD-10-CM

## 2025-05-20 DIAGNOSIS — I50.32 CHRONIC DIASTOLIC (CONGESTIVE) HEART FAILURE (HCC): ICD-10-CM

## 2025-05-20 DIAGNOSIS — E03.9 ACQUIRED HYPOTHYROIDISM: ICD-10-CM

## 2025-05-20 DIAGNOSIS — I21.11 ST ELEVATION MYOCARDIAL INFARCTION INVOLVING RIGHT CORONARY ARTERY (HCC): Primary | ICD-10-CM

## 2025-05-20 LAB
25(OH)D3 SERPL-MCNC: 28 NG/ML (ref 30–100)
ALBUMIN SERPL-MCNC: 3.6 G/DL (ref 3.4–5)
ALBUMIN/GLOB SERPL: 1.3 (ref 0.8–1.7)
ALP SERPL-CCNC: 129 U/L (ref 45–117)
ALT SERPL-CCNC: 20 U/L (ref 10–35)
ANION GAP SERPL CALC-SCNC: 12 MMOL/L (ref 3–18)
AST SERPL-CCNC: 21 U/L (ref 10–38)
BILIRUB SERPL-MCNC: 0.5 MG/DL (ref 0.2–1)
BUN SERPL-MCNC: 14 MG/DL (ref 6–23)
BUN/CREAT SERPL: 14 (ref 12–20)
CALCIUM SERPL-MCNC: 10.4 MG/DL (ref 8.5–10.1)
CHLORIDE SERPL-SCNC: 99 MMOL/L (ref 98–107)
CO2 SERPL-SCNC: 26 MMOL/L (ref 21–32)
CREAT SERPL-MCNC: 1.02 MG/DL (ref 0.6–1.3)
ERYTHROCYTE [DISTWIDTH] IN BLOOD BY AUTOMATED COUNT: 13.2 % (ref 11.6–14.5)
EST. AVERAGE GLUCOSE BLD GHB EST-MCNC: 133 MG/DL
GLOBULIN SER CALC-MCNC: 2.8 G/DL (ref 2–4)
GLUCOSE SERPL-MCNC: 133 MG/DL (ref 74–108)
HBA1C MFR BLD: 6.3 % (ref 4.2–5.6)
HCT VFR BLD AUTO: 47 % (ref 35–45)
HGB BLD-MCNC: 14.5 G/DL (ref 12–16)
MCH RBC QN AUTO: 28.4 PG (ref 24–34)
MCHC RBC AUTO-ENTMCNC: 30.9 G/DL (ref 31–37)
MCV RBC AUTO: 92 FL (ref 78–100)
NRBC # BLD: 0 K/UL (ref 0–0.01)
NRBC BLD-RTO: 0 PER 100 WBC
PLATELET # BLD AUTO: 235 K/UL (ref 135–420)
PMV BLD AUTO: 9.8 FL (ref 9.2–11.8)
POTASSIUM SERPL-SCNC: 4.8 MMOL/L (ref 3.5–5.5)
PROT SERPL-MCNC: 6.3 G/DL (ref 6.4–8.2)
RBC # BLD AUTO: 5.11 M/UL (ref 4.2–5.3)
SODIUM SERPL-SCNC: 137 MMOL/L (ref 136–145)
TSH, 3RD GENERATION: 1.61 UIU/ML (ref 0.27–4.2)
VIT B12 SERPL-MCNC: 874 PG/ML (ref 211–911)
WBC # BLD AUTO: 13.1 K/UL (ref 4.6–13.2)

## 2025-05-20 PROCEDURE — 85027 COMPLETE CBC AUTOMATED: CPT

## 2025-05-20 PROCEDURE — 82306 VITAMIN D 25 HYDROXY: CPT

## 2025-05-20 PROCEDURE — 80053 COMPREHEN METABOLIC PANEL: CPT

## 2025-05-20 PROCEDURE — 99214 OFFICE O/P EST MOD 30 MIN: CPT | Performed by: FAMILY MEDICINE

## 2025-05-20 PROCEDURE — 36415 COLL VENOUS BLD VENIPUNCTURE: CPT

## 2025-05-20 PROCEDURE — 83036 HEMOGLOBIN GLYCOSYLATED A1C: CPT

## 2025-05-20 PROCEDURE — 3078F DIAST BP <80 MM HG: CPT | Performed by: FAMILY MEDICINE

## 2025-05-20 PROCEDURE — 82607 VITAMIN B-12: CPT

## 2025-05-20 PROCEDURE — 3074F SYST BP LT 130 MM HG: CPT | Performed by: FAMILY MEDICINE

## 2025-05-20 PROCEDURE — 84443 ASSAY THYROID STIM HORMONE: CPT

## 2025-05-20 NOTE — PROGRESS NOTES
Jessie Momin (:  1965) is a 59 y.o. female, Established patient, here for evaluation of the following chief complaint(s):  Abnormal CBC, Acute renal insufficiency, ST elevation, Hyponatremia, and Tremors         Assessment & Plan  1. Easy bruising.  - Reports increased bruising, including significant bruises on the breast and chest without any trauma.  - Likely due to blood thinners taken following heart attack on 2024.  - Advised to discuss with Dr. Polanco to evaluate the necessity of continuing Brilinta and potential adjustments to medication regimen.  - No leg swelling reported; weight monitoring at home suggested due to history of congestive heart failure.    2. Chest pain.  - Reports occasional mild chest pain.  - Blood pressure and heart rate are within normal limits.  - Not currently taking nitroglycerin; advised to consult Dr. Polanco regarding initiation of nitroglycerin therapy.  - No recent need for nitroglycerin reported.    3. Headaches.  - Reports a constant headache for the past four days, occasionally subsides but then returns.  - No history of migraines, vision changes, nausea, vomiting, or recent injury.  - Ice application recommended for headache relief.  - Blood pressure and heart rate are stable.    4. Fatigue.  - Reports feeling extremely tired despite adequate sleep.  - Temperature slightly elevated at 99°F, usually around 94.6°F.  - Laboratory tests to be conducted to assess vitamin D levels, TSH, and complete blood count.  - Taking B12 supplements regularly.    5. Asthma.  - Reports occasional chest tightness on the left side.  - Taking Symbicort daily.  - No recent cold, cough, or wheezing reported.  - Smoking cessation since heart attack; no recent alcohol consumption.    6. Health maintenance.  - GFR approximately 50, indicating mildly reduced kidney function.  - Calcium levels slightly elevated.  - In 2025, A1c was in the prediabetic range, and vitamin D

## 2025-05-20 NOTE — PROGRESS NOTES
Have you been to the ER, urgent care clinic since your last visit?  Hospitalized since your last visit?   NO    Have you seen or consulted any other health care providers outside our system since your last visit?   [facility], Dr. Cole LOV: 3/13/25. [facility] LOV: 11/22/24.     “Have you had a pap smear?”    2024 - [facility] Lashay Hamilton NP. Record has been requested.    No cervical cancer screening on file              Chief Complaint   Patient presents with    Abnormal CBC    Acute renal insufficiency    ST elevation    Hyponatremia    Tremors

## 2025-05-21 ENCOUNTER — RESULTS FOLLOW-UP (OUTPATIENT)
Age: 60
End: 2025-05-21

## 2025-05-21 ENCOUNTER — OFFICE VISIT (OUTPATIENT)
Age: 60
End: 2025-05-21
Payer: MEDICAID

## 2025-05-21 VITALS
BODY MASS INDEX: 47.48 KG/M2 | OXYGEN SATURATION: 99 % | HEIGHT: 63 IN | HEART RATE: 72 BPM | DIASTOLIC BLOOD PRESSURE: 73 MMHG | WEIGHT: 268 LBS | SYSTOLIC BLOOD PRESSURE: 128 MMHG

## 2025-05-21 DIAGNOSIS — I10 ESSENTIAL (PRIMARY) HYPERTENSION: ICD-10-CM

## 2025-05-21 DIAGNOSIS — E83.52 HYPERCALCEMIA: ICD-10-CM

## 2025-05-21 DIAGNOSIS — I10 ESSENTIAL HYPERTENSION: ICD-10-CM

## 2025-05-21 DIAGNOSIS — E78.2 MIXED HYPERLIPIDEMIA: ICD-10-CM

## 2025-05-21 DIAGNOSIS — I50.32 CHRONIC DIASTOLIC (CONGESTIVE) HEART FAILURE (HCC): ICD-10-CM

## 2025-05-21 DIAGNOSIS — E66.01 SEVERE OBESITY (BMI >= 40) (HCC): ICD-10-CM

## 2025-05-21 DIAGNOSIS — I25.118 CORONARY ARTERY DISEASE OF NATIVE ARTERY OF NATIVE HEART WITH STABLE ANGINA PECTORIS: Primary | ICD-10-CM

## 2025-05-21 DIAGNOSIS — Z95.5 HISTORY OF CORONARY ARTERY STENT PLACEMENT: ICD-10-CM

## 2025-05-21 PROBLEM — I50.9 CONGESTIVE HEART FAILURE (HCC): Status: RESOLVED | Noted: 2021-06-11 | Resolved: 2025-05-21

## 2025-05-21 PROCEDURE — 99214 OFFICE O/P EST MOD 30 MIN: CPT | Performed by: INTERNAL MEDICINE

## 2025-05-21 PROCEDURE — 3074F SYST BP LT 130 MM HG: CPT | Performed by: INTERNAL MEDICINE

## 2025-05-21 PROCEDURE — 3078F DIAST BP <80 MM HG: CPT | Performed by: INTERNAL MEDICINE

## 2025-05-21 RX ORDER — FUROSEMIDE 40 MG/1
40 TABLET ORAL DAILY
Qty: 90 TABLET | Refills: 1 | Status: SHIPPED | OUTPATIENT
Start: 2025-05-21

## 2025-05-21 RX ORDER — DAPAGLIFLOZIN 10 MG/1
10 TABLET, FILM COATED ORAL EVERY MORNING
Qty: 90 TABLET | Refills: 3 | Status: SHIPPED | OUTPATIENT
Start: 2025-05-21

## 2025-05-21 RX ORDER — ASPIRIN 81 MG/1
81 TABLET ORAL DAILY
Qty: 100 TABLET | Status: SHIPPED | OUTPATIENT
Start: 2025-05-21

## 2025-05-21 RX ORDER — LISINOPRIL 2.5 MG/1
2.5 TABLET ORAL DAILY
Qty: 90 TABLET | Refills: 3 | Status: SHIPPED | OUTPATIENT
Start: 2025-05-21

## 2025-05-21 RX ORDER — CLOPIDOGREL BISULFATE 75 MG/1
75 TABLET ORAL DAILY
Qty: 90 TABLET | Refills: 3 | Status: SHIPPED | OUTPATIENT
Start: 2025-05-21

## 2025-05-21 RX ORDER — LANOLIN ALCOHOL/MO/W.PET/CERES
400 CREAM (GRAM) TOPICAL 2 TIMES DAILY
Qty: 90 TABLET | Refills: 3 | Status: SHIPPED | OUTPATIENT
Start: 2025-05-21

## 2025-05-21 RX ORDER — ATORVASTATIN CALCIUM 80 MG/1
80 TABLET, FILM COATED ORAL NIGHTLY
Qty: 90 TABLET | Refills: 3 | Status: SHIPPED | OUTPATIENT
Start: 2025-05-21

## 2025-05-21 RX ORDER — SPIRONOLACTONE 50 MG/1
50 TABLET, FILM COATED ORAL DAILY
Qty: 90 TABLET | Refills: 3 | Status: SHIPPED | OUTPATIENT
Start: 2025-05-21

## 2025-05-21 RX ORDER — CARVEDILOL 6.25 MG/1
6.25 TABLET ORAL 2 TIMES DAILY WITH MEALS
Qty: 180 TABLET | Refills: 3 | Status: SHIPPED | OUTPATIENT
Start: 2025-05-21

## 2025-05-21 RX ORDER — NITROGLYCERIN 0.4 MG/1
0.4 TABLET SUBLINGUAL EVERY 5 MIN PRN
Qty: 25 TABLET | Refills: 0 | Status: SHIPPED | OUTPATIENT
Start: 2025-05-21

## 2025-05-21 ASSESSMENT — PATIENT HEALTH QUESTIONNAIRE - PHQ9
SUM OF ALL RESPONSES TO PHQ QUESTIONS 1-9: 0
1. LITTLE INTEREST OR PLEASURE IN DOING THINGS: NOT AT ALL
SUM OF ALL RESPONSES TO PHQ QUESTIONS 1-9: 0
2. FEELING DOWN, DEPRESSED OR HOPELESS: NOT AT ALL

## 2025-05-21 ASSESSMENT — ENCOUNTER SYMPTOMS
EYES NEGATIVE: 1
GASTROINTESTINAL NEGATIVE: 1
SHORTNESS OF BREATH: 0

## 2025-05-21 NOTE — RESULT ENCOUNTER NOTE
Please contact patient and do the following asap    This EKG was initially filed with the wrong patient, Ms. Dorman.  We printed it from her records and corrected the name manually and scanned in correct patient's chart.  The findings of IRBBB and LAHB are same as in previous EKG of Ms. Camer

## 2025-05-21 NOTE — PROGRESS NOTES
1. Have you been to the ER, urgent care clinic since your last visit?  Hospitalized since your last visit?     No      2.  Where do you normally have your labs drawn?   [facility]    3. Do you need any refills today?   Yes    4. Which local pharmacy do you use (enter pharmacy)?   [facility]    5. Which mail order pharmacy do you use (enter pharmacy)?   No     6. Are you here for surgical clearance and if so who will be doing your     procedure/surgery (care team)?   No      
some distal embolization however cleared up quickly and patient received 3.5 x 12 mm drug-eluting stent.  After stent insertion to distal right coronary artery there is no residual stenosis at the area of 99% stenosis and there is a AGUILAR-3 flow.  Patient is asymptomatic.    Plan: Will continue dual antiplatelet medications and statin and beta-blocker and ARB.  I will check serial EKG and enzymes and echocardiogram.  Advised patient to quit smoking completely.  Thank you.    Signed by: Collin Sosa MD on 8/30/2024  9:48 AM    Cardiac cath  10/21/2024  Conclusion  Severe Coronary Artery Disease  pRCA 40-50% stenosis  mLCFX 70% stenosis  mLAD 30-40% stenosis, Diagonal 1 50-69% stenosis, Diagonal 2 50-69% stenosis  LM 0% stenosis luminal irregularities  LVEDP 17mmHg mildly elevated  RCA Dominant System     Successful balloon angioplasty and MANA of the mLCFX utilizing 4.0x22mm MANA and postdilated with 5.0mm NC balloon, deployed at lower pressure distally with high proximal distal size mismatch     Plan: Continue with aspirin 81mg po daily, brilinta 90mg po BID, statin as can tolerate, BB as can tolerate, ACC provisions diet and exercise, Cardiac rehab.           ASSESSMENT & PLAN    Lab Results   Component Value Date    CHOL 147 08/30/2024    CHOL 214 (H) 03/22/2024    CHOL 221 (H) 03/22/2024    CHOL 205 (H) 11/16/2023    CHOL 186 06/23/2023    CHOL 179 01/17/2023    CHOL 179 01/11/2023    CHOL 174 01/09/2023     Lab Results   Component Value Date    TRIG 66 08/30/2024    TRIG 153 (H) 03/22/2024    TRIG 149 03/22/2024    TRIG 155 (H) 11/16/2023    TRIG 171 (H) 06/23/2023    TRIG 137 01/17/2023    TRIG 139 01/11/2023    TRIG 125 01/09/2023     Lab Results   Component Value Date    HDL 42 08/30/2024    HDL 52 03/22/2024    HDL 54 03/22/2024    HDL 59 11/16/2023    HDL 46 06/23/2023    HDL 46 01/17/2023    HDL 42 01/11/2023    HDL 4.3 01/11/2023     Lab Results   Component Value Date    LDL 91.8 08/30/2024    .4

## 2025-05-27 ENCOUNTER — RESULTS FOLLOW-UP (OUTPATIENT)
Facility: CLINIC | Age: 60
End: 2025-05-27

## 2025-05-27 ENCOUNTER — HOSPITAL ENCOUNTER (OUTPATIENT)
Age: 60
Discharge: HOME | End: 2025-05-30
Payer: MEDICAID

## 2025-05-27 VITALS — BODY MASS INDEX: 47.48 KG/M2 | HEIGHT: 63 IN | WEIGHT: 268 LBS

## 2025-05-27 DIAGNOSIS — R73.01 IMPAIRED FASTING BLOOD SUGAR: ICD-10-CM

## 2025-05-27 DIAGNOSIS — R71.8 ELEVATED HEMATOCRIT: Primary | ICD-10-CM

## 2025-05-27 DIAGNOSIS — E83.52 SERUM CALCIUM ELEVATED: ICD-10-CM

## 2025-05-27 DIAGNOSIS — E55.9 VITAMIN D DEFICIENCY: ICD-10-CM

## 2025-05-27 DIAGNOSIS — Z12.31 ENCOUNTER FOR SCREENING MAMMOGRAM FOR MALIGNANT NEOPLASM OF BREAST: ICD-10-CM

## 2025-05-27 PROCEDURE — 77063 BREAST TOMOSYNTHESIS BI: CPT

## 2025-05-30 DIAGNOSIS — Z91.89 AT HIGH RISK FOR BREAST CANCER: Primary | ICD-10-CM

## 2025-05-30 NOTE — PROGRESS NOTES
[facility]  [facility], [address]  [place] [address]  Phone: [phone]  Fax: [phone]        Jessie Momin  : 1965  PCP: Susana Allen MD  6/3/2025     PROGRESS NOTE     Consent: Jessie Momin was evaluated through a patient-initiated, synchronous (real-time) audio only encounter. She (or guardian if applicable) is aware that it is a billable service, which includes applicable co-pays, with coverage as determined by her insurance carrier. This visit was conducted with the patient's (and/or legan guardian's) verbal consent. She has not had a related appointment within my department in the past 7 days or scheduled within the next 24 hours. The patient was located in a state where the provider was licensed to provide care.     The patient was located at Home: [address] [place] [phone]  Provider was located at Facility (Appt Dept): [address] [place] [address]. [address]  [place] [address]    An audio-video encounter was offered to Jessie Momin  who opted for audio only encounter due to patient preference.      The visit was conducted in the office with the patient being in home through a telephone platform.  Visit time start: 11:59AM end: 12:10PM    HISTORY OF PRESENT ILLNESS    3/5/25  Presented for BUE EMG 25 which showed evidence of Residual at least moderate median mononeuropathy at the wrists bilaterally (carpal tunnel syndrome), At least Moderate ulnar mononeuropathy at the elbows bilaterally (cubital tunnel syndrome), and Length-dependent sensorimotor peripheral polyneuropathy   Presented for BLE EMG which showed evidence of Length-dependent sensorimotor peripheral polyneuropathy and Chronic left L4 radiculopathy.  Pt previously found benefit from B/L knee nerve ablations.   Previous BLE EMG  showed neuropathy and upper L2-4? Radiculopathy.   Previous BUE EMG  showed mod/sev B/L CTS and mild B/L CuTS.

## 2025-06-03 ENCOUNTER — SCHEDULED TELEPHONE ENCOUNTER (OUTPATIENT)
Age: 60
End: 2025-06-03
Payer: MEDICAID

## 2025-06-03 DIAGNOSIS — M47.816 LUMBAR FACET ARTHROPATHY: ICD-10-CM

## 2025-06-03 DIAGNOSIS — M79.18 CHRONIC PRIMARY MUSCULOSKELETAL PAIN: ICD-10-CM

## 2025-06-03 DIAGNOSIS — M54.50 LUMBAR PAIN: ICD-10-CM

## 2025-06-03 DIAGNOSIS — G62.9 NEUROPATHY: ICD-10-CM

## 2025-06-03 DIAGNOSIS — G89.29 CHRONIC PRIMARY MUSCULOSKELETAL PAIN: ICD-10-CM

## 2025-06-03 DIAGNOSIS — M54.2 CERVICAL PAIN: ICD-10-CM

## 2025-06-03 DIAGNOSIS — M48.062 SPINAL STENOSIS OF LUMBAR REGION WITH NEUROGENIC CLAUDICATION: Primary | ICD-10-CM

## 2025-06-03 PROCEDURE — 99214 OFFICE O/P EST MOD 30 MIN: CPT | Performed by: PHYSICAL MEDICINE & REHABILITATION

## 2025-06-03 ASSESSMENT — ENCOUNTER SYMPTOMS
TROUBLE SWALLOWING: 0
NAUSEA: 0
WHEEZING: 0
VOMITING: 0
SHORTNESS OF BREATH: 0
BACK PAIN: 1

## 2025-06-03 NOTE — PATIENT INSTRUCTIONS
MRI Scheduling Number: [id number]  Locations:  [facility]     Jagdish Woodward MD  [address] [address] [address] [facility]. [place] [address] [phone]

## 2025-06-04 DIAGNOSIS — G62.9 POLYNEUROPATHY, UNSPECIFIED: ICD-10-CM

## 2025-06-05 ENCOUNTER — TELEPHONE (OUTPATIENT)
Age: 60
End: 2025-06-05

## 2025-06-05 RX ORDER — TICAGRELOR 60 MG/1
60 TABLET, FILM COATED ORAL 2 TIMES DAILY
Qty: 180 TABLET | Refills: 1 | Status: SHIPPED | OUTPATIENT
Start: 2025-06-05

## 2025-06-05 NOTE — TELEPHONE ENCOUNTER
Patient called stating she doesn't like the plavix and is requesting to go back on brilinta. If possible a lower dose of the brilinta.

## 2025-06-05 NOTE — TELEPHONE ENCOUNTER
We can reduce the dose of Brilinta to 60 mg twice a day.  Discontinue Plavix  I sent the prescription

## 2025-06-09 RX ORDER — DULOXETIN HYDROCHLORIDE 60 MG/1
CAPSULE, DELAYED RELEASE ORAL
Qty: 30 CAPSULE | Refills: 0 | OUTPATIENT
Start: 2025-06-09

## 2025-06-17 ENCOUNTER — HOSPITAL ENCOUNTER (OUTPATIENT)
Age: 60
Discharge: HOME | End: 2025-06-20
Payer: MEDICAID

## 2025-06-17 DIAGNOSIS — M48.062 SPINAL STENOSIS OF LUMBAR REGION WITH NEUROGENIC CLAUDICATION: ICD-10-CM

## 2025-06-17 PROCEDURE — 72148 MRI LUMBAR SPINE W/O DYE: CPT

## 2025-06-20 ENCOUNTER — OFFICE VISIT (OUTPATIENT)
Age: 60
End: 2025-06-20

## 2025-06-20 VITALS
RESPIRATION RATE: 20 BRPM | WEIGHT: 271.6 LBS | HEART RATE: 74 BPM | OXYGEN SATURATION: 97 % | BODY MASS INDEX: 48.12 KG/M2 | DIASTOLIC BLOOD PRESSURE: 86 MMHG | SYSTOLIC BLOOD PRESSURE: 136 MMHG | TEMPERATURE: 97.5 F | HEIGHT: 63 IN

## 2025-06-20 DIAGNOSIS — Z91.89 AT HIGH RISK FOR BREAST CANCER: Primary | ICD-10-CM

## 2025-06-20 DIAGNOSIS — Z80.3 FAMILY HISTORY OF BREAST CANCER IN SISTER: ICD-10-CM

## 2025-06-20 NOTE — PROGRESS NOTES
Jessie Momin is a 59 y.o. female (: 1965)     Chief Complaint   Patient presents with    Follow-up     Bilateral breast/ high risk        Medication list and allergies have been reviewed with Jessie Momin and updated as of today's date.     I have gone over all Medical, Surgical and Social History with Jessie Momin and updated/added the information accordingly.

## 2025-06-20 NOTE — PROGRESS NOTES
CC:   Chief Complaint   Patient presents with    Follow-up     Bilateral breast/ high risk         Assessment:    ICD-10-CM    1. At high risk for breast cancer  Z91.89       2. Family history of breast cancer in sister  Z80.3           Plan:  I reviewed with her the most recent mammogram from 5/28/2025 which was normal BI-RADS 2.  She is due for bilateral screening mammogram in May 2026.  She would like to continue with high risk breast cancer screening alternating MRI and mammogram every 6 months.  We will order MRI for her to be done in November 2025.    We discussed the high risk breast screening program in detail. Women with a lifetime Tyrer-Cuzick score of 20% or greater, known genetic mutation (or untested women with a first-degree relative with a genetic mutation), history of radiation therapy to the chest prior to the age of 30, Li-Fraumeni syndrome, Cowden syndrome, Yoncerak-Eivhi-Ptdtwocld syndrome or a first-degree relative with these syndromes qualifies for high risk screening.     Per NCCN and ACR guidelines, it is recommended that women at higher than average risk undergo annual breast MRI with and without contrast starting at the age of 25, annual screening mammogram starting at the age of 30, and a clinical breast exam (CBE) every 6-12 months. I recommend an CBE every 6 months with alternating MRI and screening mammogram every 6 months. I also recommend monthly self breast exams to look for lumps, overlying skin changes, pain, or nipple discharge.       We discussed genetic testing in detail. Genetic testing is recommend for those over the age of 18. Health insurance should not be significantly affected by the results, though life and disability insurance may be. Those pursuing genetic testing may consider obtaining or maximizing benefits prior to proceeding with genetic testing. Many genetic testing companies will pay for first degree and sometimes extended relatives to be tested for the specific

## 2025-06-25 ENCOUNTER — OFFICE VISIT (OUTPATIENT)
Facility: CLINIC | Age: 60
End: 2025-06-25
Payer: MEDICAID

## 2025-06-25 VITALS
SYSTOLIC BLOOD PRESSURE: 134 MMHG | HEART RATE: 76 BPM | TEMPERATURE: 98.3 F | RESPIRATION RATE: 18 BRPM | OXYGEN SATURATION: 95 % | DIASTOLIC BLOOD PRESSURE: 78 MMHG

## 2025-06-25 DIAGNOSIS — G62.9 NEUROPATHY: ICD-10-CM

## 2025-06-25 DIAGNOSIS — M25.511 ACUTE PAIN OF RIGHT SHOULDER: Primary | ICD-10-CM

## 2025-06-25 DIAGNOSIS — M79.672 ACUTE FOOT PAIN, LEFT: ICD-10-CM

## 2025-06-25 DIAGNOSIS — M48.062 SPINAL STENOSIS OF LUMBAR REGION WITH NEUROGENIC CLAUDICATION: ICD-10-CM

## 2025-06-25 PROCEDURE — 3075F SYST BP GE 130 - 139MM HG: CPT | Performed by: FAMILY MEDICINE

## 2025-06-25 PROCEDURE — 99214 OFFICE O/P EST MOD 30 MIN: CPT | Performed by: FAMILY MEDICINE

## 2025-06-25 PROCEDURE — 3078F DIAST BP <80 MM HG: CPT | Performed by: FAMILY MEDICINE

## 2025-06-25 RX ORDER — PREGABALIN 25 MG/1
25 CAPSULE ORAL 2 TIMES DAILY
Qty: 180 CAPSULE | Refills: 0 | Status: SHIPPED | OUTPATIENT
Start: 2025-06-25 | End: 2025-09-23

## 2025-06-25 RX ORDER — TRAMADOL HYDROCHLORIDE 50 MG/1
50 TABLET ORAL EVERY 8 HOURS PRN
Qty: 30 TABLET | Refills: 0 | Status: SHIPPED | OUTPATIENT
Start: 2025-06-25 | End: 2025-07-09

## 2025-06-25 ASSESSMENT — ENCOUNTER SYMPTOMS
BACK PAIN: 1
COUGH: 0
ABDOMINAL PAIN: 0

## 2025-06-25 NOTE — PROGRESS NOTES
Have you been to the ER, urgent care clinic since your last visit?  Hospitalized since your last visit?   NO    Have you seen or consulted any other health care providers outside our system since your last visit?   NO           
(congestive heart failure) (HCC)     Chronic back pain     COPD (chronic obstructive pulmonary disease) (HCC)     D-dimer, elevated     Chronic elevation    Depression     Disease of blood and blood forming organ     Diverticulosis     Enlargement, spleen     Esophagospasm     Essential hypertension     Fatty liver     GERD (gastroesophageal reflux disease)     GI (gastrointestinal bleed)     Heart disease     Heart failure (HCC)     Right sided    Histoplasmosis     HPV (human papilloma virus) anogenital infection     Hypothyroidism     Irritable bowel syndrome     Lyme disease     MI (myocardial infarction) ([facility]) Aug.30,2024    Morbid obesity ([facility])     Neuropathy     Upper and lower    Neuropathy     Osteoarthritis     PCOS (polycystic ovarian syndrome)     Pituitary abnormality     Pneumonia     PTSD (post-traumatic stress disorder)     Restless legs syndrome     Tachycardia     Thyroid disease     Tricuspid insufficiency      Patient Active Problem List   Diagnosis    Diverticulitis    Gastroesophageal reflux disease    Asthma    Atypical angina    Mild intermittent asthma with acute exacerbation    Venous stasis    Chorioretinal scar, left eye    Coronary artery disease    Acquired hypothyroidism    Chronic diastolic (congestive) heart failure (HCC)    Polycystic ovary syndrome    Lyme disease    Essential hypertension    Neuropathy    Community acquired pneumonia of left lung    Vitamin D deficiency    Tobacco abuse counseling    Post traumatic stress disorder    Chorioretinal inflammation, bilateral    Severe obesity (BMI >= 40) ([facility])    ST elevation myocardial infarction involving right coronary artery (HCC)    Lung nodule    CAD (coronary artery disease)    Post-menopausal bleeding    Lumbar spondylosis    Chronic pain of both knees    Left foot pain    Hyponatremia    Tremor    Acute renal insufficiency    Pulmonary emphysema (HCC)    Elevated hematocrit    Serum calcium elevated    Impaired fasting blood

## 2025-06-26 ENCOUNTER — HOSPITAL ENCOUNTER (OUTPATIENT)
Age: 60
Setting detail: SPECIMEN
Discharge: HOME | End: 2025-06-29
Payer: MEDICAID

## 2025-06-26 ENCOUNTER — HOSPITAL ENCOUNTER (OUTPATIENT)
Age: 60
Setting detail: SPECIMEN
Discharge: HOME | End: 2025-06-29

## 2025-06-26 ENCOUNTER — HOSPITAL ENCOUNTER (OUTPATIENT)
Age: 60
Discharge: HOME | End: 2025-06-29
Payer: MEDICAID

## 2025-06-26 DIAGNOSIS — M25.511 ACUTE PAIN OF RIGHT SHOULDER: ICD-10-CM

## 2025-06-26 DIAGNOSIS — I50.32 CHRONIC DIASTOLIC (CONGESTIVE) HEART FAILURE (HCC): ICD-10-CM

## 2025-06-26 DIAGNOSIS — E83.52 HYPERCALCEMIA: ICD-10-CM

## 2025-06-26 LAB
ANION GAP SERPL CALC-SCNC: 12 MMOL/L
BUN SERPL-MCNC: 15 MG/DL (ref 6–23)
BUN/CREAT SERPL: 14
CA-I BLD-MCNC: 9.9 MG/DL (ref 8.5–10.1)
CHLORIDE SERPL-SCNC: 98 MMOL/L (ref 98–107)
CO2 SERPL-SCNC: 26 MMOL/L (ref 21–32)
CREAT SERPL-MCNC: 1.02 MG/DL (ref 0.6–1.3)
GLUCOSE SERPL-MCNC: 136 MG/DL (ref 74–108)
MAGNESIUM SERPL-MCNC: 2.1 MG/DL (ref 1.6–2.6)
POTASSIUM SERPL-SCNC: 4.4 MMOL/L (ref 3.5–5.5)
SODIUM SERPL-SCNC: 135 MMOL/L (ref 136–145)

## 2025-06-26 PROCEDURE — 36415 COLL VENOUS BLD VENIPUNCTURE: CPT

## 2025-06-26 PROCEDURE — 73030 X-RAY EXAM OF SHOULDER: CPT

## 2025-06-26 PROCEDURE — 80048 BASIC METABOLIC PNL TOTAL CA: CPT

## 2025-06-26 PROCEDURE — 83970 ASSAY OF PARATHORMONE: CPT

## 2025-06-26 PROCEDURE — 83735 ASSAY OF MAGNESIUM: CPT

## 2025-06-27 ENCOUNTER — RESULTS FOLLOW-UP (OUTPATIENT)
Age: 60
End: 2025-06-27

## 2025-06-27 LAB
CA-I BLD-MCNC: 9.6 MG/DL (ref 8.5–10.1)
PTH-INTACT SERPL-MCNC: 82.3 PG/ML (ref 15–65)

## 2025-06-27 NOTE — RESULT ENCOUNTER NOTE
Please contact patient and do the following asap  Mild elevation of PTH.  Tell patient to discuss with PCP.

## 2025-06-30 ENCOUNTER — RESULTS FOLLOW-UP (OUTPATIENT)
Facility: CLINIC | Age: 60
End: 2025-06-30

## 2025-06-30 NOTE — TELEPHONE ENCOUNTER
----- Message from Dr. Chance Polanco MD sent at 6/27/2025 12:24 PM EDT -----  Please contact patient and do the following asap  Mild elevation of PTH.  Tell patient to discuss with PCP.

## 2025-06-30 NOTE — TELEPHONE ENCOUNTER
Spoke to patient she was happy to get a call about her lab work. She is going to ask her pcp what they recommend due to pth being elevated. She will call with any future questions or concerns.

## 2025-07-03 NOTE — PROGRESS NOTES
I SPOKE WITH HER.  She is still having foot pain.  Her foot feels like she is walking on side of her foot.  I spoke with her.  She did come see me in August 2025.  She is still having some pain discomfort in her foot, popping sensation in her foot.  CT scan results are listed as below.    Plan    1. See me. August 2025.      CT FOOT LEFT WO CONTRAST 04/08/2025    Narrative  EXAM: CT ANKLE LEFT WO CONTRAST, CT FOOT LEFT WO CONTRAST    INDICATION: Diabetic. Primary osteoarthritis left foot. History of toe  surgeries. Chronic pain in left ankle. Charcot joint left foot.    COMPARISON: None    TECHNIQUE: Helical CT of the LEFT ANKLE with with multiplanar reformations.  Helical CT of the LEFT FOOT with multiplanar reformations.. Images reviewed in  soft tissue and bone windows.  CT dose reduction was achieved through the use of  a standardized protocol tailored for this examination and automatic exposure  control for dose modulation.    CONTRAST: None.    FINDINGS: Bones: Bone dilatation is within normal limits. There is no acute  fracture or dislocation.    Joint fluid: No substantial ankle or posterior subtalar effusion. No substantial  foot effusion.    Articulations: There are no neuropathic changes demonstrated. There is mild  ankle osteoarthrosis associated with mild nonuniform joint space narrowing  appearing greatest posterior medially. Tiny ankle joint marginal osteophytes are  shown.    There is small-moderate dorsal osteophytes at the proximal margin of the  navicular bone, without substantial talonavicular joint space narrowing. There  is no substantial subtalar, calcaneocuboid, or naviculocuneiform arthrosis.    There is severe joint space narrowing of the second and third tarsometatarsal  articulations associated with subchondral sclerosis and numerous tiny cysts.  There are additionally numerous subcortical cyst at the base of the fourth  metatarsal bone. A minimal-mild appearance of

## 2025-07-07 ENCOUNTER — TELEPHONE (OUTPATIENT)
Dept: CARDIOTHORACIC SURGERY | Age: 60
End: 2025-07-07

## 2025-07-07 NOTE — TELEPHONE ENCOUNTER
Out of Brilinta, [facility] needs an auth prior too. States she's been trying to reach the [facility] location, but no answer.

## 2025-07-08 RX ORDER — TICAGRELOR 60 MG/1
60 TABLET, FILM COATED ORAL 2 TIMES DAILY
Qty: 180 TABLET | Refills: 3 | Status: SHIPPED | OUTPATIENT
Start: 2025-07-08 | End: 2025-07-08 | Stop reason: SDUPTHER

## 2025-07-08 RX ORDER — TICAGRELOR 90 MG/1
90 TABLET, FILM COATED ORAL 2 TIMES DAILY
Qty: 180 TABLET | Refills: 0 | Status: SHIPPED | OUTPATIENT
Start: 2025-07-08

## 2025-07-08 NOTE — TELEPHONE ENCOUNTER
[facility] called and stated they don't have the Brilinta 60 mg and they not sure when they will get it in. Patient states she will take the 90 mg until they get it in. Please advise and send [facility] to [facility] on [address]

## 2025-07-08 NOTE — TELEPHONE ENCOUNTER
Requested Prescriptions     Pending Prescriptions Disp Refills    ticagrelor (BRILINTA) 60 MG TABS tablet 180 tablet 3     Sig: Take 1 tablet by mouth 2 times daily

## 2025-07-08 NOTE — TELEPHONE ENCOUNTER
She used to be on the higher dose and because of her bruising you took her off and switched to brilinta. She didn't what she was on so you agreed to give her brilinta at 60 mg 2x a day.

## 2025-07-16 ENCOUNTER — OFFICE VISIT (OUTPATIENT)
Age: 60
End: 2025-07-16

## 2025-07-16 VITALS — WEIGHT: 275 LBS | HEIGHT: 67 IN | BODY MASS INDEX: 43.16 KG/M2

## 2025-07-16 DIAGNOSIS — G56.21 CUBITAL TUNNEL SYNDROME, RIGHT: Primary | ICD-10-CM

## 2025-07-16 DIAGNOSIS — R22.31 MASS OF FINGER OF RIGHT HAND: ICD-10-CM

## 2025-07-16 DIAGNOSIS — G56.22 CUBITAL TUNNEL SYNDROME, LEFT: ICD-10-CM

## 2025-07-16 NOTE — PROGRESS NOTES
Jessie Momin is a 59 y.o. female right handed.  Worker's Compensation and legal considerations: none    Chief Complaint   Patient presents with    Hand Pain     Bilateral      Pain Score:   6    Subjective:     Initial HPI: Patient presents today with a history of bilateral hand numbness and tingling.  She reports a history of carpal tunnel releases bilaterally years ago.  She reports numbness and tingling in the ulnar-sided digits.  On the back of the right hand there is a mass that she reports moves around.  She has recently had an EMG.    Date of onset: Chronic  Injury: No  Prior Treatment:  No  Contributory history: History of bilateral carpal tunnel releases.    ROS: Review of Systems - General ROS: negative except HPI    Past Medical History:   Diagnosis Date    Allergic rhinitis     Anemia     Angio-edema     Asthma     Baker's cyst     Blind left eye     BRCA1 negative     BRCA2 negative     CAD (coronary artery disease)     Carpal tunnel syndrome     CHF (congestive heart failure) (HCC)     Chronic back pain     COPD (chronic obstructive pulmonary disease) (HCC)     D-dimer, elevated     Chronic elevation    Depression     Disease of blood and blood forming organ     Diverticulosis     Enlargement, spleen     Esophagospasm     Essential hypertension     Fatty liver     GERD (gastroesophageal reflux disease)     GI (gastrointestinal bleed)     Heart disease     Heart failure (HCC)     Right sided    Histoplasmosis     HPV (human papilloma virus) anogenital infection     Hypothyroidism     Irritable bowel syndrome     Lyme disease     MI (myocardial infarction) ([facility]) Aug.30,2024    Morbid obesity (HCC)     Neuropathy     Upper and lower    Neuropathy     Osteoarthritis     PCOS (polycystic ovarian syndrome)     Pituitary abnormality     Pneumonia     PTSD (post-traumatic stress disorder)     Restless legs syndrome     Tachycardia     Thyroid disease     Tricuspid insufficiency        Past Surgical

## 2025-07-18 ENCOUNTER — SCHEDULED TELEPHONE ENCOUNTER (OUTPATIENT)
Age: 60
End: 2025-07-18
Payer: MEDICAID

## 2025-07-18 DIAGNOSIS — M79.18 CHRONIC PRIMARY MUSCULOSKELETAL PAIN: ICD-10-CM

## 2025-07-18 DIAGNOSIS — M47.816 LUMBAR FACET ARTHROPATHY: ICD-10-CM

## 2025-07-18 DIAGNOSIS — M54.50 LUMBAR PAIN: ICD-10-CM

## 2025-07-18 DIAGNOSIS — M48.062 SPINAL STENOSIS OF LUMBAR REGION WITH NEUROGENIC CLAUDICATION: Primary | ICD-10-CM

## 2025-07-18 DIAGNOSIS — G62.9 NEUROPATHY: ICD-10-CM

## 2025-07-18 DIAGNOSIS — G89.29 CHRONIC PRIMARY MUSCULOSKELETAL PAIN: ICD-10-CM

## 2025-07-18 DIAGNOSIS — M54.2 CERVICAL PAIN: ICD-10-CM

## 2025-07-18 PROCEDURE — 99214 OFFICE O/P EST MOD 30 MIN: CPT | Performed by: PHYSICAL MEDICINE & REHABILITATION

## 2025-07-18 RX ORDER — DULOXETIN HYDROCHLORIDE 30 MG/1
30 CAPSULE, DELAYED RELEASE ORAL 2 TIMES DAILY
Qty: 60 CAPSULE | Refills: 5 | Status: SHIPPED | OUTPATIENT
Start: 2025-07-18 | End: 2026-01-14

## 2025-07-18 ASSESSMENT — ENCOUNTER SYMPTOMS
VOMITING: 0
WHEEZING: 0
NAUSEA: 0
BACK PAIN: 1
TROUBLE SWALLOWING: 0
SHORTNESS OF BREATH: 0

## 2025-07-18 NOTE — PROGRESS NOTES
and spondylosis producing ventral sac flattening. Coupled with ligament flavum hypertrophy and mild epidural lipomatosis, the canal is moderately stenotic with crowding of the cauda equina. Adequate recess patency. Disc protrusion into the neural foramen on the left again noted, producing mild to moderate stenosis with only mild mass effect on the left L3 nerve root. Widely patent foramen on the right. Facet joints are mildly to moderately degenerative.     L2-3: Disc bulging and spondylosis to the right but broadly protruded to the left. Low normal canal. Adequate foraminal patency bilaterally.     L1-2: Mild disc bulging to the left but mildly protruded to the right and extending into the neural foramen. Mild contact along the ventral surface of the right L1 nerve root, but no significant distortion. Widely patent canal.     T12-L1: Disc bulging centrally and to the right with widely patent canal and foramina.     DISTAL CORD/CONUS/CAUDA EQUINA:     > Distal cord and conus are normal in morphology and signal intensity.     > Tip of conus lies at L1.     > Additional cauda equina findings: None.     OSSEOUS: Multilevel discogenic endplate marrow edema greatest to the right at L4-5 and to the left at L3-4, but also significant to the right at L1-2, anteriorly at T12-L1 and anteriorly at T10-11.     VISUALIZED SACRUM AND POSTERIOR ILIAC BONES: Unremarkable.     PARASPINAL and RETROPERITONEAL: Unremarkable.     ADDITIONAL FINDINGS: None.      IMPRESSION:  1. Panlumbar DDD with high-grade spinal stenosis at L4-5, worsening since last year. There is no other high-grade canal stenosis although considered moderate at L3-4.   2. No moderate or high-grade foraminal stenosis.   3. Extensive discogenic endplate marrow edema involving multiple levels, each potential pain generators.     The limitations of a telemedicine visit including the inability to perform a detailed physical examination may miss significant findings was

## 2025-07-23 ENCOUNTER — TRANSCRIBE ORDERS (OUTPATIENT)
Facility: HOSPITAL | Age: 60
End: 2025-07-23

## 2025-07-23 DIAGNOSIS — M48.061 SPINAL STENOSIS OF LUMBAR REGION, UNSPECIFIED WHETHER NEUROGENIC CLAUDICATION PRESENT: ICD-10-CM

## 2025-07-23 DIAGNOSIS — M54.16 LUMBAR RADICULOPATHY: Primary | ICD-10-CM

## 2025-07-24 ENCOUNTER — CLINICAL DOCUMENTATION (OUTPATIENT)
Age: 60
End: 2025-07-24

## 2025-07-24 NOTE — PROGRESS NOTES
Sent pre op clearance request with blood thinner instructions to Dr. Polanco for patient's RT CuTR surgery scheduled for 8/19/25.  Received request back that Dr. Polanco would like patient to wait until after her follow up with him on 11/21/25.  Called patient and let her know that we did not have clearance for surgery, but advised patient to call me in October to hold a tentative surgery date for after her cardiology appt.     PO appt cancelled and patient removed from surgery calendar.

## 2025-08-04 ENCOUNTER — HOSPITAL ENCOUNTER (OUTPATIENT)
Age: 60
Discharge: HOME | End: 2025-08-07
Payer: MEDICAID

## 2025-08-04 DIAGNOSIS — M54.16 LUMBAR RADICULOPATHY: ICD-10-CM

## 2025-08-04 DIAGNOSIS — M48.061 SPINAL STENOSIS OF LUMBAR REGION, UNSPECIFIED WHETHER NEUROGENIC CLAUDICATION PRESENT: ICD-10-CM

## 2025-08-04 PROCEDURE — 72131 CT LUMBAR SPINE W/O DYE: CPT

## 2025-08-06 ENCOUNTER — TRANSCRIBE ORDERS (OUTPATIENT)
Age: 60
End: 2025-08-06

## 2025-08-06 ENCOUNTER — HOSPITAL ENCOUNTER (OUTPATIENT)
Age: 60
Discharge: HOME | End: 2025-08-09
Payer: MEDICAID

## 2025-08-06 DIAGNOSIS — M54.16 LUMBAR RADICULOPATHY: ICD-10-CM

## 2025-08-06 DIAGNOSIS — M48.061 SPINAL STENOSIS, LUMBAR REGION, WITHOUT NEUROGENIC CLAUDICATION: Primary | ICD-10-CM

## 2025-08-06 DIAGNOSIS — M48.061 SPINAL STENOSIS, LUMBAR REGION, WITHOUT NEUROGENIC CLAUDICATION: ICD-10-CM

## 2025-08-06 PROCEDURE — 72110 X-RAY EXAM L-2 SPINE 4/>VWS: CPT

## 2025-08-07 ENCOUNTER — OFFICE VISIT (OUTPATIENT)
Age: 60
End: 2025-08-07
Payer: MEDICAID

## 2025-08-07 DIAGNOSIS — M19.072 PRIMARY OSTEOARTHRITIS OF LEFT FOOT: Primary | ICD-10-CM

## 2025-08-07 PROCEDURE — 99214 OFFICE O/P EST MOD 30 MIN: CPT | Performed by: ORTHOPAEDIC SURGERY

## 2025-08-07 RX ORDER — TRAMADOL HYDROCHLORIDE 50 MG/1
50 TABLET ORAL EVERY 8 HOURS PRN
Qty: 21 TABLET | Refills: 0 | Status: SHIPPED | OUTPATIENT
Start: 2025-08-07 | End: 2025-08-14

## 2025-08-08 ENCOUNTER — PATIENT MESSAGE (OUTPATIENT)
Age: 60
End: 2025-08-08

## 2025-08-25 ENCOUNTER — TELEPHONE (OUTPATIENT)
Age: 60
End: 2025-08-25

## 2025-08-25 DIAGNOSIS — M19.072 PRIMARY OSTEOARTHRITIS OF LEFT FOOT: Primary | ICD-10-CM

## 2025-08-26 DIAGNOSIS — G62.9 POLYNEUROPATHY, UNSPECIFIED: ICD-10-CM

## 2025-08-26 RX ORDER — TRAMADOL HYDROCHLORIDE 50 MG/1
50 TABLET ORAL 2 TIMES DAILY PRN
Qty: 14 TABLET | Refills: 0 | Status: SHIPPED | OUTPATIENT
Start: 2025-08-26 | End: 2025-09-02

## 2025-09-03 RX ORDER — DULOXETIN HYDROCHLORIDE 30 MG/1
CAPSULE, DELAYED RELEASE ORAL
Qty: 90 CAPSULE | Refills: 2 | Status: SHIPPED | OUTPATIENT
Start: 2025-09-03 | End: 2025-09-05 | Stop reason: SDUPTHER

## (undated) DEVICE — GAMMEX® NON-LATEX PI UNDERGLOVE SIZE 6.5, STERILE POLYISOPRENE POWDER-FREE SURGICAL GLOVE: Brand: GAMMEX

## (undated) DEVICE — CATH BLLN ANGIO 3X15MM SC EUPHORA RX

## (undated) DEVICE — STERILE POLYISOPRENE POWDER-FREE SURGICAL GLOVES: Brand: PROTEXIS

## (undated) DEVICE — PRESSURE TUBING: Brand: TRUWAVE

## (undated) DEVICE — SYRINGE MED 10ML RED POLYCARB BRL FIX M LUER CONN FLAT GRP

## (undated) DEVICE — DRAPE,ANGIO,BRACH,STERILE,38X44: Brand: MEDLINE

## (undated) DEVICE — Device

## (undated) DEVICE — FCPS RAD JAW 4LC 240CM W/NDL -- BX/20 RADIAL JAW 4

## (undated) DEVICE — KIT CLN UP BON SECOURS MARYV

## (undated) DEVICE — SUT SLK 2-0SH 30IN BLK --

## (undated) DEVICE — SYRINGE MED 25GA 3ML L5/8IN SUBQ PLAS W/ DETACH NDL SFTY

## (undated) DEVICE — PREP SKN CHLRAPRP APL 26ML STR --

## (undated) DEVICE — CATHETER ETER VASC OD6FR CARD 145DEG PGTL BRAID JL40 JR40 MP

## (undated) DEVICE — CATHETER GUID 6FR DIA0.071IN SHFT NYL STD L JR 4 CRV ENH

## (undated) DEVICE — DEVICE INFL W ACCS + HEMSTAS VLV INSRT TOOL AND TORQ BASIX

## (undated) DEVICE — LINER SUCT CANSTR 3000CC PLAS SFT PRE ASSEMB W/OUT TBNG W/

## (undated) DEVICE — GLIDESHEATH SLENDER STAINLESS STEEL KIT: Brand: GLIDESHEATH SLENDER

## (undated) DEVICE — BOWL MED M 16OZ PLAS CAP GRAD

## (undated) DEVICE — GUIDEWIRE VASC L260CM DIA0.035IN RAD 3MM J TIP L7CM PTFE

## (undated) DEVICE — ANGIO-SEAL VIP VASCULAR CLOSURE DEVICE: Brand: ANGIO-SEAL

## (undated) DEVICE — GUIDEWIRE VASC L150CM DIA0.035IN TIP L3MM PTFE J CRV FIX

## (undated) DEVICE — 3M™ TEGADERM™ TRANSPARENT FILM DRESSING FRAME STYLE WITH BORDER, 1616, 4 IN X 4-3/4 IN (10 CM X 12 CM), 50/CT 4CT/CASE: Brand: 3M™ TEGADERM™

## (undated) DEVICE — BASIN EMSIS 16OZ GRAPHITE PLAS KID SHP MOLD GRAD FOR ORAL

## (undated) DEVICE — CATHETER THOR 36FR DIA10.7MM POLYVI CHL TRCR TIP STR SFT

## (undated) DEVICE — SYR 20ML LL STRL LF --

## (undated) DEVICE — CATHETER SUCT TR FL TIP 14FR W/ O CTRL

## (undated) DEVICE — PROBE SET W/ DRP

## (undated) DEVICE — SHEARS ENDOSCP L9CM CRV HARM FOCS +

## (undated) DEVICE — FLUFF AND POLYMER UNDERPAD,EXTRA HEAVY: Brand: WINGS

## (undated) DEVICE — COVER US PRB W15XL120CM W/ GEL RUBBERBAND TAPE STRP FLD GEN

## (undated) DEVICE — PINNACLE INTRODUCER SHEATH: Brand: PINNACLE

## (undated) DEVICE — HI-TORQUE VERSACORE FLOPPY GUIDE WIRE SYSTEM 145 CM: Brand: HI-TORQUE VERSACORE

## (undated) DEVICE — SC 3W MP RA OFF PB - PG: Brand: NAMIC

## (undated) DEVICE — GAUZE,SPONGE,4"X4",16PLY,STRL,LF,10/TRAY: Brand: MEDLINE

## (undated) DEVICE — 4FR MICROPUNCTURE KIT

## (undated) DEVICE — CATH BLLN ANGIO 5X15MM NC EUPHORIA RX

## (undated) DEVICE — CATHETER GUID EBU3.5 6 FRX100 CM VISTA BRT TIP

## (undated) DEVICE — PROCEDURE KIT FLUID MGMT 10 FR CUST MAINFOLD

## (undated) DEVICE — CATHETER ANGIO 5FR L100CM GRY S STL NYL JR4 3 SEG BRAID L

## (undated) DEVICE — COVER,LIGHT HANDLE,FLX,1/PK: Brand: MEDLINE INDUSTRIES, INC.

## (undated) DEVICE — GLOVE SURG SZ 6 CRM LTX FREE POLYISOPRENE POLYMER BEAD ANTI

## (undated) DEVICE — FORCEPS BX L240CM JAW DIA2.8MM L CAP W/ NDL MIC MESH TOOTH

## (undated) DEVICE — CANNULA ORIG TL CLR W FOAM CUSHIONS AND 14FT SUPL TB 3 CHN

## (undated) DEVICE — MEDI-VAC NON-CONDUCTIVE SUCTION TUBING: Brand: CARDINAL HEALTH

## (undated) DEVICE — Device: Brand: OMNIWIRE PRESSURE GUIDE WIRE

## (undated) DEVICE — SUTURE VCRL SZ 3-0 L27IN ABSRB UD L26MM SH 1/2 CIR J416H

## (undated) DEVICE — SYR 50ML SLIP TIP NSAF LF STRL --

## (undated) DEVICE — CATH BLLN ANGIO 3.50X15MM NC EUPHORA RX

## (undated) DEVICE — INSULATED BLADE ELECTRODE: Brand: EDGE

## (undated) DEVICE — TOWEL,OR,DSP,ST,BLUE,STD,4/PK,20PK/CS: Brand: MEDLINE

## (undated) DEVICE — YANKAUER,SMOOTH HANDLE,HIGH CAPACITY: Brand: MEDLINE INDUSTRIES, INC.

## (undated) DEVICE — REM POLYHESIVE ADULT PATIENT RETURN ELECTRODE: Brand: VALLEYLAB

## (undated) DEVICE — CANNULA NSL AD TBNG L14FT STD PVC O2 CRV CONN NONFLARED NSL

## (undated) DEVICE — NEEDLE HYPO 25GA L1.5IN BVL ORIENTED ECLIPSE

## (undated) DEVICE — SYR 10ML LUER LOK 1/5ML GRAD --

## (undated) DEVICE — SURGICAL PROCEDURE TRAY CRD CATH 3 PRT

## (undated) DEVICE — GOWN ISOL IMPERV UNIV, DISP, OPEN BACK, BLUE --

## (undated) DEVICE — SET FLD ADMIN 3 W STPCOCK FIX FEM L BOR 1IN

## (undated) DEVICE — Device: Brand: EAGLE EYE PLATINUM ST RX DIGITAL IVUS CATHETER

## (undated) DEVICE — INTENDED FOR TISSUE SEPARATION, AND OTHER PROCEDURES THAT REQUIRE A SHARP SURGICAL BLADE TO PUNCTURE OR CUT.: Brand: BARD-PARKER ® STAINLESS STEEL BLADES

## (undated) DEVICE — PRESSURE MONITORING SET: Brand: TRUWAVE

## (undated) DEVICE — DECANTER BAG 9": Brand: MEDLINE INDUSTRIES, INC.

## (undated) DEVICE — PLASTICS CHEST BREAST ASU: Brand: MEDLINE INDUSTRIES, INC.

## (undated) DEVICE — SUTURE MCRYL SZ 4-0 L27IN ABSRB UD L24MM PS-1 3/8 CIR PRIM Y935H

## (undated) DEVICE — ENDOSCOPY PUMP TUBING/ CAP SET: Brand: ERBE

## (undated) DEVICE — CATHETER 5FR JL4 CORDIS 100 CM

## (undated) DEVICE — APPLIER CLP L9.38IN M LIG TI DISP STR RNG HNDL LIGACLP

## (undated) DEVICE — BAND COMPR L24CM REG CLR PLAS HEMSTAT EXT HK AND LOOP RETEN

## (undated) DEVICE — BASIXCOMPAK INDEFLATOR

## (undated) DEVICE — RUNTHROUGH NS EXTRA FLOPPY PTCA GUIDEWIRE: Brand: RUNTHROUGH

## (undated) DEVICE — BITE BLOCK ENDOSCP UNIV AD 6 TO 9.4 MM

## (undated) DEVICE — PADS  DEFIB  ADULT

## (undated) DEVICE — SOLUTION IRRIG 1000ML H2O STRL BLT

## (undated) DEVICE — ANGIOGRAPHY KIT CUST VASC

## (undated) DEVICE — SUPPORT WRST COMPR W/ HK MBRACE

## (undated) DEVICE — DERMABOND SKIN ADH 0.7ML -- DERMABOND ADVANCED 12/BX

## (undated) DEVICE — BAG WST COLL CLR DISP PVC W/ ROTICULATING LUER L BOR TBNG

## (undated) DEVICE — COVER INSTRUMENT LOCALIZER